# Patient Record
Sex: FEMALE | Race: WHITE | NOT HISPANIC OR LATINO | Employment: PART TIME | URBAN - METROPOLITAN AREA
[De-identification: names, ages, dates, MRNs, and addresses within clinical notes are randomized per-mention and may not be internally consistent; named-entity substitution may affect disease eponyms.]

---

## 2017-01-03 ENCOUNTER — PATIENT MESSAGE (OUTPATIENT)
Dept: FAMILY MEDICINE | Facility: CLINIC | Age: 67
End: 2017-01-03

## 2017-01-03 DIAGNOSIS — M25.50 ARTHRALGIA, UNSPECIFIED JOINT: Primary | ICD-10-CM

## 2017-01-03 NOTE — TELEPHONE ENCOUNTER
Patient is wanting to be referred to rheumatology. Have attached and pended referral for your signature.   Thank you  Corrie Espinoza LPN

## 2017-01-04 ENCOUNTER — OFFICE VISIT (OUTPATIENT)
Dept: SURGERY | Facility: CLINIC | Age: 67
End: 2017-01-04
Payer: MEDICARE

## 2017-01-04 VITALS
BODY MASS INDEX: 29.49 KG/M2 | HEIGHT: 63 IN | HEART RATE: 67 BPM | DIASTOLIC BLOOD PRESSURE: 74 MMHG | TEMPERATURE: 97 F | WEIGHT: 166.44 LBS | SYSTOLIC BLOOD PRESSURE: 173 MMHG

## 2017-01-04 DIAGNOSIS — R15.9 FECAL INCONTINENCE: Primary | ICD-10-CM

## 2017-01-04 PROCEDURE — 1160F RVW MEDS BY RX/DR IN RCRD: CPT | Mod: S$GLB,,, | Performed by: SURGERY

## 2017-01-04 PROCEDURE — 3078F DIAST BP <80 MM HG: CPT | Mod: S$GLB,,, | Performed by: SURGERY

## 2017-01-04 PROCEDURE — 3077F SYST BP >= 140 MM HG: CPT | Mod: S$GLB,,, | Performed by: SURGERY

## 2017-01-04 PROCEDURE — 99999 PR PBB SHADOW E&M-EST. PATIENT-LVL III: CPT | Mod: PBBFAC,,, | Performed by: SURGERY

## 2017-01-04 PROCEDURE — 1126F AMNT PAIN NOTED NONE PRSNT: CPT | Mod: S$GLB,,, | Performed by: SURGERY

## 2017-01-04 PROCEDURE — 1157F ADVNC CARE PLAN IN RCRD: CPT | Mod: S$GLB,,, | Performed by: SURGERY

## 2017-01-04 PROCEDURE — 99204 OFFICE O/P NEW MOD 45 MIN: CPT | Mod: S$GLB,,, | Performed by: SURGERY

## 2017-01-04 PROCEDURE — 1159F MED LIST DOCD IN RCRD: CPT | Mod: S$GLB,,, | Performed by: SURGERY

## 2017-01-04 NOTE — LETTER
January 4, 2017      Mayco Shultz MD  [address] [facility] [address] [place] [address]           [facility]  [address] [facility] [place] [address]  Phone: [phone]          Patient: Lyudmila Neri   MR Number: [id number]   YOB: 1950   Date of Visit: 1/4/2017       Dear Dr. Mayco Shultz:    Thank you for referring Lyudmila Neri to me for evaluation. Attached you will find relevant portions of my assessment and plan of care.    If you have questions, please do not hesitate to call me. I look forward to following Lyudmila Neri along with you.    Sincerely,    Willie Foley MD    Enclosure  CC:  No Recipients    If you would like to receive this communication electronically, please contact [email or web address] or [phone] to request more information on [email or web address] Link access.    For providers and/or their staff who would like to refer a patient to [facility], please contact us through our one-stop-shop provider referral line, [facility] , at [phone].    If you feel you have received this communication in error or would no longer like to receive these types of communications, please e-mail [email or web address]

## 2017-01-04 NOTE — MR AVS SNAPSHOT
[facility]  [address]  [place] [address]  Phone: [phone]                  Lyudmila Neri   2017 2:00 PM   Office Visit    Description:  Female : 1950   Provider:  Willie Foley MD   Department:  [facility]           Reason for Visit     Consult           Diagnoses this Visit        Comments    Fecal incontinence    -  Primary            To Do List           Future Appointments        Provider Department Dept Phone    2017 8:45 AM LAB, [place] [facility] [phone]    2017 9:20 AM Aleksandr Das NP [facility] Hematology Oncology [phone]      Goals (5 Years of Data)     None      [facility] Call     [facility] Nurse Care Line -  Assistance  Registered nurses in the [facility] provide clinical advisement, health education, appointment booking, and other advisory services.  Call for this free service at [phone].             Medications           Message regarding Medications     Verify the changes and/or additions to your medication regime listed below are the same as discussed with your clinician today.  If any of these changes or additions are incorrect, please notify your healthcare provider.             Verify that the below list of medications is an accurate representation of the medications you are currently taking.  If none reported, the list may be blank. If incorrect, please contact your healthcare provider. Carry this list with you in case of emergency.           Current Medications     amlodipine (NORVASC) 5 MG tablet Take 1 tablet (5 mg total) by mouth once daily.    aspirin (ECOTRIN) 81 MG EC tablet Take 81 mg by mouth once daily.    FOLIC ACID/MULTIVIT-MIN/LUTEIN (CENTRUM SILVER ORAL) Take by mouth.    losartan (COZAAR) 50 MG tablet Take 50 mg by mouth once daily.    sertraline (ZOLOFT) 100 MG tablet Take 1 tablet (100 mg total) by mouth once daily.    TUMS 200 mg  "calcium (500 mg) chewable tablet     celecoxib (CELEBREX) 200 MG capsule TAKE ONE CAPSULE BY MOUTH TWICE DAILY    clobetasol (TEMOVATE) 0.05 % external solution Apply topically 2 (two) times daily.           Clinical Reference Information           Vital Signs - Last Recorded  Most recent update: 1/4/2017  2:02 PM by Kailash Mendez MA    BP Pulse Temp Ht Wt BMI    (!) 173/74 67 97.4 °F (36.3 °C) (Oral) 5' 3" (1.6 m) 75.5 kg (166 lb 7.2 oz) 29.48 kg/m2      Blood Pressure          Most Recent Value    BP  (!)  173/74      Allergies as of 1/4/2017     Codeine      Immunizations Administered on Date of Encounter - 1/4/2017     None      "

## 2017-01-04 NOTE — PROGRESS NOTES
Subjective:       Patient ID: Lyudmila Neri is a 66 y.o. female.    Chief Complaint: Consult (Bowel Incontinence)    HPI  Pleasant 65 yo F referred to me for evaluation of fecal incontinence.  Sh enotes that over the last 3-4 months she has been having occasional episodes of incontinence to stool.  Notes that it is very sporadic some weeks never occurring and some twice a week.  She denies abdominal pain.  No n/v.  No blood per rectum.  No rectal pain.  She notes that her stools are always loose.  She has had one vaginal delivery requiring episotomy.  She had a normal cscope 3 years ago.  Pt is otherwise without complaint.   \  Review of Systems   Constitutional: Negative for activity change, appetite change, fever and unexpected weight change.   Respiratory: Negative for chest tightness, shortness of breath and wheezing.    Cardiovascular: Negative for chest pain.   Gastrointestinal: Positive for diarrhea. Negative for abdominal distention, abdominal pain, anal bleeding, blood in stool, constipation, nausea, rectal pain and vomiting.   Genitourinary: Negative for difficulty urinating, dysuria and frequency.   Musculoskeletal: Negative for arthralgias and neck stiffness.   Skin: Negative for wound.   Neurological: Negative for dizziness.   Hematological: Negative for adenopathy. Does not bruise/bleed easily.   Psychiatric/Behavioral: Negative for agitation and decreased concentration.       Objective:      Physical Exam   Constitutional: She is oriented to person, place, and time. She appears well-developed and well-nourished.   HENT:   Head: Normocephalic and atraumatic.   Eyes: Pupils are equal, round, and reactive to light.   Neck: Normal range of motion. Neck supple. No tracheal deviation present. No thyromegaly present.   Cardiovascular: Normal rate, regular rhythm and normal heart sounds.    No murmur heard.  Pulmonary/Chest: Effort normal and breath sounds normal. She exhibits no tenderness.   Abdominal:  Soft. Bowel sounds are normal. She exhibits no distension, no abdominal bruit, no pulsatile midline mass and no mass. There is no hepatosplenomegaly. There is no tenderness. There is no rigidity, no rebound, no guarding, no tenderness at McBurney's point and negative Day's sign. No hernia. Hernia confirmed negative in the ventral area.   Genitourinary: Rectum normal.   Genitourinary Comments: Ext exam with no  Significant abnormality.  TEDDY with increased sphincter tone.  Tender on exam secondary to hypertonicity.  No masses.  Anoscopy unable to be performed.       Musculoskeletal: Normal range of motion.   Neurological: She is alert and oriented to person, place, and time.   Skin: Skin is warm. No rash noted. No erythema.   Psychiatric: She has a normal mood and affect. Her behavior is normal.   Vitals reviewed.      Assessment:     fecal incontinence.    No diagnosis found.    Plan:       D/ w pt.  Feel that most of her symptoms are related to looseness of stools.  I have recommended a bulking agent to take twice daily. R ecommended fibercon.  She will rTC In 6 weeks and if symptoms persist will need a cscope.

## 2017-01-10 ENCOUNTER — PATIENT MESSAGE (OUTPATIENT)
Dept: FAMILY MEDICINE | Facility: CLINIC | Age: 67
End: 2017-01-10

## 2017-01-10 DIAGNOSIS — I10 ESSENTIAL HYPERTENSION: ICD-10-CM

## 2017-01-11 RX ORDER — LOSARTAN POTASSIUM 100 MG/1
100 TABLET ORAL DAILY
Qty: 90 TABLET | Refills: 3 | Status: SHIPPED | OUTPATIENT
Start: 2017-01-11 | End: 2018-11-27 | Stop reason: ALTCHOICE

## 2017-01-11 RX ORDER — HYDROCHLOROTHIAZIDE 12.5 MG/1
12.5 CAPSULE ORAL DAILY
Qty: 90 CAPSULE | Refills: 3 | Status: SHIPPED | OUTPATIENT
Start: 2017-01-11 | End: 2018-08-07 | Stop reason: SDUPTHER

## 2017-01-11 RX ORDER — AMLODIPINE BESYLATE 5 MG/1
5 TABLET ORAL DAILY
Qty: 90 TABLET | Refills: 3 | Status: SHIPPED | OUTPATIENT
Start: 2017-01-11 | End: 2018-05-09 | Stop reason: SDUPTHER

## 2017-02-02 ENCOUNTER — PATIENT MESSAGE (OUTPATIENT)
Dept: FAMILY MEDICINE | Facility: CLINIC | Age: 67
End: 2017-02-02

## 2017-03-01 ENCOUNTER — OFFICE VISIT (OUTPATIENT)
Dept: HEMATOLOGY/ONCOLOGY | Facility: CLINIC | Age: 67
End: 2017-03-01
Payer: MEDICARE

## 2017-03-01 ENCOUNTER — LAB VISIT (OUTPATIENT)
Dept: LAB | Facility: HOSPITAL | Age: 67
End: 2017-03-01
Attending: NURSE PRACTITIONER
Payer: MEDICARE

## 2017-03-01 ENCOUNTER — TELEPHONE (OUTPATIENT)
Dept: HEMATOLOGY/ONCOLOGY | Facility: CLINIC | Age: 67
End: 2017-03-01

## 2017-03-01 VITALS
HEIGHT: 63 IN | WEIGHT: 168 LBS | HEART RATE: 59 BPM | SYSTOLIC BLOOD PRESSURE: 126 MMHG | DIASTOLIC BLOOD PRESSURE: 67 MMHG | TEMPERATURE: 98 F | BODY MASS INDEX: 29.77 KG/M2 | RESPIRATION RATE: 18 BRPM

## 2017-03-01 DIAGNOSIS — C49.A2 GASTROINTESTINAL STROMAL TUMOR (GIST) OF STOMACH: Primary | ICD-10-CM

## 2017-03-01 DIAGNOSIS — C49.A0 GIST, MALIGNANT: ICD-10-CM

## 2017-03-01 LAB
ALBUMIN SERPL BCP-MCNC: 3.6 G/DL
ALP SERPL-CCNC: 47 U/L
ALT SERPL W/O P-5'-P-CCNC: 25 U/L
ANION GAP SERPL CALC-SCNC: 7 MMOL/L
AST SERPL-CCNC: 20 U/L
BASOPHILS # BLD AUTO: 0.05 K/UL
BASOPHILS NFR BLD: 0.7 %
BILIRUB SERPL-MCNC: 0.5 MG/DL
BUN SERPL-MCNC: 17 MG/DL
CALCIUM SERPL-MCNC: 8.8 MG/DL
CHLORIDE SERPL-SCNC: 102 MMOL/L
CO2 SERPL-SCNC: 31 MMOL/L
CREAT SERPL-MCNC: 0.77 MG/DL
DIFFERENTIAL METHOD: NORMAL
EOSINOPHIL # BLD AUTO: 0.2 K/UL
EOSINOPHIL NFR BLD: 2.4 %
ERYTHROCYTE [DISTWIDTH] IN BLOOD BY AUTOMATED COUNT: 12.8 %
EST. GFR  (AFRICAN AMERICAN): >60 ML/MIN/1.73 M^2
EST. GFR  (NON AFRICAN AMERICAN): >60 ML/MIN/1.73 M^2
GLUCOSE SERPL-MCNC: 93 MG/DL
HCT VFR BLD AUTO: 38.9 %
HGB BLD-MCNC: 12.8 G/DL
LDH SERPL L TO P-CCNC: 410 U/L
LYMPHOCYTES # BLD AUTO: 2.1 K/UL
LYMPHOCYTES NFR BLD: 28.6 %
MCH RBC QN AUTO: 30.6 PG
MCHC RBC AUTO-ENTMCNC: 32.9 %
MCV RBC AUTO: 93 FL
MONOCYTES # BLD AUTO: 0.7 K/UL
MONOCYTES NFR BLD: 8.9 %
NEUTROPHILS # BLD AUTO: 4.4 K/UL
NEUTROPHILS NFR BLD: 59.4 %
NRBC BLD-RTO: 0 /100 WBC
PLATELET # BLD AUTO: 221 K/UL
PMV BLD AUTO: 12.1 FL
POTASSIUM SERPL-SCNC: 4.6 MMOL/L
PROT SERPL-MCNC: 7 G/DL
RBC # BLD AUTO: 4.18 M/UL
SODIUM SERPL-SCNC: 140 MMOL/L
WBC # BLD AUTO: 7.44 K/UL

## 2017-03-01 PROCEDURE — 83615 LACTATE (LD) (LDH) ENZYME: CPT

## 2017-03-01 PROCEDURE — 1157F ADVNC CARE PLAN IN RCRD: CPT | Mod: S$GLB,,, | Performed by: NURSE PRACTITIONER

## 2017-03-01 PROCEDURE — 99213 OFFICE O/P EST LOW 20 MIN: CPT | Mod: S$GLB,,, | Performed by: NURSE PRACTITIONER

## 2017-03-01 PROCEDURE — 1160F RVW MEDS BY RX/DR IN RCRD: CPT | Mod: S$GLB,,, | Performed by: NURSE PRACTITIONER

## 2017-03-01 PROCEDURE — 85025 COMPLETE CBC W/AUTO DIFF WBC: CPT

## 2017-03-01 PROCEDURE — 1126F AMNT PAIN NOTED NONE PRSNT: CPT | Mod: S$GLB,,, | Performed by: NURSE PRACTITIONER

## 2017-03-01 PROCEDURE — 99999 PR PBB SHADOW E&M-EST. PATIENT-LVL IV: CPT | Mod: PBBFAC,,, | Performed by: NURSE PRACTITIONER

## 2017-03-01 PROCEDURE — 3078F DIAST BP <80 MM HG: CPT | Mod: S$GLB,,, | Performed by: NURSE PRACTITIONER

## 2017-03-01 PROCEDURE — 80053 COMPREHEN METABOLIC PANEL: CPT

## 2017-03-01 PROCEDURE — 1159F MED LIST DOCD IN RCRD: CPT | Mod: S$GLB,,, | Performed by: NURSE PRACTITIONER

## 2017-03-01 PROCEDURE — 3074F SYST BP LT 130 MM HG: CPT | Mod: S$GLB,,, | Performed by: NURSE PRACTITIONER

## 2017-03-01 NOTE — MR AVS SNAPSHOT
[place] [id number]  [facility] [address]  Phone: [phone]  Fax: [phone]                  Lyudmila Neri   3/1/2017 10:20 AM   Office Visit    Description:  Female : 1950   Provider:  Aleksandr Das NP   Department:  [facility]           Diagnoses this Visit        Comments    Gastrointestinal stromal tumor (GIST) of stomach    -  Primary            To Do List           Future Appointments        Provider Department Dept Phone    3/2/2017 8:00 AM [facility] XR3 [facility] [phone]    3/14/2017 9:00 AM Sophie Banerjee DO [facility] Rheumatology [phone]    2018 10:00 AM LAB, [place] [facility] [phone]    2018 10:15 AM NS XR2 [facility] [phone]    3/1/2018 1:40 PM Aleksandr Das NP [facility] [phone]      Goals (5 Years of Data)     None      Follow-Up and Disposition     Return in about 1 year (around 3/1/2018) for ANNUAL evaluation with interval CBC, CMP, LDH,  CXR.    Follow-up and Disposition History      [facility]     [facility] Care [facility] - 24/7 Assistance  Registered nurses in the [facility] provide clinical advisement, health education, appointment booking, and other advisory services.  Call for this free service at [phone].             Medications           Message regarding Medications     Verify the changes and/or additions to your medication regime listed below are the same as discussed with your clinician today.  If any of these changes or additions are incorrect, please notify your healthcare provider.             Verify that the below list of medications is an accurate representation of the medications you are currently taking.  If none reported, the list may be blank. If incorrect, please contact your healthcare provider. Carry this list with you in case of emergency.           Current  Medications     amlodipine (NORVASC) 5 MG tablet Take 1 tablet (5 mg total) by mouth once daily.    aspirin (ECOTRIN) 81 MG EC tablet Take 81 mg by mouth once daily.    celecoxib (CELEBREX) 200 MG capsule TAKE ONE CAPSULE BY MOUTH TWICE DAILY    clobetasol (TEMOVATE) 0.05 % external solution Apply topically as needed.     FOLIC ACID/MULTIVIT-MIN/LUTEIN (CENTRUM SILVER ORAL) Take by mouth.    hydrochlorothiazide (MICROZIDE) 12.5 mg capsule Take 1 capsule (12.5 mg total) by mouth once daily.    losartan (COZAAR) 100 MG tablet Take 1 tablet (100 mg total) by mouth once daily.    sertraline (ZOLOFT) 100 MG tablet Take 1 tablet (100 mg total) by mouth once daily.    TUMS 200 mg calcium (500 mg) chewable tablet            Clinical Reference Information           Your Vitals Were     BP                   126/67           Blood Pressure          Most Recent Value    BP  126/67      Allergies as of 3/1/2017     Codeine      Immunizations Administered on Date of Encounter - 3/1/2017     None      Orders Placed During Today's Visit     Future Labs/Procedures Expected by Expires    CBC w/ DIFF  3/1/2017 4/30/2018    CMP  3/1/2017 4/30/2018    CXR  3/1/2017 3/1/2018    CXR  3/1/2017 3/1/2018    LDH  3/1/2017 4/30/2018      Language Assistance Services     ATTENTION: Language assistance services are available, free of charge. Please call [phone].      ATENCIÓN: Si habla español, tiene a carrillo disposición servicios gratuitos de asistencia lingüística. Llame al [phone].     [facility] Ý: N?u b?n nói Ti?ng Vi?t, có các d?ch v? h? tr? ngôn ng? mi?n phí dành cho b?n. G?i s? [phone].         [facility] complies with applicable Federal civil rights laws and does not discriminate on the basis of race, color, national origin, age, disability, or sex.

## 2017-03-01 NOTE — PROGRESS NOTES
HISTORY OF PRESENT ILLNESS:  This is a 67-year-old white female known to Dr. Pro for resected low-grade GIST (December 2011).  She presents to the clinic   today for her approximate 62-month post-therapy evaluation.  She denies any   difficulties with fevers, chills, abdominal discomfort/bloating, nausea,   vomiting, constipation, diarrhea, painful lymphadenopathy, drenching night   sweats, bleeding, etc.  No other new complaints or pertinent findings on a   14-point review of systems.    PHYSICAL EXAMINATION:  GENERAL:  Well-developed, well-nourished white female in no acute distress.    Alert and oriented x3.  VITAL SIGNS:  Weight 167.9 pounds (gain of 2 pounds in six months), /67,   pulse 95, respiration 18, temperature 98.0.  HEENT:  Normocephalic, atraumatic.  Oral mucosa pink and moist.  Lips without   lesions.  Tongue midline.  Oropharynx clear.  Nonicteric sclerae.   NECK:  Supple.  No adenopathy.                                               HEART:  Regular rate and rhythm without murmur, gallop or rub.               LUNGS:  Clear to auscultation bilaterally.                                   ABDOMEN:  Soft, nontender and nondistended with positive normoactive bowel   sounds.  No hepatosplenomegaly.                                              EXTREMITIES:  No cyanosis, clubbing or edema.  Distal pulses are intact.     AXILLAE AND GROIN:  No palpable pathologic lymphadenopathy is appreciated.    LABORATORY:  WBC 7.44, hemoglobin 12.8, hematocrit 38.9, platelets 221.    Unremarkable differential.  Chemistry:  Sodium 140, potassium 4.6, chloride 102,   CO2 of 31, BUN 17, creatinine 0.77, EGFR > 60, glucose 93, calcium 8.8.  Alk phos,   liver enzymes and LDH within normal limits.    RADIOLOGY:  Chest x-ray to be scheduled and phone review.    IMPRESSION:  Completely resected gastrointestinal stromal tumor -- no evidence   of disease.    PLAN:  As the patient has completed 5 years of surveillance,  we will have the   patient return in one year with interval CBC, CMP, LDH and chest x-ray prior.    Assessment/plan reviewed and approved by Dr. Pro.      ROSA/SHANTI  dd: 03/01/2017 10:40:10 (CST)  td: 03/01/2017 17:55:14 (CST)  Doc ID   #[id number]  Job ID #[id number]    Addendum:  CXR 03/02/17 WNL; posted results to [facility].

## 2017-03-02 ENCOUNTER — HOSPITAL ENCOUNTER (OUTPATIENT)
Dept: RADIOLOGY | Facility: HOSPITAL | Age: 67
Discharge: HOME | End: 2017-03-02
Attending: NURSE PRACTITIONER
Payer: MEDICARE

## 2017-03-02 DIAGNOSIS — C49.A2 GASTROINTESTINAL STROMAL TUMOR (GIST) OF STOMACH: ICD-10-CM

## 2017-03-02 PROCEDURE — 71020 XR CHEST PA AND LATERAL: CPT | Mod: TC,PO

## 2017-03-02 PROCEDURE — 71020 XR CHEST PA AND LATERAL: CPT | Mod: 26,,, | Performed by: RADIOLOGY

## 2017-03-14 ENCOUNTER — INITIAL CONSULT (OUTPATIENT)
Dept: RHEUMATOLOGY | Facility: CLINIC | Age: 67
End: 2017-03-14
Payer: MEDICARE

## 2017-03-14 VITALS
HEART RATE: 72 BPM | BODY MASS INDEX: 30 KG/M2 | WEIGHT: 169.31 LBS | DIASTOLIC BLOOD PRESSURE: 90 MMHG | SYSTOLIC BLOOD PRESSURE: 140 MMHG | HEIGHT: 63 IN

## 2017-03-14 DIAGNOSIS — L40.9 PSORIASIS: ICD-10-CM

## 2017-03-14 DIAGNOSIS — M13.0 POLYARTHRITIS: Primary | ICD-10-CM

## 2017-03-14 PROCEDURE — 99999 PR PBB SHADOW E&M-EST. PATIENT-LVL III: CPT | Mod: PBBFAC,,, | Performed by: INTERNAL MEDICINE

## 2017-03-14 PROCEDURE — 1157F ADVNC CARE PLAN IN RCRD: CPT | Mod: S$GLB,,, | Performed by: INTERNAL MEDICINE

## 2017-03-14 PROCEDURE — 99205 OFFICE O/P NEW HI 60 MIN: CPT | Mod: S$GLB,,, | Performed by: INTERNAL MEDICINE

## 2017-03-14 PROCEDURE — 1160F RVW MEDS BY RX/DR IN RCRD: CPT | Mod: S$GLB,,, | Performed by: INTERNAL MEDICINE

## 2017-03-14 PROCEDURE — 1159F MED LIST DOCD IN RCRD: CPT | Mod: S$GLB,,, | Performed by: INTERNAL MEDICINE

## 2017-03-14 PROCEDURE — 99499 UNLISTED E&M SERVICE: CPT | Mod: S$GLB,,, | Performed by: INTERNAL MEDICINE

## 2017-03-14 PROCEDURE — 3077F SYST BP >= 140 MM HG: CPT | Mod: S$GLB,,, | Performed by: INTERNAL MEDICINE

## 2017-03-14 PROCEDURE — 1125F AMNT PAIN NOTED PAIN PRSNT: CPT | Mod: S$GLB,,, | Performed by: INTERNAL MEDICINE

## 2017-03-14 PROCEDURE — 3080F DIAST BP >= 90 MM HG: CPT | Mod: S$GLB,,, | Performed by: INTERNAL MEDICINE

## 2017-03-14 ASSESSMENT — ROUTINE ASSESSMENT OF PATIENT INDEX DATA (RAPID3)
WHEN YOU AWAKENED IN THE MORNING OVER THE LAST WEEK, PLEASE INDICATE THE AMOUNT OF TIME IT TAKES UNTIL YOU ARE AS LIMBER AS YOU WILL BE FOR THE DAY: AN HOUR
FATIGUE SCORE: 8
TOTAL RAPID3 SCORE: 3.55
PSYCHOLOGICAL DISTRESS SCORE: 3.3
PATIENT GLOBAL ASSESSMENT SCORE: 3
PAIN SCORE: 5
AM STIFFNESS SCORE: 1, YES
MDHAQ FUNCTION SCORE: .8

## 2017-03-14 NOTE — LETTER
March 14, 2017      Mayco Shultz MD  [address] [facility] [address] [place] [address]           [facility]  [address] [facility] [place] [address]  Phone: [phone]  Fax: [phone]          Patient: Lyudmila Neri   MR Number: [id number]   YOB: 1950   Date of Visit: 3/14/2017       Dear Dr. Mayco Shultz:    Thank you for referring Lyudmila Neri to me for evaluation. Attached you will find relevant portions of my assessment and plan of care.    If you have questions, please do not hesitate to call me. I look forward to following Lyudmila Neri along with you.    Sincerely,    Sophie Banerjee, DO    Enclosure  CC:  No Recipients    If you would like to receive this communication electronically, please contact [email or web address] or [phone] to request more information on [employer] Link access.    For providers and/or their staff who would like to refer a patient to [facility], please contact us through our one-stop-shop provider referral line, [facility], at [phone].    If you feel you have received this communication in error or would no longer like to receive these types of communications, please e-mail [email or web address]

## 2017-03-14 NOTE — PROGRESS NOTES
Subjective:          Chief Complaint: Lyudmila Neri is a 67 y.o. female who had concerns including Disease Management.    HPI:    Patient is a 67-year-old female referred by Dr. Shultz for various arthralgias.  As a history of nonerosive erosive reflux gastritis as well as GIST sected 2011 follows with Dr. Pro.  Recent serologies rheumatoid factor negative, sedimentation rate within normal limits, SINDI negative, C-reactive protein is slightly elevated.  Affected joints: knees, shoulders cervical spine (hx of fusion), hips, hands are stiff in the CMC and MCP and PIP, feet. Right ankle with known fx and ORIF.   Stifffness in AM 45 min with hot shower. Morning is the worst time for many joints. By the end of the day her legs aches after work.   No dactylitis, no known other joint swelling. No IBD. She notes drys eyes, no scleritis, episcleritis  She did use aleve which helps, meloxicam not really. Celebrex does help but cannot use BID due to HTN. Did recently have BP meds adjusted.  Is having relief but not complete. No GI upset.   She has sensitivity to soft tissue pain.  She has hx of Psoriasis that is controlled for at least 10 years-scalp predominant but diffusely.   A new well marginated erosion present at the radial aspect of the base of the right fourth proximal phalanx joint space narrowing in the third fourth MCP and the left third MCP joint degenerative changes otherwise.    REVIEW OF SYSTEMS:    Review of Systems   Constitutional: Positive for malaise/fatigue. Negative for fever and weight loss.   HENT: Negative for sore throat.    Eyes: Negative for double vision, photophobia and redness.   Respiratory: Negative for cough, shortness of breath and wheezing.    Cardiovascular: Negative for chest pain, palpitations and orthopnea.   Gastrointestinal: Negative for abdominal pain, constipation and diarrhea.   Genitourinary: Negative for dysuria, hematuria and urgency.   Musculoskeletal: Positive for joint  pain and myalgias. Negative for back pain.   Skin: Negative for rash.   Neurological: Negative for dizziness, tingling, focal weakness and headaches.   Endo/Heme/Allergies: Does not bruise/bleed easily.   Psychiatric/Behavioral: Negative for depression, hallucinations and suicidal ideas.               Objective:            Past Medical History:   Diagnosis Date    Arthritis     osteoarthritis    Blood transfusion     Cancer 2011    stromal tumor, stomach    Depression     Disorder of kidney and ureter     CKD 2    GERD (gastroesophageal reflux disease)     GIST (gastrointestinal stromal tumor), malignant     H. pylori infection     Hypertension     KATHY (obstructive sleep apnea) 6/24/2013    Psoriasis 6/24/2013    Trouble in sleeping     arthritic pain wakes her up    Urinary incontinence     stress incontinence     Family History   Problem Relation Age of Onset    Cancer Mother      colon    Heart disease Mother     Arthritis Mother      osteoarthritis    COPD Mother     Hyperlipidemia Mother     Hypertension Mother     Kidney disease Mother     Stroke Mother     Cancer Father      brain and lung    Arthritis Father     COPD Sister     Depression Daughter     Arthritis Maternal Aunt      rheumatoid arthritis    Heart disease Maternal Uncle     Early death Maternal Uncle      in 50s due to heart disease    Arthritis Paternal Aunt      rheuamtoid arthritis    Heart disease Maternal Grandfather     Arthritis Paternal Grandmother      rheumatoid arthritis    Diabetes Paternal Grandmother     Diabetes Cousin     Heart disease Cousin      Social History   Substance Use Topics    Smoking status: Never Smoker    Smokeless tobacco: Never Used    Alcohol use No         Current Outpatient Prescriptions on File Prior to Visit   Medication Sig Dispense Refill    amlodipine (NORVASC) 5 MG tablet Take 1 tablet (5 mg total) by mouth once daily. 90 tablet 3    aspirin (ECOTRIN) 81 MG EC tablet  Take 81 mg by mouth once daily.      celecoxib (CELEBREX) 200 MG capsule TAKE ONE CAPSULE BY MOUTH TWICE DAILY 180 capsule 3    FOLIC ACID/MULTIVIT-MIN/LUTEIN (CENTRUM SILVER ORAL) Take by mouth.      hydrochlorothiazide (MICROZIDE) 12.5 mg capsule Take 1 capsule (12.5 mg total) by mouth once daily. 90 capsule 3    losartan (COZAAR) 100 MG tablet Take 1 tablet (100 mg total) by mouth once daily. 90 tablet 3    sertraline (ZOLOFT) 100 MG tablet Take 1 tablet (100 mg total) by mouth once daily. 30 tablet 11    TUMS 200 mg calcium (500 mg) chewable tablet       clobetasol (TEMOVATE) 0.05 % external solution Apply topically as needed.        No current facility-administered medications on file prior to visit.        Vitals:    03/14/17 0933   BP: (!) 140/90   Pulse: 72       Physical Exam:    Physical Exam   Constitutional: She appears well-developed and well-nourished.   HENT:   Nose: No septal deviation.   Mouth/Throat: Mucous membranes are normal. No oral lesions.   Eyes: Pupils are equal, round, and reactive to light. Right conjunctiva is not injected. Left conjunctiva is not injected.   Neck: No JVD present. No thyroid mass and no thyromegaly present.   Cardiovascular: Normal rate, regular rhythm and normal pulses.    No edema   Pulmonary/Chest: Effort normal and breath sounds normal.   Abdominal: Soft. Normal appearance. There is no hepatosplenomegaly.   Musculoskeletal:        Right shoulder: She exhibits tenderness. She exhibits normal range of motion and no swelling.        Left shoulder: She exhibits tenderness. She exhibits normal range of motion and no swelling.        Right elbow: She exhibits normal range of motion and no swelling. No tenderness found.        Left elbow: She exhibits normal range of motion and no swelling. No tenderness found.        Right wrist: She exhibits normal range of motion, no tenderness and no swelling.        Left wrist: She exhibits normal range of motion, no tenderness  and no swelling.        Right hip: She exhibits normal range of motion, normal strength and no swelling.        Left hip: She exhibits normal range of motion, no tenderness and no swelling.        Right knee: She exhibits normal range of motion and no swelling. Tenderness found.        Left knee: She exhibits normal range of motion and no swelling. Tenderness found.        Right ankle: She exhibits normal range of motion and no swelling. No tenderness.        Left ankle: She exhibits normal range of motion and no swelling. No tenderness.        Right hand: She exhibits tenderness.        Left hand: She exhibits tenderness.        Right foot: There is tenderness.        Left foot: There is tenderness.   2nd DIP b/l bony hypertrophy.   No active synovitis but tenderness at fingers, shoulders, knees and metatarsalgia.   No nodules of the achilles.    Lymphadenopathy:     She has no cervical adenopathy.     She has no axillary adenopathy.   Neurological: She has normal strength and normal reflexes.   Skin: Skin is dry and intact.   Psychiatric: She has a normal mood and affect.             Assessment:       Encounter Diagnoses   Name Primary?    Polyarthritis Yes    Psoriasis           Plan:        Polyarthritis    Psoriasis    Not quite meeting criteria for PsA but erosive change at MCP is concerning for inflammatory arthritis. CRP only slightly elevated, negative RF, personal hx of psoriasis long standing.   Now that BP meds increased try Celebrex 200mg BID watch BP  If not working going to try  Alternate NSAIDs with PPI and will   will consider MTX in future.   HCQ will flare psoriasis.  Return in about 8 weeks (around 5/9/2017).      Thank you for allowing me to participate in the care of this very pleasant patient.

## 2017-03-14 NOTE — MR AVS SNAPSHOT
[facility] Rheumatology  [address]  [facility] [address]  Phone: [phone]  Fax: [phone]                  Lyudmila Neri   3/14/2017 9:00 AM   Initial consult    Description:  Female : 1950   Provider:  Sophie Banerjee DO   Department:  [facility] - Rheumatology           Reason for Visit     Disease Management           Diagnoses this Visit        Comments    Polyarthritis    -  Primary     Psoriasis                To Do List           Future Appointments        Provider Department Dept Phone    2017 8:00 AM Sophie Banerjee DO [facility] Rheumatology [phone]    2018 10:00 AM LAB, [place] [facility] [phone]    2018 10:15 AM NS XR2 [facility] [phone]    3/1/2018 1:40 PM Aleksandr Das NP [facility] Hematology [phone]      Goals (5 Years of Data)     None      Follow-Up and Disposition     Return in about 8 weeks (around 2017).      [facility]     [facility] Nurse Care [facility] - 24/7 Assistance  Registered nurses in the [facility] provide clinical advisement, health education, appointment booking, and other advisory services.  Call for this free service at [phone].             Medications           Message regarding Medications     Verify the changes and/or additions to your medication regime listed below are the same as discussed with your clinician today.  If any of these changes or additions are incorrect, please notify your healthcare provider.             Verify that the below list of medications is an accurate representation of the medications you are currently taking.  If none reported, the list may be blank. If incorrect, please contact your healthcare provider. Carry this list with you in case of emergency.           Current Medications     amlodipine (NORVASC) 5 MG tablet Take 1 tablet (5 mg total) by mouth once daily.    aspirin (ECOTRIN) 81 MG EC tablet  "Take 81 mg by mouth once daily.    celecoxib (CELEBREX) 200 MG capsule TAKE ONE CAPSULE BY MOUTH TWICE DAILY    FOLIC ACID/MULTIVIT-MIN/LUTEIN (CENTRUM SILVER ORAL) Take by mouth.    hydrochlorothiazide (MICROZIDE) 12.5 mg capsule Take 1 capsule (12.5 mg total) by mouth once daily.    losartan (COZAAR) 100 MG tablet Take 1 tablet (100 mg total) by mouth once daily.    sertraline (ZOLOFT) 100 MG tablet Take 1 tablet (100 mg total) by mouth once daily.    TUMS 200 mg calcium (500 mg) chewable tablet     clobetasol (TEMOVATE) 0.05 % external solution Apply topically as needed.            Clinical Reference Information           Your Vitals Were     BP Pulse Height Weight BMI    140/90 72 5' 3" (1.6 m) 76.8 kg (169 lb 5 oz) 29.99 kg/m2      Blood Pressure          Most Recent Value    BP  (!)  140/90      Allergies as of 3/14/2017     Codeine      Immunizations Administered on Date of Encounter - 3/14/2017     None      Language Assistance Services     ATTENTION: Language assistance services are available, free of charge. Please call [phone].      ATENCIÓN: Si habla manuelañol, tiene a carrillo disposición servicios gratuitos de asistencia lingüística. Llame al [phone].     MATTHEW Ý: N?u b?n nói Ti?ng Vi?t, có các d?ch v? h? tr? ngôn ng? mi?n phí dành cho b?n. G?i s? [phone].         [facility] complies with applicable Federal civil rights laws and does not discriminate on the basis of race, color, national origin, age, disability, or sex.        "

## 2017-04-13 ENCOUNTER — OFFICE VISIT (OUTPATIENT)
Dept: PRIMARY CARE CLINIC | Facility: CLINIC | Age: 67
End: 2017-04-13
Payer: MEDICARE

## 2017-04-13 ENCOUNTER — TELEPHONE (OUTPATIENT)
Dept: FAMILY MEDICINE | Facility: CLINIC | Age: 67
End: 2017-04-13

## 2017-04-13 VITALS
SYSTOLIC BLOOD PRESSURE: 126 MMHG | DIASTOLIC BLOOD PRESSURE: 72 MMHG | WEIGHT: 165.81 LBS | HEART RATE: 65 BPM | BODY MASS INDEX: 29.38 KG/M2 | OXYGEN SATURATION: 95 % | TEMPERATURE: 98 F | HEIGHT: 63 IN

## 2017-04-13 DIAGNOSIS — L72.3 INFECTED SEBACEOUS CYST: Primary | ICD-10-CM

## 2017-04-13 DIAGNOSIS — L08.9 INFECTED SEBACEOUS CYST: Primary | ICD-10-CM

## 2017-04-13 PROCEDURE — 99999 PR PBB SHADOW E&M-EST. PATIENT-LVL IV: CPT | Mod: PBBFAC,,, | Performed by: NURSE PRACTITIONER

## 2017-04-13 PROCEDURE — 3074F SYST BP LT 130 MM HG: CPT | Mod: S$GLB,,, | Performed by: NURSE PRACTITIONER

## 2017-04-13 PROCEDURE — 99213 OFFICE O/P EST LOW 20 MIN: CPT | Mod: S$GLB,,, | Performed by: NURSE PRACTITIONER

## 2017-04-13 PROCEDURE — 3078F DIAST BP <80 MM HG: CPT | Mod: S$GLB,,, | Performed by: NURSE PRACTITIONER

## 2017-04-13 PROCEDURE — 1160F RVW MEDS BY RX/DR IN RCRD: CPT | Mod: S$GLB,,, | Performed by: NURSE PRACTITIONER

## 2017-04-13 PROCEDURE — 1159F MED LIST DOCD IN RCRD: CPT | Mod: S$GLB,,, | Performed by: NURSE PRACTITIONER

## 2017-04-13 PROCEDURE — 1157F ADVNC CARE PLAN IN RCRD: CPT | Mod: S$GLB,,, | Performed by: NURSE PRACTITIONER

## 2017-04-13 PROCEDURE — 1125F AMNT PAIN NOTED PAIN PRSNT: CPT | Mod: S$GLB,,, | Performed by: NURSE PRACTITIONER

## 2017-04-13 RX ORDER — SULFAMETHOXAZOLE AND TRIMETHOPRIM 800; 160 MG/1; MG/1
1 TABLET ORAL 2 TIMES DAILY
Qty: 20 TABLET | Refills: 0 | Status: SHIPPED | OUTPATIENT
Start: 2017-04-13 | End: 2017-04-24 | Stop reason: ALTCHOICE

## 2017-04-13 NOTE — Clinical Note
Mayco Shultz MD,  I saw your patient today in the [facility].  If you have any questions, please do not hesitate to contact me.  Thank you!  HELIO Simpson

## 2017-04-13 NOTE — TELEPHONE ENCOUNTER
----- Message from Molina COSME Frisard sent at 4/13/2017  1:53 PM CDT -----  Contact: same  Patient called in and requested a message be sent regarding a boil she has on her neck.  Patient has history of staph outbreaks.  Patient wanted to see if Dr. Shultz wanted to start her on an antibiotic or could see her on Monday 4/17/17.  Patient stated she has had this for about 5 days.  Patient has been using warm compresses and Bactraban.    Patient call back number is [phone]

## 2017-04-13 NOTE — PATIENT INSTRUCTIONS
Your symptoms and exam today are consistent with infected sebaceous cyst.    Take the antibiotic Bactrim.  Consider taking probiotic or eating a yogurt with active culture to prevent stomach upset while on the antibiotic.    Topical bactroban ointment twice a day.    Warm compresses through out the day until improved.    If you are not better in 3 days, if worse or you have concerns or questions, please do not hesitate to call.  You can reach us at [phone] Monday through Friday (except holidays) 12 nooon to 6 p.m.    Evenings and weekends [facility] is available if symptoms worsen or fail to improve.  When you call the number, a registered nurse answers and takes your information.  The nurse can look at your medical record and make recommendations or call the on call physician, if warranted.     [facility] Urgent care is also available.    If you get fever, go to the ER.    Thank you for using the [facility]!    RUKHSANA Simpson, CNP, FNP-BC  [facility]  [facility]

## 2017-04-13 NOTE — TELEPHONE ENCOUNTER
Have her seen at [facility] or [facility] with Zabrina

## 2017-04-13 NOTE — MR AVS SNAPSHOT
[facility]  [address]  [place] [address]  Phone: [phone]                  Lyudmila WELLS Daron   2017 5:00 PM   Office Visit    Description:  Female : 1950   Provider:  Lyudmila Monsalve NP   Department:  [facility]           Reason for Visit     Recurrent Skin Infections           Diagnoses this Visit        Comments    Infected sebaceous cyst    -  Primary            To Do List           Future Appointments        Provider Department Dept Phone    2017 8:00 AM Sophie Banerjee,  [facility] Rheumatology [phone]    2018 10:00 AM LAB, [place] [facility] [phone]    2018 10:15 AM [facility] XR2 [facility] [phone]    3/1/2018 1:40 PM Aleksandr Das NP [facility] Hematology [phone]      Goals (5 Years of Data)     None       These Medications        Disp Refills Start End    sulfamethoxazole-trimethoprim 800-160mg (BACTRIM DS) 800-160 mg Tab 20 tablet 0 2017    Take 1 tablet by mouth 2 (two) times daily. - Oral    Pharmacy: [facility] [address] [place] [address] AT On license of [facility] & [address] #: [phone]         [facility]     [facility] Care [facility] -  Assistance  Unless otherwise directed by your provider, please contact [facility], our nurse care line that is available for  assistance.     Registered nurses in the [facility] provide: appointment scheduling, clinical advisement, health education, and other advisory services.  Call: [phone] (toll free)               Medications           Message regarding Medications     Verify the changes and/or additions to your medication regime listed below are the same as discussed with your clinician today.  If any of these changes or additions are incorrect, please notify your healthcare provider.        START taking these NEW medications         "Refills    sulfamethoxazole-trimethoprim 800-160mg (BACTRIM DS) 800-160 mg Tab 0    Sig: Take 1 tablet by mouth 2 (two) times daily.    Class: Normal    Route: Oral           Verify that the below list of medications is an accurate representation of the medications you are currently taking.  If none reported, the list may be blank. If incorrect, please contact your healthcare provider. Carry this list with you in case of emergency.           Current Medications     amlodipine (NORVASC) 5 MG tablet Take 1 tablet (5 mg total) by mouth once daily.    aspirin (ECOTRIN) 81 MG EC tablet Take 81 mg by mouth once daily.    celecoxib (CELEBREX) 200 MG capsule TAKE ONE CAPSULE BY MOUTH TWICE DAILY    clobetasol (TEMOVATE) 0.05 % external solution Apply topically as needed.     FOLIC ACID/MULTIVIT-MIN/LUTEIN (CENTRUM SILVER ORAL) Take by mouth.    hydrochlorothiazide (MICROZIDE) 12.5 mg capsule Take 1 capsule (12.5 mg total) by mouth once daily.    losartan (COZAAR) 100 MG tablet Take 1 tablet (100 mg total) by mouth once daily.    sertraline (ZOLOFT) 100 MG tablet Take 1 tablet (100 mg total) by mouth once daily.    sulfamethoxazole-trimethoprim 800-160mg (BACTRIM DS) 800-160 mg Tab Take 1 tablet by mouth 2 (two) times daily.    TUMS 200 mg calcium (500 mg) chewable tablet            Clinical Reference Information           Your Vitals Were     BP Pulse Temp Height Weight SpO2    126/72 65 97.7 °F (36.5 °C) 5' 3" (1.6 m) 75.2 kg (165 lb 12.6 oz) 95%    BMI                29.37 kg/m2          Blood Pressure          Most Recent Value    BP  126/72      Allergies as of 4/13/2017     Codeine      Immunizations Administered on Date of Encounter - 4/13/2017     None      Instructions    Your symptoms and exam today are consistent with infected sebaceous cyst.    Take the antibiotic Bactrim.  Consider taking probiotic or eating a yogurt with active culture to prevent stomach upset while on the antibiotic.    Topical bactroban " ointment twice a day.    Warm compresses through out the day until improved.    If you are not better in 3 days, if worse or you have concerns or questions, please do not hesitate to call.  You can reach us at [phone] Monday through Friday (except holidays) 12 nooon to 6 p.m.    Evenings and weekends [facility] is available if symptoms worsen or fail to improve.  When you call the number, a registered nurse answers and takes your information.  The nurse can look at your medical record and make recommendations or call the on call physician, if warranted.     [facility] Urgent care is also available.    If you get fever, go to the ER.    Thank you for using the [facility]!    Zabrina Bello, RUKHSANA, CNP, FNP-BC  [facility]  [facility]         Language Assistance Services     ATTENTION: Language assistance services are available, free of charge. Please call [phone].      ATENCIÓN: Si habla español, tiene a carrillo disposición servicios gratuitos de asistencia lingüística. Llame al [phone].     [facility] Ý: N?u b?n nói Ti?ng Vi?t, có các d?ch v? h? tr? ngôn ng? mi?n phí dành cho b?n. G?i s? [phone].         [facility] complies with applicable Federal civil rights laws and does not discriminate on the basis of race, color, national origin, age, disability, or sex.

## 2017-04-13 NOTE — TELEPHONE ENCOUNTER
Spoke to patient and states that she has a boil on her neck and it seems to have gotten worse overnight. States she has been using warm compresses and putting Bactroban on it. Describes it a localized tenderness. Pt is requesting an antibiotic to be sent in as she has a history of staph. She is willing to come into the clinic as well. Please advise.

## 2017-04-13 NOTE — PROGRESS NOTES
"Lyudmila Neri is a 67 y.o. female patient of Mayco Shultz MD who presents to the clinic today for   Chief Complaint   Patient presents with    Recurrent Skin Infections     noticed 4 days ago, using hot compresses and bactroban.   .    HPI    Pt, who is not known to me, reports a new problem to me:  Right neck with "staph breakout" that she noted 4 days ago.  Using bactroban and warm compresses.    These symptoms began 4 days ago and status is worse, manuel over the past 24 hrs..     Pt denies the following symptoms:  illness    Aggravating factors include nothing .    Relieving factors include nothing .    OTC Medications tried are nothing.    Prescription medications taken for symptoms are bactroban.    Pertinent medical history:  H/o staph infections in the past    ROS    Constitutional:  No fever.    Skin:  See chief complaint/HPI.    HEENT:  No complaints.    Heart/Lungs:  No complaints    GI/:  No sxs.    MS:  No new problems in bones, joints or muscles.      PAST MEDICAL HISTORY:  Past Medical History:   Diagnosis Date    Arthritis     osteoarthritis    Blood transfusion     Cancer 2011    stromal tumor, stomach    Depression     Disorder of kidney and ureter     CKD 2    GERD (gastroesophageal reflux disease)     GIST (gastrointestinal stromal tumor), malignant     H. pylori infection     Hypertension     KATHY (obstructive sleep apnea) 6/24/2013    Psoriasis 6/24/2013    Trouble in sleeping     arthritic pain wakes her up    Urinary incontinence     stress incontinence       PAST SURGICAL HISTORY:  Past Surgical History:   Procedure Laterality Date    ANKLE FRACTURE SURGERY      RT ankle    APPENDECTOMY      BLADDER SUSPENSION  1995    CARPAL TUNNEL RELEASE      left    CERVICAL FUSION      CHOLECYSTECTOMY  12/7/2011  Dr. Cai    COLONOSCOPY  7/26/2005  Thomas    Non-erosive esophageal reflux (NERD) disease?.  Post-surgical deformity in the gastric body.   Gastric mucosal " abnormality (erythema) in the greater  curve of antrum.  STEVIE Test performed on 02/24/12 was Negative.    COLONOSCOPY  12/12/2008  Thomas    Small internal hemorrhoids.  Slightly redundant left colon.    CYST REMOVAL Left 2016    left middle finger, Dr. Johnson    EYE SURGERY Bilateral 1995    RK surgery    EYE SURGERY Bilateral 2015    cataract removal    HYSTERECTOMY      KNEE ARTHROSCOPY W/ DEBRIDEMENT      lt knee    SHOULDER OPEN ROTATOR CUFF REPAIR      left    STOMACH SURGERY  12/7/2011  Dr. Cai    removal of GIST tumor from wall of the stomach, 2.3 cm in size.    UPPER GASTROINTESTINAL ENDOSCOPY  2/24/2012  Thomas    Non-erosive esophageal reflux (NERD) disease?.  Post-surgical deformity in the gastric body.   Gastric mucosal abnormality (erythema) in the greater  curve of antrum.  STEVIE Test performed on 02/24/12 was Negative.       SOCIAL HISTORY:  Social History     Social History    Marital status:      Spouse name: N/A    Number of children: N/A    Years of education: N/A     Occupational History    Not on file.     Social History Main Topics    Smoking status: Never Smoker    Smokeless tobacco: Never Used    Alcohol use No    Drug use: No    Sexual activity: Yes     Partners: Male     Other Topics Concern    Not on file     Social History Narrative       FAMILY HISTORY:  Family History   Problem Relation Age of Onset    Cancer Mother      colon    Heart disease Mother     Arthritis Mother      osteoarthritis    COPD Mother     Hyperlipidemia Mother     Hypertension Mother     Kidney disease Mother     Stroke Mother     Cancer Father      brain and lung    Arthritis Father     COPD Sister     Depression Daughter     Arthritis Maternal Aunt      rheumatoid arthritis    Heart disease Maternal Uncle     Early death Maternal Uncle      in 50s due to heart disease    Arthritis Paternal Aunt      rheuamtoid arthritis    Heart disease Maternal Grandfather      Arthritis Paternal Grandmother      rheumatoid arthritis    Diabetes Paternal Grandmother     Diabetes Cousin     Heart disease Cousin        ALLERGIES AND MEDICATIONS: updated and reviewed.  Review of patient's allergies indicates:   Allergen Reactions    Codeine      Other reaction(s): Nausea     Current Outpatient Prescriptions   Medication Sig Dispense Refill    amlodipine (NORVASC) 5 MG tablet Take 1 tablet (5 mg total) by mouth once daily. 90 tablet 3    aspirin (ECOTRIN) 81 MG EC tablet Take 81 mg by mouth once daily.      celecoxib (CELEBREX) 200 MG capsule TAKE ONE CAPSULE BY MOUTH TWICE DAILY 180 capsule 3    clobetasol (TEMOVATE) 0.05 % external solution Apply topically as needed.       FOLIC ACID/MULTIVIT-MIN/LUTEIN (CENTRUM SILVER ORAL) Take by mouth.      hydrochlorothiazide (MICROZIDE) 12.5 mg capsule Take 1 capsule (12.5 mg total) by mouth once daily. 90 capsule 3    losartan (COZAAR) 100 MG tablet Take 1 tablet (100 mg total) by mouth once daily. 90 tablet 3    sertraline (ZOLOFT) 100 MG tablet Take 1 tablet (100 mg total) by mouth once daily. 30 tablet 11    TUMS 200 mg calcium (500 mg) chewable tablet        No current facility-administered medications for this visit.        PHYSICAL EXAM    Alert, coop 67 y.o. female patient in no acute distress, is not ill-appearing.    Vitals:    04/13/17 1726   BP: 126/72   Pulse: 65   Temp: 97.7 °F (36.5 °C)     VS reviewed.  VS stable.  CC, nursing note, medications & PMH all reviewed today.    Head:  Normocephalic, atraumatic.    EENT:  Ext nose/ears normal.               Eyes with normal lids, not injected.     Resp:  Respirations even, unlabored    Heart:  Regular rate.  CV:  Cap RF brisk    MS:  Abulates normally.     NEURO:  Alert and oriented x 4.  Responds appropriately during interaction.    Skin:  Warm, dry, color good.            A/an very tender, erythematous, round, papular lesion approx 1 cm diameter is noted on the right side of  the neck inf and post to the ear.              No pustules or vesicles noted.    Psych:  Responds appropriately throughout the visit.               Relaxed.  Well-groomed    Infected sebaceous cyst  -     sulfamethoxazole-trimethoprim 800-160mg (BACTRIM DS) 800-160 mg Tab; Take 1 tablet by mouth 2 (two) times daily.  Dispense: 20 tablet; Refill: 0      Pt today presents with 4 days of worsening skin lesion on the right side of the neck.  Size doubled in the past 24 hrs despite bactroban and warm compresses.    H/o staph infections in the past.      This is a new problem to me.  No work up is planned.       Pt advised to perform comfort measures recommended on patient instruction sheet .    If not better in 3 days, the patient is advised to call us.  If worse or concerns, the patient is advised to call us.  Explained exam findings, diagnosis and treatment plan to patient.  Questions answered and patient states understanding.

## 2017-04-20 ENCOUNTER — TELEPHONE (OUTPATIENT)
Dept: RHEUMATOLOGY | Facility: CLINIC | Age: 67
End: 2017-04-20

## 2017-04-20 NOTE — TELEPHONE ENCOUNTER
----- Message from Petrona Crawford sent at 4/20/2017  8:57 AM CDT -----  Patient needs to reschedule appointment on May 25th/stated she will be on vacation/requesting appointment the following week or sooner in the morning/no appointment showing available until July/please call back at [phone] to reschedule with patient.    Patient is rescheduling to 6-23-17. CG

## 2017-04-24 ENCOUNTER — OFFICE VISIT (OUTPATIENT)
Dept: PRIMARY CARE CLINIC | Facility: CLINIC | Age: 67
End: 2017-04-24
Payer: MEDICARE

## 2017-04-24 VITALS
HEART RATE: 65 BPM | TEMPERATURE: 98 F | HEIGHT: 63 IN | DIASTOLIC BLOOD PRESSURE: 60 MMHG | OXYGEN SATURATION: 98 % | WEIGHT: 167.56 LBS | BODY MASS INDEX: 29.69 KG/M2 | SYSTOLIC BLOOD PRESSURE: 118 MMHG

## 2017-04-24 DIAGNOSIS — L72.3 INFECTED SEBACEOUS CYST: Primary | ICD-10-CM

## 2017-04-24 DIAGNOSIS — L08.9 INFECTED SEBACEOUS CYST: Primary | ICD-10-CM

## 2017-04-24 PROCEDURE — 3078F DIAST BP <80 MM HG: CPT | Mod: S$GLB,,, | Performed by: NURSE PRACTITIONER

## 2017-04-24 PROCEDURE — 99999 PR PBB SHADOW E&M-EST. PATIENT-LVL IV: CPT | Mod: PBBFAC,,, | Performed by: NURSE PRACTITIONER

## 2017-04-24 PROCEDURE — 1160F RVW MEDS BY RX/DR IN RCRD: CPT | Mod: S$GLB,,, | Performed by: NURSE PRACTITIONER

## 2017-04-24 PROCEDURE — 99213 OFFICE O/P EST LOW 20 MIN: CPT | Mod: S$GLB,,, | Performed by: NURSE PRACTITIONER

## 2017-04-24 PROCEDURE — 1126F AMNT PAIN NOTED NONE PRSNT: CPT | Mod: S$GLB,,, | Performed by: NURSE PRACTITIONER

## 2017-04-24 PROCEDURE — 3074F SYST BP LT 130 MM HG: CPT | Mod: S$GLB,,, | Performed by: NURSE PRACTITIONER

## 2017-04-24 PROCEDURE — 1159F MED LIST DOCD IN RCRD: CPT | Mod: S$GLB,,, | Performed by: NURSE PRACTITIONER

## 2017-04-24 RX ORDER — MUPIROCIN 20 MG/G
OINTMENT TOPICAL 2 TIMES DAILY
Qty: 15 G | Refills: 0 | Status: SHIPPED | OUTPATIENT
Start: 2017-04-24 | End: 2017-05-04

## 2017-04-24 RX ORDER — SULFAMETHOXAZOLE AND TRIMETHOPRIM 800; 160 MG/1; MG/1
1 TABLET ORAL 2 TIMES DAILY
Qty: 14 TABLET | Refills: 0 | Status: SHIPPED | OUTPATIENT
Start: 2017-04-24 | End: 2017-05-01

## 2017-04-24 NOTE — PATIENT INSTRUCTIONS
The infection looks much better and does not need draining at this time.  If it gets larger and and soft in the middle, then drainage would improve it.  Restart the Bactrim and continue warm compresses and bactroban.    Follow up as needed.      If you have any questions, please call.  You can reach us at [phone] Monday through Friday (except holidays) 12 nooon to 6 p.m.    Thank you for using the [facility]!    RUKHSANA Simpson, CNP, FNP-BC  [facility]  [facility]

## 2017-04-24 NOTE — Clinical Note
Mayco Shultz MD,  I saw your patient today in the [facility].  If you have any questions, please do not hesitate to contact me.  Thank you!  HELIO Simpson

## 2017-04-24 NOTE — MR AVS SNAPSHOT
[facility]  [address]  [facility] [address]  Phone: [phone]                  Lyudmila WELLS Daron   2017 10:00 AM   Office Visit    Description:  Female : 1950   Provider:  Lyudmila Monsalve NP   Department:  [facility]           Reason for Visit     Follow-up           Diagnoses this Visit        Comments    Infected sebaceous cyst    -  Primary            To Do List           Future Appointments        Provider Department Dept Phone    2017 8:30 AM Sophie Banerjee,  [facility] Rheumatology [phone]    2018 10:00 AM LAB, [place] [facility] [phone]    2018 10:15 AM [facility] XR2 [facility] [phone]    3/1/2018 1:40 PM Aleksandr Das NP [facility] Hematology [phone]      Goals (5 Years of Data)     None       These Medications        Disp Refills Start End    sulfamethoxazole-trimethoprim 800-160mg (BACTRIM DS) 800-160 mg Tab 14 tablet 0 2017    Take 1 tablet by mouth 2 (two) times daily. - Oral    Pharmacy: [facility] [address] [place] [address] AT [facility] & [address] #: [phone]         [facility]     [facility] Care [facility] -  Assistance  Unless otherwise directed by your provider, please contact [facility], our nurse care line that is available for  assistance.     Registered nurses in the [facility] provide: appointment scheduling, clinical advisement, health education, and other advisory services.  Call: [phone] (toll free)               Medications           Message regarding Medications     Verify the changes and/or additions to your medication regime listed below are the same as discussed with your clinician today.  If any of these changes or additions are incorrect, please notify your healthcare provider.        START taking these NEW medications        Refills     "sulfamethoxazole-trimethoprim 800-160mg (BACTRIM DS) 800-160 mg Tab 0    Sig: Take 1 tablet by mouth 2 (two) times daily.    Class: Normal    Route: Oral           Verify that the below list of medications is an accurate representation of the medications you are currently taking.  If none reported, the list may be blank. If incorrect, please contact your healthcare provider. Carry this list with you in case of emergency.           Current Medications     amlodipine (NORVASC) 5 MG tablet Take 1 tablet (5 mg total) by mouth once daily.    aspirin (ECOTRIN) 81 MG EC tablet Take 81 mg by mouth once daily.    celecoxib (CELEBREX) 200 MG capsule TAKE ONE CAPSULE BY MOUTH TWICE DAILY    clobetasol (TEMOVATE) 0.05 % external solution Apply topically as needed.     FOLIC ACID/MULTIVIT-MIN/LUTEIN (CENTRUM SILVER ORAL) Take by mouth.    hydrochlorothiazide (MICROZIDE) 12.5 mg capsule Take 1 capsule (12.5 mg total) by mouth once daily.    losartan (COZAAR) 100 MG tablet Take 1 tablet (100 mg total) by mouth once daily.    sertraline (ZOLOFT) 100 MG tablet Take 1 tablet (100 mg total) by mouth once daily.    TUMS 200 mg calcium (500 mg) chewable tablet     sulfamethoxazole-trimethoprim 800-160mg (BACTRIM DS) 800-160 mg Tab Take 1 tablet by mouth 2 (two) times daily.           Clinical Reference Information           Your Vitals Were     BP Pulse Temp Height Weight SpO2    118/60 (BP Location: Right arm, Patient Position: Sitting, BP Method: Manual) 65 98.2 °F (36.8 °C) (Oral) 5' 3" (1.6 m) 76 kg (167 lb 8.8 oz) 98%    BMI                29.68 kg/m2          Blood Pressure          Most Recent Value    BP  118/60      Allergies as of 4/24/2017     Codeine      Immunizations Administered on Date of Encounter - 4/24/2017     None      Instructions    The infection looks much better and does not need draining at this time.  If it gets larger and and soft in the middle, then drainage would improve it.  Restart the Bactrim and " continue warm compresses and bactroban.    Follow up as needed.      If you have any questions, please call.  You can reach us at [phone] Monday through Friday (except holidays) 12 nooon to 6 p.m.    Thank you for using the [facility]!    Zabrina Monsalve, APRN, CNP, FNP-BC  [facility]  [facility]           Language Assistance Services     ATTENTION: Language assistance services are available, free of charge. Please call [phone].      ATENCIÓN: Si habla español, tiene a carrillo disposición servicios gratuitos de asistencia lingüística. Llame al [phone].     CHÚ Ý: N?u b?n nói Ti?ng Vi?t, có các d?ch v? h? tr? ngôn ng? mi?n phí dành cho b?n. G?i s? [phone].         [facility] complies with applicable Federal civil rights laws and does not discriminate on the basis of race, color, national origin, age, disability, or sex.

## 2017-04-24 NOTE — PROGRESS NOTES
Lyudmila Neri is a 67 y.o. female patient of Mayco Shultz MD who presents to the clinic today for   Chief Complaint   Patient presents with    Follow-up     infected sebaceous cyst   .    HPI    Pt, who is known to me, reports a continuing but improved problem to me, sebaceous cyst that's improved but not resolved with 7 days of Bactrim and bactroban with warm compresses. .    These symptoms began 2 weeks  ago and status is improved.     Pt denies the following symptoms:  fever    Aggravating factors include h/o staph .    Relieving factors include bactroban and bactrim .    OTC Medications tried are none.    Prescription medications taken for symptoms are bactroban and bactrim.    Pertinent medical history:  H/o staph    ROS    Constitutional:  No fever.    Skin:  See chief complaint/HPI.    All other ROS neg.      PAST MEDICAL HISTORY:  Past Medical History:   Diagnosis Date    Arthritis     osteoarthritis    Blood transfusion     Cancer 2011    stromal tumor, stomach    Depression     Disorder of kidney and ureter     CKD 2    GERD (gastroesophageal reflux disease)     GIST (gastrointestinal stromal tumor), malignant     H. pylori infection     Hypertension     KATHY (obstructive sleep apnea) 6/24/2013    Psoriasis 6/24/2013    Trouble in sleeping     arthritic pain wakes her up    Urinary incontinence     stress incontinence       PAST SURGICAL HISTORY:  Past Surgical History:   Procedure Laterality Date    ANKLE FRACTURE SURGERY      RT ankle    APPENDECTOMY      BLADDER SUSPENSION  1995    CARPAL TUNNEL RELEASE      left    CERVICAL FUSION      CHOLECYSTECTOMY  12/7/2011  Dr. Cai    COLONOSCOPY  7/26/2005  Thomas    Non-erosive esophageal reflux (NERD) disease?.  Post-surgical deformity in the gastric body.   Gastric mucosal abnormality (erythema) in the greater  curve of antrum.  STEVIE Test performed on 02/24/12 was Negative.    COLONOSCOPY  12/12/2008  [facility]    Small internal  hemorrhoids.  Slightly redundant left colon.    CYST REMOVAL Left 2016    left middle finger, Dr. Johnson    EYE SURGERY Bilateral 1995    RK surgery    EYE SURGERY Bilateral 2015    cataract removal    HYSTERECTOMY      KNEE ARTHROSCOPY W/ DEBRIDEMENT      lt knee    SHOULDER OPEN ROTATOR CUFF REPAIR      left    STOMACH SURGERY  12/7/2011  Dr. Cai    removal of GIST tumor from wall of the stomach, 2.3 cm in size.    UPPER GASTROINTESTINAL ENDOSCOPY  2/24/2012  Thomas    Non-erosive esophageal reflux (NERD) disease?.  Post-surgical deformity in the gastric body.   Gastric mucosal abnormality (erythema) in the greater  curve of antrum.  STEVIE Test performed on 02/24/12 was Negative.       SOCIAL HISTORY:  Social History     Social History    Marital status:      Spouse name: N/A    Number of children: N/A    Years of education: N/A     Occupational History    Not on file.     Social History Main Topics    Smoking status: Never Smoker    Smokeless tobacco: Never Used    Alcohol use No    Drug use: No    Sexual activity: Yes     Partners: Male     Other Topics Concern    Not on file     Social History Narrative       FAMILY HISTORY:  Family History   Problem Relation Age of Onset    Cancer Mother      colon    Heart disease Mother     Arthritis Mother      osteoarthritis    COPD Mother     Hyperlipidemia Mother     Hypertension Mother     Kidney disease Mother     Stroke Mother     Cancer Father      brain and lung    Arthritis Father     COPD Sister     Depression Daughter     Arthritis Maternal Aunt      rheumatoid arthritis    Heart disease Maternal Uncle     Early death Maternal Uncle      in 50s due to heart disease    Arthritis Paternal Aunt      rheuamtoid arthritis    Heart disease Maternal Grandfather     Arthritis Paternal Grandmother      rheumatoid arthritis    Diabetes Paternal Grandmother     Diabetes Cousin     Heart disease Cousin        ALLERGIES AND  MEDICATIONS: updated and reviewed.  Review of patient's allergies indicates:   Allergen Reactions    Codeine      Other reaction(s): Nausea     Current Outpatient Prescriptions   Medication Sig Dispense Refill    amlodipine (NORVASC) 5 MG tablet Take 1 tablet (5 mg total) by mouth once daily. 90 tablet 3    aspirin (ECOTRIN) 81 MG EC tablet Take 81 mg by mouth once daily.      celecoxib (CELEBREX) 200 MG capsule TAKE ONE CAPSULE BY MOUTH TWICE DAILY 180 capsule 3    clobetasol (TEMOVATE) 0.05 % external solution Apply topically as needed.       FOLIC ACID/MULTIVIT-MIN/LUTEIN (CENTRUM SILVER ORAL) Take by mouth.      hydrochlorothiazide (MICROZIDE) 12.5 mg capsule Take 1 capsule (12.5 mg total) by mouth once daily. 90 capsule 3    losartan (COZAAR) 100 MG tablet Take 1 tablet (100 mg total) by mouth once daily. 90 tablet 3    sertraline (ZOLOFT) 100 MG tablet Take 1 tablet (100 mg total) by mouth once daily. 30 tablet 11    TUMS 200 mg calcium (500 mg) chewable tablet        No current facility-administered medications for this visit.          PHYSICAL EXAM    Alert, coop 67 y.o. female patient in no acute distress, is not ill-appearing.    Vitals:    04/24/17 1024   BP: 118/60   Pulse: 65   Temp: 98.2 °F (36.8 °C)     VS reviewed.  VS stable.  CC, nursing note, medications & PMH all reviewed today.    Head:  Normocephalic, atraumatic.    EENT:  Ext nose/ears normal.               Eyes with normal lids, not injected.     Resp:  Respirations even, unlabored    Heart:  Regular rate.  CV:  Cap RF brisk    MS:  Ambulates normallt.     NEURO:  Alert and oriented x 4.  Responds appropriately during interaction.    Skin:  Warm, dry, color good.            A/an erythematous, drying, small, round, papular lesion (approx 0.5 cm) is noted on the right side of the neck under the ear.  It has improved since the 4/12/17 visit.            No pustules or vesicles noted.    Psych:  Responds appropriately throughout the  visit.               Relaxed.  Well-groomed    Infected sebaceous cyst  Comments:  improved but not resolved, repeat bactrim DS for 1 week, continue bactroban and warm compresses, call if larger and soft-would need surg consult due to location  Orders:  -     sulfamethoxazole-trimethoprim 800-160mg (BACTRIM DS) 800-160 mg Tab; Take 1 tablet by mouth 2 (two) times daily.  Dispense: 14 tablet; Refill: 0  -     mupirocin (BACTROBAN) 2 % ointment; Apply topically 2 (two) times daily.  Dispense: 15 g; Refill: 0      Pt today presents with improved but not resolved infected sebaceous cyst on the right neck in a pt with h/o staph.  Lesion is firm and drying, decreased redness and size.    This is a new problem to me.  No work up is planned.        Pt advised to perform comfort measures recommended on patient instruction sheet .  If larger and soft in the middle, surg consult would be recommended.  Pt will notify us in that circumstance.    If not better in 5 days, the patient is advised to call us.  If worse or concerns, the patient is advised to call us.  Explained exam findings, diagnosis and treatment plan to patient.  Questions answered and patient states understanding.

## 2017-06-23 ENCOUNTER — OFFICE VISIT (OUTPATIENT)
Dept: RHEUMATOLOGY | Facility: CLINIC | Age: 67
End: 2017-06-23
Payer: MEDICARE

## 2017-06-23 VITALS
HEART RATE: 64 BPM | DIASTOLIC BLOOD PRESSURE: 70 MMHG | HEIGHT: 63 IN | BODY MASS INDEX: 29.57 KG/M2 | WEIGHT: 166.88 LBS | SYSTOLIC BLOOD PRESSURE: 108 MMHG

## 2017-06-23 DIAGNOSIS — M15.9 PRIMARY OSTEOARTHRITIS INVOLVING MULTIPLE JOINTS: Primary | ICD-10-CM

## 2017-06-23 PROCEDURE — 99999 PR PBB SHADOW E&M-EST. PATIENT-LVL III: CPT | Mod: PBBFAC,,, | Performed by: INTERNAL MEDICINE

## 2017-06-23 PROCEDURE — 99499 UNLISTED E&M SERVICE: CPT | Mod: S$GLB,,, | Performed by: INTERNAL MEDICINE

## 2017-06-23 PROCEDURE — 99213 OFFICE O/P EST LOW 20 MIN: CPT | Mod: S$GLB,,, | Performed by: INTERNAL MEDICINE

## 2017-06-23 PROCEDURE — 1125F AMNT PAIN NOTED PAIN PRSNT: CPT | Mod: S$GLB,,, | Performed by: INTERNAL MEDICINE

## 2017-06-23 PROCEDURE — 1159F MED LIST DOCD IN RCRD: CPT | Mod: S$GLB,,, | Performed by: INTERNAL MEDICINE

## 2017-06-23 ASSESSMENT — ROUTINE ASSESSMENT OF PATIENT INDEX DATA (RAPID3)
PATIENT GLOBAL ASSESSMENT SCORE: 4
PAIN SCORE: 4
MDHAQ FUNCTION SCORE: .7
PSYCHOLOGICAL DISTRESS SCORE: 3.3
TOTAL RAPID3 SCORE: 3.44

## 2017-06-23 NOTE — PROGRESS NOTES
Subjective:          Chief Complaint: Lyudmila Neri is a 67 y.o. female who had concerns including Disease Management.    HPI:    Patient is a 67-year-old female her for consult f/u we did start Celebrex and having significantly improved overall. The anterior thigh aches are present and ache more as the day goes on. Feet are painful by the end of the day.      As a history of nonerosive erosive reflux gastritis as well as GIST sected 2011 follows with Dr. Pro.  Recent serologies rheumatoid factor negative, sedimentation rate within normal limits, SINDI negative, C-reactive protein is slightly elevated.  Affected joints: knees, shoulders cervical spine (hx of fusion), hips, hands are stiff in the CMC and MCP and PIP, feet. Right ankle with known fx and ORIF.   Stifffness in AM 45 min with hot shower. Morning is the worst time for many joints. By the end of the day her legs aches after work.   No dactylitis, no known other joint swelling. No IBD. She notes drys eyes, no scleritis, episcleritis  She did use aleve which helps, meloxicam not really. Celebrex does help but cannot use BID due to HTN. Did recently have BP meds adjusted.  Is having relief but not complete. No GI upset.   She has sensitivity to soft tissue pain.  She has hx of Psoriasis that is controlled for at least 10 years-scalp predominant but diffusely.   A new well marginated erosion present at the radial aspect of the base of the right fourth proximal phalanx joint space narrowing in the third fourth MCP and the left third MCP joint degenerative changes otherwise.    REVIEW OF SYSTEMS:    Review of Systems   Constitutional: Positive for malaise/fatigue. Negative for fever and weight loss.   HENT: Negative for sore throat.    Eyes: Negative for double vision, photophobia and redness.   Respiratory: Negative for cough, shortness of breath and wheezing.    Cardiovascular: Negative for chest pain, palpitations and orthopnea.   Gastrointestinal:  Negative for abdominal pain, constipation and diarrhea.   Genitourinary: Negative for dysuria, hematuria and urgency.   Musculoskeletal: Positive for joint pain and myalgias. Negative for back pain.   Skin: Negative for rash.   Neurological: Negative for dizziness, tingling, focal weakness and headaches.   Endo/Heme/Allergies: Does not bruise/bleed easily.   Psychiatric/Behavioral: Negative for depression, hallucinations and suicidal ideas.               Objective:            Past Medical History:   Diagnosis Date    Arthritis     osteoarthritis    Blood transfusion     Cancer 2011    stromal tumor, stomach    Depression     Disorder of kidney and ureter     CKD 2    GERD (gastroesophageal reflux disease)     GIST (gastrointestinal stromal tumor), malignant     H. pylori infection     Hypertension     KATHY (obstructive sleep apnea) 6/24/2013    Psoriasis 6/24/2013    Trouble in sleeping     arthritic pain wakes her up    Urinary incontinence     stress incontinence     Family History   Problem Relation Age of Onset    Cancer Mother      colon    Heart disease Mother     Arthritis Mother      osteoarthritis    COPD Mother     Hyperlipidemia Mother     Hypertension Mother     Kidney disease Mother     Stroke Mother     Cancer Father      brain and lung    Arthritis Father     COPD Sister     Depression Daughter     Arthritis Maternal Aunt      rheumatoid arthritis    Heart disease Maternal Uncle     Early death Maternal Uncle      in 50s due to heart disease    Arthritis Paternal Aunt      rheuamtoid arthritis    Heart disease Maternal Grandfather     Arthritis Paternal Grandmother      rheumatoid arthritis    Diabetes Paternal Grandmother     Diabetes Cousin     Heart disease Cousin      Social History   Substance Use Topics    Smoking status: Never Smoker    Smokeless tobacco: Never Used    Alcohol use No         Current Outpatient Prescriptions on File Prior to Visit   Medication  Sig Dispense Refill    amlodipine (NORVASC) 5 MG tablet Take 1 tablet (5 mg total) by mouth once daily. 90 tablet 3    aspirin (ECOTRIN) 81 MG EC tablet Take 81 mg by mouth once daily.      celecoxib (CELEBREX) 200 MG capsule TAKE ONE CAPSULE BY MOUTH TWICE DAILY 180 capsule 3    clobetasol (TEMOVATE) 0.05 % external solution Apply topically as needed.       FOLIC ACID/MULTIVIT-MIN/LUTEIN (CENTRUM SILVER ORAL) Take by mouth.      hydrochlorothiazide (MICROZIDE) 12.5 mg capsule Take 1 capsule (12.5 mg total) by mouth once daily. 90 capsule 3    losartan (COZAAR) 100 MG tablet Take 1 tablet (100 mg total) by mouth once daily. 90 tablet 3    TUMS 200 mg calcium (500 mg) chewable tablet       sertraline (ZOLOFT) 100 MG tablet Take 1 tablet (100 mg total) by mouth once daily. 30 tablet 11     No current facility-administered medications on file prior to visit.        Vitals:    06/23/17 0839   BP: 108/70   Pulse: 64       Physical Exam:    Physical Exam   Constitutional: She appears well-developed and well-nourished.   HENT:   Nose: No septal deviation.   Mouth/Throat: Mucous membranes are normal. No oral lesions.   Eyes: Pupils are equal, round, and reactive to light. Right conjunctiva is not injected. Left conjunctiva is not injected.   Neck: No JVD present. No thyroid mass and no thyromegaly present.   Cardiovascular: Normal rate, regular rhythm and normal pulses.    No edema   Pulmonary/Chest: Effort normal and breath sounds normal.   Abdominal: Soft. Normal appearance. There is no hepatosplenomegaly.   Musculoskeletal:        Right shoulder: She exhibits tenderness. She exhibits normal range of motion and no swelling.        Left shoulder: She exhibits tenderness. She exhibits normal range of motion and no swelling.        Right elbow: She exhibits normal range of motion and no swelling. No tenderness found.        Left elbow: She exhibits normal range of motion and no swelling. No tenderness found.         Right wrist: She exhibits normal range of motion, no tenderness and no swelling.        Left wrist: She exhibits normal range of motion, no tenderness and no swelling.        Right hip: She exhibits normal range of motion, normal strength and no swelling.        Left hip: She exhibits normal range of motion, no tenderness and no swelling.        Right knee: She exhibits normal range of motion and no swelling. Tenderness found.        Left knee: She exhibits normal range of motion and no swelling. Tenderness found.        Right ankle: She exhibits normal range of motion and no swelling. No tenderness.        Left ankle: She exhibits normal range of motion and no swelling. No tenderness.        Right hand: She exhibits tenderness.        Left hand: She exhibits tenderness.        Right foot: There is tenderness.        Left foot: There is tenderness.   2nd DIP b/l bony hypertrophy.   No active synovitis but tenderness at fingers, shoulders, knees and metatarsalgia.   No nodules of the achilles.    Lymphadenopathy:     She has no cervical adenopathy.     She has no axillary adenopathy.   Neurological: She has normal strength and normal reflexes.   Skin: Skin is dry and intact.   Psychiatric: She has a normal mood and affect.             Assessment:       Encounter Diagnosis   Name Primary?    Primary osteoarthritis involving multiple joints Yes          Plan:        Primary osteoarthritis involving multiple joints         Likely this is OA  Celebrex is working.   Now that BP meds increased try Celebrex 200mg BID -BP doing very well.       Return in about 6 months (around 12/23/2017).

## 2017-08-07 DIAGNOSIS — M19.90 ARTHRITIS: ICD-10-CM

## 2017-08-08 RX ORDER — CELECOXIB 200 MG/1
200 CAPSULE ORAL 2 TIMES DAILY
Qty: 180 CAPSULE | Refills: 3 | Status: SHIPPED | OUTPATIENT
Start: 2017-08-08 | End: 2018-10-13 | Stop reason: SDUPTHER

## 2017-11-01 ENCOUNTER — OFFICE VISIT (OUTPATIENT)
Dept: FAMILY MEDICINE | Facility: CLINIC | Age: 67
End: 2017-11-01
Payer: MEDICARE

## 2017-11-01 VITALS
SYSTOLIC BLOOD PRESSURE: 129 MMHG | BODY MASS INDEX: 29.92 KG/M2 | DIASTOLIC BLOOD PRESSURE: 77 MMHG | WEIGHT: 168.88 LBS | HEART RATE: 68 BPM | HEIGHT: 63 IN

## 2017-11-01 DIAGNOSIS — M15.9 PRIMARY OSTEOARTHRITIS INVOLVING MULTIPLE JOINTS: ICD-10-CM

## 2017-11-01 DIAGNOSIS — I10 ESSENTIAL HYPERTENSION: ICD-10-CM

## 2017-11-01 DIAGNOSIS — Z12.31 ENCOUNTER FOR SCREENING MAMMOGRAM FOR MALIGNANT NEOPLASM OF BREAST: ICD-10-CM

## 2017-11-01 DIAGNOSIS — Z80.0 FAMILY HISTORY OF COLON CANCER IN MOTHER: ICD-10-CM

## 2017-11-01 DIAGNOSIS — M51.36 DEGENERATIVE DISC DISEASE, LUMBAR: ICD-10-CM

## 2017-11-01 DIAGNOSIS — L40.9 PSORIASIS: ICD-10-CM

## 2017-11-01 DIAGNOSIS — Z00.00 ENCOUNTER FOR PREVENTIVE HEALTH EXAMINATION: Primary | ICD-10-CM

## 2017-11-01 DIAGNOSIS — I77.9 BILATERAL CAROTID ARTERY DISEASE: ICD-10-CM

## 2017-11-01 DIAGNOSIS — Z85.09 HISTORY OF GASTROINTESTINAL STROMAL TUMOR (GIST): ICD-10-CM

## 2017-11-01 DIAGNOSIS — G47.33 OSA (OBSTRUCTIVE SLEEP APNEA): ICD-10-CM

## 2017-11-01 DIAGNOSIS — N18.2 CHRONIC KIDNEY DISEASE, STAGE II (MILD): ICD-10-CM

## 2017-11-01 PROBLEM — M51.369 DEGENERATIVE DISC DISEASE, LUMBAR: Status: ACTIVE | Noted: 2017-11-01

## 2017-11-01 PROCEDURE — 99999 PR PBB SHADOW E&M-EST. PATIENT-LVL V: CPT | Mod: PBBFAC,,, | Performed by: NURSE PRACTITIONER

## 2017-11-01 PROCEDURE — 99499 UNLISTED E&M SERVICE: CPT | Mod: S$GLB,,, | Performed by: NURSE PRACTITIONER

## 2017-11-01 PROCEDURE — G0439 PPPS, SUBSEQ VISIT: HCPCS | Mod: S$GLB,,, | Performed by: NURSE PRACTITIONER

## 2017-11-01 NOTE — PATIENT INSTRUCTIONS
Counseling and Referral of Other Preventative  (Italic type indicates deductible and co-insurance are waived)    Patient Name: Lyudmila Neri  Today's Date: 11/1/2017      SERVICE LIMITATIONS RECOMMENDATION    Vaccines    · Pneumococcal (once after 65)    · Influenza (annually)    · Hepatitis B (if medium/high risk)    · Prevnar 13      Hepatitis B medium/high risk factors:       - End-stage renal disease       - Hemophiliacs who received Factor VII or         IX concentrates       - Clients of institutions for the mentally             retarded       - Persons who live in the same house as          a HepB carrier       - Homosexual men       - Illicit injectable drug abusers     Pneumococcal: Done, no repeat necessary     Influenza: Done, repeat in one year     Hepatitis B: N/A     Prevnar 13: Done, no repeat necessary    Mammogram (biennial age 50-74)  Annually (age 40 or over)  Scheduled, see appointments    Pap (up to age 70 and after 70 if unknown history or abnormal study last 10 years)    N/A     The USPSTF recommends against screening for cervical cancer in women older than age 65 years who have had adequate prior screening and are not otherwise at high risk for cervical cancer.      Colorectal cancer screening (to age 75)    · Fecal occult blood test (annual)  · Flexible sigmoidoscopy (5y)  · Screening colonoscopy (10y)  · Barium enema   Last done 2013, recommend to repeat every 5  years    Diabetes self-management training (no USPSTF recommendations)  Requires referral by treating physician for patient with diabetes or renal disease. 10 hours of initial DSMT sessions of no less than 30 minutes each in a continuous 12-month period. 2 hours of follow-up DSMT in subsequent years.  N/A    Bone mass measurements (age 65 & older, biennial)  Requires diagnosis related to osteoporosis or estrogen deficiency. Biennial benefit unless patient has history of long-term glucocorticoid  Last done 2016, recommend to  repeat every 3  years    Glaucoma screening (no USPSTF recommendation)  Diabetes mellitus, family history   , age 50 or over    American, age 65 or over  Recommend follow up with eye care professional regularly    Medical nutrition therapy for diabetes or renal disease (no recommended schedule)  Requires referral by treating physician for patient with diabetes or renal disease or kidney transplant within the past 3 years.  Can be provided in same year as diabetes self-management training (DSMT), and CMS recommends medical nutrition therapy take place after DSMT. Up to 3 hours for initial year and 2 hours in subsequent years.  N/A    Cardiovascular screening blood tests (every 5 years)  · Fasting lipid panel  Order as a panel if possible  Done this year, repeat every year    Diabetes screening tests (at least every 3 years, Medicare covers annually or at 6-month intervals for prediabetic patients)  · Fasting blood sugar (FBS) or glucose tolerance test (GTT)  Patient must be diagnosed with one of the following:       - Hypertension       - Dyslipidemia       - Obesity (BMI 30kg/m2)       - Previous elevated impaired FBS or GTT       ... or any two of the following:       - Overweight (BMI 25 but <30)       - Family history of diabetes       - Age 65 or older       - History of gestational diabetes or birth of baby weighing more than 9 pounds  Done this year, repeat every year    HIV screening (annually for increased risk patients)  · HIV-1 and HIV-2 by EIA, or YANCY, rapid antibody test or oral mucosa transudate  Patients must be at increased risk for HIV infection per USPSTF guidelines or pregnant. Tests covered annually for patient at increased risk or as requested by the patient. Pregnant patients may receive up to 3 tests during pregnancy.  Risks discussed, screening is not recommended    Smoking cessation counseling (up to 8 sessions per year)  Patients must be asymptomatic of  tobacco-related conditions to receive as a preventative service.  Non-smoker    Subsequent annual wellness visit  At least 12 months since last AWV  Return in one year     The following information is provided to all patients.  This information is to help you find resources for any of the problems found today that may be affecting your health:                Living healthy guide: www.[facility].[place].[facility]      Understanding Diabetes: www.diabetes.org      Eating healthy: www.cdc.gov/healthyweight      CDC home safety checklist: www.cdc.gov/steadi/patient.html      Agency on Aging: www.goea.[place].[facility]      Alcoholics anonymous (AA): www.aa.org      Physical Activity: www.eliud.nih.gov/sw2dnxq      Tobacco use: www.quitwithusla.org

## 2017-11-02 ENCOUNTER — CLINICAL SUPPORT (OUTPATIENT)
Dept: REHABILITATION | Facility: HOSPITAL | Age: 67
End: 2017-11-02
Payer: MEDICARE

## 2017-11-02 DIAGNOSIS — M54.5 LOW BACK PAIN, UNSPECIFIED BACK PAIN LATERALITY, UNSPECIFIED CHRONICITY, WITH SCIATICA PRESENCE UNSPECIFIED: ICD-10-CM

## 2017-11-02 PROBLEM — M54.50 LOW BACK PAIN: Status: ACTIVE | Noted: 2017-11-02

## 2017-11-02 PROCEDURE — G8991 OTHER PT/OT GOAL STATUS: HCPCS | Mod: CJ,PN

## 2017-11-02 PROCEDURE — 97161 PT EVAL LOW COMPLEX 20 MIN: CPT | Mod: PN

## 2017-11-02 PROCEDURE — G8990 OTHER PT/OT CURRENT STATUS: HCPCS | Mod: CK,PN

## 2017-11-02 PROCEDURE — 97110 THERAPEUTIC EXERCISES: CPT | Mod: PN

## 2017-11-02 NOTE — PROGRESS NOTES
OUTPATIENT PHYSICAL THERAPY  PHYSICAL THERAPY EVALUATION    Name: Lyudmila Neri  Clinic Number: [id number]    Evaluation Date: 11/02/2017  Visit #: 1  Precautions: HTN     Diagnosis:   Encounter Diagnosis   Name Primary?    Low back pain, unspecified back pain laterality, unspecified chronicity, with sciatica presence unspecified      Physician: Tamie Torres,*  Treatment Orders: PT Eval and Treat  Past Medical History:   Diagnosis Date    Arthritis     osteoarthritis    Blood transfusion     Cancer 2011    stromal tumor, stomach    Depression     Disorder of kidney and ureter     CKD 2    GERD (gastroesophageal reflux disease)     GIST (gastrointestinal stromal tumor), malignant     H. pylori infection     Hypertension     KATHY (obstructive sleep apnea) 6/24/2013    Psoriasis 6/24/2013    Trouble in sleeping     arthritic pain wakes her up    Urinary incontinence     stress incontinence     Current Outpatient Prescriptions   Medication Sig    amlodipine (NORVASC) 5 MG tablet Take 1 tablet (5 mg total) by mouth once daily.    aspirin (ECOTRIN) 81 MG EC tablet Take 81 mg by mouth once daily.    celecoxib (CELEBREX) 200 MG capsule Take 1 capsule (200 mg total) by mouth 2 (two) times daily.    clobetasol (TEMOVATE) 0.05 % external solution Apply topically as needed.     FOLIC ACID/MULTIVIT-MIN/LUTEIN (CENTRUM SILVER ORAL) Take by mouth.    hydrochlorothiazide (MICROZIDE) 12.5 mg capsule Take 1 capsule (12.5 mg total) by mouth once daily.    losartan (COZAAR) 100 MG tablet Take 1 tablet (100 mg total) by mouth once daily.    sertraline (ZOLOFT) 100 MG tablet Take 1 tablet (100 mg total) by mouth once daily.    TUMS 200 mg calcium (500 mg) chewable tablet      No current facility-administered medications for this visit.      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[employer]    Subjective   History of Present Illness: acute epsiode of low back and RLE pain limiting daily activity at home, work, and community   Chief complaint: low back pain with radicular sx into RLE   Pain: current 6/10, worst 9/10, best 3/10, achiness and heaviness   Radicular symptoms: RLE   Aggravating factors: prolonged sitting  Easing factors: movement   Pts goals: Decrease pain with daily activity     Objective   Mental status: alert    Static Postural Assessment:   Loss of lumbar lordosis, APT, Genu valgus     GAIT: Lyudmila ambulates with no assistive device with independently.     GAIT DEVIATIONS: Lyudmila displays decreased tatiana      ROM:   Lumbar Flexion = tight   Lumbar extension = Painful   Lumbar Rotation: R = 25 % limited/ L = 25 % limited and painful     Strength:   Psoas R = 2 * pain with resistance    Glute Med = 2 Sagar   Anterior core stab = 2     Level of strength:   3 = Normal   2 = Compensation (recruits other muscles)  1= Weak    Muscle Length:   +Tight psoas   + HS   + gastroc/soleus     Special Tests:  + SLR RLE     Palpation:  Tight lumbar paraspinals   TTP R sided glute and piriformis     Pt/family was provided educational information, including: role of PT, goals for PT, scheduling - pt verbalized understanding. Discussed insurance plan with pt.     TREATMENT   Time In: 1:01 PM  Time Out: 2:00 PM     Discussed Plan of Care with patient: Yes    Lyudmila received 10 minutes of therapeutic exercise including:   diaphragmatic breathing   PPT x 30   Nerve glides 3 x 10     Lyudmila received 5 minutes of manual therapy including:  MFD/MFR lumbar paraspinals and TL  fascia      Pt signed written consent to dry needling Rx.  Pt gave verbal consent for DN.    DN Time: 5'; (No Charge)    Pt rec'd dry needling to  Homeostatic points:  1. Deep Radial  2. Greater Auricular  3. Spinal Accessory  4. Saphenous  5. Deep Fibular  6. Tibial  7. Greater Occipital  8. Suprascapular ( infraspinatus)  9. Lateral Antebrachial Cutaneous  10. Sural  11 Lateral Popliteal  12. Superficial Radial  13. Dorsal Scapular  14. Superior Cluneal *  15. Posterior Cutaneous L 2 *  16. Inferior Gluteal *   17. Lateral Pectoral  18. Ilitotibal  19. Infraorbital  20. Spinous process T7  21. Posterior cutaneous  T6  22. Posterior cutaneous L 5  23. Supraorbital  24. Common fibular     Paravertebral Points:   L3-4   Symptomatic Points:   Piriformis      with 40-60  mm needles;  with no adverse effects.         Written Home Exercises Provided: yes  Exercises were reviewed and Lyudmila was able to demonstrate them prior to the end of the session. Pt received a written copy of exercises to perform at home. Lyudmila demonstrated good  understanding of the education provided.     Assessment   Lyudmila is a 67 y.o. female referred to outpatient physical therapy with a medical diagnosis of Low back pain with RLE radiculopathy .     Demonstrates limitations as described in the problem list.  Medical necessity is demonstrated by the following IMPAIRMENTS/PROBLEMS:  weakness, impaired balance, decreased lower extremity function, pain, decreased ROM, impaired joint extensibility and impaired muscle length      Positive prognostic factors include motivated .   Negative prognostic factors include age, comorbities .   Pt prognosis is Good.    Pt will benefit from skilled outpatient physical therapy to address the above stated deficits, provide pt/family education, and to maximize pt's level of independence.       History  Co-morbidities and personal factors that may impact the plan of care Examination  Body Structures and  Functions, activity limitations and participation restrictions that may impact the plan of care Clinical Presentation   Decision Making/ Complexity Score   Co-morbidities:   BMI, HTN, CKD        Personal Factors:   age Body Regions:   back  lower extremities    Body Systems:   ROM  strength  balance    Activity limitations:   Learning and applying knowledge  no deficits    General Tasks and Commands  no deficits    Communication  no deficits    Mobility  lifting and carrying objects  walking    Self care  no deficits    Domestic Life  doing house work (cleaning house, washing dishes, laundry)  assisting others    Life Areas  no deficits    Community and Social Life  no deficits    Participation Restrictions:   None          stable and uncomplicated            low low low low         Anticipated Barriers for physical therapy: insurance     GOALS: 8- 12 weeks:   -Independent with HEP for mgmt of low back pain sx   -(-) SLR in RLE for improved activity tolerance.   -Improved postural awareness   -Psoas, HS and heelcord length WNL got improved postural alignment   -Improved glute and abd strength WFL for increased postural stability   -FOTO reporting to predicted level of function.         Plan       Outpatient physical therapy 1- 2 times weekly to include: pt ed, HEP, therapeutic exercises, therapeutic activities, neuromuscular re-education/ balance exercises, manual therapy, and modalities prn. Cont PT for  8-12 weeks. Pt may be seen by PTA as part of the rehabilitation team.     I certify the need for these services furnished under this plan of treatment and while under my care.    Ishmael Rudd, PT

## 2017-11-02 NOTE — PROGRESS NOTES
"Lyudmila Neri presented for a  Medicare AWV and comprehensive Health Risk Assessment today. The following components were reviewed and updated:    · Medical history  · Family History  · Social history  · Allergies and Current Medications  · Health Risk Assessment  · Health Maintenance  · Care Team     ** See Completed Assessments for Annual Wellness Visit within the encounter summary.**       The following assessments were completed:  · Living Situation  · CAGE  · Depression Screening  · Timed Get Up and Go  · Whisper Test  · Cognitive Function Screening          · Nutrition Screening  · ADL Screening  · PAQ Screening    Vitals:    11/01/17 1458   BP: 129/77   BP Location: Left arm   Patient Position: Sitting   BP Method: Medium (Automatic)   Pulse: 68   Weight: 76.6 kg (168 lb 14 oz)   Height: 5' 3" (1.6 m)     Body mass index is 29.91 kg/m².  Physical Exam   Constitutional: She is oriented to person, place, and time. She appears well-developed and well-nourished.   HENT:   Head: Normocephalic and atraumatic.   Right Ear: External ear normal.   Left Ear: External ear normal.   Eyes: Conjunctivae are normal. No scleral icterus.   Neck: No thyromegaly present.   Cardiovascular: Normal rate, regular rhythm and normal heart sounds.  Exam reveals no gallop and no friction rub.    No murmur heard.  Pulmonary/Chest: Effort normal and breath sounds normal. She has no wheezes. She has no rales.   Musculoskeletal: Normal range of motion. She exhibits no edema.   Neurological: She is alert and oriented to person, place, and time. She has normal strength. No cranial nerve deficit or sensory deficit.   Skin: Skin is warm and dry. No rash noted.   Vitals reviewed.        Diagnoses and health risks identified today and associated recommendations/orders:    1. Encounter for preventive health examination  Reviewed health maintenance and provided recommendations    recommend schedule annual with pcp    - Mammo Digital Screening " Bilat with CAD; Future    2. Essential hypertension  Stable.   Controlled on current medications.  Followed by Mayco Shultz MD .       3. Chronic kidney disease, stage II (mild)  Stable.   Taking arb  Followed by Mayco Shultz MD .       4. Primary osteoarthritis involving multiple joints  Taking celebrex    Followed by Mayco Shultz MD .       5. Family history of colon cancer in mother  Stable.     Followed by Mayco Shultz MD .       6. KATHY (obstructive sleep apnea)  Recommend cpap as previously instructed  Followed by Mayco Shultz MD .       7. History of gastrointestinal stromal tumor (GIST)  Stable.     Followed by [facility].       8. Psoriasis  Stable.     Followed by Mayco Shultz MD .       9. Encounter for screening mammogram for malignant neoplasm of breast       - Mammo Digital Screening Bilat with CAD; Future    10. Bilateral carotid artery disease  Continue to monitor lipids    Followed by Mayco Shultz MD .   Carotid US 9/26/16    11. Degenerative disc disease, lumbar  uncontrolled  Followed by Mayco Shultz MD .     - Ambulatory Referral to Physical/Occupational Therapy      Provided Lyudmila with a 5-10 year written screening schedule and personal prevention plan. Recommendations were developed using the USPSTF age appropriate recommendations. Education, counseling, and referrals were provided as needed. After Visit Summary printed and given to patient which includes a list of additional screenings\tests needed.    Return in about 1 year (around 11/1/2018).    Tamie Torres NP

## 2017-11-06 NOTE — PLAN OF CARE
OUTPATIENT PHYSICAL THERAPY  PHYSICAL THERAPY EVALUATION    Name: Lyudmila Neri  Clinic Number: [id number]    Evaluation Date: 11/02/2017  Visit #: 1  Precautions: HTN     Diagnosis:   Encounter Diagnosis   Name Primary?    Low back pain, unspecified back pain laterality, unspecified chronicity, with sciatica presence unspecified      Physician: Tamie Torres,*  Treatment Orders: PT Eval and Treat  Past Medical History:   Diagnosis Date    Arthritis     osteoarthritis    Blood transfusion     Cancer 2011    stromal tumor, stomach    Depression     Disorder of kidney and ureter     CKD 2    GERD (gastroesophageal reflux disease)     GIST (gastrointestinal stromal tumor), malignant     H. pylori infection     Hypertension     KATHY (obstructive sleep apnea) 6/24/2013    Psoriasis 6/24/2013    Trouble in sleeping     arthritic pain wakes her up    Urinary incontinence     stress incontinence     Current Outpatient Prescriptions   Medication Sig    amlodipine (NORVASC) 5 MG tablet Take 1 tablet (5 mg total) by mouth once daily.    aspirin (ECOTRIN) 81 MG EC tablet Take 81 mg by mouth once daily.    celecoxib (CELEBREX) 200 MG capsule Take 1 capsule (200 mg total) by mouth 2 (two) times daily.    clobetasol (TEMOVATE) 0.05 % external solution Apply topically as needed.     FOLIC ACID/MULTIVIT-MIN/LUTEIN (CENTRUM SILVER ORAL) Take by mouth.    hydrochlorothiazide (MICROZIDE) 12.5 mg capsule Take 1 capsule (12.5 mg total) by mouth once daily.    losartan (COZAAR) 100 MG tablet Take 1 tablet (100 mg total) by mouth once daily.    sertraline (ZOLOFT) 100 MG tablet Take 1 tablet (100 mg total) by mouth once daily.    TUMS 200 mg calcium (500 mg) chewable tablet      No current facility-administered medications for this visit.      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[employer]    Subjective   History of Present Illness: acute epsiode of low back and RLE pain limiting daily activity at home, work, and community   Chief complaint: low back pain with radicular sx into RLE   Pain: current 6/10, worst 9/10, best 3/10, achiness and heaviness   Radicular symptoms: RLE   Aggravating factors: prolonged sitting  Easing factors: movement   Pts goals: Decrease pain with daily activity     Objective   Mental status: alert    Static Postural Assessment:   Loss of lumbar lordosis, APT, Genu valgus     GAIT: Lyudmila ambulates with no assistive device with independently.     GAIT DEVIATIONS: Lyudmila displays decreased tatiana      ROM:   Lumbar Flexion = tight   Lumbar extension = Painful   Lumbar Rotation: R = 25 % limited/ L = 25 % limited and painful     Strength:   Psoas R = 2 * pain with resistance    Glute Med = 2 Sagar   Anterior core stab = 2     Level of strength:   3 = Normal   2 = Compensation (recruits other muscles)  1= Weak    Muscle Length:   +Tight psoas   + HS   + gastroc/soleus     Special Tests:  + SLR RLE     Palpation:  Tight lumbar paraspinals   TTP R sided glute and piriformis     Pt/family was provided educational information, including: role of PT, goals for PT, scheduling - pt verbalized understanding. Discussed insurance plan with pt.     TREATMENT   Time In: 1:01 PM  Time Out: 2:00 PM     Discussed Plan of Care with patient: Yes    Lyudmila received 10 minutes of therapeutic exercise including:   diaphragmatic breathing   PPT x 30   Nerve glides 3 x 10     Lyudmila received 5 minutes of manual therapy including:  MFD/MFR lumbar paraspinals and TL  fascia      Pt signed written consent to dry needling Rx.  Pt gave verbal consent for DN.    DN Time: 5'; (No Charge)    Pt rec'd dry needling to  Homeostatic points:  1. Deep Radial  2. Greater Auricular  3. Spinal Accessory  4. Saphenous  5. Deep Fibular  6. Tibial  7. Greater Occipital  8. Suprascapular ( infraspinatus)  9. Lateral Antebrachial Cutaneous  10. Sural  11 Lateral Popliteal  12. Superficial Radial  13. Dorsal Scapular  14. Superior Cluneal *  15. Posterior Cutaneous L 2 *  16. Inferior Gluteal *   17. Lateral Pectoral  18. Ilitotibal  19. Infraorbital  20. Spinous process T7  21. Posterior cutaneous  T6  22. Posterior cutaneous L 5  23. Supraorbital  24. Common fibular     Paravertebral Points:   L3-4   Symptomatic Points:   Piriformis      with 40-60  mm needles;  with no adverse effects.         Written Home Exercises Provided: yes  Exercises were reviewed and Lyudmila was able to demonstrate them prior to the end of the session. Pt received a written copy of exercises to perform at home. Lyudmila demonstrated good  understanding of the education provided.     Assessment   Lyudmila is a 67 y.o. female referred to outpatient physical therapy with a medical diagnosis of Low back pain with RLE radiculopathy .     Demonstrates limitations as described in the problem list.  Medical necessity is demonstrated by the following IMPAIRMENTS/PROBLEMS:  weakness, impaired balance, decreased lower extremity function, pain, decreased ROM, impaired joint extensibility and impaired muscle length      Positive prognostic factors include motivated .   Negative prognostic factors include age, comorbities .   Pt prognosis is Good.    Pt will benefit from skilled outpatient physical therapy to address the above stated deficits, provide pt/family education, and to maximize pt's level of independence.       History  Co-morbidities and personal factors that may impact the plan of care Examination  Body Structures and  Functions, activity limitations and participation restrictions that may impact the plan of care Clinical Presentation   Decision Making/ Complexity Score   Co-morbidities:   BMI, HTN, CKD        Personal Factors:   age Body Regions:   back  lower extremities    Body Systems:   ROM  strength  balance    Activity limitations:   Learning and applying knowledge  no deficits    General Tasks and Commands  no deficits    Communication  no deficits    Mobility  lifting and carrying objects  walking    Self care  no deficits    Domestic Life  doing house work (cleaning house, washing dishes, laundry)  assisting others    Life Areas  no deficits    Community and Social Life  no deficits    Participation Restrictions:   None          stable and uncomplicated            low low low low         Anticipated Barriers for physical therapy: insurance     GOALS: 8- 12 weeks:   -Independent with HEP for mgmt of low back pain sx   -(-) SLR in RLE for improved activity tolerance.   -Improved postural awareness   -Psoas, HS and heelcord length WNL got improved postural alignment   -Improved glute and abd strength WFL for increased postural stability   -FOTO reporting to predicted level of function.         Plan       Outpatient physical therapy 1- 2 times weekly to include: pt ed, HEP, therapeutic exercises, therapeutic activities, neuromuscular re-education/ balance exercises, manual therapy, and modalities prn. Cont PT for  8-12 weeks. Pt may be seen by PTA as part of the rehabilitation team.     I certify the need for these services furnished under this plan of treatment and while under my care.    Ishmael Rudd, PT

## 2017-11-08 ENCOUNTER — CLINICAL SUPPORT (OUTPATIENT)
Dept: REHABILITATION | Facility: HOSPITAL | Age: 67
End: 2017-11-08
Payer: MEDICARE

## 2017-11-08 DIAGNOSIS — M54.5 LOW BACK PAIN, UNSPECIFIED BACK PAIN LATERALITY, UNSPECIFIED CHRONICITY, WITH SCIATICA PRESENCE UNSPECIFIED: Primary | ICD-10-CM

## 2017-11-08 PROCEDURE — 97110 THERAPEUTIC EXERCISES: CPT | Mod: PN

## 2017-11-08 NOTE — PROGRESS NOTES
Name: Lyudmila WELLS [facility] Number: [id number]  Date of Treatment: 11/08/2017   Diagnosis:   Encounter Diagnosis   Name Primary?    Low back pain, unspecified back pain laterality, unspecified chronicity, with sciatica presence unspecified Yes       Time in: 1000   Time Out: 1100  Total Treatment Time: 60   Group Time: 30       Subjective:    Lyudmila reports improvement of symptoms.  Patient reports their pain to be 3/10 on a 0-10 scale with 0 being no pain and 10 being the worst pain imaginable.    Objective      Lyudmila received therapeutic exercises to develop strength, ROM, flexibility, posture and core stabilization for 60 minutes including:      -Supine:   PPT x 30   LTR x 3 min   Hamstring stretch RLE x 30 sec    Nerve glides 3 x 10   Hip ER stretch 2 x 10 Sagar    Written Home Exercises Provided: see instructions   Pt demo good understanding of the education provided. Lyudmila demonstrated good return demonstration of activities.     Assessment: improved sx of back pain, tolerated exercises well decreased pain after session       Pt will continue to benefit from skilled PT intervention. Medical Necessity is demonstrated by:  Pain limits function of effected part for some activities and Requires skilled supervision to complete and progress HEP.    Patient is making good progress towards established goals.    New/Revised goals: none       Plan:  Continue with established Plan of Care towards PT goals.     Ishmael Rudd

## 2017-11-10 NOTE — PATIENT INSTRUCTIONS
Back Exercises: Bridge  The bridge exercise strengthens your abdominal, buttock, and hamstring muscles. This helps keep your back stable and aligned when you walk.  · Lie on the floor with your back and palms flat. Bend your knees. Keep your feet flat on the floor.  · Contract your abdominal and buttock muscles. Slowly lift your buttocks off the floor until there is a straight line from your knees to your shoulders.  · Hold for 5 to 15  seconds. Repeat 5 to10 times.    Date Last Reviewed: 8/31/2015  © 5645-1247 [employer]. [address] [place] [address]. All rights reserved. This information is not intended as a substitute for professional medical care. Always follow your healthcare professional's instructions.        Back Exercises: Hip Lift    To start, lie on your back with your knees bent and feet flat on the floor. Dont press your neck or lower back to the floor. Breathe deeply. You should feel comfortable and relaxed in this position:  · Tighten your abdomen and buttocks.  · Slowly raise your hips upward. Be careful not to arch your back.  · Hold for 5 seconds. Lower your hips to the floor.  · Repeat 10 times.  For your safety, check with your healthcare provider before starting an exercise program.   Date Last Reviewed: 8/16/2015  © 4600-5613 [employer]. [address] [place] [address]. All rights reserved. This information is not intended as a substitute for professional medical care. Always follow your healthcare professional's instructions.        Back Exercises: Leg Pull    To start, lie on your back with your knees bent and feet flat on the floor. Dont press your neck or lower back to the floor. Breathe deeply. You should feel comfortable and relaxed in this position.  · Pull one knee to your chest.  · Hold for 30 to 60 seconds. Return to starting position.  · Repeat 2 times.  · Switch legs.  · For a double leg pull, pull both legs to your chest at  the same time. Repeat 2 times.  For your safety, check with your healthcare provider before starting an exercise program.   Date Last Reviewed: 8/16/2015  © 7605-5599 [employer]. [address] [place] [address]. All rights reserved. This information is not intended as a substitute for professional medical care. Always follow your healthcare professional's instructions.        Back Exercises: Lower Back Rotation    To start, lie on your back with your knees bent and feet flat on the floor. Dont press your neck or lower back to the floor. Breathe deeply. You should feel comfortable and relaxed in this position.  · Drop both knees to one side. Turn your head to the other side. Keep your shoulders flat on the floor.  · Do not push through pain.  · Hold for 20 seconds.  · Slowly switch sides.  · Repeat 2 to 5 times.  Date Last Reviewed: 10/11/2015  © 0031-4376 [employer]. [address] [place] [address]. All rights reserved. This information is not intended as a substitute for professional medical care. Always follow your healthcare professional's instructions.        Back Exercises: Pelvic Tilt    To start, lie on your back with your knees bent and feet flat on the floor. Dont press your neck or lower back to the floor. Breathe deeply. You should feel comfortable and relaxed in this position:  · Tighten your stomach and buttocks, and press your lower back toward the floor. This should be a small, subtle movement. This should not increase your pain.  · Hold for 5 to 15 seconds. Release.  · Repeat 2 to 5 times.  Date Last Reviewed: 10/11/2015  © 6888-1965 [employer]. [address] [place] [address]. All rights reserved. This information is not intended as a substitute for professional medical care. Always follow your healthcare professional's instructions.

## 2017-11-16 ENCOUNTER — TELEPHONE (OUTPATIENT)
Dept: FAMILY MEDICINE | Facility: CLINIC | Age: 67
End: 2017-11-16

## 2017-11-16 ENCOUNTER — CLINICAL SUPPORT (OUTPATIENT)
Dept: REHABILITATION | Facility: HOSPITAL | Age: 67
End: 2017-11-16
Payer: MEDICARE

## 2017-11-16 DIAGNOSIS — M54.5 LOW BACK PAIN, UNSPECIFIED BACK PAIN LATERALITY, UNSPECIFIED CHRONICITY, WITH SCIATICA PRESENCE UNSPECIFIED: ICD-10-CM

## 2017-11-16 PROCEDURE — 97110 THERAPEUTIC EXERCISES: CPT | Mod: PN

## 2017-11-16 NOTE — PATIENT INSTRUCTIONS
Pelvic Tilt: Posterior - Legs Bent (supine)            Tighten lower stomach and flatten back by rolling pelvis down. Buttocks may contract lightlt. Hold for 5 seconds. Relax. Repeat.  Repeat 10 times per set. Do 2 sets per session. Do 1-2 sessions per day.     https://[email or web address]."[employer].us/203   Copyright © I. All rights reserved.             Knee Roll          Lying on back, with knees bent and feet flat on floor, arms outstretched to sides, slowly roll both knees to side, hold 5 seconds. Back to starting position, pause and then rotate to opposite side, hold 5 seconds. Return to starting position. Keep shoulders and arms in contact with floor. Perform this exercise before getting out of bed and before falling asleep.  Copyright © [employer]. All rights reserved.           STRAIGHT LEG RAISE: Nerve Mobilization        Lie on back, hands behind right thigh, knee pulled toward opposite shoulder. Straighten knee, hold 3 seconds, return to start. Repeat. Perform on both legs.  Do 2 sets of 10 repetitions per session. Do 1-2 sessions per day.  Copyright © [employer]. All rights reserved.     Chair Sitting        Sit at edge of seat, spine straight, one leg extended, toes pointed toward ceiling. Keeping back straight, bend forward from the hip until stretch is felt in back of thigh. Hold 10 slow breaths. Perform on each leg.  Repeat 2 times per session. Do 2-3 sessions per day.    Copyright © [employer]. All rights reserved.   Piriformis Stretch, Supine        While lying on your back with your leg crossed over your knee, use your hand and push the crossed knee away from you as shown. Hold 2 seconds, releax, repeat. Perform on both legs   Repeat 20 times per session. Do 1-2 sessions per day.    Copyright © [employer]. All rights reserved.   Bridge Pose        Take a relaxed breath in. Press small of back into mat by tightening lower abdominals and buttocks. As you breathe out, gently push through your feet to lift hips off floor/bed. Focus on keeping  abdominals and buttocks tight.   Repeat 10 times. Perform 2 sets. Perform onle 1 time a day.    Copyright © I. All rights reserved.

## 2017-11-16 NOTE — PROGRESS NOTES
Name: Lyudmila Neri  Clinic Number: [id number]  Date of Treatment: 11/16/2017   Diagnosis:   Encounter Diagnosis   Name Primary?    Low back pain, unspecified back pain laterality, unspecified chronicity, with sciatica presence unspecified        Time in: 8:00   Time Out: 8:53  Total Treatment Time: 50   Group Time: 0      Subjective:    Lyudmila reports improvement of symptoms with no pain currently into the R hip, but stiffness in the low back as she does every morning.  Patient reports their pain to be 0/10 but stiffness at 3-4/10 on a 0-10 scale with 0 being no pain and 10 being the worst pain imaginable.    Objective      Lyudmila received therapeutic exercises to develop strength, ROM, flexibility, posture and core stabilization for 45 minutes including:      -Supine:   PPT x 30   LTR x 3 min   Seated Hamstring stretch RLE x 30 sec    Nerve glides 3 x 10   Sup modified figure 4 stretch 2 x 10 Sagar  Bridging 2x10     Pt received STM to R lumbar paraspinals in L side lying x 5 minutes for improved tissue extensibility and circulation.    Written Home Exercises Provided: Patient given written and verbal instruction for all exercises listed above, see patient instructions in notes section.    Pt demo good understanding of the education provided. Lyudmila demonstrated good return demonstration of activities.     Assessment: improved sx of back pain, tolerated exercises well decreased pain after session       Tolerated treatment well overall this date yet reported having nausea from a flare up with vertigo this date. Weakness of core and B hips with exercises, relief following stretches. No increase of radicular symptoms reported post treatment.   Pt will continue to benefit from skilled PT intervention. Medical Necessity is demonstrated by:  Pain limits function of effected part for some activities and Requires skilled supervision to complete and progress HEP.    Patient is making good progress towards established  goals.    New/Revised goals: none       Plan:  Continue with established Plan of Care towards PT goals.     Fatimah Murphy

## 2017-11-16 NOTE — TELEPHONE ENCOUNTER
----- Message from Desiree Leija sent at 11/16/2017  3:34 PM CST -----  Contact: Self  Patient is having vertigo for the last three days and leaves on a cruise on Saturday and would like a one time script for Meclizine.  Please call [phone] (home).  Thanks    [facility] [address] [place] [address] AT SEC of [facility] & [address] [address]  Phone: [phone] Fax: [phone]

## 2017-11-16 NOTE — TELEPHONE ENCOUNTER
Spoke to patient and states she is having some issues of vertigo and is requesting a prescription for Meclizine. States she is leaving on a cruise on Saturday. Pt aware that  may not send in prescription, but would really like a few tablets. Please advise. Thanks.

## 2017-11-18 RX ORDER — MECLIZINE HYDROCHLORIDE 25 MG/1
25 TABLET ORAL 3 TIMES DAILY PRN
Qty: 20 TABLET | Refills: 0 | Status: SHIPPED | OUTPATIENT
Start: 2017-11-18 | End: 2018-02-20

## 2017-12-05 ENCOUNTER — CLINICAL SUPPORT (OUTPATIENT)
Dept: REHABILITATION | Facility: HOSPITAL | Age: 67
End: 2017-12-05
Payer: MEDICARE

## 2017-12-05 DIAGNOSIS — M54.5 LOW BACK PAIN, UNSPECIFIED BACK PAIN LATERALITY, UNSPECIFIED CHRONICITY, WITH SCIATICA PRESENCE UNSPECIFIED: Primary | ICD-10-CM

## 2017-12-05 PROCEDURE — 97110 THERAPEUTIC EXERCISES: CPT | Mod: PN

## 2017-12-05 NOTE — PROGRESS NOTES
Name: Lyudmila WELLS [facility]  Clinic Number: [id number]  Date of Treatment: 12/05/2017   Diagnosis:   Encounter Diagnosis   Name Primary?    Low back pain, unspecified back pain laterality, unspecified chronicity, with sciatica presence unspecified Yes       Time in: 300  Time Out: 345  Total Treatment Time: 45  Group Time: 15      Subjective:    Lyudmila reports improvement of sx up until 4 days prior pulled a muscle in her back decorating for diana  Pain is located L side .  Patient reports their pain to be 5/10 on a 0-10 scale with 0 being no pain and 10 being the worst pain imaginable.    Objective:     Lyudmila received therapeutic exercises to develop ROM, flexibility, posture and core stabilization for 45 minutes including: (30 min 1:1)   -cat/camel   -rock backs   -pirformis stretch   -SKTC/DKTC       Lyudmila received the following manual therapy techniques: DN were applied to the: low back for 5 minutes.   -L3-5, Superior cluneals, pirformis L     Written Home Exercises Provided:  Lumbar flexion/spinal mobility   Pt demo  good understanding of the education provided. Lyudmila demonstrated good return demonstration of activities.     Assessment: continued pain/guarding low back,  Better after session today     Pt will continue to benefit from skilled PT intervention. Medical Necessity is demonstrated by:  Pain limits function of effected part for some activities and Requires skilled supervision to complete and progress HEP.    Patient is making good progress towards established goals.    New/Revised goals: none       Plan:  Continue with established Plan of Care towards PT goals.     Ishmael Rudd

## 2018-01-29 DIAGNOSIS — N95.1 MENOPAUSAL SYMPTOMS: Primary | ICD-10-CM

## 2018-01-29 DIAGNOSIS — R53.83 FATIGUE, UNSPECIFIED TYPE: ICD-10-CM

## 2018-01-30 ENCOUNTER — TELEPHONE (OUTPATIENT)
Dept: OBSTETRICS AND GYNECOLOGY | Facility: CLINIC | Age: 68
End: 2018-01-30

## 2018-01-30 NOTE — TELEPHONE ENCOUNTER
----- Message from Kelle Tena sent at 1/30/2018  9:09 AM CST -----  Contact: self  Patient is returning a missed call and can be reached at [phone].

## 2018-01-31 ENCOUNTER — TELEPHONE (OUTPATIENT)
Dept: OBSTETRICS AND GYNECOLOGY | Facility: CLINIC | Age: 68
End: 2018-01-31

## 2018-01-31 NOTE — TELEPHONE ENCOUNTER
----- Message from Dayron Rondon MA sent at 1/29/2018  3:31 PM CST -----  pt calling to schedule hormone implant appt..please advise

## 2018-02-20 ENCOUNTER — HOSPITAL ENCOUNTER (OUTPATIENT)
Dept: RADIOLOGY | Facility: HOSPITAL | Age: 68
Discharge: HOME | End: 2018-02-20
Attending: NURSE PRACTITIONER
Payer: MEDICARE

## 2018-02-20 ENCOUNTER — OFFICE VISIT (OUTPATIENT)
Dept: FAMILY MEDICINE | Facility: CLINIC | Age: 68
End: 2018-02-20
Payer: MEDICARE

## 2018-02-20 VITALS
DIASTOLIC BLOOD PRESSURE: 72 MMHG | WEIGHT: 165.81 LBS | SYSTOLIC BLOOD PRESSURE: 120 MMHG | BODY MASS INDEX: 29.38 KG/M2 | HEART RATE: 68 BPM | RESPIRATION RATE: 16 BRPM | HEIGHT: 63 IN

## 2018-02-20 DIAGNOSIS — K62.89 RECTAL PAIN: ICD-10-CM

## 2018-02-20 DIAGNOSIS — Z12.31 ENCOUNTER FOR SCREENING MAMMOGRAM FOR MALIGNANT NEOPLASM OF BREAST: ICD-10-CM

## 2018-02-20 DIAGNOSIS — I10 ESSENTIAL HYPERTENSION: ICD-10-CM

## 2018-02-20 DIAGNOSIS — H35.30 MACULAR DEGENERATION, UNSPECIFIED LATERALITY, UNSPECIFIED TYPE: ICD-10-CM

## 2018-02-20 DIAGNOSIS — H18.519 FUCHS' CORNEAL DYSTROPHY: ICD-10-CM

## 2018-02-20 DIAGNOSIS — Z00.00 ROUTINE HEALTH MAINTENANCE: Primary | ICD-10-CM

## 2018-02-20 DIAGNOSIS — Z00.00 ENCOUNTER FOR PREVENTIVE HEALTH EXAMINATION: ICD-10-CM

## 2018-02-20 PROCEDURE — 99397 PER PM REEVAL EST PAT 65+ YR: CPT | Mod: S$GLB,,, | Performed by: FAMILY MEDICINE

## 2018-02-20 PROCEDURE — 77067 SCR MAMMO BI INCL CAD: CPT | Mod: 26,,, | Performed by: RADIOLOGY

## 2018-02-20 PROCEDURE — 77067 SCR MAMMO BI INCL CAD: CPT | Mod: TC,PO

## 2018-02-20 PROCEDURE — 77063 BREAST TOMOSYNTHESIS BI: CPT | Mod: 26,,, | Performed by: RADIOLOGY

## 2018-02-20 PROCEDURE — 99999 PR PBB SHADOW E&M-EST. PATIENT-LVL III: CPT | Mod: PBBFAC,,, | Performed by: FAMILY MEDICINE

## 2018-02-20 PROCEDURE — 99499 UNLISTED E&M SERVICE: CPT | Mod: S$GLB,,, | Performed by: FAMILY MEDICINE

## 2018-02-20 RX ORDER — VIT C/E/ZN/COPPR/LUTEIN/ZEAXAN 250MG-90MG
1000 CAPSULE ORAL DAILY
COMMUNITY

## 2018-02-20 RX ORDER — AMOXICILLIN 500 MG
CAPSULE ORAL DAILY
COMMUNITY

## 2018-02-20 NOTE — PROGRESS NOTES
HPI  Lyudmila Neri is a 68 y.o. female with multiple medical diagnoses as listed in the medical history and problem list that presents for Annual Exam and Rectal Pain  .      HPI  Here today for routine health maintenance.    PAST MEDICAL HISTORY:  Past Medical History:   Diagnosis Date    Arthritis     osteoarthritis    Blood transfusion     Cancer 2011    stromal tumor, stomach    Depression     Disorder of kidney and ureter     CKD 2    GERD (gastroesophageal reflux disease)     GIST (gastrointestinal stromal tumor), malignant     H. pylori infection     Hypertension     KATHY (obstructive sleep apnea) 6/24/2013    Psoriasis 6/24/2013    Trouble in sleeping     arthritic pain wakes her up    Urinary incontinence     stress incontinence       PAST SURGICAL HISTORY:  Past Surgical History:   Procedure Laterality Date    ANKLE FRACTURE SURGERY      RT ankle    APPENDECTOMY      BLADDER SUSPENSION  1995    CARPAL TUNNEL RELEASE      left    CERVICAL FUSION      CHOLECYSTECTOMY  12/7/2011  Dr. Cai    COLONOSCOPY  7/26/2005  Thomas    Non-erosive esophageal reflux (NERD) disease?.  Post-surgical deformity in the gastric body.   Gastric mucosal abnormality (erythema) in the greater  curve of antrum.  STEVIE Test performed on 02/24/12 was Negative.    COLONOSCOPY  12/12/2008  Thomas    Small internal hemorrhoids.  Slightly redundant left colon.    CYST REMOVAL Left 2016    left middle finger, Dr. Johnson    EYE SURGERY Bilateral 1995    RK surgery    EYE SURGERY Bilateral 2015    cataract removal    HYSTERECTOMY      KNEE ARTHROSCOPY W/ DEBRIDEMENT      lt knee    SHOULDER OPEN ROTATOR CUFF REPAIR      left    STOMACH SURGERY  12/7/2011  Dr. Cai    removal of GIST tumor from wall of the stomach, 2.3 cm in size.    UPPER GASTROINTESTINAL ENDOSCOPY  2/24/2012  Thomas    Non-erosive esophageal reflux (NERD) disease?.  Post-surgical deformity in the gastric body.   Gastric mucosal abnormality  (erythema) in the greater  curve of antrum.  STEVIE Test performed on 02/24/12 was Negative.       SOCIAL HISTORY:  Social History     Social History    Marital status:      Spouse name: N/A    Number of children: N/A    Years of education: N/A     Occupational History    Not on file.     Social History Main Topics    Smoking status: Never Smoker    Smokeless tobacco: Never Used    Alcohol use No    Drug use: No    Sexual activity: Yes     Partners: Male     Other Topics Concern    Not on file     Social History Narrative    No narrative on file       FAMILY HISTORY:  Family History   Problem Relation Age of Onset    Cancer Mother      colon    Heart disease Mother     Arthritis Mother      osteoarthritis    COPD Mother     Hyperlipidemia Mother     Hypertension Mother     Kidney disease Mother     Stroke Mother     Cancer Father      brain and lung    Arthritis Father     COPD Sister     Depression Daughter     Arthritis Maternal Aunt      rheumatoid arthritis    Heart disease Maternal Uncle     Early death Maternal Uncle      in 50s due to heart disease    Arthritis Paternal Aunt      rheuamtoid arthritis    Heart disease Maternal Grandfather     Arthritis Paternal Grandmother      rheumatoid arthritis    Diabetes Paternal Grandmother     Diabetes Cousin     Heart disease Cousin        ALLERGIES AND MEDICATIONS: updated and reviewed.  Review of patient's allergies indicates:   Allergen Reactions    Codeine      Other reaction(s): Nausea     Current Outpatient Prescriptions   Medication Sig Dispense Refill    amlodipine (NORVASC) 5 MG tablet Take 1 tablet (5 mg total) by mouth once daily. 90 tablet 3    aspirin (ECOTRIN) 81 MG EC tablet Take 81 mg by mouth once daily.      celecoxib (CELEBREX) 200 MG capsule Take 1 capsule (200 mg total) by mouth 2 (two) times daily. 180 capsule 3    cholecalciferol, vitamin D3, (VITAMIN D3) 1,000 unit capsule Take 1,000 Units by mouth once  daily.      clobetasol (TEMOVATE) 0.05 % external solution Apply topically as needed.       fish oil-omega-3 fatty acids 300-1,000 mg capsule Take by mouth once daily.      FOLIC ACID/MULTIVIT-MIN/LUTEIN (CENTRUM SILVER ORAL) Take by mouth.      hydrochlorothiazide (MICROZIDE) 12.5 mg capsule Take 1 capsule (12.5 mg total) by mouth once daily. 90 capsule 3    losartan (COZAAR) 100 MG tablet Take 1 tablet (100 mg total) by mouth once daily. 90 tablet 3    TUMS 200 mg calcium (500 mg) chewable tablet       lidocaine 3.75 % Crea Apply 1 application topically 2 (two) times daily as needed (Pain). 60 g 0     No current facility-administered medications for this visit.        ROS  Review of Systems   Constitutional: Negative for fatigue, fever and unexpected weight change.   HENT: Positive for rhinorrhea (treatment with flonase). Negative for congestion, hearing loss and sore throat.    Eyes: Negative for visual disturbance.   Respiratory: Negative for cough, chest tightness, shortness of breath and wheezing.    Cardiovascular: Negative for chest pain, palpitations and leg swelling.   Gastrointestinal: Positive for rectal pain (with occasional bleeding, requesting colonoscopy prior to colorectal evaluation.  declines rectal exam today). Negative for abdominal distention, abdominal pain, blood in stool, constipation, diarrhea, nausea and vomiting.   Genitourinary: Negative for difficulty urinating, dysuria, frequency, hematuria, menstrual problem, pelvic pain, urgency and vaginal bleeding.   Musculoskeletal: Negative for back pain, joint swelling and neck pain.   Skin: Negative for rash.   Neurological: Negative for dizziness, tremors, weakness, light-headedness, numbness and headaches.   Psychiatric/Behavioral: Negative for confusion, dysphoric mood and sleep disturbance. The patient is not nervous/anxious.        Physical Exam  Vitals:    02/20/18 1152   BP: 120/72   Pulse: 68   Resp: 16    Body mass index is  "29.37 kg/m².  Weight: 75.2 kg (165 lb 12.6 oz)   Height: 5' 3" (160 cm)     Physical Exam   Constitutional: She is oriented to person, place, and time. She appears well-developed and well-nourished.   HENT:   Head: Normocephalic and atraumatic.   Right Ear: External ear normal.   Left Ear: External ear normal.   Nose: Nose normal.   Mouth/Throat: Oropharynx is clear and moist.   Eyes: Conjunctivae and EOM are normal. Pupils are equal, round, and reactive to light. No scleral icterus.   Neck: Normal range of motion. Neck supple. No JVD present. No thyromegaly present.   Cardiovascular: Normal rate, regular rhythm and normal heart sounds.  Exam reveals no gallop and no friction rub.    No murmur heard.  Pulmonary/Chest: Effort normal and breath sounds normal. She has no wheezes. She has no rales.   Abdominal: Soft. Bowel sounds are normal. She exhibits no distension and no mass. There is no tenderness. There is no rebound and no guarding.   Musculoskeletal: Normal range of motion. She exhibits no edema.   Lymphadenopathy:     She has no cervical adenopathy.   Neurological: She is alert and oriented to person, place, and time. She has normal strength. No cranial nerve deficit or sensory deficit.   Skin: Skin is warm and dry. No rash noted.   Lipomatous lesion right upper abdomen   Vitals reviewed.      Health Maintenance       Date Due Completion Date    Mammogram 11/08/2017 11/8/2016    Colonoscopy 12/13/2018 12/13/2013    DEXA SCAN 11/07/2019 11/7/2016    Lipid Panel 12/27/2021 12/27/2016    TETANUS VACCINE 09/23/2026 9/23/2016 (Declined)    Override on 9/23/2016: Declined          Assessment & Plan    Routine health maintenance  - Health maintenance reviewed  - Diet and exercise education.    Rectal pain  -     lidocaine 3.75 % Crea; Apply 1 application topically 2 (two) times daily as needed (Pain).  Dispense: 60 g; Refill: 0  -     Case request GI: COLONOSCOPY  - Recommend CRS    Essential hypertension  -     " Comprehensive metabolic panel; Future; Expected date: 02/20/2018  -     Lipid panel; Future; Expected date: 02/20/2018  - Continue current therapy  - Serial blood pressure monitoring  - Diet and exercise education.    Fuchs' corneal dystrophy with Macular degeneration, unspecified laterality, unspecified type  - Continue Optometry      Follow-up in about 1 year (around 2/20/2019).

## 2018-02-22 ENCOUNTER — OFFICE VISIT (OUTPATIENT)
Dept: RHEUMATOLOGY | Facility: CLINIC | Age: 68
End: 2018-02-22
Payer: MEDICARE

## 2018-02-22 VITALS
BODY MASS INDEX: 28.75 KG/M2 | DIASTOLIC BLOOD PRESSURE: 80 MMHG | HEIGHT: 63 IN | WEIGHT: 162.25 LBS | SYSTOLIC BLOOD PRESSURE: 110 MMHG | HEART RATE: 60 BPM

## 2018-02-22 DIAGNOSIS — M15.9 PRIMARY OSTEOARTHRITIS INVOLVING MULTIPLE JOINTS: Primary | ICD-10-CM

## 2018-02-22 DIAGNOSIS — L40.9 PSORIASIS: ICD-10-CM

## 2018-02-22 DIAGNOSIS — N18.2 CHRONIC KIDNEY DISEASE, STAGE II (MILD): ICD-10-CM

## 2018-02-22 PROCEDURE — 99214 OFFICE O/P EST MOD 30 MIN: CPT | Mod: S$GLB,,, | Performed by: INTERNAL MEDICINE

## 2018-02-22 PROCEDURE — 3008F BODY MASS INDEX DOCD: CPT | Mod: S$GLB,,, | Performed by: INTERNAL MEDICINE

## 2018-02-22 PROCEDURE — 1125F AMNT PAIN NOTED PAIN PRSNT: CPT | Mod: S$GLB,,, | Performed by: INTERNAL MEDICINE

## 2018-02-22 PROCEDURE — 99499 UNLISTED E&M SERVICE: CPT | Mod: S$GLB,,, | Performed by: INTERNAL MEDICINE

## 2018-02-22 PROCEDURE — 1159F MED LIST DOCD IN RCRD: CPT | Mod: S$GLB,,, | Performed by: INTERNAL MEDICINE

## 2018-02-22 PROCEDURE — 99999 PR PBB SHADOW E&M-EST. PATIENT-LVL III: CPT | Mod: PBBFAC,,, | Performed by: INTERNAL MEDICINE

## 2018-02-22 ASSESSMENT — ROUTINE ASSESSMENT OF PATIENT INDEX DATA (RAPID3)
PSYCHOLOGICAL DISTRESS SCORE: 1.1
TOTAL RAPID3 SCORE: 2.44
PAIN SCORE: 4
PATIENT GLOBAL ASSESSMENT SCORE: 1
MDHAQ FUNCTION SCORE: .7

## 2018-02-22 NOTE — PATIENT INSTRUCTIONS
Going to try to take Celebrex 200mg once daily and Tylenol 500-1,000mg daily for any breakthrough  Ok to use second Celebrex for bad days.

## 2018-02-22 NOTE — PROGRESS NOTES
Subjective:          Chief Complaint: Lyudmila Neri is a 68 y.o. female who had concerns including Disease Management.    HPI:    Patient is a 67-year-old female her for consult f/u we did start Celebrex and having significantly improved overall.  Feet are painful by the end of the day.   Taking Celebrex 200mg twice daily  BP has been good     As a history of nonerosive erosive reflux gastritis as well as GIST resected 2011 follows with Dr. Pro.  Recent serologies rheumatoid factor negative, sedimentation rate within normal limits, SINDI negative, C-reactive protein is slightly elevated.  Affected joints: knees, shoulders cervical spine (hx of fusion), hips, hands are stiff in the CMC and MCP and PIP, feet. Right ankle with known fx and ORIF.   Stifffness in AM 45 min with hot shower.   No dactylitis, no known other joint swelling. No IBD. She notes drys eyes, no scleritis, episcleritis  She did use aleve which helps, meloxicam not really. Celebrex does help but cannot use BID due to HTN. Did recently have BP meds adjusted.  Is having relief but not complete. No GI upset.   She has sensitivity to soft tissue pain.  She has hx of Psoriasis that is controlled for at least 10 years-scalp predominant but diffusely.   A new well marginated erosion present at the radial aspect of the base of the right fourth proximal phalanx joint space narrowing in the third fourth MCP and the left third MCP joint degenerative changes otherwise.    REVIEW OF SYSTEMS:    Review of Systems   Constitutional: Positive for malaise/fatigue. Negative for fever and weight loss.   HENT: Negative for sore throat.    Eyes: Negative for double vision, photophobia and redness.   Respiratory: Negative for cough, shortness of breath and wheezing.    Cardiovascular: Negative for chest pain, palpitations and orthopnea.   Gastrointestinal: Negative for abdominal pain, constipation and diarrhea.   Genitourinary: Negative for dysuria, hematuria and  urgency.   Musculoskeletal: Positive for joint pain and myalgias. Negative for back pain.   Skin: Negative for rash.   Neurological: Negative for dizziness, tingling, focal weakness and headaches.   Endo/Heme/Allergies: Does not bruise/bleed easily.   Psychiatric/Behavioral: Negative for depression, hallucinations and suicidal ideas.               Objective:            Past Medical History:   Diagnosis Date    Arthritis     osteoarthritis    Blood transfusion     Cancer 2011    stromal tumor, stomach    Depression     Disorder of kidney and ureter     CKD 2    GERD (gastroesophageal reflux disease)     GIST (gastrointestinal stromal tumor), malignant     H. pylori infection     Hypertension     KATHY (obstructive sleep apnea) 6/24/2013    Psoriasis 6/24/2013    Trouble in sleeping     arthritic pain wakes her up    Urinary incontinence     stress incontinence     Family History   Problem Relation Age of Onset    Cancer Mother      colon    Heart disease Mother     Arthritis Mother      osteoarthritis    COPD Mother     Hyperlipidemia Mother     Hypertension Mother     Kidney disease Mother     Stroke Mother     Cancer Father      brain and lung    Arthritis Father     COPD Sister     Depression Daughter     Arthritis Maternal Aunt      rheumatoid arthritis    Heart disease Maternal Uncle     Early death Maternal Uncle      in 50s due to heart disease    Arthritis Paternal Aunt      rheuamtoid arthritis    Heart disease Maternal Grandfather     Arthritis Paternal Grandmother      rheumatoid arthritis    Diabetes Paternal Grandmother     Diabetes Cousin     Heart disease Cousin      Social History   Substance Use Topics    Smoking status: Never Smoker    Smokeless tobacco: Never Used    Alcohol use No         Current Outpatient Prescriptions on File Prior to Visit   Medication Sig Dispense Refill    amlodipine (NORVASC) 5 MG tablet Take 1 tablet (5 mg total) by mouth once daily. 90  tablet 3    aspirin (ECOTRIN) 81 MG EC tablet Take 81 mg by mouth once daily.      celecoxib (CELEBREX) 200 MG capsule Take 1 capsule (200 mg total) by mouth 2 (two) times daily. 180 capsule 3    cholecalciferol, vitamin D3, (VITAMIN D3) 1,000 unit capsule Take 1,000 Units by mouth once daily.      clobetasol (TEMOVATE) 0.05 % external solution Apply topically as needed.       fish oil-omega-3 fatty acids 300-1,000 mg capsule Take by mouth once daily.      FOLIC ACID/MULTIVIT-MIN/LUTEIN (CENTRUM SILVER ORAL) Take by mouth.      hydrochlorothiazide (MICROZIDE) 12.5 mg capsule Take 1 capsule (12.5 mg total) by mouth once daily. 90 capsule 3    lidocaine 3.75 % Crea Apply 1 application topically 2 (two) times daily as needed (Pain). 60 g 0    losartan (COZAAR) 100 MG tablet Take 1 tablet (100 mg total) by mouth once daily. 90 tablet 3    TUMS 200 mg calcium (500 mg) chewable tablet        No current facility-administered medications on file prior to visit.        Vitals:    02/22/18 1032   BP: 110/80   Pulse: 60       Physical Exam:    Physical Exam   Constitutional: She appears well-developed and well-nourished.   HENT:   Nose: No septal deviation.   Mouth/Throat: Mucous membranes are normal. No oral lesions.   Eyes: Pupils are equal, round, and reactive to light. Right conjunctiva is not injected. Left conjunctiva is not injected.   Neck: No JVD present. No thyroid mass and no thyromegaly present.   Cardiovascular: Normal rate, regular rhythm and normal pulses.    No edema   Pulmonary/Chest: Effort normal and breath sounds normal.   Abdominal: Soft. Normal appearance. There is no hepatosplenomegaly.   Musculoskeletal:        Right shoulder: She exhibits tenderness. She exhibits normal range of motion and no swelling.        Left shoulder: She exhibits tenderness. She exhibits normal range of motion and no swelling.        Right elbow: She exhibits normal range of motion and no swelling. No tenderness found.         Left elbow: She exhibits normal range of motion and no swelling. No tenderness found.        Right wrist: She exhibits normal range of motion, no tenderness and no swelling.        Left wrist: She exhibits normal range of motion, no tenderness and no swelling.        Right hip: She exhibits normal range of motion, normal strength and no swelling.        Left hip: She exhibits normal range of motion, no tenderness and no swelling.        Right knee: She exhibits normal range of motion and no swelling. Tenderness found.        Left knee: She exhibits normal range of motion and no swelling. Tenderness found.        Right ankle: She exhibits normal range of motion and no swelling. No tenderness.        Left ankle: She exhibits normal range of motion and no swelling. No tenderness.        Right hand: She exhibits tenderness.        Left hand: She exhibits tenderness.        Right foot: There is tenderness.        Left foot: There is tenderness.   2nd DIP b/l bony hypertrophy.   No active synovitis but tenderness at fingers, shoulders, knees and metatarsalgia.   No nodules of the achilles.    Lymphadenopathy:     She has no cervical adenopathy.     She has no axillary adenopathy.   Neurological: She has normal strength and normal reflexes.   Skin: Skin is dry and intact.   Psychiatric: She has a normal mood and affect.             Assessment:       Encounter Diagnoses   Name Primary?    Primary osteoarthritis involving multiple joints Yes    Psoriasis     Chronic kidney disease, stage II (mild)           Plan:        Primary osteoarthritis involving multiple joints    Psoriasis    Chronic kidney disease, stage II (mild)     OA  Celebrex is working.   Now that BP meds increased try Celebrex 200mg BID -BP doing very well.   Going to try to take Celebrex 200mg once daily and Tylenol 500-1,000mg daily for any breakthrough  Ok to use second Celebrex for bad days. Would like to find the lowest effective dose that  allows quality of life for her joint pain.     Follow-up in about 6 months (around 8/22/2018).         30min consultation with greater than 50% spent in counseling, chart review and coordination of care. All questions answered.

## 2018-02-27 ENCOUNTER — HOSPITAL ENCOUNTER (OUTPATIENT)
Dept: RADIOLOGY | Facility: HOSPITAL | Age: 68
Discharge: HOME | End: 2018-02-27
Attending: NURSE PRACTITIONER
Payer: MEDICARE

## 2018-02-27 DIAGNOSIS — C49.A2 GASTROINTESTINAL STROMAL TUMOR (GIST) OF STOMACH: ICD-10-CM

## 2018-02-27 PROCEDURE — 71046 X-RAY EXAM CHEST 2 VIEWS: CPT | Mod: TC,FY,PO

## 2018-02-27 PROCEDURE — 71046 X-RAY EXAM CHEST 2 VIEWS: CPT | Mod: 26,,, | Performed by: RADIOLOGY

## 2018-02-28 ENCOUNTER — PATIENT MESSAGE (OUTPATIENT)
Dept: GASTROENTEROLOGY | Facility: CLINIC | Age: 68
End: 2018-02-28

## 2018-03-01 ENCOUNTER — OFFICE VISIT (OUTPATIENT)
Dept: HEMATOLOGY/ONCOLOGY | Facility: CLINIC | Age: 68
End: 2018-03-01
Payer: MEDICARE

## 2018-03-01 VITALS
RESPIRATION RATE: 16 BRPM | TEMPERATURE: 98 F | HEART RATE: 68 BPM | SYSTOLIC BLOOD PRESSURE: 141 MMHG | BODY MASS INDEX: 29.49 KG/M2 | HEIGHT: 63 IN | WEIGHT: 166.44 LBS | DIASTOLIC BLOOD PRESSURE: 83 MMHG

## 2018-03-01 DIAGNOSIS — R11.0 NAUSEA: ICD-10-CM

## 2018-03-01 DIAGNOSIS — I10 ESSENTIAL HYPERTENSION: ICD-10-CM

## 2018-03-01 DIAGNOSIS — Z85.09 HISTORY OF GASTROINTESTINAL STROMAL TUMOR (GIST): Primary | ICD-10-CM

## 2018-03-01 PROCEDURE — 99999 PR PBB SHADOW E&M-EST. PATIENT-LVL IV: CPT | Mod: PBBFAC,,, | Performed by: NURSE PRACTITIONER

## 2018-03-01 PROCEDURE — 99213 OFFICE O/P EST LOW 20 MIN: CPT | Mod: S$GLB,,, | Performed by: NURSE PRACTITIONER

## 2018-03-01 PROCEDURE — 3079F DIAST BP 80-89 MM HG: CPT | Mod: S$GLB,,, | Performed by: NURSE PRACTITIONER

## 2018-03-01 PROCEDURE — 3077F SYST BP >= 140 MM HG: CPT | Mod: S$GLB,,, | Performed by: NURSE PRACTITIONER

## 2018-03-01 NOTE — PROGRESS NOTES
"HISTORY OF PRESENT ILLNESS:  This is a 68-year-old white female known to Dr. Pro for resected low-grade GIST (December 2011).  She presents to the clinic   today for her approximate 74-month post-therapy evaluation.  She reports that she   has been experiencing "vague" nausea as she did before her diagnosis of GIST   over the last month.  She denies any difficulties with fevers, chills, abdominal   discomfort/bloating, vomiting, constipation, diarrhea, painful lymphadenopathy,   drenching night sweats, bleeding, etc.  No other new complaints or pertinent findings   on a 14-point review of systems.    PHYSICAL EXAMINATION:  GENERAL:  Well-developed, well-nourished white female in no acute distress.    Alert and oriented x3.  VITAL SIGNS:    Wt Readings from Last 3 Encounters:   03/01/18 75.5 kg (166 lb 7.2 oz)   02/22/18 73.6 kg (162 lb 4.1 oz)   02/20/18 75.2 kg (165 lb 12.6 oz)     Temp Readings from Last 3 Encounters:   03/01/18 98.3 °F (36.8 °C)   04/24/17 98.2 °F (36.8 °C) (Oral)   04/13/17 97.7 °F (36.5 °C)     BP Readings from Last 3 Encounters:   03/01/18 (!) 141/83   02/22/18 110/80   02/20/18 120/72     Pulse Readings from Last 3 Encounters:   03/01/18 68   02/22/18 60   02/20/18 68     HEENT:  Normocephalic, atraumatic.  Oral mucosa pink and moist.  Lips without   lesions.  Tongue midline.  Oropharynx clear.  Nonicteric sclerae.   NECK:  Supple.  No adenopathy.                                               HEART:  Regular rate and rhythm without murmur, gallop or rub.               LUNGS:  Clear to auscultation bilaterally.                                   ABDOMEN:  Soft, lipoma to RUQ, tender to LUQ, nondistended with positive normoactive bowel   sounds.  No hepatosplenomegaly.                                              EXTREMITIES:  No cyanosis, clubbing or edema.  Distal pulses are intact.     AXILLAE AND GROIN:  No palpable pathologic lymphadenopathy is appreciated.    LABORATORY:    Lab " Results   Component Value Date    WBC 6.76 02/27/2018    HGB 13.2 02/27/2018    HCT 39.7 02/27/2018    MCV 91 02/27/2018     02/27/2018     Unremarkable differential    CMP  Sodium   Date Value Ref Range Status   02/27/2018 139 136 - 145 mmol/L Final   02/27/2018 139 136 - 145 mmol/L Final     Potassium   Date Value Ref Range Status   02/27/2018 4.0 3.5 - 5.1 mmol/L Final   02/27/2018 4.0 3.5 - 5.1 mmol/L Final     Chloride   Date Value Ref Range Status   02/27/2018 103 95 - 110 mmol/L Final   02/27/2018 103 95 - 110 mmol/L Final     CO2   Date Value Ref Range Status   02/27/2018 29 23 - 29 mmol/L Final   02/27/2018 29 23 - 29 mmol/L Final     Glucose   Date Value Ref Range Status   02/27/2018 92 70 - 110 mg/dL Final   02/27/2018 92 70 - 110 mg/dL Final     BUN, Bld   Date Value Ref Range Status   02/27/2018 15 8 - 23 mg/dL Final   02/27/2018 15 8 - 23 mg/dL Final     Creatinine   Date Value Ref Range Status   02/27/2018 0.8 0.5 - 1.4 mg/dL Final   02/27/2018 0.8 0.5 - 1.4 mg/dL Final     Calcium   Date Value Ref Range Status   02/27/2018 9.1 8.7 - 10.5 mg/dL Final   02/27/2018 9.1 8.7 - 10.5 mg/dL Final     Total Protein   Date Value Ref Range Status   02/27/2018 6.9 6.0 - 8.4 g/dL Final   02/27/2018 6.9 6.0 - 8.4 g/dL Final     Albumin   Date Value Ref Range Status   02/27/2018 3.3 (L) 3.5 - 5.2 g/dL Final   02/27/2018 3.3 (L) 3.5 - 5.2 g/dL Final     Total Bilirubin   Date Value Ref Range Status   02/27/2018 0.4 0.1 - 1.0 mg/dL Final     Comment:     For infants and newborns, interpretation of results should be based  on gestational age, weight and in agreement with clinical  observations.  Premature Infant recommended reference ranges:  Up to 24 hours.............<8.0 mg/dL  Up to 48 hours............<12.0 mg/dL  3-5 days..................<15.0 mg/dL  6-29 days.................<15.0 mg/dL     02/27/2018 0.4 0.1 - 1.0 mg/dL Final     Comment:     For infants and newborns, interpretation of results should  be based  on gestational age, weight and in agreement with clinical  observations.  Premature Infant recommended reference ranges:  Up to 24 hours.............<8.0 mg/dL  Up to 48 hours............<12.0 mg/dL  3-5 days..................<15.0 mg/dL  6-29 days.................<15.0 mg/dL       Alkaline Phosphatase   Date Value Ref Range Status   02/27/2018 43 (L) 55 - 135 U/L Final   02/27/2018 43 (L) 55 - 135 U/L Final     AST   Date Value Ref Range Status   02/27/2018 13 10 - 40 U/L Final   02/27/2018 13 10 - 40 U/L Final     ALT   Date Value Ref Range Status   02/27/2018 12 10 - 44 U/L Final   02/27/2018 12 10 - 44 U/L Final     Anion Gap   Date Value Ref Range Status   02/27/2018 7 (L) 8 - 16 mmol/L Final   02/27/2018 7 (L) 8 - 16 mmol/L Final     eGFR if    Date Value Ref Range Status   02/27/2018 >60 >60 mL/min/1.73 m^2 Final   02/27/2018 >60 >60 mL/min/1.73 m^2 Final     eGFR if non    Date Value Ref Range Status   02/27/2018 >60 >60 mL/min/1.73 m^2 Final     Comment:     Calculation used to obtain the estimated glomerular filtration  rate (eGFR) is the CKD-EPI equation.      02/27/2018 >60 >60 mL/min/1.73 m^2 Final     Comment:     Calculation used to obtain the estimated glomerular filtration  rate (eGFR) is the CKD-EPI equation.            RADIOLOGY:  Chest x-ray dated 02/27/17:   Impression     No evidence of active chest disease.     IMPRESSION:    1.  Completely resected gastrointestinal stromal tumor -- no evidence of disease.  2.  Nausea - over last month.    PLAN:   1.  Return in one year with interval CBC, CMP, LDH and chest x-ray prior.  2.  Case request for Upper GI with Dr. Guzman in light of nausea & history.    Assessment/plan reviewed and approved by Dr. Comer.

## 2018-03-06 ENCOUNTER — PATIENT MESSAGE (OUTPATIENT)
Dept: OBSTETRICS AND GYNECOLOGY | Facility: CLINIC | Age: 68
End: 2018-03-06

## 2018-03-08 ENCOUNTER — TELEPHONE (OUTPATIENT)
Dept: GASTROENTEROLOGY | Facility: CLINIC | Age: 68
End: 2018-03-08

## 2018-03-08 NOTE — TELEPHONE ENCOUNTER
----- Message from Ector Prince sent at 3/8/2018 11:56 AM CST -----  Contact: [phone]  Patient returning the nurse phone call. Thanks!

## 2018-03-20 ENCOUNTER — PROCEDURE VISIT (OUTPATIENT)
Dept: OBSTETRICS AND GYNECOLOGY | Facility: CLINIC | Age: 68
End: 2018-03-20
Attending: OBSTETRICS & GYNECOLOGY
Payer: MEDICARE

## 2018-03-20 VITALS
WEIGHT: 166.69 LBS | HEIGHT: 63 IN | BODY MASS INDEX: 29.54 KG/M2 | SYSTOLIC BLOOD PRESSURE: 114 MMHG | DIASTOLIC BLOOD PRESSURE: 80 MMHG

## 2018-03-20 DIAGNOSIS — N95.1 SYMPTOMATIC MENOPAUSAL OR FEMALE CLIMACTERIC STATES: Primary | ICD-10-CM

## 2018-03-20 PROCEDURE — 11980 IMPLANT HORMONE PELLET(S): CPT | Mod: S$GLB,,, | Performed by: OBSTETRICS & GYNECOLOGY

## 2018-03-20 NOTE — PATIENT INSTRUCTIONS
Follow the information you have been given by your doctor. Here are some additional instructions and reminders on how to care for yourself once you're home.     Keep pressure on the area for 1 hour. You may have some discomfort. This will go away.   A slight bloody discharge is normal.   A slight yellow discharge of serum is normal.   Pellet can be felt below the skin cut. This is normal.   Breast and vulva tingling and feeling of fullness is normal.   Change your bandage daily for 5 to 7 days.   If small butterfly tape comes off, you do not need to do anything.   You may remove butterfly tape after 5 days.   You may shower the next morning after your procedure. Do NOT take a bath.   Change your bandage if it becomes wet.   If one small white pellet comes out, just wipe off with alcohol and throw away.   If area becomes tender and inflamed, take the antibiotic medication you have been given.   If the suture looks like it has melted, schedule an appointment to have it removed.   Do not do exercise or stretch for 3 days.   Some fluid retention can occur in first weeks or later.   In about 1 month, this area will feel like a lump. This is normal.    If you have any questions, contact the [facility] at [phone].    CHELSEA Morales MD  [facility]  [address], [address]  Phone: [phone]  Fax: [phone]

## 2018-03-20 NOTE — PROCEDURES
Endoscopic injection/implant  Date/Time: 3/20/2018 1:32 PM  Performed by: VIOLETA SHERMAN  Authorized by: VIOLETA SHERMAN   Preparation: Patient was prepped and draped in the usual sterile fashion.  Local anesthesia used: yes  Anesthesia: local infiltration    Anesthesia:  Local anesthesia used: yes  Local Anesthetic: lidocaine 1% without epinephrine  Anesthetic total: 10 mL    Sedation:  Patient sedated: no  Patient tolerance: Patient tolerated the procedure well with no immediate complications      Lyudmila Neri  68 y.o. female is here for her numerous pellet insertion. She did havea hysterectomy.  She has signed her informed consent outlining the procedure and the implant of the pellets . Pt is also aware of the risks and benefits of estradiol and testosterone and that this mode of delivery is not FDA approved even though the actual drug is FDA approved.      After proper informed consent was obtained, pt laid prone on the examining table. With alcohol prep in place, 10 cc of 1% lidocaine with out epinepherine was placed in a tracking formation for the trochar. This was allowed to set for 15 min. The right buttock was prepped and draped in the usual fashion for a sterile procedure using betadine.  With an 11 blade a one cm incision was made.  The sterile trochar was used to insert 100 mg of Testosterone in pellet form followed by 100 mg of Estradiol in pellet form.  Pressure was applied to the incision site for hemostasis.  A steri strip was placed and a pressure dressing placed to be removed no earlier than 4 hours.      Pt tolerated the procedure well and there were no complications.           Pt was instructed to call for fever or redness of the area.  We will call in Rx if infection occurs.    Pt also was instructed to call with adverse effects or in 6 months and to follow up with labs in 4 months

## 2018-04-17 NOTE — H&P
"History & Physical - Short Stay  Gastroenterology      SUBJECTIVE:     Procedure: Gastroscopy and Colonoscopy    Chief Complaint/Indication for Procedure:  "vague" nausea.  Rectal Pain.    History of Present Illness:  Office Visit     3/1/2018  [facility]      Aleksandr Das NP   Hematology and Oncology   History of gastrointestinal stromal tumor (GIST) +2 more   Dx   Appointment   Reason for Visit    Progress Notes        HISTORY OF PRESENT ILLNESS:  This is a 68-year-old white female known to Dr. Pro for resected low-grade GIST (December 2011).  She presents to the clinic   today for her approximate 74-month post-therapy evaluation.  She reports that she   has been experiencing "vague" nausea as she did before her diagnosis of GIST   over the last month.  She denies any difficulties with fevers, chills, abdominal   discomfort/bloating, vomiting, constipation, diarrhea, painful lymphadenopathy,   drenching night sweats, bleeding, etc.  No other new complaints or pertinent findings on a 14-point review of systems    Impression     No evidence of active chest disease.      IMPRESSION:    1.  Completely resected gastrointestinal stromal tumor -- no evidence of disease.  2.  Nausea - over last month.     PLAN:   1.  Return in one year with interval CBC, CMP, LDH and chest x-ray prior.  2.  Case request for Upper GI with Dr. Guzman in light of nausea & history.     Assessment/plan reviewed and approved by Dr. Comer.            Office Visit     2/20/2018  [facility]      Mayco Shultz MD   Family Medicine   Routine health maintenance +4 more   Dx   Annual Exam, Rectal Pain   Reason for Visit    Progress Notes     [facility]  Lyudmila Neri is a 68 y.o. female with multiple medical diagnoses as listed in the medical history and problem list that presents for Annual Exam and Rectal Pain      HPI  Here today for routine health maintenance.     Assessment & " Plan     Routine health maintenance  -           Health maintenance reviewed  -           Diet and exercise education.     Rectal pain  -     lidocaine 3.75 % Crea; Apply 1 application topically 2 (two) times daily as needed (Pain).  Dispense: 60 g; Refill: 0  -     Case request GI: COLONOSCOPY  -           Recommend CRS     Essential hypertension  -     Comprehensive metabolic panel; Future; Expected date: 02/20/2018  -     Lipid panel; Future; Expected date: 02/20/2018  -           Continue current therapy  -           Serial blood pressure monitoring  -           Diet and exercise education.     Fuchs' corneal dystrophy with Macular degeneration, unspecified laterality, unspecified type  -           Continue Optometry      Follow-up in about 1 year (around 2/20/2019).           STOMACH SURGERY   12/7/2011  Dr. Cai     removal of GIST tumor from wall of the stomach, 2.3 cm in size.    UPPER GASTROINTESTINAL ENDOSCOPY   2/24/2012  [facility]     Non-erosive esophageal reflux (NERD) disease?.  Post-surgical deformity in the gastric body.   Gastric mucosal abnormality (erythema) in the greater  curve of antrum.  STEVIE Test performed on 02/24/12 was Negative.   Last Colonoscopy 12/13/2013  Indications:   Colon cancer screening in patient at increased risk:                        Colorectal cancer in mother. Occasional RLQ pain.                        Last colonoscopy: December 12, 2008  Impression:          - Internal hemorrhoids.                       - The entire examined colon is normal.                       - The ascending colon, cecum and ileocecal valve are                        normal.                       - The examined portion of the ileum was normal.                       - Irritable bowel syndrome.  Recommendation:      - Discharge patient to home.                       - High fiber diet.                       - Take a PROBIOTIC, such as a carton of GREEK YOGURT                        (Chobani or Oikos, or  Activia or Dannon); or tablets                        of ALIGN or CULTURELLE or BHARATH-Q (all                        non-prescription), every day for a month.                       - Use Levsin, NuLev (hyoscyamine) 0.125 mg 1-2 tabs                        SL q 4 hours PRN.                       - Repeat colonoscopy in 5 years for surveillance.  David Guzman MD  12/13/2013     Facility-Administered Medications Prior to Admission   Medication    estradiol 50 mg pellet     PTA Medications   Medication Sig    amlodipine (NORVASC) 5 MG tablet Take 1 tablet (5 mg total) by mouth once daily.    aspirin (ECOTRIN) 81 MG EC tablet Take 81 mg by mouth once daily.    celecoxib (CELEBREX) 200 MG capsule Take 1 capsule (200 mg total) by mouth 2 (two) times daily.    cholecalciferol, vitamin D3, (VITAMIN D3) 1,000 unit capsule Take 1,000 Units by mouth once daily.    clobetasol (TEMOVATE) 0.05 % external solution Apply topically as needed.     fish oil-omega-3 fatty acids 300-1,000 mg capsule Take by mouth once daily.    FOLIC ACID/MULTIVIT-MIN/LUTEIN (CENTRUM SILVER ORAL) Take by mouth.    hydrochlorothiazide (MICROZIDE) 12.5 mg capsule Take 1 capsule (12.5 mg total) by mouth once daily.    lidocaine 3.75 % Crea Apply 1 application topically 2 (two) times daily as needed (Pain).    losartan (COZAAR) 100 MG tablet Take 1 tablet (100 mg total) by mouth once daily.    TUMS 200 mg calcium (500 mg) chewable tablet        Review of patient's allergies indicates:   Allergen Reactions    Codeine      Other reaction(s): Nausea        Past Medical History:   Diagnosis Date    Arthritis     osteoarthritis    Blood transfusion     Cancer 2011    stromal tumor, stomach    Depression     Disorder of kidney and ureter     CKD 2    GERD (gastroesophageal reflux disease)     GIST (gastrointestinal stromal tumor), malignant     H. pylori infection     Hypertension     KATHY (obstructive sleep apnea) 6/24/2013     Psoriasis 6/24/2013    Trouble in sleeping     arthritic pain wakes her up    Urinary incontinence     stress incontinence     Past Surgical History:   Procedure Laterality Date    ANKLE FRACTURE SURGERY      RT ankle    APPENDECTOMY      BLADDER SUSPENSION  1995    CARPAL TUNNEL RELEASE      left    CERVICAL FUSION      CHOLECYSTECTOMY  12/7/2011  Dr. Cai    COLONOSCOPY  7/26/2005  Thomas    Non-erosive esophageal reflux (NERD) disease?.  Post-surgical deformity in the gastric body.   Gastric mucosal abnormality (erythema) in the greater  curve of antrum.  STEVIE Test performed on 02/24/12 was Negative.    COLONOSCOPY  12/12/2008  Thomas    Small internal hemorrhoids.  Slightly redundant left colon.    CYST REMOVAL Left 2016    left middle finger, Dr. Johnson    EYE SURGERY Bilateral 1995    RK surgery    EYE SURGERY Bilateral 2015    cataract removal    GASTRECTOMY      HYSTERECTOMY      KNEE ARTHROSCOPY W/ DEBRIDEMENT      lt knee    SHOULDER OPEN ROTATOR CUFF REPAIR      left    STOMACH SURGERY  12/7/2011  Dr. Cai    removal of GIST tumor from wall of the stomach, 2.3 cm in size.    UPPER GASTROINTESTINAL ENDOSCOPY  2/24/2012  Thomas    Non-erosive esophageal reflux (NERD) disease?.  Post-surgical deformity in the gastric body.   Gastric mucosal abnormality (erythema) in the greater  curve of antrum.  STEVIE Test performed on 02/24/12 was Negative.     Family History   Problem Relation Age of Onset    Cancer Mother      colon    Heart disease Mother     Arthritis Mother      osteoarthritis    COPD Mother     Hyperlipidemia Mother     Hypertension Mother     Kidney disease Mother     Stroke Mother     Cancer Father      brain and lung    Arthritis Father     COPD Sister     Depression Daughter     Arthritis Maternal Aunt      rheumatoid arthritis    Heart disease Maternal Uncle     Early death Maternal Uncle      in 50s due to heart disease    Arthritis Paternal Aunt       "rheuamtoid arthritis    Heart disease Maternal Grandfather     Arthritis Paternal Grandmother      rheumatoid arthritis    Diabetes Paternal Grandmother     Diabetes Cousin     Heart disease Cousin      Social History   Substance Use Topics    Smoking status: Never Smoker    Smokeless tobacco: Never Used    Alcohol use No         OBJECTIVE:     Vital Signs (Most Recent)  Temp: 97.7 °F (36.5 °C) (04/18/18 1207)  Pulse: 64 (04/18/18 1207)  Resp: 18 (04/18/18 1207)  BP: 133/65 (04/18/18 1207)  SpO2: 98 % (04/18/18 1207)    Physical Exam:          :Ht 5' 3" (1.6 m)    Wt 75.5 kg (166 lb 7.2 oz)    BMI 29.48 kg/m²                                                         GENERAL:  Comfortable, in no acute distress.                                 HEENT EXAM:  Nonicteric.  No adenopathy.  Oropharynx is clear.               NECK:  Supple.                                                               LUNGS:  Clear.                                                               CARDIAC:  Regular rate and rhythm.  S1, S2.  No murmur.                      ABDOMEN:  Soft, positive bowel sounds, nontender.  No hepatosplenomegaly or masses.  No rebound or guarding.                                             EXTREMITIES:  No edema.     MENTAL STATUS:  Alert and oriented.    ASSESSMENT/PLAN:     Assessment:  "vague" nausea.  Rectal Pain.    Plan: Gastroscopy and Colonoscopy    Anesthesia Plan:   MAC / General Anaesthesia    ASA Grade: ASA 2 - Patient with mild systemic disease with no functional limitations    MALLAMPATI SCORE: II (hard and soft palate, upper portion of tonsils anduvula visible)    "

## 2018-04-18 ENCOUNTER — HOSPITAL ENCOUNTER (OUTPATIENT)
Facility: HOSPITAL | Age: 68
Discharge: HOME | End: 2018-04-18
Attending: INTERNAL MEDICINE | Admitting: INTERNAL MEDICINE
Payer: MEDICARE

## 2018-04-18 ENCOUNTER — ANESTHESIA EVENT (OUTPATIENT)
Dept: ENDOSCOPY | Facility: HOSPITAL | Age: 68
End: 2018-04-18
Payer: MEDICARE

## 2018-04-18 ENCOUNTER — ANESTHESIA (OUTPATIENT)
Dept: ENDOSCOPY | Facility: HOSPITAL | Age: 68
End: 2018-04-18
Payer: MEDICARE

## 2018-04-18 ENCOUNTER — SURGERY (OUTPATIENT)
Age: 68
End: 2018-04-18

## 2018-04-18 VITALS
HEART RATE: 67 BPM | SYSTOLIC BLOOD PRESSURE: 129 MMHG | DIASTOLIC BLOOD PRESSURE: 69 MMHG | RESPIRATION RATE: 18 BRPM | BODY MASS INDEX: 28.35 KG/M2 | WEIGHT: 160 LBS | OXYGEN SATURATION: 98 % | HEIGHT: 63 IN | TEMPERATURE: 97 F

## 2018-04-18 LAB — H PYLORI INDEX VALUE: NEGATIVE

## 2018-04-18 PROCEDURE — 88305 TISSUE EXAM BY PATHOLOGIST: CPT | Mod: 26,,, | Performed by: PATHOLOGY

## 2018-04-18 PROCEDURE — 43239 EGD BIOPSY SINGLE/MULTIPLE: CPT | Mod: PO | Performed by: INTERNAL MEDICINE

## 2018-04-18 PROCEDURE — 88305 TISSUE EXAM BY PATHOLOGIST: CPT | Performed by: PATHOLOGY

## 2018-04-18 PROCEDURE — 87449 NOS EACH ORGANISM AG IA: CPT | Mod: PO | Performed by: INTERNAL MEDICINE

## 2018-04-18 PROCEDURE — 63600175 PHARM REV CODE 636 W HCPCS: Mod: PO | Performed by: NURSE ANESTHETIST, CERTIFIED REGISTERED

## 2018-04-18 PROCEDURE — 45378 DIAGNOSTIC COLONOSCOPY: CPT | Mod: ,,, | Performed by: INTERNAL MEDICINE

## 2018-04-18 PROCEDURE — D9220A PRA ANESTHESIA: Mod: CRNA,,,

## 2018-04-18 PROCEDURE — 43239 EGD BIOPSY SINGLE/MULTIPLE: CPT | Mod: 51,,, | Performed by: INTERNAL MEDICINE

## 2018-04-18 PROCEDURE — 27201012 HC FORCEPS, HOT/COLD, DISP: Mod: PO | Performed by: INTERNAL MEDICINE

## 2018-04-18 PROCEDURE — 37000008 HC ANESTHESIA 1ST 15 MINUTES: Mod: PO | Performed by: INTERNAL MEDICINE

## 2018-04-18 PROCEDURE — D9220A PRA ANESTHESIA: Mod: ANES,,, | Performed by: ANESTHESIOLOGY

## 2018-04-18 PROCEDURE — 37000009 HC ANESTHESIA EA ADD 15 MINS: Mod: PO | Performed by: INTERNAL MEDICINE

## 2018-04-18 PROCEDURE — 45378 DIAGNOSTIC COLONOSCOPY: CPT | Mod: PO | Performed by: INTERNAL MEDICINE

## 2018-04-18 RX ORDER — HYDROCORTISONE ACETATE PRAMOXINE HCL 2.5; 1 G/100G; G/100G
CREAM TOPICAL 3 TIMES DAILY
Qty: 30 G | Refills: 3 | Status: SHIPPED | OUTPATIENT
Start: 2018-04-18 | End: 2018-04-28

## 2018-04-18 RX ORDER — LIDOCAINE HCL/PF 100 MG/5ML
SYRINGE (ML) INTRAVENOUS
Status: DISCONTINUED | OUTPATIENT
Start: 2018-04-18 | End: 2018-04-18

## 2018-04-18 RX ORDER — PROPOFOL 10 MG/ML
VIAL (ML) INTRAVENOUS
Status: DISCONTINUED | OUTPATIENT
Start: 2018-04-18 | End: 2018-04-18

## 2018-04-18 RX ORDER — SODIUM CHLORIDE, SODIUM LACTATE, POTASSIUM CHLORIDE, CALCIUM CHLORIDE 600; 310; 30; 20 MG/100ML; MG/100ML; MG/100ML; MG/100ML
INJECTION, SOLUTION INTRAVENOUS CONTINUOUS PRN
Status: DISCONTINUED | OUTPATIENT
Start: 2018-04-18 | End: 2018-04-18

## 2018-04-18 RX ORDER — OMEPRAZOLE 40 MG/1
40 CAPSULE, DELAYED RELEASE ORAL
Qty: 90 CAPSULE | Refills: 3 | Status: SHIPPED | OUTPATIENT
Start: 2018-04-18 | End: 2019-09-07 | Stop reason: SDUPTHER

## 2018-04-18 RX ADMIN — PROPOFOL 40 MG: 10 INJECTION, EMULSION INTRAVENOUS at 12:04

## 2018-04-18 RX ADMIN — PROPOFOL 40 MG: 10 INJECTION, EMULSION INTRAVENOUS at 01:04

## 2018-04-18 RX ADMIN — LIDOCAINE HYDROCHLORIDE 100 MG: 20 INJECTION, SOLUTION INTRAVENOUS at 12:04

## 2018-04-18 RX ADMIN — SODIUM CHLORIDE, SODIUM LACTATE, POTASSIUM CHLORIDE, CALCIUM CHLORIDE: 600; 310; 30; 20 INJECTION, SOLUTION INTRAVENOUS at 12:04

## 2018-04-18 RX ADMIN — PROPOFOL 100 MG: 10 INJECTION, EMULSION INTRAVENOUS at 12:04

## 2018-04-18 NOTE — ANESTHESIA POSTPROCEDURE EVALUATION
"Anesthesia Post Evaluation    Patient: Lyudmila Neri    Procedure(s) Performed: Procedure(s) (LRB):  ESOPHAGOGASTRODUODENOSCOPY (EGD) (N/A)  COLONOSCOPY (N/A)    Final Anesthesia Type: general  Patient location during evaluation: PACU  Patient participation: Yes- Able to Participate  Level of consciousness: awake and alert  Post-procedure vital signs: reviewed and stable  Pain management: adequate  Airway patency: patent  PONV status at discharge: No PONV  Anesthetic complications: no      Cardiovascular status: hemodynamically stable  Respiratory status: unassisted and room air  Hydration status: euvolemic  Follow-up not needed.        Visit Vitals  BP (!) 95/59 (BP Location: Left arm, Patient Position: Lying)   Pulse (!) 55   Temp 36.1 °C (97 °F) (Skin)   Resp 18   Ht 5' 3" (1.6 m)   Wt 72.6 kg (160 lb)   SpO2 100%   Breastfeeding? No   BMI 28.34 kg/m²       Pain/Jacquelin Score: Pain Assessment Performed: Yes (4/18/2018  1:25 PM)  Presence of Pain: non-verbal indicators absent (4/18/2018  1:25 PM)  Jacquelin Score: 5 (4/18/2018  1:25 PM)      "

## 2018-04-18 NOTE — TRANSFER OF CARE
"Anesthesia Transfer of Care Note    Patient: Lyudmila Neri    Procedure(s) Performed: Procedure(s) (LRB):  ESOPHAGOGASTRODUODENOSCOPY (EGD) (N/A)  COLONOSCOPY (N/A)    Patient location: PACU    Anesthesia Type: general    Transport from OR: Transported from OR on room air with adequate spontaneous ventilation    Post pain: adequate analgesia    Post assessment: no apparent anesthetic complications    Post vital signs: stable    Level of consciousness: awake and sedated    Nausea/Vomiting: no nausea/vomiting    Complications: none    Transfer of care protocol was followed      Last vitals:   Visit Vitals  /65 (BP Location: Right arm, Patient Position: Sitting)   Pulse 64   Temp 36.5 °C (97.7 °F) (Skin)   Resp 18   Ht 5' 3" (1.6 m)   Wt 72.6 kg (160 lb)   SpO2 98%   Breastfeeding? No   BMI 28.34 kg/m²     "

## 2018-04-18 NOTE — PROVATION PATIENT INSTRUCTIONS
Discharge Summary/Instructions for after Colonoscopy without   Biopsy/Polypectomy  Lyudmila Neri    Wednesday, April 18, 2018  David Guzman MD  RESTRICTIONS ON ACTIVITY:  - Do not drive a car or operate machinery until the day after the procedure.      - The following day: return to full activity including work.  - For  3 days: No heavy lifting, straining or running.  - Diet: You may eat solid foods, but no gassy foods (i.e. beans, broccoli,   cabbage, etc).  TREATMENT FOR COMMON SIDE EFFECTS:  - Mild abdominal pain and bloating or excessive gas: rest, eat lightly and   use a heating pad.  SYMPTOMS TO WATCH FOR AND REPORT TO YOUR PHYSICIAN:  1. Severe abdominal pain.  2. Fever within 24 hours after a procedure.  3. A large amount of rectal bleeding. (A small amount of blood from the   rectum is not serious, especially if hemorrhoids are present.  3.  Because air was put into your colon during the procedure, expelling   large amounts of air from your rectum is normal.  4.  You may not have a bowel movement for 1-3 days because of the   colonoscopy prep.  This is normal.  5.  Call immediately if you notice any of the following:   Chills and/or fever over 101   Persistent vomiting   Severe abdominal pain, other than gas cramps   Severe chest pain   Black, tarry stools   Any bleeding - exceeding one tablespoon  Your doctor recommends these additional instructions:  Eat a high fiber diet.   use Analpram HC Cream 2.5%, apply externally twice a day for one week.   If symptoms persist, consider consulting with the colon/rectal surgeon.   Your physician has recommended a repeat colonoscopy in 8-10 years for   screening purposes.  None  If you have any questions or problems, please call your physician.  EMERGENCY PHONE NUMBER: [phone]  LAB RESULTS: Call in two (2) weeks for lab results, [phone]  ___________________________________________  Nurse  Signature  ___________________________________________  Patient/Designated Responsible Party Signature  David Guzman MD  4/18/2018 1:55:33 PM  This report has been verified and signed electronically.

## 2018-04-18 NOTE — DISCHARGE INSTRUCTIONS
Recovery After Procedural Sedation (Adult)  You have been given medicine by vein to make you sleep during your surgery. This may have included both a pain medicine and sleeping medicine. Most of the effects have worn off. But you may still have some drowsiness for the next 6 to 8 hours.  Home care  Follow these guidelines when you get home:  · For the next 8 hours, you should be watched by a responsible adult. This person should make sure your condition is not getting worse.  · Don't drink any alcohol for the next 24 hours.  · Don't drive, operate dangerous machinery, or make important business or personal decisions during the next 24 hours.  Note: Your healthcare provider may tell you not to take any medicine by mouth for pain or sleep in the next 4 hours. These medicines may react with the medicines you were given in the hospital. This could cause a much stronger response than usual.  Follow-up care  Follow up with your healthcare provider if you are not alert and back to your usual level of activity within 12 hours.  When to seek medical advice  Call your healthcare provider right away if any of these occur:  · Drowsiness gets worse  · Weakness or dizziness gets worse  · Repeated vomiting  · You can't be awakened   Date Last Reviewed: 10/18/2016  © 4105-4122 [employer]. [address] [place] [address], [place] [address]. All rights reserved. This information is not intended as a substitute for professional medical care. Always follow your healthcare professional's instructions.      PROBIOTICS:  Now that your colon is so cleaned out, now is a good time for a round of PROBIOTICS.  Eat a container of Greek Yogurt, such as OIKOS or CHOBANI,  Or Activia or Dannon    Greek Yogurt.    Or Take a similar Probiotic product such as Align or Culturelle or Mary-Q, every day for a month.                  (The products listed are non-prescription, but you may need to ask the pharmacist for their location.)  Repeat  this 5-6 times a year.        Tips to Control Acid Reflux    To control acid reflux, youll need to make some basic diet and lifestyle changes. The simple steps outlined below may be all youll need to ease discomfort.  Watch what you eat  · Avoid fatty foods and spicy foods.  · Eat fewer acidic foods, such as citrus and tomato-based foods. These can increase symptoms.  · Limit drinking alcohol, caffeine, and fizzy beverages. All increase acid reflux.  · Try limiting chocolate, peppermint, and spearmint. These can worsen acid reflux in some people.  Watch when you eat  · Avoid lying down for 3 hours after eating.  · Do not snack before going to bed.  Raise your head  Raising your head and upper body by 4 to 6 inches helps limit reflux when youre lying down. Put blocks under the head of your bed frame to raise it.  Other changes  · Lose weight, if you need to  · Dont exercise near bedtime  · Avoid tight-fitting clothes  · Limit aspirin and ibuprofen  · Stop smoking   Date Last Reviewed: 7/1/2016  © 5422-4065 [employer]. [address] [place] [address]. All rights reserved. This information is not intended as a substitute for professional medical care. Always follow your healthcare professional's instructions.        High-Fiber Diet  Fiber is in fruits, vegetables, cereals, and grains. Fiber passes through your body undigested. A high-fiber diet helps food move through your intestinal tract. The added bulk is helpful in preventing constipation. In people with diverticulosis, fiber helps clean out the pouches along the colon wall. It also prevents new pouches from forming. A high-fiber diet reduces the risk of colon cancer. It also lowers blood cholesterol and prevents high blood sugar in people with diabetes.    The fiber-rich foods listed below should be part of your diet. If you are not used to high-fiber foods, start with 1 or 2 foods from this list. Every 3 to 4 days add a new one to  your diet. Do this until you are eating 4 high-fiber foods per day. This should give you 20 to 35 grams of fiber a day. It is also important to drink a lot of water when you are on this diet. You should have 6 to 8 glasses of water a day. Water makes the fiber swell and increases the benefit.  Foods high in dietary fiber  The following foods are high in dietary fiber:  · Breads. Breads made with 100% whole-wheat flour; avi, wheat, or rye crackers; whole-grain tortillas, bran muffins.  · Cereals. Whole-grain and bran cereals with bran (shredded wheat, wheat flakes, raisin bran, corn bran); oatmeal, rolled oats, granola, and brown rice.  · Fruits. Fresh fruits and their edible skins (pears, prunes, raisins, berries, apples, and apricots); bananas, citrus fruit, mangoes, pineapple; and prune juice.  · Nuts. Any nuts and seeds.  · Vegetables. Best served raw or lightly cooked. All types, especially: green peas, celery, eggplant, potatoes, spinach, broccoli, [place] sprouts, winter squash, carrots, cauliflower, soybeans, lentils, and fresh and dried beans of all kinds.  · Other. Popcorn, any spices.  Date Last Reviewed: 8/1/2016  © 3830-6068 [employer]. [address] [place] [address], [place] [address]. All rights reserved. This information is not intended as a substitute for professional medical care. Always follow your healthcare professional's instructions.

## 2018-04-18 NOTE — PROVATION PATIENT INSTRUCTIONS
Discharge Summary/Instructions after an Endoscopic Procedure  Patient Name: Lyudmila Neri  Patient MRN: [id number]  Patient YOB: 1950 Wednesday, April 18, 2018  David Guzman MD  RESTRICTIONS:  During your procedure today, you received medications for sedation.  These   medications may affect your judgment, balance and coordination.  Therefore,   for 24 hours, you have the following restrictions:   - DO NOT drive a car, operate machinery, make legal/financial decisions,   sign important papers or drink alcohol.    ACTIVITY:  The following day: return to full activity including work, except no heavy   lifting, straining or running for 3 days if polyps were removed.  DIET:  Eat and drink normally unless instructed otherwise.     TREATMENT FOR COMMON SIDE EFFECTS:  - Mild abdominal pain, nausea, belching, bloating or excessive gas:  rest,   eat lightly and use a heating pad.  - Sore Throat: treat with throat lozenges and/or gargle with warm salt   water.  - Because air was used during the procedure, expelling large amounts of air   from your rectum or belching is normal.  - If a bowel prep was taken, you may not have a bowel movement for 1-3 days.    This is normal.  SYMPTOMS TO WATCH FOR AND REPORT TO YOUR PHYSICIAN:  1. Abdominal pain or bloating, other than gas cramps.  2. Chest pain.  3. Back pain.  4. Signs of infection such as: chills or fever occurring within 24 hours   after the procedure.  5. Rectal bleeding, which would show as bright red, maroon, or black stools.   (A tablespoon of blood from the rectum is not serious, especially if   hemorrhoids are present.)  6. Vomiting.  7. Weakness or dizziness.  GO DIRECTLY TO THE NEAREST EMERGENCY ROOM IF YOU HAVE ANY OF THE FOLLOWING:      Difficulty breathing              Chills and/or fever over 101 F   Persistent vomiting and/or vomiting blood   Severe abdominal pain   Severe chest pain   Black, tarry stools   Bleeding- more than one  tablespoon   Any other symptom or condition that you feel may need urgent attention  Your doctor recommends these additional instructions:  If any biopsies were taken, your doctors clinic will contact you in 1 to 2   weeks with any results.  - Perform a colonoscopy as previously scheduled.   - Discharge patient to home after that.   - Await pathology and CLOtest results.   - Follow an antireflux regimen.   - Use Prilosec (omeprazole) 40 mg PO daily for 3 months.   - Return to primary care physician as previously scheduled.   - Return to GI clinic PRN.  For questions, problems or results please call your physician - David Guzman MD at Work:  [phone].  EMERGENCY PHONE NUMBER: [phone], LAB RESULTS: [phone]  IF A COMPLICATION OR EMERGENCY SITUATION ARISES AND YOU ARE UNABLE TO REACH   YOUR PHYSICIAN - GO DIRECTLY TO THE EMERGENCY ROOM.  ___________________________________________  Nurse Signature  ___________________________________________  Patient/Designated Responsible Party Signature  David Guzman MD  4/18/2018 1:36:47 PM  This report has been verified and signed electronically.

## 2018-04-18 NOTE — ANESTHESIA PREPROCEDURE EVALUATION
04/18/2018  Lyudmila Neri is a 68 y.o., female.    Anesthesia Evaluation    I have reviewed the Patient Summary Reports.    I have reviewed the Nursing Notes.   I have reviewed the Medications.     Review of Systems  Anesthesia Hx:  No problems with previous Anesthesia    Hematology/Oncology:         -- Cancer in past history: Other (see Oncology comments) surgery    Cardiovascular:   Hypertension, well controlled    Pulmonary:   Sleep Apnea    Renal/:   Chronic Renal Disease    Hepatic/GI:   Bowel Prep. GERD H/o GIST    Musculoskeletal:   Arthritis     Neurological:  Neurology Normal    Endocrine:  Endocrine Normal    Psych:   Psychiatric History depression          Physical Exam  General:  Well nourished    Airway/Jaw/Neck:  Airway Findings: Mouth Opening: Normal Tongue: Normal  General Airway Assessment: Adult  Mallampati: I  TM Distance: Normal, at least 6 cm        Eyes/Ears/Nose:  EYES/EARS/NOSE FINDINGS: Normal   Dental:  Dental Findings:Upper veneers    Chest/Lungs:  Chest/Lungs Findings: Clear to auscultation, Normal Respiratory Rate     Heart/Vascular:  Heart Findings: Rate: Normal  Rhythm: Regular Rhythm  Sounds: Normal        Mental Status:  Mental Status Findings:  Cooperative, Alert and Oriented         Anesthesia Plan  Type of Anesthesia, risks & benefits discussed:  Anesthesia Type:  general  Patient's Preference:   Intra-op Monitoring Plan: standard ASA monitors  Intra-op Monitoring Plan Comments:   Post Op Pain Control Plan:   Post Op Pain Control Plan Comments:   Induction:   IV  Beta Blocker:  Patient is not currently on a Beta-Blocker (No further documentation required).       Informed Consent: Patient understands risks and agrees with Anesthesia plan.  Questions answered. Anesthesia consent signed with patient.  ASA Score: 3     Day of Surgery Review of History & Physical: I have  interviewed and examined the patient. I have reviewed the patient's H&P dated:  There are no significant changes.  H&P update referred to the provider.         Ready For Surgery From Anesthesia Perspective.

## 2018-05-09 DIAGNOSIS — I10 ESSENTIAL HYPERTENSION: ICD-10-CM

## 2018-05-09 RX ORDER — AMLODIPINE BESYLATE 5 MG/1
TABLET ORAL
Qty: 90 TABLET | Refills: 3 | Status: SHIPPED | OUTPATIENT
Start: 2018-05-09 | End: 2019-08-21 | Stop reason: ALTCHOICE

## 2018-05-15 ENCOUNTER — PATIENT MESSAGE (OUTPATIENT)
Dept: GASTROENTEROLOGY | Facility: CLINIC | Age: 68
End: 2018-05-15

## 2018-05-15 ENCOUNTER — TELEPHONE (OUTPATIENT)
Dept: GASTROENTEROLOGY | Facility: CLINIC | Age: 68
End: 2018-05-15

## 2018-05-15 NOTE — TELEPHONE ENCOUNTER
I had EGD don 4/18 and have not yet received the biopsy report. I was told I would receive it in 2-3 weeks. Please inform me of the results....   Thank You

## 2018-05-26 ENCOUNTER — PATIENT MESSAGE (OUTPATIENT)
Dept: GASTROENTEROLOGY | Facility: CLINIC | Age: 68
End: 2018-05-26

## 2018-06-28 ENCOUNTER — PES CALL (OUTPATIENT)
Dept: ADMINISTRATIVE | Facility: CLINIC | Age: 68
End: 2018-06-28

## 2018-08-04 DIAGNOSIS — I10 HYPERTENSION, UNSPECIFIED TYPE: Primary | ICD-10-CM

## 2018-08-06 NOTE — PROGRESS NOTES
Refill Authorization Note     is requesting a refill authorization.    Brief assessment and rationale for refill: DEFER: not sure if pt is still taking  Amount/Quantity of medication ordered: 90d        Refills Authorized: No              Medication Therapy Plan: Labs WNL; BP cotrolled; medication not showing up on medmined; pt recently picked up amlodipine; not sure if pt still taking, should have been out 1/18; defer to you  Name and strength of medication: LOSARTAN 100MG TABLETS  How patient will take medication: t1t qd  Medication reconciliation completed: Yes  Comments:   BP Readings from Last 3 Encounters:   04/18/18 129/69   03/20/18 114/80   03/01/18 (!) 141/83     Lab Results   Component Value Date    CREATININE 0.8 02/27/2018    CREATININE 0.8 02/27/2018    BUN 15 02/27/2018    BUN 15 02/27/2018     02/27/2018     02/27/2018    K 4.0 02/27/2018    K 4.0 02/27/2018     02/27/2018     02/27/2018    CO2 29 02/27/2018    CO2 29 02/27/2018

## 2018-08-06 NOTE — PROGRESS NOTES
Refill Authorization Note     is requesting a refill authorization.    Brief assessment and rationale for refill: DEFER: pt requesting combination pill  Amount/Quantity of medication ordered: 90d        Refills Authorized: No           Medication-related problems identified: Non-adherence (knowledge deficit) non-intentional  Medication Therapy Plan: Labs WNL; BP cotrolled; losartan last filled for 90d back in 10/17; pt recently picked up amlodipine; should have been out 1/18; reading updated note per Hilary pt is requesting losartan 100 mg hctz 12.5 combo; will queue and defer  Name and strength of medication: LOSARTAN 100MG TABLETS  How patient will take medication: t1t qd  Medication reconciliation completed: Yes  Comments:   BP Readings from Last 3 Encounters:   04/18/18 129/69   03/20/18 114/80   03/01/18 (!) 141/83      Lab Results   Component Value Date    CREATININE 0.8 02/27/2018    CREATININE 0.8 02/27/2018    BUN 15 02/27/2018    BUN 15 02/27/2018     02/27/2018     02/27/2018    K 4.0 02/27/2018    K 4.0 02/27/2018     02/27/2018     02/27/2018    CO2 29 02/27/2018    CO2 29 02/27/2018

## 2018-08-07 RX ORDER — LOSARTAN POTASSIUM 100 MG/1
TABLET ORAL
Qty: 90 TABLET | Refills: 0 | OUTPATIENT
Start: 2018-08-07

## 2018-08-07 NOTE — TELEPHONE ENCOUNTER
Called pt to verify that she is still taking losartan cause pt should have been out of medication 6 months ago. LM for pt to call us back

## 2018-08-07 NOTE — TELEPHONE ENCOUNTER
Pt states she has been taking medication from an old prescription on file for the Hyzaar 100/12.5mg so that she could finish it out, but is now almost completely out of medication. She states that she prefers the combination pill instead of the two separate pills.

## 2018-08-08 RX ORDER — LOSARTAN POTASSIUM AND HYDROCHLOROTHIAZIDE 12.5; 1 MG/1; MG/1
1 TABLET ORAL DAILY
Qty: 90 TABLET | Refills: 0 | Status: SHIPPED | OUTPATIENT
Start: 2018-08-08 | End: 2018-11-26 | Stop reason: SDUPTHER

## 2018-08-20 ENCOUNTER — TELEPHONE (OUTPATIENT)
Dept: RHEUMATOLOGY | Facility: CLINIC | Age: 68
End: 2018-08-20

## 2018-08-20 NOTE — TELEPHONE ENCOUNTER
----- Message from Valente Martinez sent at 8/20/2018 12:26 PM CDT -----  Contact: Patient  Lyudmila, [phone]. Calling to reschedule her 8/23 appointment. Please advise. Thanks.    LVM a letter will be mailed with next available appointment. CG

## 2018-08-22 ENCOUNTER — PES CALL (OUTPATIENT)
Dept: ADMINISTRATIVE | Facility: CLINIC | Age: 68
End: 2018-08-22

## 2018-08-27 ENCOUNTER — PES CALL (OUTPATIENT)
Dept: ADMINISTRATIVE | Facility: CLINIC | Age: 68
End: 2018-08-27

## 2018-10-13 DIAGNOSIS — M19.90 ARTHRITIS: ICD-10-CM

## 2018-10-15 RX ORDER — CELECOXIB 200 MG/1
CAPSULE ORAL
Qty: 180 CAPSULE | Refills: 2 | Status: SHIPPED | OUTPATIENT
Start: 2018-10-15 | End: 2019-05-21 | Stop reason: SDUPTHER

## 2018-10-19 ENCOUNTER — PES CALL (OUTPATIENT)
Dept: ADMINISTRATIVE | Facility: CLINIC | Age: 68
End: 2018-10-19

## 2018-11-27 RX ORDER — LOSARTAN POTASSIUM AND HYDROCHLOROTHIAZIDE 12.5; 1 MG/1; MG/1
TABLET ORAL
Qty: 90 TABLET | Refills: 0 | Status: SHIPPED | OUTPATIENT
Start: 2018-11-27 | End: 2019-04-16 | Stop reason: SDUPTHER

## 2019-02-13 ENCOUNTER — OFFICE VISIT (OUTPATIENT)
Dept: FAMILY MEDICINE | Facility: CLINIC | Age: 69
End: 2019-02-13
Payer: MEDICARE

## 2019-02-13 ENCOUNTER — LAB VISIT (OUTPATIENT)
Dept: LAB | Facility: HOSPITAL | Age: 69
End: 2019-02-13
Attending: FAMILY MEDICINE
Payer: MEDICARE

## 2019-02-13 ENCOUNTER — TELEPHONE (OUTPATIENT)
Dept: FAMILY MEDICINE | Facility: CLINIC | Age: 69
End: 2019-02-13

## 2019-02-13 VITALS
WEIGHT: 162.69 LBS | DIASTOLIC BLOOD PRESSURE: 84 MMHG | SYSTOLIC BLOOD PRESSURE: 140 MMHG | HEART RATE: 59 BPM | HEIGHT: 63 IN | BODY MASS INDEX: 28.82 KG/M2

## 2019-02-13 DIAGNOSIS — R10.30 LOWER ABDOMINAL PAIN: ICD-10-CM

## 2019-02-13 DIAGNOSIS — M54.16 LUMBAR RADICULOPATHY: Primary | ICD-10-CM

## 2019-02-13 LAB
BILIRUB UR QL STRIP: NEGATIVE
CLARITY UR: CLEAR
COLOR UR: YELLOW
GLUCOSE UR QL STRIP: NEGATIVE
HGB UR QL STRIP: NEGATIVE
KETONES UR QL STRIP: NEGATIVE
LEUKOCYTE ESTERASE UR QL STRIP: NEGATIVE
NITRITE UR QL STRIP: NEGATIVE
PH UR STRIP: 6 [PH] (ref 5–8)
PROT UR QL STRIP: NEGATIVE
SP GR UR STRIP: 1.02 (ref 1–1.03)
URN SPEC COLLECT METH UR: NORMAL

## 2019-02-13 PROCEDURE — 99214 PR OFFICE/OUTPT VISIT, EST, LEVL IV, 30-39 MIN: ICD-10-PCS | Mod: HCNC,S$GLB,, | Performed by: FAMILY MEDICINE

## 2019-02-13 PROCEDURE — 81003 URINALYSIS AUTO W/O SCOPE: CPT | Mod: HCNC,PO

## 2019-02-13 PROCEDURE — 1100F PTFALLS ASSESS-DOCD GE2>/YR: CPT | Mod: HCNC,CPTII,S$GLB, | Performed by: FAMILY MEDICINE

## 2019-02-13 PROCEDURE — 3077F SYST BP >= 140 MM HG: CPT | Mod: HCNC,CPTII,S$GLB, | Performed by: FAMILY MEDICINE

## 2019-02-13 PROCEDURE — 3079F DIAST BP 80-89 MM HG: CPT | Mod: HCNC,CPTII,S$GLB, | Performed by: FAMILY MEDICINE

## 2019-02-13 PROCEDURE — 3288F PR FALLS RISK ASSESSMENT DOCUMENTED: ICD-10-PCS | Mod: HCNC,CPTII,S$GLB, | Performed by: FAMILY MEDICINE

## 2019-02-13 PROCEDURE — 99214 OFFICE O/P EST MOD 30 MIN: CPT | Mod: HCNC,S$GLB,, | Performed by: FAMILY MEDICINE

## 2019-02-13 PROCEDURE — 99999 PR PBB SHADOW E&M-EST. PATIENT-LVL IV: ICD-10-PCS | Mod: PBBFAC,HCNC,, | Performed by: FAMILY MEDICINE

## 2019-02-13 PROCEDURE — 3079F PR MOST RECENT DIASTOLIC BLOOD PRESSURE 80-89 MM HG: ICD-10-PCS | Mod: HCNC,CPTII,S$GLB, | Performed by: FAMILY MEDICINE

## 2019-02-13 PROCEDURE — 1100F PR PT FALLS ASSESS DOC 2+ FALLS/FALL W/INJURY/YR: ICD-10-PCS | Mod: HCNC,CPTII,S$GLB, | Performed by: FAMILY MEDICINE

## 2019-02-13 PROCEDURE — 3077F PR MOST RECENT SYSTOLIC BLOOD PRESSURE >= 140 MM HG: ICD-10-PCS | Mod: HCNC,CPTII,S$GLB, | Performed by: FAMILY MEDICINE

## 2019-02-13 PROCEDURE — 99999 PR PBB SHADOW E&M-EST. PATIENT-LVL IV: CPT | Mod: PBBFAC,HCNC,, | Performed by: FAMILY MEDICINE

## 2019-02-13 PROCEDURE — 3288F FALL RISK ASSESSMENT DOCD: CPT | Mod: HCNC,CPTII,S$GLB, | Performed by: FAMILY MEDICINE

## 2019-02-13 NOTE — MEDICAL/APP STUDENT
"Subjective     Patient: Lyudmila Neri is a 69 year old female.   Chief Complaint: Back pain      Lyudmila Neri complains of a 1 month history of bilateral leg pain. The pain starts in her lumbar area and shoots down both legs. The pain is sharp in nature. It is worse when she is supine. This has impacted day-to-day functioning and she has decreased her number of work days. She is a [employer] nurse. She has not taken any measures to reduce this pain. She has attended physical therapy in the past (2017) with some relief. She also complains of worsening fecal incontinence that occurs 2-3 times a month. She is unable to predict these episodes. She denies associated pain or bleeding. Of note, her recent colonoscopy on 4/18/2018 was normal other than small, grade 1 internal hemorrhoids "that do not prolapse".     MRI Lumbar Spine 10/28/2014:     Impression       1.  L1-2, L2-3, and L3-4 mild disc bulges.  2.  L4-5 right posterior paracentral this extrusion with right paracentral thecal sac compression.  There is mild degenerative facet arthrosis.  3.  L5-S1 minimal disc bulge with mild thecal sac compression and mild right foraminal stenosis.  There is degenerative facet arthrosis.     She has been experiencing lower abdominal pain for the past 2 weeks. It is described as "searing" and "burning" in nature. She compares it to the pain she experienced with her endometrioses and PID as a teenager. It does not radiate nor does it change with bowel movements. Of note, she is s/p total hysterectomy. She denies indigestion, constipation, nausea, vomiting.     She also complains of right flank pain that wakes her up at night. This is a new issue and started 3 months in an intermittent fashion, however, for the past 4-5 weeks it has been constant. She denies fever, chills, night sweats, unintentional weight loss. She also denies past history of renal stones or injury to the right flank area.       Review of Systems "   Constitutional: Negative for chills, diaphoresis, fever and weight loss.   HENT: Negative.    Eyes: Negative.    Respiratory: Negative for shortness of breath.    Cardiovascular: Negative for chest pain and palpitations.   Gastrointestinal: Positive for abdominal pain and diarrhea (Intermittent). Negative for blood in stool, constipation, heartburn, nausea and vomiting.   Genitourinary: Positive for flank pain (right) and urgency. Negative for dysuria, frequency and hematuria.   Musculoskeletal: Positive for back pain.   Skin: Negative.    Neurological: Negative for dizziness, sensory change, focal weakness and headaches.   Endo/Heme/Allergies: Negative.    Psychiatric/Behavioral: Negative for depression, hallucinations and suicidal ideas. The patient is not nervous/anxious.      Current Outpatient Medications on File Prior to Visit   Medication Sig Dispense Refill    amLODIPine (NORVASC) 5 MG tablet TAKE 1 TABLET(5 MG) BY MOUTH EVERY DAY 90 tablet 3    aspirin (ECOTRIN) 81 MG EC tablet Take 81 mg by mouth once daily.      celecoxib (CELEBREX) 200 MG capsule TAKE ONE CAPSULE BY MOUTH TWICE DAILY 180 capsule 2    clobetasol (TEMOVATE) 0.05 % external solution Apply topically as needed.       FOLIC ACID/MULTIVIT-MIN/LUTEIN (CENTRUM SILVER ORAL) Take by mouth.      lidocaine 3.75 % Crea Apply 1 application topically 2 (two) times daily as needed (Pain). 60 g 0    losartan-hydrochlorothiazide 100-12.5 mg (HYZAAR) 100-12.5 mg Tab TAKE 1 TABLET BY MOUTH EVERY DAY 90 tablet 0    omeprazole (PRILOSEC) 40 MG capsule Take 1 capsule (40 mg total) by mouth before breakfast. 90 capsule 3    cholecalciferol, vitamin D3, (VITAMIN D3) 1,000 unit capsule Take 1,000 Units by mouth once daily.      fish oil-omega-3 fatty acids 300-1,000 mg capsule Take by mouth once daily.      [DISCONTINUED] phenylephrine-acetaminophen 5-325 mg Cap       [DISCONTINUED] TUMS 200 mg calcium (500 mg) chewable tablet        Current  "Facility-Administered Medications on File Prior to Visit   Medication Dose Route Frequency Provider Last Rate Last Dose    estradiol 50 mg pellet  100 mg Intramuscular Once Afua Morales MD           Objective     Vitals:    02/13/19 1124   BP: (!) 140/84   Pulse: (!) 59   Weight: 73.8 kg (162 lb 11.2 oz)   Height: 5' 3" (1.6 m)       Physical Exam   Constitutional: She is oriented to person, place, and time. Vital signs are normal. She appears distressed (Appears uncomfortable).   Eyes: Conjunctivae and EOM are normal. Pupils are equal, round, and reactive to light.   Neck: Normal range of motion. Neck supple.   Cardiovascular: Normal rate, regular rhythm, normal heart sounds and intact distal pulses.   Pulmonary/Chest: Effort normal and breath sounds normal. No respiratory distress.   Abdominal: Soft. Normal appearance and bowel sounds are normal. She exhibits no mass. There is no splenomegaly or hepatomegaly. There is tenderness (Lower abdomen) in the right lower quadrant and left lower quadrant. There is no rigidity, no rebound and no guarding.   Musculoskeletal:        Right hip: She exhibits decreased range of motion (Straight leg raise positive at 45 degrees).        Left hip: She exhibits decreased range of motion (Straigh leg raise positive at 80 degrees).   Lymphadenopathy:     She has no cervical adenopathy.   Neurological: She is alert and oriented to person, place, and time. She has intact cranial nerves. She displays no tremor. No cranial nerve deficit. She has a normal Cerebellar Exam. Gait (antalgic gait) abnormal.   Skin: Skin is warm and dry.   Psychiatric: Mood, memory, affect and judgment normal. Her mood appears not anxious. She does not exhibit a depressed mood.         Assessment     1. Lumbar radiculopathy  2. Lower abdominal pain  3. Right flank pain  4. Health maintenance      Plan     1. Lumbar radiculopathy  · Uncontrolled  · MRI Lumbar Spine   · Utilize lumbar " support/ergonomics  · Refer to neurosurgery  · Keep track of frequency of fecal incontinence     2. Lower abdominal pain  · New   · CT Abdomen Pelvis   · Acquire past surgical records for hysterectomy    3. Right flank pain  · New  · Urinalysis  · Renal   · Abdominal ultrasound    4. Health maintenance  · PHQ-9  · Mammogram to be scheduled this year              Consuelo Hernandez  MS-4  [employer]

## 2019-02-13 NOTE — TELEPHONE ENCOUNTER
Hi,    Dr. Shultz will like this patient to see Neuro surgery ASAP. Please help me schedule it. Patient will be waiting for call    Thanks    Bushra [facility]

## 2019-02-18 ENCOUNTER — TELEPHONE (OUTPATIENT)
Dept: FAMILY MEDICINE | Facility: CLINIC | Age: 69
End: 2019-02-18

## 2019-02-18 DIAGNOSIS — Z12.39 BREAST CANCER SCREENING: Primary | ICD-10-CM

## 2019-02-18 NOTE — TELEPHONE ENCOUNTER
----- Message from Amy Tian sent at 2/18/2019 11:06 AM CST -----  Contact: self   Patient need orders put in for a mammogram, please call back to schedule at [phone] (home)

## 2019-02-18 NOTE — TELEPHONE ENCOUNTER
Spoke with pt and put in orders for a mammogram. Pt said she will call back to schedule when she gets by her calendar.

## 2019-02-19 ENCOUNTER — TELEPHONE (OUTPATIENT)
Dept: OBSTETRICS AND GYNECOLOGY | Facility: CLINIC | Age: 69
End: 2019-02-19

## 2019-02-19 DIAGNOSIS — N95.1 MENOPAUSAL SYMPTOMS: Primary | ICD-10-CM

## 2019-02-19 DIAGNOSIS — Z79.890 MENOPAUSAL SYNDROME ON HORMONE REPLACEMENT THERAPY: Primary | ICD-10-CM

## 2019-02-19 DIAGNOSIS — N95.1 MENOPAUSAL SYNDROME ON HORMONE REPLACEMENT THERAPY: Primary | ICD-10-CM

## 2019-02-19 NOTE — TELEPHONE ENCOUNTER
----- Message from Adriana Gonzalez sent at 2/19/2019  9:33 AM CST -----  Contact: pt   Name of Who is Calling: MIKI HOUSTON [[id number]]    What is the request in detail: Patient is requesting to schedule hormone implants.... Please contact to further discuss and advise      Can the clinic reply by [facility]:     What Number to Call Back if not in [facility]: [phone].

## 2019-02-20 ENCOUNTER — HOSPITAL ENCOUNTER (OUTPATIENT)
Dept: RADIOLOGY | Facility: HOSPITAL | Age: 69
Discharge: HOME | End: 2019-02-20
Attending: FAMILY MEDICINE
Payer: MEDICARE

## 2019-02-20 DIAGNOSIS — R10.30 LOWER ABDOMINAL PAIN: ICD-10-CM

## 2019-02-20 DIAGNOSIS — M54.16 LUMBAR RADICULOPATHY: ICD-10-CM

## 2019-02-20 PROCEDURE — 74178 CT ABD&PLV WO CNTR FLWD CNTR: CPT | Mod: TC,HCNC,PO

## 2019-02-20 PROCEDURE — 72148 MRI LUMBAR SPINE WITHOUT CONTRAST: ICD-10-PCS | Mod: 26,HCNC,, | Performed by: RADIOLOGY

## 2019-02-20 PROCEDURE — 72148 MRI LUMBAR SPINE W/O DYE: CPT | Mod: TC,HCNC,PO

## 2019-02-20 PROCEDURE — 72148 MRI LUMBAR SPINE W/O DYE: CPT | Mod: 26,HCNC,, | Performed by: RADIOLOGY

## 2019-02-20 PROCEDURE — 74178 CT ABDOMEN PELVIS W WO CONTRAST: ICD-10-PCS | Mod: 26,HCNC,, | Performed by: RADIOLOGY

## 2019-02-20 PROCEDURE — 74178 CT ABD&PLV WO CNTR FLWD CNTR: CPT | Mod: 26,HCNC,, | Performed by: RADIOLOGY

## 2019-02-20 PROCEDURE — 25500020 PHARM REV CODE 255: Mod: HCNC,PO | Performed by: FAMILY MEDICINE

## 2019-02-20 RX ADMIN — IOHEXOL 30 ML: 350 INJECTION, SOLUTION INTRAVENOUS at 11:02

## 2019-02-20 RX ADMIN — IOHEXOL 75 ML: 350 INJECTION, SOLUTION INTRAVENOUS at 11:02

## 2019-02-22 NOTE — PROGRESS NOTES
"Subjective      Patient: Lyudmila Neri is a 69 year old female.   Chief Complaint: Back pain        Lyudmila Neri complains of a 1 month history of bilateral leg pain. The pain starts in her lumbar area and shoots down both legs. The pain is sharp in nature. It is worse when she is supine. This has impacted day-to-day functioning and she has decreased her number of work days. She is a [employer] nurse. She has not taken any measures to reduce this pain. She has attended physical therapy in the past (2017) with some relief. She also complains of worsening fecal incontinence that occurs 2-3 times a month. She is unable to predict these episodes. She denies associated pain or bleeding. Of note, her recent colonoscopy on 4/18/2018 was normal other than small, grade 1 internal hemorrhoids "that do not prolapse".      MRI Lumbar Spine 10/28/2014:                       Impression         1.  L1-2, L2-3, and L3-4 mild disc bulges.  2.  L4-5 right posterior paracentral this extrusion with right paracentral thecal sac compression.  There is mild degenerative facet arthrosis.  3.  L5-S1 minimal disc bulge with mild thecal sac compression and mild right foraminal stenosis.  There is degenerative facet arthrosis.      She has been experiencing lower abdominal pain for the past 2 weeks. It is described as "searing" and "burning" in nature. She compares it to the pain she experienced with her endometrioses and PID as a teenager. It does not radiate nor does it change with bowel movements. Of note, she is s/p total hysterectomy. She denies indigestion, constipation, nausea, vomiting.      She also complains of right flank pain that wakes her up at night. This is a new issue and started 3 months in an intermittent fashion, however, for the past 4-5 weeks it has been constant. She denies fever, chills, night sweats, unintentional weight loss. She also denies past history of renal stones or injury to the right flank area.         Review " of Systems   Constitutional: Negative for chills, diaphoresis, fever and weight loss.   HENT: Negative.    Eyes: Negative.    Respiratory: Negative for shortness of breath.    Cardiovascular: Negative for chest pain and palpitations.   Gastrointestinal: Positive for abdominal pain and diarrhea (Intermittent). Negative for blood in stool, constipation, heartburn, nausea and vomiting.   Genitourinary: Positive for flank pain (right) and urgency. Negative for dysuria, frequency and hematuria.   Musculoskeletal: Positive for back pain.   Skin: Negative.    Neurological: Negative for dizziness, sensory change, focal weakness and headaches.   Endo/Heme/Allergies: Negative.    Psychiatric/Behavioral: Negative for depression, hallucinations and suicidal ideas. The patient is not nervous/anxious.              Current Outpatient Medications on File Prior to Visit   Medication Sig Dispense Refill    amLODIPine (NORVASC) 5 MG tablet TAKE 1 TABLET(5 MG) BY MOUTH EVERY DAY 90 tablet 3    aspirin (ECOTRIN) 81 MG EC tablet Take 81 mg by mouth once daily.        celecoxib (CELEBREX) 200 MG capsule TAKE ONE CAPSULE BY MOUTH TWICE DAILY 180 capsule 2    clobetasol (TEMOVATE) 0.05 % external solution Apply topically as needed.         FOLIC ACID/MULTIVIT-MIN/LUTEIN (CENTRUM SILVER ORAL) Take by mouth.        lidocaine 3.75 % Crea Apply 1 application topically 2 (two) times daily as needed (Pain). 60 g 0    losartan-hydrochlorothiazide 100-12.5 mg (HYZAAR) 100-12.5 mg Tab TAKE 1 TABLET BY MOUTH EVERY DAY 90 tablet 0    omeprazole (PRILOSEC) 40 MG capsule Take 1 capsule (40 mg total) by mouth before breakfast. 90 capsule 3    cholecalciferol, vitamin D3, (VITAMIN D3) 1,000 unit capsule Take 1,000 Units by mouth once daily.        fish oil-omega-3 fatty acids 300-1,000 mg capsule Take by mouth once daily.        [DISCONTINUED] phenylephrine-acetaminophen 5-325 mg Cap          [DISCONTINUED] TUMS 200 mg calcium (500 mg)  "chewable tablet                      Current Facility-Administered Medications on File Prior to Visit   Medication Dose Route Frequency Provider Last Rate Last Dose    estradiol 50 mg pellet  100 mg Intramuscular Once Afua Morales MD             Objective      Vitals       Vitals:     02/13/19 1124   BP: (!) 140/84   Pulse: (!) 59   Weight: 73.8 kg (162 lb 11.2 oz)   Height: 5' 3" (1.6 m)            Physical Exam   Constitutional: She is oriented to person, place, and time. Vital signs are normal. She appears distressed (Appears uncomfortable).   Eyes: Conjunctivae and EOM are normal. Pupils are equal, round, and reactive to light.   Neck: Normal range of motion. Neck supple.   Cardiovascular: Normal rate, regular rhythm, normal heart sounds and intact distal pulses.   Pulmonary/Chest: Effort normal and breath sounds normal. No respiratory distress.   Abdominal: Soft. Normal appearance and bowel sounds are normal. She exhibits no mass. There is no splenomegaly or hepatomegaly. There is tenderness (Lower abdomen) in the right lower quadrant and left lower quadrant. There is no rigidity, no rebound and no guarding.   Musculoskeletal:        Right hip: She exhibits decreased range of motion (Straight leg raise positive at 45 degrees).        Left hip: She exhibits decreased range of motion (Straigh leg raise positive at 80 degrees).   Lymphadenopathy:     She has no cervical adenopathy.   Neurological: She is alert and oriented to person, place, and time. She has intact cranial nerves. She displays no tremor. No cranial nerve deficit. She has a normal Cerebellar Exam. Gait (antalgic gait) abnormal.   Skin: Skin is warm and dry.   Psychiatric: Mood, memory, affect and judgment normal. Her mood appears not anxious. She does not exhibit a depressed mood.            Assessment      1. Lumbar radiculopathy  Ambulatory referral to Neurosurgery    MRI Lumbar Spine Without Contrast   2. Lower abdominal pain  " Urinalysis    CT Abdomen Pelvis W Wo Contrast    Creatinine, serum          Plan      1. Lumbar radiculopathy  · Uncontrolled  · MRI Lumbar Spine   · Utilize lumbar support/ergonomics  · Refer to neurosurgery  · Keep track of frequency of fecal incontinence      2. Lower abdominal pain  · New   · CT Abdomen Pelvis   · Acquire past surgical records for hysterectomy  ·  Check urinalysis and creatinine     3. Health maintenance  · Mammogram to be scheduled this year

## 2019-02-25 ENCOUNTER — TELEPHONE (OUTPATIENT)
Dept: GASTROENTEROLOGY | Facility: CLINIC | Age: 69
End: 2019-02-25

## 2019-02-25 NOTE — TELEPHONE ENCOUNTER
----- Message from Tanna Parham LPN sent at 2/23/2019 10:43 AM CST -----  Booked patient earliest appointment she would like to get seen sooner if something becomes available. Booked first available with May Goss NP. Notified patient.

## 2019-02-26 ENCOUNTER — HOSPITAL ENCOUNTER (OUTPATIENT)
Dept: RADIOLOGY | Facility: HOSPITAL | Age: 69
Discharge: HOME | End: 2019-02-26
Attending: STUDENT IN AN ORGANIZED HEALTH CARE EDUCATION/TRAINING PROGRAM
Payer: MEDICARE

## 2019-02-26 ENCOUNTER — HOSPITAL ENCOUNTER (OUTPATIENT)
Dept: RADIOLOGY | Facility: HOSPITAL | Age: 69
Discharge: HOME | End: 2019-02-26
Attending: NURSE PRACTITIONER
Payer: MEDICARE

## 2019-02-26 ENCOUNTER — INITIAL CONSULT (OUTPATIENT)
Dept: NEUROSURGERY | Facility: CLINIC | Age: 69
End: 2019-02-26
Payer: MEDICARE

## 2019-02-26 VITALS
WEIGHT: 169.31 LBS | HEART RATE: 65 BPM | TEMPERATURE: 98 F | DIASTOLIC BLOOD PRESSURE: 83 MMHG | HEIGHT: 63 IN | BODY MASS INDEX: 30 KG/M2 | SYSTOLIC BLOOD PRESSURE: 171 MMHG

## 2019-02-26 DIAGNOSIS — M51.36 DEGENERATIVE DISC DISEASE, LUMBAR: ICD-10-CM

## 2019-02-26 DIAGNOSIS — M76.30 ILIOTIBIAL BAND SYNDROME, UNSPECIFIED LATERALITY: ICD-10-CM

## 2019-02-26 DIAGNOSIS — Z85.09 HISTORY OF GASTROINTESTINAL STROMAL TUMOR (GIST): ICD-10-CM

## 2019-02-26 DIAGNOSIS — M51.36 DEGENERATIVE DISC DISEASE, LUMBAR: Primary | ICD-10-CM

## 2019-02-26 PROCEDURE — 72100 X-RAY EXAM L-S SPINE 2/3 VWS: CPT | Mod: TC,HCNC,FY,PO

## 2019-02-26 PROCEDURE — 71046 X-RAY EXAM CHEST 2 VIEWS: CPT | Mod: TC,HCNC,FY,PO

## 2019-02-26 PROCEDURE — 72100 X-RAY EXAM L-S SPINE 2/3 VWS: CPT | Mod: 26,HCNC,, | Performed by: RADIOLOGY

## 2019-02-26 PROCEDURE — 99999 PR PBB SHADOW E&M-EST. PATIENT-LVL III: ICD-10-PCS | Mod: PBBFAC,HCNC,, | Performed by: STUDENT IN AN ORGANIZED HEALTH CARE EDUCATION/TRAINING PROGRAM

## 2019-02-26 PROCEDURE — 1101F PR PT FALLS ASSESS DOC 0-1 FALLS W/OUT INJ PAST YR: ICD-10-PCS | Mod: HCNC,CPTII,S$GLB, | Performed by: STUDENT IN AN ORGANIZED HEALTH CARE EDUCATION/TRAINING PROGRAM

## 2019-02-26 PROCEDURE — 99205 PR OFFICE/OUTPT VISIT, NEW, LEVL V, 60-74 MIN: ICD-10-PCS | Mod: HCNC,S$GLB,, | Performed by: STUDENT IN AN ORGANIZED HEALTH CARE EDUCATION/TRAINING PROGRAM

## 2019-02-26 PROCEDURE — 3079F DIAST BP 80-89 MM HG: CPT | Mod: HCNC,CPTII,S$GLB, | Performed by: STUDENT IN AN ORGANIZED HEALTH CARE EDUCATION/TRAINING PROGRAM

## 2019-02-26 PROCEDURE — 72100 XR LUMBAR SPINE AP AND LAT WITH FLEX/EXT: ICD-10-PCS | Mod: 26,HCNC,, | Performed by: RADIOLOGY

## 2019-02-26 PROCEDURE — 1101F PT FALLS ASSESS-DOCD LE1/YR: CPT | Mod: HCNC,CPTII,S$GLB, | Performed by: STUDENT IN AN ORGANIZED HEALTH CARE EDUCATION/TRAINING PROGRAM

## 2019-02-26 PROCEDURE — 72120 X-RAY BEND ONLY L-S SPINE: CPT | Mod: 26,HCNC,, | Performed by: RADIOLOGY

## 2019-02-26 PROCEDURE — 71046 XR CHEST PA AND LATERAL: ICD-10-PCS | Mod: 26,HCNC,, | Performed by: RADIOLOGY

## 2019-02-26 PROCEDURE — 3079F PR MOST RECENT DIASTOLIC BLOOD PRESSURE 80-89 MM HG: ICD-10-PCS | Mod: HCNC,CPTII,S$GLB, | Performed by: STUDENT IN AN ORGANIZED HEALTH CARE EDUCATION/TRAINING PROGRAM

## 2019-02-26 PROCEDURE — 99999 PR PBB SHADOW E&M-EST. PATIENT-LVL III: CPT | Mod: PBBFAC,HCNC,, | Performed by: STUDENT IN AN ORGANIZED HEALTH CARE EDUCATION/TRAINING PROGRAM

## 2019-02-26 PROCEDURE — 3077F PR MOST RECENT SYSTOLIC BLOOD PRESSURE >= 140 MM HG: ICD-10-PCS | Mod: HCNC,CPTII,S$GLB, | Performed by: STUDENT IN AN ORGANIZED HEALTH CARE EDUCATION/TRAINING PROGRAM

## 2019-02-26 PROCEDURE — 3077F SYST BP >= 140 MM HG: CPT | Mod: HCNC,CPTII,S$GLB, | Performed by: STUDENT IN AN ORGANIZED HEALTH CARE EDUCATION/TRAINING PROGRAM

## 2019-02-26 PROCEDURE — 71046 X-RAY EXAM CHEST 2 VIEWS: CPT | Mod: 26,HCNC,, | Performed by: RADIOLOGY

## 2019-02-26 PROCEDURE — 99205 OFFICE O/P NEW HI 60 MIN: CPT | Mod: HCNC,S$GLB,, | Performed by: STUDENT IN AN ORGANIZED HEALTH CARE EDUCATION/TRAINING PROGRAM

## 2019-02-26 PROCEDURE — 72120 XR LUMBAR SPINE AP AND LAT WITH FLEX/EXT: ICD-10-PCS | Mod: 26,HCNC,, | Performed by: RADIOLOGY

## 2019-02-26 NOTE — PROGRESS NOTES
"[facility] - Neurosurgery  Clinic Consult     Consult Requested By: Mayco Shultz MD, Mayco Shultz MD        SUBJECTIVE:     Chief Complaint:   Chief Complaint   Patient presents with    Lumbar Spine Pain (L-Spine)     reports low back pain radiating into the buttocks; reports "heavy" legs; patient denies numbness and tingling; denies any recent falls; pain 6-8 weeks; pain 5/10       History of Present Illness:  Lyudmila Neri is a 69 y.o. female who presents with   Chronic back pain.  Over the past 8 weeks she has had significant increase in her back pain.  She has difficulty with sitting standing as high frequency of disability  She has significant pain in her bilateral hips and lateral legs proximally  Her pain does not travel below the knee  She feels like she has difficulty with ambulation and her legs are weak    She has and she has no red flags    Pertinent and Recent history, provider evaluations, imaging and data reviewed in EPIC          Review of patient's allergies indicates:   Allergen Reactions    Codeine      Other reaction(s): Nausea       Past Medical History:   Diagnosis Date    Arthritis     osteoarthritis    Blood transfusion     Cancer 2011    stromal tumor, stomach    Depression     Disorder of kidney and ureter     CKD 2    GERD (gastroesophageal reflux disease)     GIST (gastrointestinal stromal tumor), malignant     H. pylori infection     Hypertension     KATHY (obstructive sleep apnea) 6/24/2013    Psoriasis 6/24/2013    Trouble in sleeping     arthritic pain wakes her up    Urinary incontinence     stress incontinence     Past Surgical History:   Procedure Laterality Date    ANKLE FRACTURE SURGERY      RT ankle    APPENDECTOMY      BLADDER SUSPENSION  1995    CARPAL TUNNEL RELEASE      left    CERVICAL FUSION      CHOLECYSTECTOMY  12/7/2011  Dr. Cai    COLONOSCOPY  7/26/2005  Thomas    Non-erosive esophageal reflux (NERD) disease?.  Post-surgical " deformity in the gastric body.   Gastric mucosal abnormality (erythema) in the greater  curve of antrum.  STEVIE Test performed on 02/24/12 was Negative.    COLONOSCOPY  12/12/2008  Thomas    Small internal hemorrhoids.  Slightly redundant left colon.    COLONOSCOPY N/A 4/18/2018    Performed by [facility] at [facility]    COLONOSCOPY N/A 12/13/2013    Performed by [facility] Jr[facility] at [facility]    CYST REMOVAL Left 2016    left middle finger, Dr. Johnson    EGD (ESOPHAGOGASTRODUODENOSCOPY) N/A 2/24/2012    Per[facility] Jr[facility] at [facility]    ESOPHAGOGASTRODUODENOSCOPY (EGD) N/A 4/18/2018    Performed [facility] at [facility]    EYE SURGERY Bilateral 1995    RK surgery    EYE SURGERY Bilateral 2015    cataract removal    GASTRECTOMY      HYSTERECTOMY      KNEE ARTHROSCOPY W/ DEBRIDEMENT      lt knee    SHOULDER OPEN ROTATOR CUFF REPAIR      left    STOMACH SURGERY  12/7/2011  Dr. Cai    removal of GIST tumor from wall of the stomach, 2.3 cm in size.    UPPER GASTROINTESTINAL ENDOSCOPY  2/24/2012  Thomas    Non-erosive esophageal reflux (NERD) disease?.  Post-surgical deformity in the gastric body.   Gastric mucosal abnormality (erythema) in the greater  curve of antrum.  STEVIE Test performed on 02/24/12 was Negative.     Family History   Problem Relation Age of Onset    Cancer Mother         colon    Heart disease Mother     Arthritis Mother         osteoarthritis    COPD Mother     Hyperlipidemia Mother     Hypertension Mother     Kidney disease Mother     Stroke Mother     Cancer Father         brain and lung    Arthritis Father     COPD Sister     Depression Daughter     Arthritis Maternal Aunt         rheumatoid arthritis    Heart disease Maternal Uncle     Early death Maternal Uncle         in 50s due to heart disease    Arthritis Paternal Aunt         rheuamtoid arthritis    Heart disease Maternal Grandfather     Arthritis  Paternal Grandmother         rheumatoid arthritis    Diabetes Paternal Grandmother     Diabetes Cousin     Heart disease Cousin      Social History     Tobacco Use    Smoking status: Never Smoker    Smokeless tobacco: Never Used   Substance Use Topics    Alcohol use: No    Drug use: No        Review of Systems:    Constitutional: no fever, chills or night sweats. No changes in weight   Eyes: no diplopia, lid drooping, loss of vision   ENT: no nasal congestion, sore throat, discharge  Respiratory: no cough, shortness of breath, or pain  Cardiovascular: no chest pain or palpitations   Gastrointestinal: no nausea or vomiting  Genitourinary: no hematuria or dysuria   Integument/Breast: no rash or pruritis   Hematologic/Lymphatic: no easy bruising or lymphadenopathy   Musculoskeletal: no arthralgias or myalgias.   Neurological: no seizures or tremors   Behavioral/Psych: no auditory or visual hallucinations   Endocrine: no heat or cold intolerance   All other systems reviewed and are negative.      OBJECTIVE:     Vital Signs (Most Recent):  Temp: 97.8 °F (36.6 °C) (02/26/19 1430)  Pulse: 65 (02/26/19 1430)  BP: (!) 171/83 (02/26/19 1430)    Physical Exam:    General: well developed, well nourished, no distress. .  Mental Status: Awake, Alert, Oriented x 4  Language: No aphasia  Speech: No dysarthria  Head: normocephalic, atraumatic.  Neck: trachea midline, no JVD   Cardiovascular: no LE edema  Pulmonary: normal respirations, no signs of respiratory distress  Abdomen: soft, non-distended  Skin: Skin is warm, dry and intact.    Motor Strength:  No abnormal movements seen.     Strength  Deltoids Triceps Biceps Wrist Extension Wrist Flexion Hand  Interossei     Upper: R 5/5 5/5 5/5 5/5 5/5 5/5 5/5      L 5/5 5/5 5/5 5/5 5/5 5/5 5/5       Iliopsoas Quadriceps Knee  Flexion Tibialis  anterior Gastro- cnemius EHL  Foot Eversion Foot inversion   Lower: R 5/5 5/5 5/5 5/5 5/5 5/5 5/5 5/5 5/5    L 5/5 5/5 5/5 5/5 5/5 5/5  5/5 5/5 5/5     SILT,PP      DTR's: 2 + and symmetric in UE and LE  Meza: absent  Clonus: absent  Babinski: absent    She has a slow antalgic gait  Pre she has significant tender to palpation in IT band bilaterally and the gluteal region       Pain on Hip ROM: Negative  Straight leg raise: negative bilaterally     SI Joint tenderness:  Positive           Diagnostic Results:  I have independently reviewed the following imaging:  MRI: Reviewed  MRI was reviewed which shows multilevel thoracolumbar spondylosis degenerative disc disease.  There is no significant central stenosis at any level she has variable degrees of moderate foraminal stenosis.  Most significant is at L4-5 where she has facet hypertrophy increased T2 signal/diastasis with right greater than left lateral recess and foraminal stenosis    ASSESSMENT/PLAN:     Degenerative disc disease, lumbar    Iliotibial band syndrome, unspecified laterality      Very pleasant 69-year-old female who has chronic back proximal leg pain  She has had a flare-up over the last 8 weeks she has high frequency and severity  Of symptoms primarily back hip and proximal leg pain  She has chronic findings on her lumbar MRI with multilevel spondylosis degenerative disc disease  She has significant facet arthropathy worse  At L4-5 also L5-S1 at L4-5 she has increased T2 signal within her facet joint worse on the right also lateral recess and foraminal stenosis without correlating L4-5 radiculopathy  Overall think she has multifactorial etiology for her pain and limitation  She is physical exam correlating with ID band syndrome is quite tight and severe tenderness to palpation  She has exam correlating with SI joint pathology in addition    I recommend physical therapy  Referral for consultation to pain management with consideration of trigger point injection, L4-5 facet injections, and fail SI joint injections  Obtain lumbar flexion-extension x-rays to rule out a mobile  spondylolisthesis    Her chief complaints appears to be myofascial I recommend conservative treatment    We discussed non-surgical treatment options.  We discussed medical management and referral options including anti-inflammatories, muscle relaxants, narcotics and neuropathic pain medications.  We discussed physical therapy for back strengthening and flexibility.  We talked about injection therapy for both treatment and diagnosis.                Siva Mcmanus MD  Neurosurgery

## 2019-03-01 ENCOUNTER — OFFICE VISIT (OUTPATIENT)
Dept: HEMATOLOGY/ONCOLOGY | Facility: CLINIC | Age: 69
End: 2019-03-01
Payer: MEDICARE

## 2019-03-01 VITALS
WEIGHT: 164.44 LBS | BODY MASS INDEX: 29.14 KG/M2 | HEIGHT: 63 IN | TEMPERATURE: 98 F | DIASTOLIC BLOOD PRESSURE: 75 MMHG | SYSTOLIC BLOOD PRESSURE: 138 MMHG | HEART RATE: 78 BPM | RESPIRATION RATE: 18 BRPM

## 2019-03-01 DIAGNOSIS — Z85.09 HISTORY OF GASTROINTESTINAL STROMAL TUMOR (GIST): Primary | ICD-10-CM

## 2019-03-01 PROCEDURE — 3078F DIAST BP <80 MM HG: CPT | Mod: HCNC,CPTII,S$GLB, | Performed by: NURSE PRACTITIONER

## 2019-03-01 PROCEDURE — 1101F PT FALLS ASSESS-DOCD LE1/YR: CPT | Mod: HCNC,CPTII,S$GLB, | Performed by: NURSE PRACTITIONER

## 2019-03-01 PROCEDURE — 3075F PR MOST RECENT SYSTOLIC BLOOD PRESS GE 130-139MM HG: ICD-10-PCS | Mod: HCNC,CPTII,S$GLB, | Performed by: NURSE PRACTITIONER

## 2019-03-01 PROCEDURE — 99213 OFFICE O/P EST LOW 20 MIN: CPT | Mod: HCNC,S$GLB,, | Performed by: NURSE PRACTITIONER

## 2019-03-01 PROCEDURE — 1101F PR PT FALLS ASSESS DOC 0-1 FALLS W/OUT INJ PAST YR: ICD-10-PCS | Mod: HCNC,CPTII,S$GLB, | Performed by: NURSE PRACTITIONER

## 2019-03-01 PROCEDURE — 99213 PR OFFICE/OUTPT VISIT, EST, LEVL III, 20-29 MIN: ICD-10-PCS | Mod: HCNC,S$GLB,, | Performed by: NURSE PRACTITIONER

## 2019-03-01 PROCEDURE — 3075F SYST BP GE 130 - 139MM HG: CPT | Mod: HCNC,CPTII,S$GLB, | Performed by: NURSE PRACTITIONER

## 2019-03-01 PROCEDURE — 3078F PR MOST RECENT DIASTOLIC BLOOD PRESSURE < 80 MM HG: ICD-10-PCS | Mod: HCNC,CPTII,S$GLB, | Performed by: NURSE PRACTITIONER

## 2019-03-01 PROCEDURE — 99999 PR PBB SHADOW E&M-EST. PATIENT-LVL IV: CPT | Mod: PBBFAC,HCNC,, | Performed by: NURSE PRACTITIONER

## 2019-03-01 PROCEDURE — 99999 PR PBB SHADOW E&M-EST. PATIENT-LVL IV: ICD-10-PCS | Mod: PBBFAC,HCNC,, | Performed by: NURSE PRACTITIONER

## 2019-03-01 NOTE — Clinical Note
Hx of resected GIST December 2011.  Having abdominal burning similar to what she experienced when she was diagnosed with H pylori on different occassions.  CT ABD/PELVIS done 02/20 by Dr. Shultz - see Impression.  Appt with your NP on 03/14

## 2019-03-01 NOTE — PROGRESS NOTES
"HISTORY OF PRESENT ILLNESS:  This is a 69-year-old white female known   to Dr. Pro for resected low-grade GIST (December 2011).      She presents to the clinic today for her approximate 86-month post-therapy   evaluation.  She reports increasing discomfort with abdominal "burning".    She states that "this is how her stomach felt the last several times she   had H. pylori".  She reported this to Dr. Shultz who had a CT done on 02/20/19.     She denies any difficulties with fevers, chills, vomiting, constipation, diarrhea,   painful lymphadenopathy, drenching night sweats, bleeding, nausea, etc.    No other new complaints or pertinent findings on a 14-point review of systems.    PHYSICAL EXAMINATION:  GENERAL:  Well-developed, well-nourished white female in no acute distress.    Alert and oriented x3.  VITAL SIGNS:  Weight:  Loss of 2 pounds in 1 year  Wt Readings from Last 3 Encounters:   03/01/19 74.6 kg (164 lb 7.4 oz)   02/26/19 76.8 kg (169 lb 5 oz)   02/13/19 73.8 kg (162 lb 11.2 oz)     Temp Readings from Last 3 Encounters:   03/01/19 98.4 °F (36.9 °C)   02/26/19 97.8 °F (36.6 °C) (Oral)   04/18/18 97 °F (36.1 °C) (Skin)     BP Readings from Last 3 Encounters:   03/01/19 138/75   02/26/19 (!) 171/83   02/13/19 (!) 140/84     Pulse Readings from Last 3 Encounters:   03/01/19 78   02/26/19 65   02/13/19 (!) 59     HEENT:  Normocephalic, atraumatic.  Oral mucosa pink and moist.  Lips without   lesions.  Tongue midline.  Oropharynx clear.  Nonicteric sclerae.   NECK:  Supple.  No adenopathy.                                               HEART:  Regular rate and rhythm without murmur, gallop or rub.               LUNGS:  Clear to auscultation bilaterally.  NL respiratory effort.                            ABDOMEN:  Soft, lipoma to RUQ, tender to epigastric & LUQ, nondistended with positive   normoactive bowel sounds.  No hepatosplenomegaly.                                              EXTREMITIES:  No " cyanosis, clubbing or edema.  Distal pulses are intact.     AXILLAE AND GROIN:  No palpable pathologic lymphadenopathy is appreciated.    LABORATORY:    Lab Results   Component Value Date    WBC 6.91 02/26/2019    HGB 12.9 02/26/2019    HCT 39.2 02/26/2019    MCV 92 02/26/2019     02/26/2019     Unremarkable differential    CMP  Sodium   Date Value Ref Range Status   02/26/2019 141 136 - 145 mmol/L Final     Potassium   Date Value Ref Range Status   02/26/2019 4.4 3.5 - 5.1 mmol/L Final     Chloride   Date Value Ref Range Status   02/26/2019 106 95 - 110 mmol/L Final     CO2   Date Value Ref Range Status   02/26/2019 27 23 - 29 mmol/L Final     Glucose   Date Value Ref Range Status   02/26/2019 89 70 - 110 mg/dL Final     BUN, Bld   Date Value Ref Range Status   02/26/2019 19 8 - 23 mg/dL Final     Creatinine   Date Value Ref Range Status   02/26/2019 0.8 0.5 - 1.4 mg/dL Final     Calcium   Date Value Ref Range Status   02/26/2019 9.0 8.7 - 10.5 mg/dL Final     Total Protein   Date Value Ref Range Status   02/26/2019 6.6 6.0 - 8.4 g/dL Final     Albumin   Date Value Ref Range Status   02/26/2019 3.2 (L) 3.5 - 5.2 g/dL Final     Total Bilirubin   Date Value Ref Range Status   02/26/2019 0.3 0.1 - 1.0 mg/dL Final     Comment:     For infants and newborns, interpretation of results should be based  on gestational age, weight and in agreement with clinical  observations.  Premature Infant recommended reference ranges:  Up to 24 hours.............<8.0 mg/dL  Up to 48 hours............<12.0 mg/dL  3-5 days..................<15.0 mg/dL  6-29 days.................<15.0 mg/dL       Alkaline Phosphatase   Date Value Ref Range Status   02/26/2019 47 (L) 55 - 135 U/L Final     AST   Date Value Ref Range Status   02/26/2019 12 10 - 40 U/L Final     ALT   Date Value Ref Range Status   02/26/2019 12 10 - 44 U/L Final     Anion Gap   Date Value Ref Range Status   02/26/2019 8 8 - 16 mmol/L Final     eGFR if     Date Value Ref Range Status   02/26/2019 >60 >60 mL/min/1.73 m^2 Final     eGFR if non    Date Value Ref Range Status   02/26/2019 >60 >60 mL/min/1.73 m^2 Final     Comment:     Calculation used to obtain the estimated glomerular filtration  rate (eGFR) is the CKD-EPI equation.            RADIOLOGY:  Chest x-ray dated 02/26/19:  Impression   No acute cardiopulmonary abnormality appreciated radiographically.  No interval detrimental change in the radiographic appearance of the chest when compared to the previous study.    CT ABDOMEN/PELVIS W/WO dated 02/20/18:  Impression:  1. Postsurgical changes involving the stomach with some apparent wall thickening in the region of the antrum that appears smooth and near fusiform.  This could relate to scarring or postsurgical change or be  related to radiation but an infiltrative or inflammatory process is not excluded.  Given the patient's history of GIST tumor correlation with endoscopy would be helpful  2. Bilateral prominent renal pelves or peripelvic cysts without worrisome detrimental changes since the prior  3. Evidence of hysterectomy and cholecystectomy    IMPRESSION:    1.  Completely resected gastrointestinal stromal tumor -- no evidence of disease.  2.  Nausea - resolved  3.  Abdominal burning - CT ABDOMEN/PELVIS done; refer back to GI for possible UGI.    PLAN:   1.  Return in one year with interval CBC, CMP, LDH and chest x-ray prior.  2.  In regards to #3, Appointment with GORDO Goss NP on 03/14/19.    Assessment/plan reviewed and approved by Dr. Pro.

## 2019-03-14 ENCOUNTER — CLINICAL SUPPORT (OUTPATIENT)
Dept: REHABILITATION | Facility: HOSPITAL | Age: 69
End: 2019-03-14
Attending: STUDENT IN AN ORGANIZED HEALTH CARE EDUCATION/TRAINING PROGRAM
Payer: MEDICARE

## 2019-03-14 ENCOUNTER — OFFICE VISIT (OUTPATIENT)
Dept: GASTROENTEROLOGY | Facility: CLINIC | Age: 69
End: 2019-03-14
Payer: MEDICARE

## 2019-03-14 VITALS
RESPIRATION RATE: 18 BRPM | HEIGHT: 63 IN | HEART RATE: 78 BPM | BODY MASS INDEX: 28.58 KG/M2 | SYSTOLIC BLOOD PRESSURE: 125 MMHG | DIASTOLIC BLOOD PRESSURE: 80 MMHG | WEIGHT: 161.31 LBS

## 2019-03-14 DIAGNOSIS — R10.13 EPIGASTRIC PAIN: Primary | ICD-10-CM

## 2019-03-14 DIAGNOSIS — R19.7 INTERMITTENT DIARRHEA: ICD-10-CM

## 2019-03-14 DIAGNOSIS — Z85.09 HISTORY OF GASTROINTESTINAL STROMAL TUMOR (GIST): ICD-10-CM

## 2019-03-14 DIAGNOSIS — Z86.19 HISTORY OF HELICOBACTER PYLORI INFECTION: ICD-10-CM

## 2019-03-14 DIAGNOSIS — R12 HEARTBURN: ICD-10-CM

## 2019-03-14 DIAGNOSIS — M62.81 WEAKNESS OF TRUNK MUSCULATURE: ICD-10-CM

## 2019-03-14 DIAGNOSIS — R93.3 ABNORMAL CT SCAN, GASTROINTESTINAL TRACT: ICD-10-CM

## 2019-03-14 DIAGNOSIS — R11.0 NAUSEA: ICD-10-CM

## 2019-03-14 DIAGNOSIS — M54.50 ACUTE BILATERAL LOW BACK PAIN WITHOUT SCIATICA: ICD-10-CM

## 2019-03-14 PROCEDURE — 1101F PR PT FALLS ASSESS DOC 0-1 FALLS W/OUT INJ PAST YR: ICD-10-PCS | Mod: HCNC,CPTII,S$GLB, | Performed by: NURSE PRACTITIONER

## 2019-03-14 PROCEDURE — 99214 PR OFFICE/OUTPT VISIT, EST, LEVL IV, 30-39 MIN: ICD-10-PCS | Mod: HCNC,S$GLB,, | Performed by: NURSE PRACTITIONER

## 2019-03-14 PROCEDURE — 97161 PT EVAL LOW COMPLEX 20 MIN: CPT | Mod: HCNC,PO | Performed by: PHYSICAL THERAPIST

## 2019-03-14 PROCEDURE — 99999 PR PBB SHADOW E&M-EST. PATIENT-LVL IV: CPT | Mod: PBBFAC,HCNC,, | Performed by: NURSE PRACTITIONER

## 2019-03-14 PROCEDURE — 3079F DIAST BP 80-89 MM HG: CPT | Mod: HCNC,CPTII,S$GLB, | Performed by: NURSE PRACTITIONER

## 2019-03-14 PROCEDURE — 97110 THERAPEUTIC EXERCISES: CPT | Mod: HCNC,PO | Performed by: PHYSICAL THERAPIST

## 2019-03-14 PROCEDURE — 3074F SYST BP LT 130 MM HG: CPT | Mod: HCNC,CPTII,S$GLB, | Performed by: NURSE PRACTITIONER

## 2019-03-14 PROCEDURE — 97140 MANUAL THERAPY 1/> REGIONS: CPT | Mod: HCNC,PO | Performed by: PHYSICAL THERAPIST

## 2019-03-14 PROCEDURE — 3074F PR MOST RECENT SYSTOLIC BLOOD PRESSURE < 130 MM HG: ICD-10-PCS | Mod: HCNC,CPTII,S$GLB, | Performed by: NURSE PRACTITIONER

## 2019-03-14 PROCEDURE — 99999 PR PBB SHADOW E&M-EST. PATIENT-LVL IV: ICD-10-PCS | Mod: PBBFAC,HCNC,, | Performed by: NURSE PRACTITIONER

## 2019-03-14 PROCEDURE — 99214 OFFICE O/P EST MOD 30 MIN: CPT | Mod: HCNC,S$GLB,, | Performed by: NURSE PRACTITIONER

## 2019-03-14 PROCEDURE — 1101F PT FALLS ASSESS-DOCD LE1/YR: CPT | Mod: HCNC,CPTII,S$GLB, | Performed by: NURSE PRACTITIONER

## 2019-03-14 PROCEDURE — 3079F PR MOST RECENT DIASTOLIC BLOOD PRESSURE 80-89 MM HG: ICD-10-PCS | Mod: HCNC,CPTII,S$GLB, | Performed by: NURSE PRACTITIONER

## 2019-03-14 NOTE — PATIENT INSTRUCTIONS
Epigastric Pain (Uncertain Cause)     Epigastric pain can be a sign of disease in the upper abdomen. Common causes include:  · Acid reflux (stomach acid flowing up into the esophagus)  · Gastritis (irritation of the stomach lining)  · Peptic Ulcer Disease  · Inflammation of the pancreas  · Gallstone  · Infection in the gallbladder  Pain may be dull or burning. It may spread upward to the chest or to the back. There may be other symptoms such as belching, bloating, cramps or hunger pains. There may be weight loss or poor appetite, nausea or vomiting.  Since the diagnosis of your pain is not certain yet, further tests may sometimes be needed. Sometimes the doctor will treat you for the most likely condition to see if there is improvement before doing further tests.  Home care  Medicines  · Antacids help neutralize the normal acids in your stomach. Examples are Maalox, Mylanta, Rolaids, and Tums. If you dont like the liquid, you can also try a chewable one. You may find one works better than another for you. Overuse can cause diarrhea or constipation.  · Acid blockers (H2 blockers) decrease acid production. Examples are cimetidine (Tagamet), famotidine (Pepcid) and ranitidine (Zantac).  · Acid inhibitors (PPIs) decrease acid production in a different way than the blockers. You may find they work better, but can take a little longer to take effect.  Examples are omeprazole (Prilosec), lansoprazole (Prevacid), pantoprazole (Protonix), rabeprazole (Aciphex), and esomeprazole (Nexium).  · Take an antacid 30-60 minutes after eating and at bedtime, but not at the same time as an acid blocker.  · Try not to take NSAIDs. Aspirin may also cause problems, but if taking it for your heart or other medical reasons, talk to your doctor before stopping it; you do not want to cause a worse problem, like a heart attack or stroke.  Diet  · If certain foods seem to cause your spasm, try to avoid them.   · Eat slowly and chew food well  before swallowing. Symptoms of gastritis can be worsened by certain foods. Limit or avoid fatty, fried, and spicy foods, as well as coffee, chocolate, mint, and foods with high acid content such as tomatoes and citrus fruit and juices (orange, grapefruit, lemon).  · Avoid alcohol, caffeine, and tobacco, which can delay healing and worsen your problem.  · Try eating smaller meals with snacks in between  Follow-up care  Follow up with your healthcare provider or as advised.  When to seek medical advice  Call your healthcare provider right away if any of the following occur:  · Stomach pain worsens or moves to the right lower part of the abdomen  · Chest pain appears, or if it worsens or spreads to the chest, back, neck, shoulder, or arm  · Frequent vomiting (cant keep down liquids)  · Blood in the stool or vomit (red or black color)  · Feeling weak or dizzy, fainting, or having trouble breathing  · Fever of 100.4ºF (38ºC) or higher, or as directed by your healthcare provider  · Abdominal swelling  Date Last Reviewed: 9/25/2015  © 4073-2923 [employer]. [address] [place] [id number]. All rights reserved. This information is not intended as a substitute for professional medical care. Always follow your healthcare professional's instructions.

## 2019-03-14 NOTE — PLAN OF CARE
[facility]  Physical Therapy Initial Evaluation    Name: Lyudmila Neri  Clinic Number: [id number]    Therapy Diagnosis:   Encounter Diagnoses   Name Primary?    Acute bilateral low back pain without sciatica     Weakness of trunk musculature      Physician: Siva Mcmanus MD    Physician Orders: PT Eval and Treat 3x/wk x 60 days  Medical Diagnosis from Referral: DDD lumbar, Iliotibial band syndrome  Evaluation Date: 3/14/2019  Authorization Period Expiration: 12/31/19  Plan of Care Expiration: 5/10/19  Visit # / Visits authorized: 1/ 20    Time In: 9:00AM  Time Out: 10:00AM  Total Billable Time: 60 minutes    Precautions: Standard    Subjective   Date of onset: Recent flare-up 6-8 weeks ago  History of current condition - Lyudmila reports: Long standing hx of back pain with flare-ups 4-5x/year.  This episode of pain has not resolved on its own. She reports constant pain across low back with occasional twinges of pain into buttocks.  She also has significant tenderness bilateral lateral hips and upper thighs.     Medical History:   Past Medical History:   Diagnosis Date    Arthritis     osteoarthritis    Blood transfusion     Cancer 2011    stromal tumor, stomach    Depression     Disorder of kidney and ureter     CKD 2    GERD (gastroesophageal reflux disease)     GIST (gastrointestinal stromal tumor), malignant     H. pylori infection     Hypertension     KATHY (obstructive sleep apnea) 6/24/2013    Psoriasis 6/24/2013    Trouble in sleeping     arthritic pain wakes her up    Urinary incontinence     stress incontinence       Surgical History:   Lyudmila Neri  has a past surgical history that includes Cholecystectomy (12/7/2011  Dr. Cai); Stomach surgery (12/7/2011  Dr. Cai); Hysterectomy; Appendectomy; Cervical fusion; Knee arthroscopy w/ debridement; Shoulder open rotator cuff repair; Ankle fracture surgery; Carpal tunnel release; Colonoscopy (7/26/2005  [facility]);  Colonoscopy (12/12/2008  [facility]); Upper gastrointestinal endoscopy (2/24/2012  [facility]); Eye surgery (Bilateral, 1995); Eye surgery (Bilateral, 2015); Bladder suspension (1995); Cyst Removal (Left, 2016); Gastrectomy; and Colonoscopy (N/A, 4/18/2018).    Medications:   Lyudmila has a current medication list which includes the following prescription(s): amlodipine, aspirin, celecoxib, cholecalciferol (vitamin d3), clobetasol, fish oil-omega-3 fatty acids, folic acid/multivit-min/lutein, lidocaine, losartan-hydrochlorothiazide 100-12.5 mg, methylprednisolone, and omeprazole, and the following Facility-Administered Medications: estradiol.    Allergies:   Review of patient's allergies indicates:   Allergen Reactions    Codeine      Other reaction(s): Nausea        Imaging, MRI studies: 1. There is extensive degenerative change including degenerative disc and facet disease.  This has progressed at multiple levels as discussed above.  There is no acute fracture but there is some degree of disc space narrowing, disc bulge with osteophytic ridging and facet joint arthropathy at multiple levels in addition to mild degenerative listhesis.  2. There is no spinal canal or significant foraminal stenosis at the T10-11, T11-12, T12-L1 levels.  3. At the L1-2 level there is moderate-to-marked left and mild right foraminal stenosis with interval progression but there is no spinal stenosis.  4. At the L2-3 level there is mild spinal stenosis with mild-to-moderate bilateral foraminal stenosis with interval progression.  5. At the L3-4 level there is no significant spinal stenosis.  There is mild right foraminal stenosis without definite change.  6. At the L4-5 level there is mild spinal stenosis with mild-to-moderate right lateral recess stenosis.  Additionally, there is moderate-to-marked right foraminal stenosis.  These findings have progressed.  The left foramen remains patent.  7. At the L5-S1 level there is borderline to mild spinal  stenosis.  There is a broad disc bulge which approaches and contacts both S1 roots which are not obviously compressed or displaced.  This is unchanged.  There is mild-to-moderate right foraminal stenosis without change.  The left foramen remains patent    Xray: A dextrocurvature is noted centered in the upper lumbar spine.  Surgical clips are noted in the right upper quadrant of the abdomen suggestive cholecystectomy clips.  A retrolisthesis of 4 mm is noted of the L1 on L2 vertebral body and of the L2 on L3 vertebral body.  Anterolisthesis of 5 mm is noted of the L4 on L5 vertebral body.  A 6 mm anterolisthesis is noted of the L5 on S1 vertebral body.  Vertebral body heights appear well preserved.  Facet arthropathy is noted at the L4-L5 and L5-S1 levels.  Intervertebral disc height loss is noted at the L1-L2, L2-L3, L3-L4, L4-L5, and L5-S1 levels.  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  Prior Level of Function: She has had to limit prolonged standing/walking and sitting for a number of years  Current Level of Function: Significant difficulty walking 2 blocks, doing usual work and bending    Pain:  Current 6/10, worst 9/10, best 3/10   Location: bilateral back , and bilateral lateral hips/thighs  Description: Aching, Grabbing and Tight  Aggravating Factors: Standing, Walking, Extension, Flexing and Getting out of bed/chair  Easing Factors: TENS unit and icy hot patches and Celebrex    Pts goals: To get through this flare up of pain.    Objective     Observation: Pt is using hands to push herself up from flexed posture and from transitions from sit to stand and supine to sit.    Posture:  Pt presents with left pelvic crest elevated, increased lumbar lordosis with prominent lumbar paravertebrals and right knee genu valgus.    Lumbar Range of  Motion:    Degrees Pain   Flexion 40 degree forward bend occurring through hips, poor reversal of lordosis   yes        Extension 40   yes        Left Side Bending 30 Pain across low back        Right Side Bending 25 Sharp right L-S pain             Lower Extremity Strength  Right LE  Left LE    Knee extension: 5/5 Knee extension: 5/5   Knee flexion: 5/5 Knee flexion: 5/5   Hip flexion: 4/5 Hip flexion: 4/5   Hip extension:  4/5 Hip extension: 4/5   Hip abduction: 4/5 Hip abduction: 4/5   Hip adduction: 5/5 Hip adduction 5/5   Ankle dorsiflexion: 5/5 Ankle dorsiflexion: 5/5   Ankle plantarflexion: 5/5 Ankle plantarflexion: 5/5         Special Tests:  -Repeated Flexion: Slight decrease in pain. Unable to rise from flexion without use of hands walking up her thighs.  -Repeated Ext: Increased low back pain  -Piriformis Test: neg  -Prone Instability Test: positive  -Bridge Test: positive        Neuro Dynamic Testing:    Sciatic nerve:      SLR: R = neg     L = neg       Femoral Nerve:    Femoral nerve test: neg      Joint Mobility: Moderate restriction of AP glide    Palpation: Moderate palpable tightness of lumbar paraspinals.  Significant tenderness with palpation of ITband and TFL bilaterally.    Sensation: intact    Flexibility: Hamstrings tight at 80 degree SLR bilaterally         CMS Impairment/Limitation/Restriction for FOTO lumbar Survey    Therapist reviewed FOTO scores for Lyudmila Neri on 3/14/2019.   FOTO documents entered into [employer] - see Media section.    Limitation Score: 52%       TREATMENT   Treatment Time In: 9:30AM  Treatment Time Out: 10:00AM  Total Treatment time separate from Evaluation: 30 minutes    Lyudmila received therapeutic exercises to develop strength, ROM, flexibility, posture and core stabilization for 20 minutes including:  KTC 5x 5sec ea  Bilateral KTC 5x 5sec  Posterior pelvic tilt x 20  Partial sit-up 2/10  Hamstring stretch 3x 30 sec ea  Seated lumbar flexion 5x 5sec    Lyudmila  received the following manual therapy techniques: Soft tissue Mobilization were applied to the: bilateral ITBand for 10 minutes, including:  Rolling along ITBand bilaterally    Home Exercises and Patient Education Provided    Education provided:   - Pt instructed to perform flexion based exercise 2-3x/day for first week while in acute flare-up.    Written Home Exercises Provided: yes.  Exercises were reviewed and Lyudmila was able to demonstrate them prior to the end of the session.  Lyudmila demonstrated good  understanding of the education provided.     See EMR under Patient Instructions for exercises provided 3/14/2019.    Assessment   Lyudmila is a 69 y.o. female referred to outpatient Physical Therapy with a medical diagnosis of DDD lumbar, Iliotibial band syndrome. Pt presents with low back pain, lateral hip and upper thigh pain.  Lumbar mobility testing does not reproduce LE sx. She has pain with palpation along lateral hips and thighs.  She has significant weakness of her core musculature and tightness of her lumbar paraspinals.  Pt has had recurring episodes of pain with this episode lasting longer than most.    Pt prognosis is Good.   Pt will benefit from skilled outpatient Physical Therapy to address the deficits stated above and in the chart below, provide pt/family education, and to maximize pt's level of independence.     Plan of care discussed with patient: Yes  Pt's spiritual, cultural and educational needs considered and patient is agreeable to the plan of care and goals as stated below:     Anticipated Barriers for therapy: none    Medical Necessity is demonstrated by the following  History  Co-morbidities and personal factors that may impact the plan of care Co-morbidities:   difficulty sleeping, history of cancer, HTN and prior abdominal surgery    Personal Factors:   no deficits     moderate   Examination  Body Structures and Functions, activity limitations and participation restrictions that may  impact the plan of care Body Regions:   back  lower extremities  trunk    Body Systems:    gross symmetry  ROM  strength  gross coordinated movement  gait    Participation Restrictions:   none    Activity limitations:   Learning and applying knowledge  no deficits    General Tasks and Commands  no deficits    Communication  no deficits    Mobility  lifting and carrying objects  walking    Self care  no deficits    Domestic Life  doing house work (cleaning house, washing dishes, laundry)    Interactions/Relationships  no deficits    Life Areas  no deficits    Community and Social Life  no deficits         moderate   Clinical Presentation stable and uncomplicated low   Decision Making/ Complexity Score: low     Goals:  Short Term Goals: 4 weeks   Pt will have pain less than 4/10 with normal ADLs.  Pt will have increased core strength by 1/3 grade to allow for improved transitions without using hands for support.  Increased hamstring length by 10 degrees for improved bending.    Long Term Goals: 8 weeks   Pt will have pain less than 2/10 with normal ADLs.  Pt will increase core and ip strength by 1 grade for improved function in ADLs  Pt will be independent with HEP for sx management.    Plan   Plan of care Certification: 3/14/2019 to 5/10/19.    Outpatient Physical Therapy 2 times weekly for 8 weeks to include the following interventions: Electrical Stimulation IFC, Manual Therapy, Moist Heat/ Ice, Neuromuscular Re-ed, Patient Education, Therapeutic Activites, Therapeutic Exercise, Ultrasound and dry needling.     Mikayla Avila, PT

## 2019-03-14 NOTE — PATIENT INSTRUCTIONS
SINGLE KNEE TO CHEST STRETCH - SKTC    While Lying on your back, hold your knee and genatly pull it up towards your chest. Hold 5 seconds.  Repeat 5x each. Do 2x/day.        DOUBLE KNEE TO CHEST STRETCH - DKTC    While Lying on your back,  hold your knees and gently pull them up towards your chest. Hold 5 seconds.  Repeat 5x. Do 2x/day.        POSTERIOR PELVIC TILT    Lie on your back on a firm surface with knees comfortably bent (top picture).  Then flatten back against the table while elijah abdominal muscles as if pulling belly button toward ribs (bottom picture).    Hold. 2-3 seconds.  Repeat 20x. Do 2x/day        CURL UP - TYPE 2    While lying on your back with your arms crossed over your chest, partially curl up  your trunk and lift your shoulder blades off the ground. Then Slowly return to lying down. Hold 2-3 seconds. Do 2 sets of 10 twice a day.        HAMSTRING STRETCH WITH TOWEL    While lying down on your back, hook a towel or strap under  your foot and draw up your leg until a stretch is felt under your leg. calf area. Hold 30 seconds.  Repeat 3x on each leg. Do 2x/day.    Keep your knee in a straightened position during the stretch.        Seated Lumbar Flexion    Sit on the edge of a chair or bench with your legs apart. Round your low back and reach to the ground in between your legs. Engage your abs like you are doing a crunch to help you round your low back. Return to an upright seated position and repeat. Hold 5 seconds. Repeat 5x. Do several times throughout day.

## 2019-03-14 NOTE — LETTER
March 14, 2019        Mayco Shultz MD  [address] [place] [address]             [facility]  [address] [place] [address]  Phone: [phone]   Patient: Lyudmila Neri   MR Number: [id number]   YOB: 1950   Date of Visit: 3/14/2019       Dear Dr. Shultz:    Thank you for referring Lyudmila Neri to me for evaluation. Below are the relevant portions of my assessment and plan of care.        1. Abdominal pain, unspecified abdominal location    2. Heartburn    3. Nausea    4. History of Helicobacter pylori infection    5. Abnormal CT scan, gastrointestinal tract    6. History of gastrointestinal stromal tumor (GIST)          Abdominal pain, unspecified abdominal location    Heartburn    Nausea    History of Helicobacter pylori infection    Abnormal CT scan, gastrointestinal tract    History of gastrointestinal stromal tumor (GIST)      No Follow-up on file.      If you have questions, please do not hesitate to call me. I look forward to following Lyudmila along with you.    Sincerely,      HELIO Hewitt           CC  Aleksandr Das NP

## 2019-03-14 NOTE — PROGRESS NOTES
Subjective:       Patient ID: Lyudmila Neri is a 69 y.o. female Body mass index is 28.57 kg/m².    Chief Complaint: Gastroesophageal Reflux (abd burning, nausea, hx of h. pylori )    This patient is new to me.  Referring Provider:  Dr. Shultz & SERA Das NP for abnormal CT scan & history of GIST.  Established patient of Dr. Guzman.    Gastroesophageal Reflux   She complains of abdominal pain (started early 2/2019; epigastric pain described as burning, constant, worse at night), belching (not more than usual), heartburn (rare heartburn or reflux) and nausea (mild, typically at night). She reports no chest pain, no choking, no coughing, no dysphagia, no globus sensation, no hoarse voice or no sore throat. This is a chronic problem. The current episode started more than 1 year ago. Pertinent negatives include no fatigue, melena or weight loss. Risk factors include NSAIDs (celebrex once daily (was taking bid for 9 months but decreased to once daily dosing a few months ago)). She has tried a PPI (prilosec 40 mg once daily) for the symptoms. GASTRECTOMY FOR GIST.     Review of Systems   Constitutional: Negative for appetite change, chills, fatigue, fever and weight loss.   HENT: Negative for hoarse voice, sore throat and trouble swallowing.    Respiratory: Negative for cough, choking and shortness of breath.    Cardiovascular: Negative for chest pain.   Gastrointestinal: Positive for abdominal pain (started early 2/2019; epigastric pain described as burning, constant, worse at night), diarrhea (intermittent diarrhea over the past few weeks; occasional fecal incontinence; diarrhea occurs about 3-4 times a week; otherwise bowel movements are once daily of soft pasty stool; PAST: fiber supplement without relief), heartburn (rare heartburn or reflux) and nausea (mild, typically at night). Negative for anal bleeding, blood in stool, constipation, dysphagia, melena, rectal pain and vomiting.   Genitourinary: Negative for  difficulty urinating, dysuria and flank pain.   Neurological: Negative for weakness.       No LMP recorded. Patient has had a hysterectomy.  Past Medical History:   Diagnosis Date    Arthritis     osteoarthritis    Blood transfusion     Cancer 2011    stromal tumor, stomach    Depression     Disorder of kidney and ureter     CKD 2    GERD (gastroesophageal reflux disease)     GIST (gastrointestinal stromal tumor), malignant     H. pylori infection     Hypertension     KATHY (obstructive sleep apnea) 6/24/2013    Psoriasis 6/24/2013    Trouble in sleeping     arthritic pain wakes her up    Urinary incontinence     stress incontinence     Past Surgical History:   Procedure Laterality Date    ANKLE FRACTURE SURGERY      RT ankle    APPENDECTOMY      BLADDER SUSPENSION  1995    CARPAL TUNNEL RELEASE      left    CERVICAL FUSION      CHOLECYSTECTOMY  12/7/2011  Dr. Cai    COLONOSCOPY  7/26/2005  Thomas    Non-erosive esophageal reflux (NERD) disease?.  Post-surgical deformity in the gastric body.   Gastric mucosal abnormality (erythema) in the greater  curve of antrum.  STEVIE Test performed on 02/24/12 was Negative.    COLONOSCOPY  12/12/2008  Thomas    Small internal hemorrhoids.  Slightly redundant left colon.    COLONOSCOPY  04/18/2018    Dr. Guzman; hemorrhoids, otherwise unremarkable, repeat 5 years for surveillance due to family history of colon cancer    COLONOSCOPY N/A 4/18/2018    Performed by [facility] Gu[facility] Jr[facility]    COLONOSCOPY N/A 12/13/2013    Performed by [facility] Gu[facility] Jr., [facility] at [facility]    CYST REMOVAL Left 2016    left middle finger, Dr. Johnson    EGD (ESOPHAGOGASTRODUODENOSCOPY) N/A 2/24/2012    Per[facility] by [facility] Jr[facility] at [facility]    ESOPHAGOGASTRODUODENOSCOPY (EGD) N/A 4/18/2018    Performed by [facility] Jr[facility] at [facility]    EYE SURGERY Bilateral 1995    RK surgery    EYE SURGERY Bilateral 2015    cataract removal    GASTRECTOMY       HYSTERECTOMY      KNEE ARTHROSCOPY W/ DEBRIDEMENT      lt knee    SHOULDER OPEN ROTATOR CUFF REPAIR      left    STOMACH SURGERY  12/7/2011  Dr. Cai    removal of GIST tumor from wall of the stomach, 2.3 cm in size.    UPPER GASTROINTESTINAL ENDOSCOPY  2/24/2012  Thomas    Non-erosive esophageal reflux (NERD) disease?.  Post-surgical deformity in the gastric body.   Gastric mucosal abnormality (erythema) in the greater  curve of antrum.  STEVIE Test performed on 02/24/12 was Negative.    UPPER GASTROINTESTINAL ENDOSCOPY  04/18/2018    Dr. Guzman, antritis, evidence of prior surgery with healthy mucosa     Family History   Problem Relation Age of Onset    Heart disease Mother     Arthritis Mother         osteoarthritis    COPD Mother     Hyperlipidemia Mother     Hypertension Mother     Kidney disease Mother     Stroke Mother     Colon cancer Mother 75    Cancer Father         brain and lung    Arthritis Father     COPD Sister     Depression Daughter     Arthritis Maternal Aunt         rheumatoid arthritis    Heart disease Maternal Uncle     Early death Maternal Uncle         in 50s due to heart disease    Arthritis Paternal Aunt         rheuamtoid arthritis    Heart disease Maternal Grandfather     Arthritis Paternal Grandmother         rheumatoid arthritis    Diabetes Paternal Grandmother     Diabetes Cousin     Heart disease Cousin     Crohn's disease Neg Hx     Stomach cancer Neg Hx     Ulcerative colitis Neg Hx     Esophageal cancer Neg Hx      Wt Readings from Last 10 Encounters:   03/14/19 73.2 kg (161 lb 4.8 oz)   03/01/19 74.6 kg (164 lb 7.4 oz)   02/26/19 76.8 kg (169 lb 5 oz)   02/13/19 73.8 kg (162 lb 11.2 oz)   04/17/18 72.6 kg (160 lb)   03/20/18 75.6 kg (166 lb 10.7 oz)   03/01/18 75.5 kg (166 lb 7.2 oz)   02/22/18 73.6 kg (162 lb 4.1 oz)   02/20/18 75.2 kg (165 lb 12.6 oz)   11/01/17 76.6 kg (168 lb 14 oz)     Lab Results   Component Value Date    WBC 6.91 02/26/2019     HGB 12.9 02/26/2019    HCT 39.2 02/26/2019    MCV 92 02/26/2019     02/26/2019     CMP  Sodium   Date Value Ref Range Status   02/26/2019 141 136 - 145 mmol/L Final     Potassium   Date Value Ref Range Status   02/26/2019 4.4 3.5 - 5.1 mmol/L Final     Chloride   Date Value Ref Range Status   02/26/2019 106 95 - 110 mmol/L Final     CO2   Date Value Ref Range Status   02/26/2019 27 23 - 29 mmol/L Final     Glucose   Date Value Ref Range Status   02/26/2019 89 70 - 110 mg/dL Final     BUN, Bld   Date Value Ref Range Status   02/26/2019 19 8 - 23 mg/dL Final     Creatinine   Date Value Ref Range Status   02/26/2019 0.8 0.5 - 1.4 mg/dL Final     Calcium   Date Value Ref Range Status   02/26/2019 9.0 8.7 - 10.5 mg/dL Final     Total Protein   Date Value Ref Range Status   02/26/2019 6.6 6.0 - 8.4 g/dL Final     Albumin   Date Value Ref Range Status   02/26/2019 3.2 (L) 3.5 - 5.2 g/dL Final     Total Bilirubin   Date Value Ref Range Status   02/26/2019 0.3 0.1 - 1.0 mg/dL Final     Comment:     For infants and newborns, interpretation of results should be based  on gestational age, weight and in agreement with clinical  observations.  Premature Infant recommended reference ranges:  Up to 24 hours.............<8.0 mg/dL  Up to 48 hours............<12.0 mg/dL  3-5 days..................<15.0 mg/dL  6-29 days.................<15.0 mg/dL       Alkaline Phosphatase   Date Value Ref Range Status   02/26/2019 47 (L) 55 - 135 U/L Final     AST   Date Value Ref Range Status   02/26/2019 12 10 - 40 U/L Final     ALT   Date Value Ref Range Status   02/26/2019 12 10 - 44 U/L Final     Anion Gap   Date Value Ref Range Status   02/26/2019 8 8 - 16 mmol/L Final     eGFR if    Date Value Ref Range Status   02/26/2019 >60 >60 mL/min/1.73 m^2 Final     eGFR if non    Date Value Ref Range Status   02/26/2019 >60 >60 mL/min/1.73 m^2 Final     Comment:     Calculation used to obtain the estimated  glomerular filtration  rate (eGFR) is the CKD-EPI equation.        Lab Results   Component Value Date    TSH 1.259 02/23/2013     Reviewed prior medical records including radiology report of 2/20/19 ct abdomen pelvis & endoscopy history (see surgical history).    Objective:      Physical Exam   Constitutional: She is oriented to person, place, and time. She appears well-developed and well-nourished. No distress.   HENT:   Mouth/Throat: Oropharynx is clear and moist and mucous membranes are normal. No oral lesions. No oropharyngeal exudate.   Eyes: Conjunctivae are normal. Pupils are equal, round, and reactive to light. No scleral icterus.   Pulmonary/Chest: Effort normal and breath sounds normal. No respiratory distress. She has no wheezes.   Abdominal: Soft. Normal appearance and bowel sounds are normal. She exhibits no distension, no abdominal bruit and no mass. There is tenderness (mild generalized abdomen, moderate to epigastric). There is no rigidity, no rebound, no guarding, no tenderness at McBurney's point and negative Day's sign.   Well-healed surgical scars noted.   Neurological: She is alert and oriented to person, place, and time.   Skin: Skin is warm and dry. No rash noted. She is not diaphoretic. No erythema. No pallor.   Non-jaundiced   Psychiatric: She has a normal mood and affect. Her behavior is normal. Judgment and thought content normal.   Nursing note and vitals reviewed.      Assessment:       1. Epigastric pain    2. Heartburn    3. Nausea    4. History of Helicobacter pylori infection    5. Abnormal CT scan, gastrointestinal tract    6. History of gastrointestinal stromal tumor (GIST)    7. Intermittent diarrhea        Plan:     discussed with Dr. Guzman; Dr. Guzman agreed with below management plan    Epigastric pain  - schedule EGD, discussed procedure with patient, patient verbalized understanding    Heartburn  - schedule EGD, discussed procedure with patient, patient verbalized  understanding  - CONTINUE PRILOSEC 40MG ONCE DAILY AS DIRECTED, take in the morning 30-60 minutes before breakfast, discussed about possible long term use of medication (prefer to use lowest effective dose or discontinuing if possible) and discussed the risks & benefits with taking a reflux medication long term, and to take OTC calcium and vitamin d supplements as directed (such as Citracal +D), pt verbalized understanding  -discussed about the different types of medications used to treat reflux and how to use them, antacids can be used PRN for breakthrough heartburn symptoms by reducing stomach acid that is already produced, H2 blockers (zantac) work by limiting the amount acid production, & PPI's work to block acid production and are taken daily, patient verbalized understanding.  -Educated patient on lifestyle modifications to help control/reduce reflux/abdominal pain including: avoid large meals, avoid eating within 2-3 hours of bedtime (avoid late night eating & lying down soon after eating), elevate head of bed if nocturnal symptoms are present, smoking cessation (if current smoker), & weight loss (if overweight).   -Educated to avoid known foods which trigger reflux symptoms & to minimize/avoid high-fat foods, chocolate, caffeine, citrus, alcohol, & tomato products.  -Advised to avoid/limit use of NSAID's, since they can cause GI upset, bleeding, and/or ulcers. If needed, take with food.     Nausea  - schedule EGD, discussed procedure with patient, patient verbalized understanding    History of Helicobacter pylori infection  - schedule EGD, discussed procedure with patient, patient verbalized understanding    Abnormal CT scan, gastrointestinal tract  - schedule EGD, discussed procedure with patient, patient verbalized understanding    History of gastrointestinal stromal tumor (GIST)  - schedule EGD, discussed procedure with patient, patient verbalized understanding  Recommend follow-up with [facility]EM/[facility]  NP, for continued evaluation and management.    Intermittent diarrhea  - schedule EGD, discussed procedure with patient, patient verbalized understanding  - recommended OTC probiotic, such as Align or Culturelle, as directed  - avoid lactose  - recommended increase fiber in diet, especially soluble fiber since this can help bulk up the stool consistency and may help to slow down how fast the stool goes through the colon and can prevent diarrhea  - Possible STOOL STUDIES & TRIAL OF COLESTID pending results of testing and if symptoms persist    Follow-up in about 1 month (around 4/14/2019), or if symptoms worsen or fail to improve.      If no improvement in symptoms or symptoms worsen, call/follow-up at clinic or go to ER.

## 2019-03-19 ENCOUNTER — CLINICAL SUPPORT (OUTPATIENT)
Dept: REHABILITATION | Facility: HOSPITAL | Age: 69
End: 2019-03-19
Attending: STUDENT IN AN ORGANIZED HEALTH CARE EDUCATION/TRAINING PROGRAM
Payer: MEDICARE

## 2019-03-19 DIAGNOSIS — M54.50 ACUTE BILATERAL LOW BACK PAIN WITHOUT SCIATICA: ICD-10-CM

## 2019-03-19 DIAGNOSIS — M62.81 WEAKNESS OF TRUNK MUSCULATURE: ICD-10-CM

## 2019-03-19 PROCEDURE — 97110 THERAPEUTIC EXERCISES: CPT | Mod: HCNC,PO | Performed by: PHYSICAL THERAPIST

## 2019-03-19 NOTE — PATIENT INSTRUCTIONS
"LUMBAR STABILIZATION    NEUTRAL SPINE POSITION:   Using your abdominal muscles, rotate the pelvis forward and backwards until a balance position is achieved.  This should be a pain-free position in about mid-range.  The abdominal muscles should be contracted to "corset" and maintain this position.  This should be performed in all positions- standing, lying on back, sitting, etc. This position of stabilization should be maintained at all times even though the rest of the body may move in a variety of postures and activities.    ABDOMINAL BRACING/SETTING:     Find your neutral spine position.  Then tighten your abdominal muscles, particularly your abdomen.  This activity should push the small of your back flat against the floor.  You should feel the "corset" your contracted abdominal muscles will provide.    STABILIZATION EXERCISES:     In the following exercise you should find your neutral position and maintain this position throughout the exercise.  If you feel yourself loosing this position---STOP---regain the neutral position and continue.  Please remember to breathe throughout these exercises.  All movements should be controlled, even and steady stabilizing your trunk with your abdominal muscles.    #1  Neutral spine.  Keep elijah the abdominal muscles as you begin to raise one arm then the other over your head.  After you accomplish this, raise both arms overhead and back down. Do each exercise 10 times, with 1 lbs, 2 times per day.        #2  Neutral spine.  Elijah your abdominal muscles, raise your leg from the hip, as if you are marching.  Return to the starting position then repeat with the opposite leg.  Do each leg 10 times, 2 times per day.    #3 Neutral spine.  Elijah your abdominal muscles, raise one arm and the leg on the same side.Then lower and repeat with the opposite arm and leg.  If this is achieved then attempt to raise your arm with the opposite leg and repeat.  Do each exercise " 10 times with 1 lbs. 2 times per day.    #4 Neutral spine.  Berenice the abdominal muscles, raise both arms and one leg.  Then lower and repeat with the opposite leg.  Do each exercise 10 times with 1 lbs, 2 times per day.        LOWER TRUNK ROTATIONS - LTR    Lying on your back with your knees bent, gently move your knees side-to-side. 10x

## 2019-03-19 NOTE — PROGRESS NOTES
Physical Therapy Daily Treatment Note     Name: Lyudmila Neri  Clinic Number: [id number]    Therapy Diagnosis:   Encounter Diagnoses   Name Primary?    Acute bilateral low back pain without sciatica     Weakness of trunk musculature      Physician: Siva Mcmanus MD    Visit Date: 3/19/2019  Physician Orders: PT Eval and Treat 3x/wk x 60 days  Medical Diagnosis from Referral: DDD lumbar, Iliotibial band syndrome  Evaluation Date: 3/14/2019  Authorization Period Expiration: 12/31/19  Plan of Care Expiration: 5/10/19  Visit # / Visits authorized: 2/ 20    Time In: 8:15AM  Time Out: 9:00AM  Total Billable Time: 30 minutes    Precautions: Standard    Subjective     Pt reports: Continued back pain, even with exercise. No change or worse pain following exercise  She was compliant with home exercise program.  Response to previous treatment: no change  Functional change: no change yet    Pain: 5/10  Location: bilateral back      Objective     Lyudmila received therapeutic exercises to develop strength, ROM, flexibility, posture and core stabilization for 30 minutes including:  KTC 5x 5sec ea  Bilateral KTC 5x 5sec  Posterior pelvic tilt x 20  Partial sit-up 2/10  Hamstring stretch 3x 30 sec ea  Seated lumbar flexion 5x 5sec  LTR x 10    Lumbar stabilization:  One arm up 1lb x 10ea  Both arms up 1lb x 10  One knee up x 10ea  Same arm/knee 1lb x 10 ea  Opposite arm/ knee 1lb x 10 ea  Both arms/ one knee 1lb x 10ea  LTR with TA contraction on red ball, supine a 10ea     Lyudmila received the following manual therapy techniques: Soft tissue Mobilization were applied to the: bilateral ITBand for 00 minutes, including:  Rolling along ITBand bilaterally      Home Exercises Provided and Patient Education Provided     Education provided:   - Instructed pt in neutral lumbar spine and lumbar stabilization in exercise, sitting and standing positions.  Best with flexion bias.    Written Home Exercises Provided: yes.  Exercises were  reviewed and Lyudmila was able to demonstrate them prior to the end of the session.  Lyudmila demonstrated good  understanding of the education provided.     See EMR under Patient Instructions for exercises provided prior visit and 3/19/19    Assessment     Pt not having pain relief yet, but does improve with flexion biased lumbar stabilization.  Abdominals very weak.  Pt has had 3 abdominal surgeries.  Able to reduce sx following exercise.  Lyudmila is progressing well towards her goals.   Pt prognosis is Good.     Pt will continue to benefit from skilled outpatient physical therapy to address the deficits listed in the problem list box on initial evaluation, provide pt/family education and to maximize pt's level of independence in the home and community environment.     Pt's spiritual, cultural and educational needs considered and pt agreeable to plan of care and goals.    Anticipated barriers to physical therapy: none    Goals:   Short Term Goals: 4 weeks   Pt will have pain less than 4/10 with normal ADLs. PROGRESSING  Pt will have increased core strength by 1/3 grade to allow for improved transitions without using hands for support. PROGRESSING  Increased hamstring length by 10 degrees for improved bending. PROGRESSING     Long Term Goals: 8 weeks   Pt will have pain less than 2/10 with normal ADLs. PROGRESSING  Pt will increase core and ip strength by 1 grade for improved function in ADLs. PROGRESSING  Pt will be independent with HEP for sx management. PROGRESSING    Plan     Cont per POC with focus on lumbar stabilization, abdominal strengthening, flexion bias    Mikayla Avila, PT

## 2019-03-21 ENCOUNTER — HOSPITAL ENCOUNTER (OUTPATIENT)
Dept: RADIOLOGY | Facility: HOSPITAL | Age: 69
Discharge: HOME | End: 2019-03-21
Attending: FAMILY MEDICINE
Payer: MEDICARE

## 2019-03-21 ENCOUNTER — CLINICAL SUPPORT (OUTPATIENT)
Dept: REHABILITATION | Facility: HOSPITAL | Age: 69
End: 2019-03-21
Payer: MEDICARE

## 2019-03-21 DIAGNOSIS — M54.50 ACUTE BILATERAL LOW BACK PAIN WITHOUT SCIATICA: ICD-10-CM

## 2019-03-21 DIAGNOSIS — Z12.39 BREAST CANCER SCREENING: ICD-10-CM

## 2019-03-21 DIAGNOSIS — M62.81 WEAKNESS OF TRUNK MUSCULATURE: ICD-10-CM

## 2019-03-21 PROCEDURE — 77063 MAMMO DIGITAL SCREENING BILAT WITH TOMOSYNTHESIS_CAD: ICD-10-PCS | Mod: 26,HCNC,, | Performed by: RADIOLOGY

## 2019-03-21 PROCEDURE — 77063 BREAST TOMOSYNTHESIS BI: CPT | Mod: 26,HCNC,, | Performed by: RADIOLOGY

## 2019-03-21 PROCEDURE — 77067 SCR MAMMO BI INCL CAD: CPT | Mod: 26,HCNC,, | Performed by: RADIOLOGY

## 2019-03-21 PROCEDURE — 77067 MAMMO DIGITAL SCREENING BILAT WITH TOMOSYNTHESIS_CAD: ICD-10-PCS | Mod: 26,HCNC,, | Performed by: RADIOLOGY

## 2019-03-21 PROCEDURE — 77067 SCR MAMMO BI INCL CAD: CPT | Mod: TC,HCNC,PO

## 2019-03-21 PROCEDURE — 97110 THERAPEUTIC EXERCISES: CPT | Mod: HCNC,PO | Performed by: PHYSICAL THERAPIST

## 2019-03-21 NOTE — PROGRESS NOTES
Physical Therapy Daily Treatment Note     Name: Lyudmila Neri  Clinic Number: [id number]    Therapy Diagnosis:   Encounter Diagnoses   Name Primary?    Acute bilateral low back pain without sciatica     Weakness of trunk musculature      Physician: Siva Mcmanus MD    Visit Date: 3/21/2019  Physician Orders: PT Eval and Treat 3x/wk x 60 days  Medical Diagnosis from Referral: DDD lumbar, Iliotibial band syndrome  Evaluation Date: 3/14/2019  Authorization Period Expiration: 12/31/19  Plan of Care Expiration: 5/10/19  Visit # / Visits authorized: 3/ 20    Time In: 10:00AM  Time Out: 10:50AM  Total Billable Time: 50 minutes    Precautions: Standard    Subjective     Pt reports: Pain across low back, but less intense. Still best in flexion bias.  She was compliant with home exercise program.  Response to previous treatment: decreased pain  Functional change: some improvement with getting out of bed    Pain: 2/10  Location: bilateral back      Objective     Lyudmila received therapeutic exercises to develop strength, ROM, flexibility, posture and core stabilization for 50 minutes including:  Recumbent bike 10 min  KTC 5x 5sec ea  Bilateral KTC 5x 5sec  Posterior pelvic tilt x 20  Partial sit-up 2/10  Hamstring stretch 3x 30 sec ea  Seated lumbar flexion 5x 5sec  LTR x 10    Lumbar stabilization:  One arm up 1lb x 10ea  Both arms up 1lb x 10  One knee up x 10ea  Same arm/knee 1lb x 10 ea  Opposite arm/ knee 1lb x 10 ea  Both arms/ one knee 1lb x 10ea  LTR with TA contraction on orange ball, supine a 10ea  HS curls on ball orange x 10  Seated ball roll out forward, each side x 5 ea     Lyudmila received the following manual therapy techniques: Soft tissue Mobilization were applied to the: bilateral ITBand for 00 minutes, including:  Rolling along ITBand bilaterally      Home Exercises Provided and Patient Education Provided     Education provided:   - Instructed pt in neutral lumbar spine and lumbar stabilization in  exercise, sitting and standing positions.  Best with flexion bias.    Written Home Exercises Provided: yes.  Exercises were reviewed and Lyudmila was able to demonstrate them prior to the end of the session.  Lyudmila demonstrated good  understanding of the education provided.     See EMR under Patient Instructions for exercises provided prior visit and 3/19/19    Assessment     Pt aware of decreasing back pain.  Good relief with flexion bias. Most difficulty often with getting out of bed until she moves around a bit and then improves.  Static positioning is painful.  However, able to get out of bed this morning with slightly greater ease.  Lyudmila is progressing well towards her goals.   Pt prognosis is Good.     Pt will continue to benefit from skilled outpatient physical therapy to address the deficits listed in the problem list box on initial evaluation, provide pt/family education and to maximize pt's level of independence in the home and community environment.     Pt's spiritual, cultural and educational needs considered and pt agreeable to plan of care and goals.    Anticipated barriers to physical therapy: none    Goals:   Short Term Goals: 4 weeks   Pt will have pain less than 4/10 with normal ADLs. PROGRESSING  Pt will have increased core strength by 1/3 grade to allow for improved transitions without using hands for support. PROGRESSING  Increased hamstring length by 10 degrees for improved bending. PROGRESSING     Long Term Goals: 8 weeks   Pt will have pain less than 2/10 with normal ADLs. PROGRESSING  Pt will increase core and ip strength by 1 grade for improved function in ADLs. PROGRESSING  Pt will be independent with HEP for sx management. PROGRESSING    Plan     Cont per POC with focus on lumbar stabilization, abdominal strengthening, flexion bias    Mikayla Avila, PT

## 2019-03-26 ENCOUNTER — TELEPHONE (OUTPATIENT)
Dept: PAIN MEDICINE | Facility: CLINIC | Age: 69
End: 2019-03-26

## 2019-03-26 NOTE — TELEPHONE ENCOUNTER
----- Message from Amelie Bond RT sent at 3/26/2019  9:39 AM CDT -----  Contact: pt    pt , requesting a call back soon to confirm if she will need some one to drive her home after her appt tomorrow, thanks.

## 2019-03-27 ENCOUNTER — OFFICE VISIT (OUTPATIENT)
Dept: PAIN MEDICINE | Facility: CLINIC | Age: 69
End: 2019-03-27
Payer: MEDICARE

## 2019-03-27 VITALS
DIASTOLIC BLOOD PRESSURE: 63 MMHG | HEIGHT: 63 IN | OXYGEN SATURATION: 98 % | BODY MASS INDEX: 28.95 KG/M2 | SYSTOLIC BLOOD PRESSURE: 125 MMHG | TEMPERATURE: 96 F | HEART RATE: 66 BPM | WEIGHT: 163.38 LBS | RESPIRATION RATE: 18 BRPM

## 2019-03-27 DIAGNOSIS — M54.16 LUMBAR RADICULOPATHY: Primary | ICD-10-CM

## 2019-03-27 DIAGNOSIS — M54.16 BILATERAL LUMBAR RADICULOPATHY: ICD-10-CM

## 2019-03-27 DIAGNOSIS — M51.36 DDD (DEGENERATIVE DISC DISEASE), LUMBAR: ICD-10-CM

## 2019-03-27 DIAGNOSIS — M47.816 LUMBAR SPONDYLOSIS: ICD-10-CM

## 2019-03-27 PROCEDURE — 99999 PR PBB SHADOW E&M-EST. PATIENT-LVL V: ICD-10-PCS | Mod: PBBFAC,HCNC,, | Performed by: ANESTHESIOLOGY

## 2019-03-27 PROCEDURE — 99999 PR PBB SHADOW E&M-EST. PATIENT-LVL V: CPT | Mod: PBBFAC,HCNC,, | Performed by: ANESTHESIOLOGY

## 2019-03-27 PROCEDURE — 3074F SYST BP LT 130 MM HG: CPT | Mod: HCNC,CPTII,S$GLB, | Performed by: ANESTHESIOLOGY

## 2019-03-27 PROCEDURE — 3074F PR MOST RECENT SYSTOLIC BLOOD PRESSURE < 130 MM HG: ICD-10-PCS | Mod: HCNC,CPTII,S$GLB, | Performed by: ANESTHESIOLOGY

## 2019-03-27 PROCEDURE — 3078F DIAST BP <80 MM HG: CPT | Mod: HCNC,CPTII,S$GLB, | Performed by: ANESTHESIOLOGY

## 2019-03-27 PROCEDURE — 1101F PT FALLS ASSESS-DOCD LE1/YR: CPT | Mod: HCNC,CPTII,S$GLB, | Performed by: ANESTHESIOLOGY

## 2019-03-27 PROCEDURE — 1101F PR PT FALLS ASSESS DOC 0-1 FALLS W/OUT INJ PAST YR: ICD-10-PCS | Mod: HCNC,CPTII,S$GLB, | Performed by: ANESTHESIOLOGY

## 2019-03-27 PROCEDURE — 99204 OFFICE O/P NEW MOD 45 MIN: CPT | Mod: HCNC,S$GLB,, | Performed by: ANESTHESIOLOGY

## 2019-03-27 PROCEDURE — 99204 PR OFFICE/OUTPT VISIT, NEW, LEVL IV, 45-59 MIN: ICD-10-PCS | Mod: HCNC,S$GLB,, | Performed by: ANESTHESIOLOGY

## 2019-03-27 PROCEDURE — 3078F PR MOST RECENT DIASTOLIC BLOOD PRESSURE < 80 MM HG: ICD-10-PCS | Mod: HCNC,CPTII,S$GLB, | Performed by: ANESTHESIOLOGY

## 2019-03-27 RX ORDER — ALPRAZOLAM 0.5 MG/1
1 TABLET, ORALLY DISINTEGRATING ORAL ONCE AS NEEDED
Status: CANCELLED | OUTPATIENT
Start: 2019-04-09 | End: 2030-09-04

## 2019-03-27 NOTE — PROGRESS NOTES
This note was completed with dictation software and grammatical errors may exist.    CC:  Back and hip pain    HPI:  The patient is a 69-year-old woman with a history of GERD, previous gastrointestinal stromal tumor, KATHY, hypertension who presents in referral from Dr. Siva Mcmanus for back pain. The patient states that she has had back pain for many years but in the last year or so it has become worse.  It use to be more intermittent but it has become more constant now.  It is located across the bilateral low back and radiates into the lateral hips and somewhat into the buttock.  She denies any major radiation into the legs although she did have episodes in the past where she had pain radiating into her right anterior shin but that resolved.  She states that the pain is worse especially in the morning when she gets up, states that her legs start to feel heavy the further she walks.  She denies any kell weakness, no bowel or bladder incontinence.        Pain intervention history:  She does report getting some relief with hot baths and lidocaine patches.  She has had some mild relief with physical therapy, also had been using Celebrex twice daily with fairly good relief but her rheumatologist recommended that she cut back to once daily because of chronic renal insufficiency and she feels that her pain is worse.  She did recently have a steroid Dosepak for a week that actually gave her 95% relief of her back pain during that time but the pain quickly returned.      ROS:  She reports vision change, diarrhea, stomach pain, nausea, flank pain and urgency, joint stiffness, joint swelling and back pain.  Balance of review of systems is negative.    Past Medical History:   Diagnosis Date    Arthritis     osteoarthritis    Blood transfusion     Cancer 2011    stromal tumor, stomach    Depression     Disorder of kidney and ureter     CKD 2    GERD (gastroesophageal reflux disease)     GIST (gastrointestinal stromal  tumor), malignant     H. pylori infection     Hypertension     KATHY (obstructive sleep apnea) 6/24/2013    Psoriasis 6/24/2013    Trouble in sleeping     arthritic pain wakes her up    Urinary incontinence     stress incontinence       Past Surgical History:   Procedure Laterality Date    ANKLE FRACTURE SURGERY      RT ankle    APPENDECTOMY      BLADDER SUSPENSION  1995    CARPAL TUNNEL RELEASE      left    CERVICAL FUSION      CHOLECYSTECTOMY  12/7/2011  Dr. Cai    COLONOSCOPY  7/26/2005  [facility]    Non-erosive esophageal reflux (NERD) disease?.  Post-surgical deformity in the gastric body.   Gastric mucosal abnormality (erythema) in the greater  curve of antrum.  STEVIE Test performed on 02/24/12 was Negative.    COLONOSCOPY  12/12/2008  [facility]    Small internal hemorrhoids.  Slightly redundant left colon.    COLONOSCOPY  04/18/2018    Dr. Guzman; hemorrhoids, otherwise unremarkable, repeat 5 years for surveillance due to family history of colon cancer    COLONOSCOPY N/A 4/18/2018    [facility]    COLONOSCOPY N/A 12/13/2013    [facility]    CYST REMOVAL Left 2016    left middle finger, Dr. Johnson    EGD (ESOPHAGOGASTRODUODENOSCOPY) N/A 2/24/2012    [facility]    ESOPHAGOGASTRODUODENOSCOPY (EGD) N/A 4/18/2018    [facility]    EYE SURGERY Bilateral 1995    RK surgery    EYE SURGERY Bilateral 2015    cataract removal    GASTRECTOMY      HYSTERECTOMY      KNEE ARTHROSCOPY W/ DEBRIDEMENT      lt knee    SHOULDER OPEN ROTATOR CUFF REPAIR      left    STOMACH SURGERY  12/7/2011  Dr. Cai    removal of GIST tumor from wall of the stomach, 2.3 cm in size.    UPPER GASTROINTESTINAL ENDOSCOPY  2/24/2012  [facility]    Non-erosive esophageal reflux (NERD) disease?.  Post-surgical deformity in the gastric body.   Gastric mucosal abnormality  "(erythema) in the greater  curve of antrum.  STEVIE Test performed on 02/24/12 was Negative.    UPPER GASTROINTESTINAL ENDOSCOPY  04/18/2018    Dr. Guzman, antritis, evidence of prior surgery with healthy mucosa       Social History     Socioeconomic History    Marital status:      Spouse name: None    Number of children: None    Years of education: None    Highest education level: None   Occupational History    None   Social Needs    Financial resource strain: None    Food insecurity:     Worry: None     Inability: None    Transportation needs:     Medical: None     Non-medical: None   Tobacco Use    Smoking status: Never Smoker    Smokeless tobacco: Never Used   Substance and Sexual Activity    Alcohol use: No    Drug use: No    Sexual activity: Yes     Partners: Male   Lifestyle    Physical activity:     Days per week: None     Minutes per session: None    Stress: None   Relationships    Social connections:     Talks on phone: None     Gets together: None     Attends [employer] service: None     Active member of club or organization: None     Attends meetings of clubs or organizations: None     Relationship status: None    Intimate partner violence:     Fear of current or ex partner: None     Emotionally abused: None     Physically abused: None     Forced sexual activity: None   Other Topics Concern    None   Social History Narrative    None         Medications/Allergies: See med card    Vitals:    03/27/19 0924   BP: 125/63   Pulse: 66   Resp: 18   Temp: 96.4 °F (35.8 °C)   TempSrc: Oral   SpO2: 98%   Weight: 74.1 kg (163 lb 5.8 oz)   Height: 5' 3" (1.6 m)   PainSc:   4   PainLoc: Back         Physical exam:  Gen: A and O x3, pleasant, well-groomed  Skin: No rashes or obvious lesions  HEENT: PERRLA, no obvious deformities on ears or in canals. Trachea midline.  CVS: Regular rate and rhythm, normal palpable pulses.  Resp:No increased work of breathing, symmetrical chest rise.  Abdomen: Soft, " NT/ND.  Musculoskeletal:  Slow to move from sitting to standing, waddling gait.    Neuro:  Lower extremities: 5/5 strength bilaterally  Reflexes: Patellar 3+, Achilles 1+ bilaterally.  Sensory:  Intact and symmetrical to light touch and pinprick in L2-S1 dermatomes bilaterally.    Lumbar spine:  Lumbar spine:  Range of motion is mildly reduced with forward flexion, moderately reduced with extension with increased pain in bilateral buttock, especially with oblique extension either side.  Wade's test causes no increased pain on either side.    Supine straight leg raise is negative bilaterally.    Internal and external rotation of the hip causes right groin pain.  Myofascial exam: No tenderness to palpation across lumbar paraspinous muscles.    Imagin19 MRI L-spine:  T12-L1: There is mild facet joint arthropathy.  There is no spinal canal or significant foraminal stenosis.  There is no significant change.  L1-2: There is marked disc space narrowing.  This has slightly progressed.  There is stable mild degenerative retrolisthesis of L1 on L2 which is exaggerated by osteophyte formation.  There is a diffuse disc bulge with osteophytic ridging eccentric to the left.  There is mild-to-moderate left greater than right facet joint arthropathy with ligamentum flavum thickening.  There is flattening of the ventral dural sac.  There is no spinal stenosis.  There is moderate-to-marked left and mild right foraminal stenosis with interval progression.  L2-3: There is marked disc space narrowing.  There is stable trace retrolisthesis of L2 on L3.  There is a diffuse disc bulge with osteophytic ridging eccentric to the left.  There is a small superimposed central disc protrusion with annular fissure.  These findings were present previously and have mildly progressed.  There is flattening of the ventral dural sac.  There is moderate facet joint arthropathy with ligamentum flavum thickening.  There is mild spinal stenosis  with mild-to-moderate bilateral foraminal stenosis.  The findings have progressed.  L3-4: There is disc space narrowing.  There is a diffuse disc bulge with superimposed broad central disc protrusion with annular fissure.  There is facet joint arthropathy.  There is flattening of the ventral dural sac but there is no significant spinal stenosis.  There is mild right foraminal stenosis without significant change.  L4-5: There is marked disc space narrowing.  There is moderate-to-marked facet joint arthropathy with ligamentum flavum thickening, right greater than left.  There is a right facet joint effusion.  The facet joint changes have definitely progressed.  There is a diffuse disc bulge with osteophytic ridging and small central/right paracentral disc extrusion with annular fissure.  There is flattening of the ventral dural sac.  There is mild spinal stenosis with mild-to-moderate right lateral recess stenosis.  There is moderate-to-marked right foraminal stenosis.  The left foramina is patent.  The findings have progressed.  L5-S1: There is stable mild anterolisthesis of L5 on S1.  There is moderate facet joint arthropathy with small facet joint effusions.  There is unroofing of a diffuse disc bulge.  This diffuse disc bulge approaches and does contact both S1 roots.  This is without change.  There is borderline to mild spinal stenosis at this level.  There is mild to moderate right foraminal stenosis without significant change.  The left foramina is patent.    2/26/19 Xray C-spine:  A dextrocurvature is noted centered in the upper lumbar spine.  Surgical clips are noted in the right upper quadrant of the abdomen suggestive cholecystectomy clips.  A retrolisthesis of 4 mm is noted of the L1 on L2 vertebral body and of the L2 on L3 vertebral body.  Anterolisthesis of 5 mm is noted of the L4 on L5 vertebral body.  A 6 mm anterolisthesis is noted of the L5 on S1 vertebral body.  Vertebral body heights appear well  preserved.  Facet arthropathy is noted at the L4-L5 and L5-S1 levels.  Intervertebral disc height loss is noted at the L1-L2, L2-L3, L3-L4, L4-L5, and L5-S1 levels.  No change in alignment is noted upon flexion and extension    Assessment:  The patient is a 69-year-old woman with a history of GERD, previous gastrointestinal stromal tumor, KATHY, hypertension who presents in referral from Dr. Siva Mcmanus for back pain.   1. Lumbar radiculopathy  Vital signs    Verify informed consent    Notify physician     Notify physician     Notify physician (specify)    Diet NPO    Case Request Operating Room: Injection,steroid,epidural,transforaminal approach L4/5    Place in Outpatient    alprazolam ODT dissolvable tablet 1 mg   2. DDD (degenerative disc disease), lumbar     3. Bilateral lumbar radiculopathy     4. Lumbar spondylosis           Plan:  1.  We reviewed her imaging in terms of her x-ray and lumbar spine and it shows a spondylolisthesis of L4 on L5 causing canal and foraminal narrowing at that level in addition to severe facet arthropathy.  It is difficult to say 100% if her pain is more secondary to lumbar radiculopathy or facet joint pain but we are going to start with a transforaminal injection at L4/5 bilaterally and have her follow up in several weeks afterwards.  If she does not have relief with this, we could consider medial branch blocks at the lower two lumbar levels.    Thank you for referring this interesting patient, and I look forward to continuing to collaborate in her care.

## 2019-03-27 NOTE — LETTER
March 31, 2019      Siva Mcmanus MD  [address] [facility] [address] [place] [address]           [facility]  [address] [facility] [address] [place] [address]  Phone: [phone]  Fax: [phone]          Patient: Lyudmila Neri   MR Number: [id number]   YOB: 1950   Date of Visit: 3/27/2019       Dear Dr. Siva Mcmanus:    Thank you for referring Lyudmila Neri to me for evaluation. Attached you will find relevant portions of my assessment and plan of care.    If you have questions, please do not hesitate to call me. I look forward to following Lyudmila Neri along with you.    Sincerely,    Pa Pruett MD    Enclosure  CC:  No Recipients    If you would like to receive this communication electronically, please contact [email or web address] or [phone] to request more information on [employer] access.    For providers and/or their staff who would like to refer a patient to [facility], please contact us through our one-stop-shop provider referral line, [facility], at [phone].    If you feel you have received this communication in error or would no longer like to receive these types of communications, please e-mail [email or web address]

## 2019-03-27 NOTE — H&P (VIEW-ONLY)
This note was completed with dictation software and grammatical errors may exist.    CC:  Back and hip pain    HPI:  The patient is a 69-year-old woman with a history of GERD, previous gastrointestinal stromal tumor, KATHY, hypertension who presents in referral from Dr. Siva Mcmanus for back pain. The patient states that she has had back pain for many years but in the last year or so it has become worse.  It use to be more intermittent but it has become more constant now.  It is located across the bilateral low back and radiates into the lateral hips and somewhat into the buttock.  She denies any major radiation into the legs although she did have episodes in the past where she had pain radiating into her right anterior shin but that resolved.  She states that the pain is worse especially in the morning when she gets up, states that her legs start to feel heavy the further she walks.  She denies any kell weakness, no bowel or bladder incontinence.        Pain intervention history:  She does report getting some relief with hot baths and lidocaine patches.  She has had some mild relief with physical therapy, also had been using Celebrex twice daily with fairly good relief but her rheumatologist recommended that she cut back to once daily because of chronic renal insufficiency and she feels that her pain is worse.  She did recently have a steroid Dosepak for a week that actually gave her 95% relief of her back pain during that time but the pain quickly returned.      ROS:  She reports vision change, diarrhea, stomach pain, nausea, flank pain and urgency, joint stiffness, joint swelling and back pain.  Balance of review of systems is negative.    Past Medical History:   Diagnosis Date    Arthritis     osteoarthritis    Blood transfusion     Cancer 2011    stromal tumor, stomach    Depression     Disorder of kidney and ureter     CKD 2    GERD (gastroesophageal reflux disease)     GIST (gastrointestinal stromal  tumor), malignant     H. pylori infection     Hypertension     KATHY (obstructive sleep apnea) 6/24/2013    Psoriasis 6/24/2013    Trouble in sleeping     arthritic pain wakes her up    Urinary incontinence     stress incontinence       Past Surgical History:   Procedure Laterality Date    ANKLE FRACTURE SURGERY      RT ankle    APPENDECTOMY      BLADDER SUSPENSION  1995    CARPAL TUNNEL RELEASE      left    CERVICAL FUSION      CHOLECYSTECTOMY  12/7/2011  Dr. Cai    COLONOSCOPY  7/26/2005  [facility]    Non-erosive esophageal reflux (NERD) disease?.  Post-surgical deformity in the gastric body.   Gastric mucosal abnormality (erythema) in the greater  curve of antrum.  STEVIE Test performed on 02/24/12 was Negative.    COLONOSCOPY  12/12/2008  [facility]    Small internal hemorrhoids.  Slightly redundant left colon.    COLONOSCOPY  04/18/2018    Dr. Guzman; hemorrhoids, otherwise unremarkable, repeat 5 years for surveillance due to family history of colon cancer    COLONOSCOPY N/A 4/18/2018    [facility] ENDO    COLONOSCOPY N/A 12/13/2013    [facility]    CYST REMOVAL Left 2016    left middle finger, Dr. Johnson    EGD (ESOPHAGOGASTRODUODENOSCOPY) N/A 2/24/2012    [facility]    ESOPHAGOGASTRODUODENOSCOPY (EGD) N/A 4/18/2018    [facility]    EYE SURGERY Bilateral 1995    RK surgery    EYE SURGERY Bilateral 2015    cataract removal    GASTRECTOMY      HYSTERECTOMY      KNEE ARTHROSCOPY W/ DEBRIDEMENT      lt knee    SHOULDER OPEN ROTATOR CUFF REPAIR      left    STOMACH SURGERY  12/7/2011  Dr. Cai    removal of GIST tumor from wall of the stomach, 2.3 cm in size.    UPPER GASTROINTESTINAL ENDOSCOPY  2/24/2012  [facility]    Non-erosive esophageal reflux (NERD) disease?.  Post-surgical deformity in the gastric body.   Gastric mucosal abnormality  "(erythema) in the greater  curve of antrum.  STEVIE Test performed on 02/24/12 was Negative.    UPPER GASTROINTESTINAL ENDOSCOPY  04/18/2018    Dr. Guzman, antritis, evidence of prior surgery with healthy mucosa       Social History     Socioeconomic History    Marital status:      Spouse name: None    Number of children: None    Years of education: None    Highest education level: None   Occupational History    None   Social Needs    Financial resource strain: None    Food insecurity:     Worry: None     Inability: None    Transportation needs:     Medical: None     Non-medical: None   Tobacco Use    Smoking status: Never Smoker    Smokeless tobacco: Never Used   Substance and Sexual Activity    Alcohol use: No    Drug use: No    Sexual activity: Yes     Partners: Male   Lifestyle    Physical activity:     Days per week: None     Minutes per session: None    Stress: None   Relationships    Social connections:     Talks on phone: None     Gets together: None     Attends Anabaptism service: None     Active member of club or organization: None     Attends meetings of clubs or organizations: None     Relationship status: None    Intimate partner violence:     Fear of current or ex partner: None     Emotionally abused: None     Physically abused: None     Forced sexual activity: None   Other Topics Concern    None   Social History Narrative    None         Medications/Allergies: See med card    Vitals:    03/27/19 0924   BP: 125/63   Pulse: 66   Resp: 18   Temp: 96.4 °F (35.8 °C)   TempSrc: Oral   SpO2: 98%   Weight: 74.1 kg (163 lb 5.8 oz)   Height: 5' 3" (1.6 m)   PainSc:   4   PainLoc: Back         Physical exam:  Gen: A and O x3, pleasant, well-groomed  Skin: No rashes or obvious lesions  HEENT: PERRLA, no obvious deformities on ears or in canals. Trachea midline.  CVS: Regular rate and rhythm, normal palpable pulses.  Resp:No increased work of breathing, symmetrical chest rise.  Abdomen: Soft, " NT/ND.  Musculoskeletal:  Slow to move from sitting to standing, waddling gait.    Neuro:  Lower extremities: 5/5 strength bilaterally  Reflexes: Patellar 3+, Achilles 1+ bilaterally.  Sensory:  Intact and symmetrical to light touch and pinprick in L2-S1 dermatomes bilaterally.    Lumbar spine:  Lumbar spine:  Range of motion is mildly reduced with forward flexion, moderately reduced with extension with increased pain in bilateral buttock, especially with oblique extension either side.  Wade's test causes no increased pain on either side.    Supine straight leg raise is negative bilaterally.    Internal and external rotation of the hip causes right groin pain.  Myofascial exam: No tenderness to palpation across lumbar paraspinous muscles.    Imagin19 MRI L-spine:  T12-L1: There is mild facet joint arthropathy.  There is no spinal canal or significant foraminal stenosis.  There is no significant change.  L1-2: There is marked disc space narrowing.  This has slightly progressed.  There is stable mild degenerative retrolisthesis of L1 on L2 which is exaggerated by osteophyte formation.  There is a diffuse disc bulge with osteophytic ridging eccentric to the left.  There is mild-to-moderate left greater than right facet joint arthropathy with ligamentum flavum thickening.  There is flattening of the ventral dural sac.  There is no spinal stenosis.  There is moderate-to-marked left and mild right foraminal stenosis with interval progression.  L2-3: There is marked disc space narrowing.  There is stable trace retrolisthesis of L2 on L3.  There is a diffuse disc bulge with osteophytic ridging eccentric to the left.  There is a small superimposed central disc protrusion with annular fissure.  These findings were present previously and have mildly progressed.  There is flattening of the ventral dural sac.  There is moderate facet joint arthropathy with ligamentum flavum thickening.  There is mild spinal stenosis  with mild-to-moderate bilateral foraminal stenosis.  The findings have progressed.  L3-4: There is disc space narrowing.  There is a diffuse disc bulge with superimposed broad central disc protrusion with annular fissure.  There is facet joint arthropathy.  There is flattening of the ventral dural sac but there is no significant spinal stenosis.  There is mild right foraminal stenosis without significant change.  L4-5: There is marked disc space narrowing.  There is moderate-to-marked facet joint arthropathy with ligamentum flavum thickening, right greater than left.  There is a right facet joint effusion.  The facet joint changes have definitely progressed.  There is a diffuse disc bulge with osteophytic ridging and small central/right paracentral disc extrusion with annular fissure.  There is flattening of the ventral dural sac.  There is mild spinal stenosis with mild-to-moderate right lateral recess stenosis.  There is moderate-to-marked right foraminal stenosis.  The left foramina is patent.  The findings have progressed.  L5-S1: There is stable mild anterolisthesis of L5 on S1.  There is moderate facet joint arthropathy with small facet joint effusions.  There is unroofing of a diffuse disc bulge.  This diffuse disc bulge approaches and does contact both S1 roots.  This is without change.  There is borderline to mild spinal stenosis at this level.  There is mild to moderate right foraminal stenosis without significant change.  The left foramina is patent.    2/26/19 Xray C-spine:  A dextrocurvature is noted centered in the upper lumbar spine.  Surgical clips are noted in the right upper quadrant of the abdomen suggestive cholecystectomy clips.  A retrolisthesis of 4 mm is noted of the L1 on L2 vertebral body and of the L2 on L3 vertebral body.  Anterolisthesis of 5 mm is noted of the L4 on L5 vertebral body.  A 6 mm anterolisthesis is noted of the L5 on S1 vertebral body.  Vertebral body heights appear well  preserved.  Facet arthropathy is noted at the L4-L5 and L5-S1 levels.  Intervertebral disc height loss is noted at the L1-L2, L2-L3, L3-L4, L4-L5, and L5-S1 levels.  No change in alignment is noted upon flexion and extension    Assessment:  The patient is a 69-year-old woman with a history of GERD, previous gastrointestinal stromal tumor, KATHY, hypertension who presents in referral from Dr. Siva Mcmanus for back pain.   1. Lumbar radiculopathy  Vital signs    Verify informed consent    Notify physician     Notify physician     Notify physician (specify)    Diet NPO    Case Request Operating Room: Injection,steroid,epidural,transforaminal approach L4/5    Place in Outpatient    alprazolam ODT dissolvable tablet 1 mg   2. DDD (degenerative disc disease), lumbar     3. Bilateral lumbar radiculopathy     4. Lumbar spondylosis           Plan:  1.  We reviewed her imaging in terms of her x-ray and lumbar spine and it shows a spondylolisthesis of L4 on L5 causing canal and foraminal narrowing at that level in addition to severe facet arthropathy.  It is difficult to say 100% if her pain is more secondary to lumbar radiculopathy or facet joint pain but we are going to start with a transforaminal injection at L4/5 bilaterally and have her follow up in several weeks afterwards.  If she does not have relief with this, we could consider medial branch blocks at the lower two lumbar levels.    Thank you for referring this interesting patient, and I look forward to continuing to collaborate in her care.

## 2019-04-08 DIAGNOSIS — M51.36 DDD (DEGENERATIVE DISC DISEASE), LUMBAR: Primary | ICD-10-CM

## 2019-04-09 ENCOUNTER — HOSPITAL ENCOUNTER (OUTPATIENT)
Dept: RADIOLOGY | Facility: HOSPITAL | Age: 69
Discharge: HOME | End: 2019-04-09
Attending: ANESTHESIOLOGY | Admitting: ANESTHESIOLOGY
Payer: MEDICARE

## 2019-04-09 ENCOUNTER — HOSPITAL ENCOUNTER (OUTPATIENT)
Facility: HOSPITAL | Age: 69
Discharge: HOME | End: 2019-04-09
Attending: ANESTHESIOLOGY | Admitting: ANESTHESIOLOGY
Payer: MEDICARE

## 2019-04-09 DIAGNOSIS — M54.16 LUMBAR RADICULOPATHY: Primary | ICD-10-CM

## 2019-04-09 DIAGNOSIS — M51.36 DDD (DEGENERATIVE DISC DISEASE), LUMBAR: ICD-10-CM

## 2019-04-09 PROCEDURE — 25000003 PHARM REV CODE 250: Mod: HCNC,PO | Performed by: ANESTHESIOLOGY

## 2019-04-09 PROCEDURE — 63600175 PHARM REV CODE 636 W HCPCS: Mod: HCNC,PO | Performed by: ANESTHESIOLOGY

## 2019-04-09 PROCEDURE — 76000 FLUOROSCOPY <1 HR PHYS/QHP: CPT | Mod: TC,HCNC,PO

## 2019-04-09 PROCEDURE — 64483 NJX AA&/STRD TFRM EPI L/S 1: CPT | Mod: 50,HCNC,, | Performed by: ANESTHESIOLOGY

## 2019-04-09 PROCEDURE — 64483 PR EPIDURAL INJ, ANES/STEROID, TRANSFORAMINAL, LUMB/SACR, SNGL LEVL: ICD-10-PCS | Mod: 50,HCNC,, | Performed by: ANESTHESIOLOGY

## 2019-04-09 PROCEDURE — 64483 NJX AA&/STRD TFRM EPI L/S 1: CPT | Mod: 50,HCNC,PO | Performed by: ANESTHESIOLOGY

## 2019-04-09 PROCEDURE — 25500020 PHARM REV CODE 255: Mod: HCNC,PO | Performed by: ANESTHESIOLOGY

## 2019-04-09 RX ORDER — BUPIVACAINE HYDROCHLORIDE 2.5 MG/ML
INJECTION, SOLUTION EPIDURAL; INFILTRATION; INTRACAUDAL
Status: DISCONTINUED | OUTPATIENT
Start: 2019-04-09 | End: 2019-04-09 | Stop reason: HOSPADM

## 2019-04-09 RX ORDER — LIDOCAINE HYDROCHLORIDE 10 MG/ML
INJECTION, SOLUTION EPIDURAL; INFILTRATION; INTRACAUDAL; PERINEURAL
Status: DISCONTINUED | OUTPATIENT
Start: 2019-04-09 | End: 2019-04-09 | Stop reason: HOSPADM

## 2019-04-09 RX ORDER — ALPRAZOLAM 0.5 MG/1
1 TABLET, ORALLY DISINTEGRATING ORAL ONCE AS NEEDED
Status: COMPLETED | OUTPATIENT
Start: 2019-04-09 | End: 2019-04-09

## 2019-04-09 RX ORDER — METHYLPREDNISOLONE ACETATE 80 MG/ML
INJECTION, SUSPENSION INTRA-ARTICULAR; INTRALESIONAL; INTRAMUSCULAR; SOFT TISSUE
Status: DISCONTINUED | OUTPATIENT
Start: 2019-04-09 | End: 2019-04-09 | Stop reason: HOSPADM

## 2019-04-09 RX ADMIN — ALPRAZOLAM 1 MG: 0.5 TABLET, ORALLY DISINTEGRATING ORAL at 02:04

## 2019-04-09 NOTE — DISCHARGE INSTRUCTIONS
Home care instructions  Apply ice pack to the injection site for 20 minutes periods for the first 24 hrs for soreness/discomfort at injection site   DO NOT USE HEAT FOR 24 HOURS  Keep site clean and dry for 24 hours  MAY SHOWER TOMORROW  Do not drive until tomorrow  Take care when walking after YOUR LUMBAR LOWER BACK STEROID INJECTION  Avoid strenuous activities for 2 days  Make take 2 weeks to feel the full effects   Resume home medication as prescribed today  Resume Aspirin, Plavix, or Coumadin the day after the procedure unless otherwise instructed.    SEE IMMEDIATE MEDICAL HELP FOR:  Severe increase in your usual pain or appearance of new pain  Prolonged or increasing weakness or numbness in the legs or arms  Drainage, redness, active bleeding, or increased swelling at the injection site  Temperature over 100.0 degrees F.  Headache that increases when your head is upright and decreases when you lie flat    CALL 911 OR GO DIRECTLY TO EMERGENCY DEPARTMENT FOR:  Shortness of breath, chest pain, or problems breathing

## 2019-04-09 NOTE — DISCHARGE SUMMARY
[facility]  Discharge Note  Short Stay    Admit Date: 4/9/2019    Discharge Date: 4/9/2019    Attending Physician: Pa Pruett MD     Discharge Provider: Pa Pruett    Diagnoses:  Active Hospital Problems    Diagnosis  POA    *Lumbar radiculopathy [M54.16]  Yes      Resolved Hospital Problems   No resolved problems to display.       Discharged Condition: good    Final Diagnoses: Lumbar radiculopathy [M54.16]    Disposition: Home or Self Care    Hospital Course: no complications, uneventful    Outcome of Hospitalization, Treatment, Procedure, or Surgery:  Patient was admitted for outpatient procedure. The patient underwent procedure without complications and are discharged home    Follow up/Patient Instructions:  Follow up as scheduled in Pain Management clinic in 3-4 weeks/Patient has received instructions and follow up date and time    Medications:  Continue previous medications    Discharge Procedure Orders   Call MD for:  temperature >100.4     Call MD for:  severe uncontrolled pain     Call MD for:  redness, tenderness, or signs of infection (pain, swelling, redness, odor or green/yellow discharge around incision site)     Call MD for:  severe persistent headache     No dressing needed         Discharge Procedure Orders (must include Diet, Follow-up, Activity):   Discharge Procedure Orders (must include Diet, Follow-up, Activity)   Call MD for:  temperature >100.4     Call MD for:  severe uncontrolled pain     Call MD for:  redness, tenderness, or signs of infection (pain, swelling, redness, odor or green/yellow discharge around incision site)     Call MD for:  severe persistent headache     No dressing needed

## 2019-04-09 NOTE — OP NOTE
PROCEDURE DATE: 4/9/2019    PROCEDURE: Bilateral L4/5 transforaminal epidural steroid injection under fluoroscopy    DIAGNOSIS: Lumbar  Radiculopathy    Post op diagnosis: Same    PHYSICIAN: Pa Pruett MD    MEDICATIONS INJECTED:  Methylprednisolone 40mg (0.5ml) and 1.5ml 0.25% bupivicaine at each nerve root.     LOCAL ANESTHETIC INJECTED:  Lidocaine 1%. 4 ml per site.    SEDATION MEDICATIONS: none    ESTIMATED BLOOD LOSS:  none    COMPLICATIONS:  none    TECHNIQUE:   A time-out was taken to identify patient and procedure side prior to starting the procedure. The patient was placed in a prone position, prepped and draped in the usual sterile fashion using ChloraPrep and sterile towels.  The area to be injected was determined under fluoroscopic guidance in AP and oblique view.  Local anesthetic was given by raising a wheal and going down to the hub of a 25-gauge 1.5 inch needle.  In oblique view, a 5 inch 22-gauge bent-tip spinal needle was introduced towards 6 oclock position of the pedicle of each above named nerve root level.  The needle was walked medially then hinged into the neural foramen and position was confirmed in AP and lateral views.  Omnipaque contrast dye was injected to confirm appropriate placement and that there was no vascular uptake.  After negative aspiration for blood or CSF, the medication was then injected. This was performed at the right and then left L4/5 level(s). The patient tolerated the procedure well.    The patient was monitored after the procedure.  Patient was given post procedure and discharge instructions to follow at home. The patient was discharged in a stable condition.

## 2019-04-10 ENCOUNTER — PATIENT MESSAGE (OUTPATIENT)
Dept: FAMILY MEDICINE | Facility: CLINIC | Age: 69
End: 2019-04-10

## 2019-04-10 VITALS
OXYGEN SATURATION: 97 % | RESPIRATION RATE: 15 BRPM | WEIGHT: 165 LBS | TEMPERATURE: 98 F | BODY MASS INDEX: 29.23 KG/M2 | SYSTOLIC BLOOD PRESSURE: 144 MMHG | HEART RATE: 62 BPM | DIASTOLIC BLOOD PRESSURE: 70 MMHG | HEIGHT: 63 IN

## 2019-04-10 DIAGNOSIS — F41.9 ANXIETY: Primary | ICD-10-CM

## 2019-04-16 ENCOUNTER — OFFICE VISIT (OUTPATIENT)
Dept: FAMILY MEDICINE | Facility: CLINIC | Age: 69
End: 2019-04-16
Payer: MEDICARE

## 2019-04-16 ENCOUNTER — IMMUNIZATION (OUTPATIENT)
Dept: PHARMACY | Facility: CLINIC | Age: 69
End: 2019-04-16
Payer: MEDICARE

## 2019-04-16 VITALS
WEIGHT: 162.06 LBS | SYSTOLIC BLOOD PRESSURE: 128 MMHG | HEIGHT: 63 IN | BODY MASS INDEX: 28.71 KG/M2 | DIASTOLIC BLOOD PRESSURE: 80 MMHG | HEART RATE: 60 BPM | OXYGEN SATURATION: 99 %

## 2019-04-16 DIAGNOSIS — M62.81 WEAKNESS OF TRUNK MUSCULATURE: ICD-10-CM

## 2019-04-16 DIAGNOSIS — M51.36 DEGENERATIVE DISC DISEASE, LUMBAR: ICD-10-CM

## 2019-04-16 DIAGNOSIS — N18.2 CHRONIC KIDNEY DISEASE, STAGE II (MILD): ICD-10-CM

## 2019-04-16 DIAGNOSIS — Z85.09 HISTORY OF GASTROINTESTINAL STROMAL TUMOR (GIST): ICD-10-CM

## 2019-04-16 DIAGNOSIS — L40.9 PSORIASIS: ICD-10-CM

## 2019-04-16 DIAGNOSIS — M54.16 LUMBAR RADICULOPATHY: ICD-10-CM

## 2019-04-16 DIAGNOSIS — I65.23 BILATERAL CAROTID ARTERY STENOSIS: ICD-10-CM

## 2019-04-16 DIAGNOSIS — I10 ESSENTIAL HYPERTENSION: ICD-10-CM

## 2019-04-16 DIAGNOSIS — Z00.00 ENCOUNTER FOR PREVENTIVE HEALTH EXAMINATION: Primary | ICD-10-CM

## 2019-04-16 DIAGNOSIS — M54.50 ACUTE BILATERAL LOW BACK PAIN WITHOUT SCIATICA: ICD-10-CM

## 2019-04-16 DIAGNOSIS — G47.33 OSA (OBSTRUCTIVE SLEEP APNEA): ICD-10-CM

## 2019-04-16 DIAGNOSIS — Z80.0 FAMILY HISTORY OF COLON CANCER IN MOTHER: ICD-10-CM

## 2019-04-16 DIAGNOSIS — M15.9 PRIMARY OSTEOARTHRITIS INVOLVING MULTIPLE JOINTS: ICD-10-CM

## 2019-04-16 PROCEDURE — 3074F PR MOST RECENT SYSTOLIC BLOOD PRESSURE < 130 MM HG: ICD-10-PCS | Mod: HCNC,CPTII,S$GLB, | Performed by: NURSE PRACTITIONER

## 2019-04-16 PROCEDURE — 3074F SYST BP LT 130 MM HG: CPT | Mod: HCNC,CPTII,S$GLB, | Performed by: NURSE PRACTITIONER

## 2019-04-16 PROCEDURE — 3079F PR MOST RECENT DIASTOLIC BLOOD PRESSURE 80-89 MM HG: ICD-10-PCS | Mod: HCNC,CPTII,S$GLB, | Performed by: NURSE PRACTITIONER

## 2019-04-16 PROCEDURE — 99999 PR PBB SHADOW E&M-EST. PATIENT-LVL V: CPT | Mod: PBBFAC,HCNC,, | Performed by: NURSE PRACTITIONER

## 2019-04-16 PROCEDURE — G0439 PPPS, SUBSEQ VISIT: HCPCS | Mod: HCNC,S$GLB,, | Performed by: NURSE PRACTITIONER

## 2019-04-16 PROCEDURE — 3079F DIAST BP 80-89 MM HG: CPT | Mod: HCNC,CPTII,S$GLB, | Performed by: NURSE PRACTITIONER

## 2019-04-16 PROCEDURE — 99999 PR PBB SHADOW E&M-EST. PATIENT-LVL V: ICD-10-PCS | Mod: PBBFAC,HCNC,, | Performed by: NURSE PRACTITIONER

## 2019-04-16 PROCEDURE — G0439 PR MEDICARE ANNUAL WELLNESS SUBSEQUENT VISIT: ICD-10-PCS | Mod: HCNC,S$GLB,, | Performed by: NURSE PRACTITIONER

## 2019-04-16 RX ORDER — LOSARTAN POTASSIUM AND HYDROCHLOROTHIAZIDE 12.5; 1 MG/1; MG/1
TABLET ORAL
Qty: 90 TABLET | Refills: 3 | Status: SHIPPED | OUTPATIENT
Start: 2019-04-16 | End: 2020-06-10 | Stop reason: SDUPTHER

## 2019-04-16 NOTE — PATIENT INSTRUCTIONS
Counseling and Referral of Other Preventative  (Italic type indicates deductible and co-insurance are waived)    Patient Name: Lyudmila Neri  Today's Date: 4/16/2019    Health Maintenance       Date Due Completion Date    DEXA SCAN 11/07/2019 11/7/2016    Mammogram 03/21/2020 3/21/2019    Lipid Panel 02/27/2023 2/27/2018    Colonoscopy 04/18/2023 4/18/2018    TETANUS VACCINE 09/23/2026 9/23/2016 (Declined)    Override on 9/23/2016: Declined        No orders of the defined types were placed in this encounter.    The following information is provided to all patients.  This information is to help you find resources for any of the problems found today that may be affecting your health:                Living healthy guide: www.[facility].[place].gov      Understanding Diabetes: www.diabetes.org      Eating healthy: www.cdc.gov/healthyweight      [facility] home safety checklist: www.cdc.gov/steadi/patient.html      Agency on Aging: www.goea.[place].gov      Alcoholics anonymous (AA): www.aa.org      Physical Activity: www.eliud.nih.gov/yr8rpgp      Tobacco use: www.quitwithusla.org

## 2019-04-18 PROBLEM — I65.23 BILATERAL CAROTID ARTERY STENOSIS: Status: ACTIVE | Noted: 2019-04-18

## 2019-04-18 NOTE — PROGRESS NOTES
"Lyudmila Neri presented for a  Medicare AWV and comprehensive Health Risk Assessment today. The following components were reviewed and updated:    · Medical history  · Family History  · Social history  · Allergies and Current Medications  · Health Risk Assessment  · Health Maintenance  · Care Team     ** See Completed Assessments for Annual Wellness Visit within the encounter summary.**       The following assessments were completed:  · Living Situation  · CAGE  · Depression Screening  · Timed Get Up and Go  · Whisper Test  · Cognitive Function Screening          · Nutrition Screening  · ADL Screening  · PAQ Screening    Vitals:    04/16/19 0900   BP: 128/80   BP Location: Left arm   Patient Position: Sitting   BP Method: Medium (Manual)   Pulse: 60   SpO2: 99%   Weight: 73.5 kg (162 lb 0.6 oz)   Height: 5' 3" (1.6 m)     Body mass index is 28.7 kg/m².  Physical Exam   Constitutional: She is oriented to person, place, and time. She appears well-developed and well-nourished.   HENT:   Head: Normocephalic.   Eyes: Conjunctivae are normal. No scleral icterus.   Cardiovascular: Normal rate, regular rhythm and normal heart sounds.   No murmur heard.  Pulmonary/Chest: Effort normal and breath sounds normal. She has no wheezes.   Neurological: She is alert and oriented to person, place, and time. She has normal strength. No cranial nerve deficit.   Skin: Skin is warm.   Vitals reviewed.        Diagnoses and health risks identified today and associated recommendations/orders:    1. Encounter for preventive health examination  Reviewed health maintenance and provided recommendations    written rx for shingrix and tdap provided    2. Chronic kidney disease, stage II (mild)  Continue to monitor  Followed by Mayco Shultz MD   Continue to drbelgicak omid amoutns of h20.      3. Essential hypertension  Continue to monitor  Followed by Mayco Shultz MD .      4. History of gastrointestinal stromal tumor " (GIST)  Continue to monitor  Followed by [facility].      5. Primary osteoarthritis involving multiple joints  Continue to monitor  Followed by [facility]o.      6. Acute bilateral low back pain without sciatica  Continue to monitor  Followed by [facility].      7. KATHY (obstructive sleep apnea)  Continue to monitor  Followed by Mayco Shultz MD .      8. Family history of colon cancer in mother  Continue to monitor  Followed by Mayco Shultz MD .      9. Weakness of trunk musculature  Continue to monitor  Followed by  Mayco Shultz MD .      10. Psoriasis  Continue to monitor  Followed by Mayco Shultz MD .      11. Lumbar radiculopathy  Continue to monitor  Followed by [facility]a.      12. Degenerative disc disease, lumbar  Continue to monitor  Followed by aixa.      13. Bilateral carotid artery stenosis  Continue to monitor  Followed by Mayco Shultz MD .        Provided Lyudmila with a 5-10 year written screening schedule and personal prevention plan. Recommendations were developed using the USPSTF age appropriate recommendations. Education, counseling, and referrals were provided as needed. After Visit Summary printed and given to patient which includes a list of additional screenings\tests needed.    Follow up in about 1 year (around 4/16/2020).    Tmaie Torres NP

## 2019-04-25 ENCOUNTER — PROCEDURE VISIT (OUTPATIENT)
Dept: OBSTETRICS AND GYNECOLOGY | Facility: CLINIC | Age: 69
End: 2019-04-25
Attending: OBSTETRICS & GYNECOLOGY

## 2019-04-25 VITALS
BODY MASS INDEX: 28.98 KG/M2 | HEIGHT: 63 IN | WEIGHT: 163.56 LBS | DIASTOLIC BLOOD PRESSURE: 76 MMHG | SYSTOLIC BLOOD PRESSURE: 114 MMHG

## 2019-04-25 DIAGNOSIS — N39.3 STRESS INCONTINENCE: ICD-10-CM

## 2019-04-25 DIAGNOSIS — Z78.0 MENOPAUSE: Primary | ICD-10-CM

## 2019-04-25 DIAGNOSIS — Z79.890 HORMONE REPLACEMENT THERAPY (HRT): ICD-10-CM

## 2019-04-25 PROCEDURE — 11980 IMPLANT HORMONE PELLET(S): CPT | Mod: CSM,S$GLB,, | Performed by: OBSTETRICS & GYNECOLOGY

## 2019-04-25 PROCEDURE — 11980 PR IMPLANT,HORMONE,SUBCUTANEOUS: ICD-10-PCS | Mod: CSM,S$GLB,, | Performed by: OBSTETRICS & GYNECOLOGY

## 2019-04-25 NOTE — PATIENT INSTRUCTIONS
Follow the information you have been given by your doctor. Here are some additional instructions and reminders on how to care for yourself once you're home.     Keep pressure on the area for 1 hour. You may have some discomfort. This will go away.   A slight bloody discharge is normal.   A slight yellow discharge of serum is normal.   Pellet can be felt below the skin cut. This is normal.   Breast and vulva tingling and feeling of fullness is normal.   Change your bandage daily for 5 to 7 days.   If small butterfly tape comes off, you do not need to do anything.   You may remove butterfly tape after 5 days.   You may shower the next morning after your procedure. Do NOT take a bath.   Change your bandage if it becomes wet.   If one small white pellet comes out, just wipe off with alcohol and throw away.   If area becomes tender and inflamed, take the antibiotic medication you have been given.   If the suture looks like it has melted, schedule an appointment to have it removed.   Do not do exercise or stretch for 3 days.   Some fluid retention can occur in first weeks or later.   In about 1 month, this area will feel like a lump. This is normal.    If you have any questions, contact the [facility] at [phone].    CHELSEA Morales MD  [facility]  [address], [address]  Phone: [phone]  Fax: [phone]

## 2019-04-25 NOTE — PROCEDURES
Procedures Lyudmila Neri  69 y.o. female is here for her numerous .pellet insertion. She did havea hysterectomy.  She has signed her informed consent outlining the procedure and the implant of the pellets . Pt is also aware of the risks and benefits of estradiol and testosterone and that this mode of delivery is not FDA approved even though the actual drug is FDA approved.      After proper informed consent was obtained, pt laid prone on the examining table. With alcohol prep in place, 10 cc of 1% lidocaine with out epinepherine was placed in a tracking formation for the trochar. This was allowed to set for 15 min. The left buttock was prepped and draped in the usual fashion for a sterile procedure using betadine.  With an 11 blade a one cm incision was made.  The sterile trochar was used to insert 100 mg of Testosterone in pellet form followed by 100 mg of Estradiol in pellet form.  Pressure was applied to the incision site for hemostasis.  A steri strip was placed and a pressure dressing placed to be removed no earlier than 4 hours.      Pt tolerated the procedure well and there were no complications.        Pt was instructed to call for fever or redness of the area.  We will call in Rx if infection occurs.    Pt also was instructed to call with adverse effects or in 6 months and to follow up with labs in 4 months

## 2019-05-06 ENCOUNTER — DOCUMENTATION ONLY (OUTPATIENT)
Dept: REHABILITATION | Facility: HOSPITAL | Age: 69
End: 2019-05-06

## 2019-05-06 DIAGNOSIS — M62.81 WEAKNESS OF TRUNK MUSCULATURE: ICD-10-CM

## 2019-05-06 DIAGNOSIS — M54.50 ACUTE BILATERAL LOW BACK PAIN WITHOUT SCIATICA: ICD-10-CM

## 2019-05-06 NOTE — PROGRESS NOTES
Outpatient Therapy Discharge Summary     Name: Lyudmila Neri  [facility] Number: [id number]    Therapy Diagnosis:   Encounter Diagnoses   Name Primary?    Acute bilateral low back pain without sciatica     Weakness of trunk musculature      Physician: Siva Mcmanus MD     Physician Orders: PT Eval and Treat 3x/wk x 60 days  Medical Diagnosis from Referral: DDD lumbar, Iliotibial band syndrome  Evaluation Date: 3/14/2019        Date of Last visit: 3/21/19  Total Visits Received: 3  Cancelled Visits: 4  No Show Visits: 0    Assessment    Goals:   Short Term Goals: 4 weeks   Pt will have pain less than 4/10 with normal ADLs. PROGRESSING  Pt will have increased core strength by 1/3 grade to allow for improved transitions without using hands for support. PROGRESSING  Increased hamstring length by 10 degrees for improved bending. PROGRESSING     Long Term Goals: 8 weeks   Pt will have pain less than 2/10 with normal ADLs. PROGRESSING  Pt will increase core and ip strength by 1 grade for improved function in ADLs. PROGRESSING  Pt will be independent with HEP for sx management. PROGRESSING      Discharge reason: Other:  Pt seeking pain management    Plan   This patient is discharged from Physical Therapy

## 2019-05-08 ENCOUNTER — OFFICE VISIT (OUTPATIENT)
Dept: PAIN MEDICINE | Facility: CLINIC | Age: 69
End: 2019-05-08
Payer: MEDICARE

## 2019-05-08 VITALS
WEIGHT: 162.75 LBS | HEART RATE: 60 BPM | SYSTOLIC BLOOD PRESSURE: 154 MMHG | BODY MASS INDEX: 28.84 KG/M2 | HEIGHT: 63 IN | DIASTOLIC BLOOD PRESSURE: 84 MMHG

## 2019-05-08 DIAGNOSIS — M54.16 LUMBAR RADICULOPATHY: Primary | ICD-10-CM

## 2019-05-08 DIAGNOSIS — M47.816 LUMBAR SPONDYLOSIS: ICD-10-CM

## 2019-05-08 DIAGNOSIS — M51.36 DDD (DEGENERATIVE DISC DISEASE), LUMBAR: ICD-10-CM

## 2019-05-08 PROCEDURE — 99499 UNLISTED E&M SERVICE: CPT | Mod: HCNC,S$GLB,, | Performed by: PHYSICIAN ASSISTANT

## 2019-05-08 PROCEDURE — 99999 PR PBB SHADOW E&M-EST. PATIENT-LVL III: CPT | Mod: PBBFAC,HCNC,, | Performed by: PHYSICIAN ASSISTANT

## 2019-05-08 PROCEDURE — 99214 OFFICE O/P EST MOD 30 MIN: CPT | Mod: HCNC,S$GLB,, | Performed by: PHYSICIAN ASSISTANT

## 2019-05-08 PROCEDURE — 99499 RISK ADDL DX/OHS AUDIT: ICD-10-PCS | Mod: HCNC,S$GLB,, | Performed by: PHYSICIAN ASSISTANT

## 2019-05-08 PROCEDURE — 3077F PR MOST RECENT SYSTOLIC BLOOD PRESSURE >= 140 MM HG: ICD-10-PCS | Mod: HCNC,CPTII,S$GLB, | Performed by: PHYSICIAN ASSISTANT

## 2019-05-08 PROCEDURE — 99214 PR OFFICE/OUTPT VISIT, EST, LEVL IV, 30-39 MIN: ICD-10-PCS | Mod: HCNC,S$GLB,, | Performed by: PHYSICIAN ASSISTANT

## 2019-05-08 PROCEDURE — 1101F PT FALLS ASSESS-DOCD LE1/YR: CPT | Mod: HCNC,CPTII,S$GLB, | Performed by: PHYSICIAN ASSISTANT

## 2019-05-08 PROCEDURE — 3077F SYST BP >= 140 MM HG: CPT | Mod: HCNC,CPTII,S$GLB, | Performed by: PHYSICIAN ASSISTANT

## 2019-05-08 PROCEDURE — 3079F PR MOST RECENT DIASTOLIC BLOOD PRESSURE 80-89 MM HG: ICD-10-PCS | Mod: HCNC,CPTII,S$GLB, | Performed by: PHYSICIAN ASSISTANT

## 2019-05-08 PROCEDURE — 3079F DIAST BP 80-89 MM HG: CPT | Mod: HCNC,CPTII,S$GLB, | Performed by: PHYSICIAN ASSISTANT

## 2019-05-08 PROCEDURE — 99999 PR PBB SHADOW E&M-EST. PATIENT-LVL III: ICD-10-PCS | Mod: PBBFAC,HCNC,, | Performed by: PHYSICIAN ASSISTANT

## 2019-05-08 PROCEDURE — 1101F PR PT FALLS ASSESS DOC 0-1 FALLS W/OUT INJ PAST YR: ICD-10-PCS | Mod: HCNC,CPTII,S$GLB, | Performed by: PHYSICIAN ASSISTANT

## 2019-05-08 NOTE — PROGRESS NOTES
This note was completed with dictation software and grammatical errors may exist.    CC:  Back and hip pain    HPI:  The patient is a 69-year-old woman with a history of GERD, previous gastrointestinal stromal tumor, KATHY, hypertension who presents in referral from Dr. Siva Mcmanus for back pain. She is status post bilateral L4/5 transforaminal epidural steroid injections on 04/09/2019 with 40% relief.  The patient is new to me.  She describes aching and burning pain across her low back and at the lumbosacral junction.  This occasionally radiates to the lateral hips and bilateral groin.  She has had improvement in the left buttock pain that she had prior to the injections.  She also reports having a discomfort or tight sensation in her lower legs and feet.  The heaviness in her legs has improved some since the injection.  Her pain is much worse with prolonged sitting or standing, improved with changing positions.  She denies having any numbness but she does report stress incontinence and urgency both bladder and bowel.  She has a gastroenterologist and is also going to see a urologist this week.    Pain intervention history:  She does report getting some relief with hot baths and lidocaine patches.  She has had some mild relief with physical therapy, also had been using Celebrex twice daily with fairly good relief but her rheumatologist recommended that she cut back to once daily because of chronic renal insufficiency and she feels that her pain is worse.  She did recently have a steroid Dosepak for a week that actually gave her 95% relief of her back pain during that time but the pain quickly returned. She is status post bilateral L4/5 transforaminal epidural steroid injections on 04/09/2019 with 40% relief.    ROS:  She reports vision change, diarrhea, stomach pain, nausea, flank pain and urgency, joint stiffness, joint swelling and back pain.  Balance of review of systems is negative.    Past Medical History:    Diagnosis Date    Arthritis     osteoarthritis    Blood transfusion     Cancer 2011    stromal tumor, stomach    Depression     Disorder of kidney and ureter     CKD 2    GERD (gastroesophageal reflux disease)     GIST (gastrointestinal stromal tumor), malignant     H. pylori infection     Hypertension     KATHY (obstructive sleep apnea) 6/24/2013    Psoriasis 6/24/2013    Trouble in sleeping     arthritic pain wakes her up    Urinary incontinence     stress incontinence       Past Surgical History:   Procedure Laterality Date    ANKLE FRACTURE SURGERY      RT ankle    APPENDECTOMY      BLADDER SUSPENSION  1995    CARPAL TUNNEL RELEASE      left    CERVICAL FUSION      CHOLECYSTECTOMY  12/7/2011  Dr. Cai    COLONOSCOPY  7/26/2005  [facility]    Non-erosive esophageal reflux (NERD) disease?.  Post-surgical deformity in the gastric body.   Gastric mucosal abnormality (erythema) in the greater  curve of antrum.  STEVIE Test performed on 02/24/12 was Negative.    COLONOSCOPY  12/12/2008  [facility]    Small internal hemorrhoids.  Slightly redundant left colon.    COLONOSCOPY  04/18/2018    Dr. Guzman; hemorrhoids, otherwise unremarkable, repeat 5 years for surveillance due to family history of colon cancer    COLONOSCOPY N/A 4/18/2018    [facility]    COLONOSCOPY N/A 12/13/2013    [facility]    CYST REMOVAL Left 2016    left middle finger, Dr. Johnson    EGD (ESOPHAGOGASTRODUODENOSCOPY) N/A 2/24/2012    [facility]    ESOPHAGOGASTRODUODENOSCOPY (EGD) N/A 4/18/2018    [facility]    EYE SURGERY Bilateral 1995    RK surgery    EYE SURGERY Bilateral 2015    cataract removal    GASTRECTOMY      HYSTERECTOMY      Injection,steroid,epidural,transforaminal approach L4/5 Bilateral 4/9/2019    [facility]ett [facility] OR    KNEE  "ARTHROSCOPY W/ DEBRIDEMENT      lt knee    SHOULDER OPEN ROTATOR CUFF REPAIR      left    STOMACH SURGERY  12/7/2011  Dr. Cai    removal of GIST tumor from wall of the stomach, 2.3 cm in size.    UPPER GASTROINTESTINAL ENDOSCOPY  2/24/2012  [facility]    Non-erosive esophageal reflux (NERD) disease?.  Post-surgical deformity in the gastric body.   Gastric mucosal abnormality (erythema) in the greater  curve of antrum.  STEVIE Test performed on 02/24/12 was Negative.    UPPER GASTROINTESTINAL ENDOSCOPY  04/18/2018    Dr. Guzman, antritis, evidence of prior surgery with healthy mucosa       Social History     Socioeconomic History    Marital status:      Spouse name: Not on file    Number of children: Not on file    Years of education: Not on file    Highest education level: Not on file   Occupational History    Not on file   Social Needs    Financial resource strain: Not on file    Food insecurity:     Worry: Not on file     Inability: Not on file    Transportation needs:     Medical: Not on file     Non-medical: Not on file   Tobacco Use    Smoking status: Never Smoker    Smokeless tobacco: Never Used   Substance and Sexual Activity    Alcohol use: No    Drug use: No    Sexual activity: Yes     Partners: Male   Lifestyle    Physical activity:     Days per week: Not on file     Minutes per session: Not on file    Stress: Not on file   Relationships    Social connections:     Talks on phone: Not on file     Gets together: Not on file     Attends [employer] service: Not on file     Active member of club or organization: Not on file     Attends meetings of clubs or organizations: Not on file     Relationship status: Not on file   Other Topics Concern    Not on file   Social History Narrative    Not on file         Medications/Allergies: See med card    Vitals:    05/08/19 0929   BP: (!) 154/84   Pulse: 60   Weight: 73.8 kg (162 lb 12.3 oz)   Height: 5' 3" (1.6 m)   PainSc:   3   PainLoc: Back "         Physical exam:  Gen: A and O x3, pleasant, well-groomed  Skin: No rashes or obvious lesions  HEENT: PERRLA, no obvious deformities on ears or in canals. Trachea midline.  CVS: Regular rate and rhythm, normal palpable pulses.  Resp:No increased work of breathing, symmetrical chest rise.  Abdomen: Soft, NT/ND.  Musculoskeletal:  Slow to move from sitting to standing, waddling gait.    Neuro:  Lower extremities: 5/5 strength bilaterally  Reflexes: Patellar 3+, Achilles 1+ bilaterally.  Sensory:  Intact and symmetrical to light touch and pinprick in L2-S1 dermatomes bilaterally.    Lumbar spine:  Lumbar spine:  Range of motion is mildly reduced with flexion, moderately reduced with extension with increased pain in bilateral buttock, especially with oblique extension either side.  Wade's test causes no increased pain on either side.    Supine straight leg raise causes back pain only.  Internal and external rotation of the hip causes right groin pain.  Myofascial exam: No tenderness to palpation across lumbar paraspinous muscles.    Imagin19 MRI L-spine:  T12-L1: There is mild facet joint arthropathy.  There is no spinal canal or significant foraminal stenosis.  There is no significant change.  L1-2: There is marked disc space narrowing.  This has slightly progressed.  There is stable mild degenerative retrolisthesis of L1 on L2 which is exaggerated by osteophyte formation.  There is a diffuse disc bulge with osteophytic ridging eccentric to the left.  There is mild-to-moderate left greater than right facet joint arthropathy with ligamentum flavum thickening.  There is flattening of the ventral dural sac.  There is no spinal stenosis.  There is moderate-to-marked left and mild right foraminal stenosis with interval progression.  L2-3: There is marked disc space narrowing.  There is stable trace retrolisthesis of L2 on L3.  There is a diffuse disc bulge with osteophytic ridging eccentric to the left.   There is a small superimposed central disc protrusion with annular fissure.  These findings were present previously and have mildly progressed.  There is flattening of the ventral dural sac.  There is moderate facet joint arthropathy with ligamentum flavum thickening.  There is mild spinal stenosis with mild-to-moderate bilateral foraminal stenosis.  The findings have progressed.  L3-4: There is disc space narrowing.  There is a diffuse disc bulge with superimposed broad central disc protrusion with annular fissure.  There is facet joint arthropathy.  There is flattening of the ventral dural sac but there is no significant spinal stenosis.  There is mild right foraminal stenosis without significant change.  L4-5: There is marked disc space narrowing.  There is moderate-to-marked facet joint arthropathy with ligamentum flavum thickening, right greater than left.  There is a right facet joint effusion.  The facet joint changes have definitely progressed.  There is a diffuse disc bulge with osteophytic ridging and small central/right paracentral disc extrusion with annular fissure.  There is flattening of the ventral dural sac.  There is mild spinal stenosis with mild-to-moderate right lateral recess stenosis.  There is moderate-to-marked right foraminal stenosis.  The left foramina is patent.  The findings have progressed.  L5-S1: There is stable mild anterolisthesis of L5 on S1.  There is moderate facet joint arthropathy with small facet joint effusions.  There is unroofing of a diffuse disc bulge.  This diffuse disc bulge approaches and does contact both S1 roots.  This is without change.  There is borderline to mild spinal stenosis at this level.  There is mild to moderate right foraminal stenosis without significant change.  The left foramina is patent.    2/26/19 Xray C-spine:  A dextrocurvature is noted centered in the upper lumbar spine.  Surgical clips are noted in the right upper quadrant of the abdomen  suggestive cholecystectomy clips.  A retrolisthesis of 4 mm is noted of the L1 on L2 vertebral body and of the L2 on L3 vertebral body.  Anterolisthesis of 5 mm is noted of the L4 on L5 vertebral body.  A 6 mm anterolisthesis is noted of the L5 on S1 vertebral body.  Vertebral body heights appear well preserved.  Facet arthropathy is noted at the L4-L5 and L5-S1 levels.  Intervertebral disc height loss is noted at the L1-L2, L2-L3, L3-L4, L4-L5, and L5-S1 levels.  No change in alignment is noted upon flexion and extension    Assessment:  The patient is a 69-year-old woman with a history of GERD, previous gastrointestinal stromal tumor, KATHY, hypertension who presents in referral from Dr. Siva Mcmanus for back pain.   1. Lumbar radiculopathy     2. DDD (degenerative disc disease), lumbar     3. Lumbar spondylosis           Plan:  1.  The patient had moderate relief following the bilateral L4/5 transforaminal epidural steroid injections and she will likely benefit from repeating this.  She has an appointment with a urologist for her incontinence this week.  She will contact me afterwards to pick a day for the injection because she does not know if any procedure or surgery will be recommended by urology.  She also has an EGD scheduled so we will need to work around these.  She is a high risk patient with hypertension and carotid artery stenosis and we will need to hold her aspirin prior to the injection. She will contact us to pick a date after her appointment this week and follow-up 4 weeks postprocedure sooner as needed.  If she does not have relief with  repeating the epidural steroid injections I will schedule her for bilateral L3, 4 and 5 diagnostic medial branch nerve blocks.

## 2019-05-08 NOTE — H&P (VIEW-ONLY)
This note was completed with dictation software and grammatical errors may exist.    CC:  Back and hip pain    HPI:  The patient is a 69-year-old woman with a history of GERD, previous gastrointestinal stromal tumor, KATHY, hypertension who presents in referral from Dr. Siva Mcmanus for back pain. She is status post bilateral L4/5 transforaminal epidural steroid injections on 04/09/2019 with 40% relief.  The patient is new to me.  She describes aching and burning pain across her low back and at the lumbosacral junction.  This occasionally radiates to the lateral hips and bilateral groin.  She has had improvement in the left buttock pain that she had prior to the injections.  She also reports having a discomfort or tight sensation in her lower legs and feet.  The heaviness in her legs has improved some since the injection.  Her pain is much worse with prolonged sitting or standing, improved with changing positions.  She denies having any numbness but she does report stress incontinence and urgency both bladder and bowel.  She has a gastroenterologist and is also going to see a urologist this week.    Pain intervention history:  She does report getting some relief with hot baths and lidocaine patches.  She has had some mild relief with physical therapy, also had been using Celebrex twice daily with fairly good relief but her rheumatologist recommended that she cut back to once daily because of chronic renal insufficiency and she feels that her pain is worse.  She did recently have a steroid Dosepak for a week that actually gave her 95% relief of her back pain during that time but the pain quickly returned. She is status post bilateral L4/5 transforaminal epidural steroid injections on 04/09/2019 with 40% relief.    ROS:  She reports vision change, diarrhea, stomach pain, nausea, flank pain and urgency, joint stiffness, joint swelling and back pain.  Balance of review of systems is negative.    Past Medical History:    Diagnosis Date    Arthritis     osteoarthritis    Blood transfusion     Cancer 2011    stromal tumor, stomach    Depression     Disorder of kidney and ureter     CKD 2    GERD (gastroesophageal reflux disease)     GIST (gastrointestinal stromal tumor), malignant     H. pylori infection     Hypertension     KATHY (obstructive sleep apnea) 6/24/2013    Psoriasis 6/24/2013    Trouble in sleeping     arthritic pain wakes her up    Urinary incontinence     stress incontinence       Past Surgical History:   Procedure Laterality Date    ANKLE FRACTURE SURGERY      RT ankle    APPENDECTOMY      BLADDER SUSPENSION  1995    CARPAL TUNNEL RELEASE      left    CERVICAL FUSION      CHOLECYSTECTOMY  12/7/2011  Dr. Cai    COLONOSCOPY  7/26/2005  [facility]    Non-erosive esophageal reflux (NERD) disease?.  Post-surgical deformity in the gastric body.   Gastric mucosal abnormality (erythema) in the greater  curve of antrum.  STEVIE Test performed on 02/24/12 was Negative.    COLONOSCOPY  12/12/2008  [facility]    Small internal hemorrhoids.  Slightly redundant left colon.    COLONOSCOPY  04/18/2018    Dr. Guzman; hemorrhoids, otherwise unremarkable, repeat 5 years for surveillance due to family history of colon cancer    COLONOSCOPY N/A 4/18/2018    [facility]    COLONOSCOPY N/A 12/13/2013    [facility]    CYST REMOVAL Left 2016    left middle finger, Dr. Johnson    EGD (ESOPHAGOGASTRODUODENOSCOPY) N/A 2/24/2012    [facility]    ESOPHAGOGASTRODUODENOSCOPY (EGD) N/A 4/18/2018    [facility]    EYE SURGERY Bilateral 1995    RK surgery    EYE SURGERY Bilateral 2015    cataract removal    GASTRECTOMY      HYSTERECTOMY      Injection,steroid,epidural,transforaminal approach L4/5 Bilateral 4/9/2019    [facility] at [facility] OR    KNEE  "ARTHROSCOPY W/ DEBRIDEMENT      lt knee    SHOULDER OPEN ROTATOR CUFF REPAIR      left    STOMACH SURGERY  12/7/2011  Dr. Cai    removal of GIST tumor from wall of the stomach, 2.3 cm in size.    UPPER GASTROINTESTINAL ENDOSCOPY  2/24/2012  [facility]    Non-erosive esophageal reflux (NERD) disease?.  Post-surgical deformity in the gastric body.   Gastric mucosal abnormality (erythema) in the greater  curve of antrum.  STEVIE Test performed on 02/24/12 was Negative.    UPPER GASTROINTESTINAL ENDOSCOPY  04/18/2018    Dr. Guzman, antritis, evidence of prior surgery with healthy mucosa       Social History     Socioeconomic History    Marital status:      Spouse name: Not on file    Number of children: Not on file    Years of education: Not on file    Highest education level: Not on file   Occupational History    Not on file   Social Needs    Financial resource strain: Not on file    Food insecurity:     Worry: Not on file     Inability: Not on file    Transportation needs:     Medical: Not on file     Non-medical: Not on file   Tobacco Use    Smoking status: Never Smoker    Smokeless tobacco: Never Used   Substance and Sexual Activity    Alcohol use: No    Drug use: No    Sexual activity: Yes     Partners: Male   Lifestyle    Physical activity:     Days per week: Not on file     Minutes per session: Not on file    Stress: Not on file   Relationships    Social connections:     Talks on phone: Not on file     Gets together: Not on file     Attends [employer] service: Not on file     Active member of club or organization: Not on file     Attends meetings of clubs or organizations: Not on file     Relationship status: Not on file   Other Topics Concern    Not on file   Social History Narrative    Not on file         Medications/Allergies: See med card    Vitals:    05/08/19 0929   BP: (!) 154/84   Pulse: 60   Weight: 73.8 kg (162 lb 12.3 oz)   Height: 5' 3" (1.6 m)   PainSc:   3   PainLoc: Back "         Physical exam:  Gen: A and O x3, pleasant, well-groomed  Skin: No rashes or obvious lesions  HEENT: PERRLA, no obvious deformities on ears or in canals. Trachea midline.  CVS: Regular rate and rhythm, normal palpable pulses.  Resp:No increased work of breathing, symmetrical chest rise.  Abdomen: Soft, NT/ND.  Musculoskeletal:  Slow to move from sitting to standing, waddling gait.    Neuro:  Lower extremities: 5/5 strength bilaterally  Reflexes: Patellar 3+, Achilles 1+ bilaterally.  Sensory:  Intact and symmetrical to light touch and pinprick in L2-S1 dermatomes bilaterally.    Lumbar spine:  Lumbar spine:  Range of motion is mildly reduced with flexion, moderately reduced with extension with increased pain in bilateral buttock, especially with oblique extension either side.  Wade's test causes no increased pain on either side.    Supine straight leg raise causes back pain only.  Internal and external rotation of the hip causes right groin pain.  Myofascial exam: No tenderness to palpation across lumbar paraspinous muscles.    Imagin19 MRI L-spine:  T12-L1: There is mild facet joint arthropathy.  There is no spinal canal or significant foraminal stenosis.  There is no significant change.  L1-2: There is marked disc space narrowing.  This has slightly progressed.  There is stable mild degenerative retrolisthesis of L1 on L2 which is exaggerated by osteophyte formation.  There is a diffuse disc bulge with osteophytic ridging eccentric to the left.  There is mild-to-moderate left greater than right facet joint arthropathy with ligamentum flavum thickening.  There is flattening of the ventral dural sac.  There is no spinal stenosis.  There is moderate-to-marked left and mild right foraminal stenosis with interval progression.  L2-3: There is marked disc space narrowing.  There is stable trace retrolisthesis of L2 on L3.  There is a diffuse disc bulge with osteophytic ridging eccentric to the left.   There is a small superimposed central disc protrusion with annular fissure.  These findings were present previously and have mildly progressed.  There is flattening of the ventral dural sac.  There is moderate facet joint arthropathy with ligamentum flavum thickening.  There is mild spinal stenosis with mild-to-moderate bilateral foraminal stenosis.  The findings have progressed.  L3-4: There is disc space narrowing.  There is a diffuse disc bulge with superimposed broad central disc protrusion with annular fissure.  There is facet joint arthropathy.  There is flattening of the ventral dural sac but there is no significant spinal stenosis.  There is mild right foraminal stenosis without significant change.  L4-5: There is marked disc space narrowing.  There is moderate-to-marked facet joint arthropathy with ligamentum flavum thickening, right greater than left.  There is a right facet joint effusion.  The facet joint changes have definitely progressed.  There is a diffuse disc bulge with osteophytic ridging and small central/right paracentral disc extrusion with annular fissure.  There is flattening of the ventral dural sac.  There is mild spinal stenosis with mild-to-moderate right lateral recess stenosis.  There is moderate-to-marked right foraminal stenosis.  The left foramina is patent.  The findings have progressed.  L5-S1: There is stable mild anterolisthesis of L5 on S1.  There is moderate facet joint arthropathy with small facet joint effusions.  There is unroofing of a diffuse disc bulge.  This diffuse disc bulge approaches and does contact both S1 roots.  This is without change.  There is borderline to mild spinal stenosis at this level.  There is mild to moderate right foraminal stenosis without significant change.  The left foramina is patent.    2/26/19 Xray C-spine:  A dextrocurvature is noted centered in the upper lumbar spine.  Surgical clips are noted in the right upper quadrant of the abdomen  suggestive cholecystectomy clips.  A retrolisthesis of 4 mm is noted of the L1 on L2 vertebral body and of the L2 on L3 vertebral body.  Anterolisthesis of 5 mm is noted of the L4 on L5 vertebral body.  A 6 mm anterolisthesis is noted of the L5 on S1 vertebral body.  Vertebral body heights appear well preserved.  Facet arthropathy is noted at the L4-L5 and L5-S1 levels.  Intervertebral disc height loss is noted at the L1-L2, L2-L3, L3-L4, L4-L5, and L5-S1 levels.  No change in alignment is noted upon flexion and extension    Assessment:  The patient is a 69-year-old woman with a history of GERD, previous gastrointestinal stromal tumor, KATHY, hypertension who presents in referral from Dr. Siva Mcmanus for back pain.   1. Lumbar radiculopathy     2. DDD (degenerative disc disease), lumbar     3. Lumbar spondylosis           Plan:  1.  The patient had moderate relief following the bilateral L4/5 transforaminal epidural steroid injections and she will likely benefit from repeating this.  She has an appointment with a urologist for her incontinence this week.  She will contact me afterwards to pick a day for the injection because she does not know if any procedure or surgery will be recommended by urology.  She also has an EGD scheduled so we will need to work around these.  She is a high risk patient with hypertension and carotid artery stenosis and we will need to hold her aspirin prior to the injection. She will contact us to pick a date after her appointment this week and follow-up 4 weeks postprocedure sooner as needed.  If she does not have relief with  repeating the epidural steroid injections I will schedule her for bilateral L3, 4 and 5 diagnostic medial branch nerve blocks.

## 2019-05-13 ENCOUNTER — INITIAL CONSULT (OUTPATIENT)
Dept: UROGYNECOLOGY | Facility: CLINIC | Age: 69
End: 2019-05-13
Payer: MEDICARE

## 2019-05-13 VITALS
BODY MASS INDEX: 28.9 KG/M2 | WEIGHT: 163.13 LBS | HEART RATE: 64 BPM | HEIGHT: 63 IN | SYSTOLIC BLOOD PRESSURE: 113 MMHG | DIASTOLIC BLOOD PRESSURE: 74 MMHG

## 2019-05-13 DIAGNOSIS — R35.0 URINARY FREQUENCY: Primary | ICD-10-CM

## 2019-05-13 DIAGNOSIS — N36.41 URETHRAL HYPERMOBILITY: ICD-10-CM

## 2019-05-13 DIAGNOSIS — N31.8 FREQUENCY-URGENCY SYNDROME: ICD-10-CM

## 2019-05-13 DIAGNOSIS — N39.3 SUI (STRESS URINARY INCONTINENCE, FEMALE): ICD-10-CM

## 2019-05-13 DIAGNOSIS — N81.89 PELVIC RELAXATION: ICD-10-CM

## 2019-05-13 LAB
BILIRUB SERPL-MCNC: ABNORMAL MG/DL
BLOOD URINE, POC: ABNORMAL
COLOR, POC UA: ABNORMAL
GLUCOSE UR QL STRIP: ABNORMAL
KETONES UR QL STRIP: ABNORMAL
LEUKOCYTE ESTERASE URINE, POC: ABNORMAL
NITRITE, POC UA: ABNORMAL
PH, POC UA: 5
PROTEIN, POC: ABNORMAL
SPECIFIC GRAVITY, POC UA: 1.02
UROBILINOGEN, POC UA: ABNORMAL

## 2019-05-13 PROCEDURE — 3074F PR MOST RECENT SYSTOLIC BLOOD PRESSURE < 130 MM HG: ICD-10-PCS | Mod: HCNC,CPTII,S$GLB, | Performed by: OBSTETRICS & GYNECOLOGY

## 2019-05-13 PROCEDURE — 3078F DIAST BP <80 MM HG: CPT | Mod: HCNC,CPTII,S$GLB, | Performed by: OBSTETRICS & GYNECOLOGY

## 2019-05-13 PROCEDURE — 3074F SYST BP LT 130 MM HG: CPT | Mod: HCNC,CPTII,S$GLB, | Performed by: OBSTETRICS & GYNECOLOGY

## 2019-05-13 PROCEDURE — 99204 OFFICE O/P NEW MOD 45 MIN: CPT | Mod: 25,HCNC,S$GLB, | Performed by: OBSTETRICS & GYNECOLOGY

## 2019-05-13 PROCEDURE — 1101F PR PT FALLS ASSESS DOC 0-1 FALLS W/OUT INJ PAST YR: ICD-10-PCS | Mod: HCNC,CPTII,S$GLB, | Performed by: OBSTETRICS & GYNECOLOGY

## 2019-05-13 PROCEDURE — 99999 PR PBB SHADOW E&M-EST. PATIENT-LVL III: ICD-10-PCS | Mod: PBBFAC,HCNC,, | Performed by: OBSTETRICS & GYNECOLOGY

## 2019-05-13 PROCEDURE — 81002 POCT URINE DIPSTICK WITHOUT MICROSCOPE: ICD-10-PCS | Mod: HCNC,S$GLB,, | Performed by: OBSTETRICS & GYNECOLOGY

## 2019-05-13 PROCEDURE — 1101F PT FALLS ASSESS-DOCD LE1/YR: CPT | Mod: HCNC,CPTII,S$GLB, | Performed by: OBSTETRICS & GYNECOLOGY

## 2019-05-13 PROCEDURE — 81002 URINALYSIS NONAUTO W/O SCOPE: CPT | Mod: HCNC,S$GLB,, | Performed by: OBSTETRICS & GYNECOLOGY

## 2019-05-13 PROCEDURE — 99204 PR OFFICE/OUTPT VISIT, NEW, LEVL IV, 45-59 MIN: ICD-10-PCS | Mod: 25,HCNC,S$GLB, | Performed by: OBSTETRICS & GYNECOLOGY

## 2019-05-13 PROCEDURE — 3078F PR MOST RECENT DIASTOLIC BLOOD PRESSURE < 80 MM HG: ICD-10-PCS | Mod: HCNC,CPTII,S$GLB, | Performed by: OBSTETRICS & GYNECOLOGY

## 2019-05-13 PROCEDURE — 99999 PR PBB SHADOW E&M-EST. PATIENT-LVL III: CPT | Mod: PBBFAC,HCNC,, | Performed by: OBSTETRICS & GYNECOLOGY

## 2019-05-13 NOTE — H&P
History & Physical - Short Stay  Gastroenterology      SUBJECTIVE:     Procedure: Gastroscopy    Chief Complaint/Indication for Procedure: Epigastric pain.  Abnl CT scan (stomach)    History of Present Illness:  Office Visit     3/14/2019  [place] - Gastroenterology      [facility]   Gastroenterology   Epigastric pain +6 more   Dx   Gastroesophageal Reflux     Reason for Visit        Progress Notes        Subjective:       Patient ID: Lyudmila Neri is a 69 y.o. female Body mass index is 28.57 kg/m².     Chief Complaint: Gastroesophageal Reflux (abd burning, nausea, hx of h. pylori )     This patient is new to me.  Referring Provider:  Dr. Shultz & SERA Das NP for abnormal CT scan & history of GIST.  Established patient of Dr. Guzman.     Gastroesophageal Reflux   She complains of abdominal pain (started early 2/2019; epigastric pain described as burning, constant, worse at night), belching (not more than usual), heartburn (rare heartburn or reflux) and nausea (mild, typically at night). She reports no chest pain, no choking, no coughing, no dysphagia, no globus sensation, no hoarse voice or no sore throat. This is a chronic problem. The current episode started more than 1 year ago. Pertinent negatives include no fatigue, melena or weight loss. Risk factors include NSAIDs (celebrex once daily (was taking bid for 9 months but decreased to once daily dosing a few months ago)). She has tried a PPI (prilosec 40 mg once daily) for the symptoms. GASTRECTOMY FOR GIST.     Assessment:       1. Epigastric pain    2. Heartburn    3. Nausea    4. History of Helicobacter pylori infection    5. Abnormal CT scan, gastrointestinal tract    6. History of gastrointestinal stromal tumor (GIST)    7. Intermittent diarrhea        Plan:     discussed with Dr. Guzman; Dr. Guzman agreed with below management plan     Epigastric pain  - schedule EGD, discussed procedure with patient, patient verbalized  understanding     Heartburn  - schedule EGD, discussed procedure with patient, patient verbalized understanding  - CONTINUE PRILOSEC 40MG ONCE DAILY AS DIRECTED, take in the morning 30-60 minutes before breakfast, discussed about possible long term use of medication (prefer to use lowest effective dose or discontinuing if possible) and discussed the risks & benefits with taking a reflux medication long term, and to take OTC calcium and vitamin d supplements as directed (such as Citracal +D), pt verbalized understanding  -discussed about the different types of medications used to treat reflux and how to use them, antacids can be used PRN for breakthrough heartburn symptoms by reducing stomach acid that is already produced, H2 blockers (zantac) work by limiting the amount acid production, & PPI's work to block acid production and are taken daily, patient verbalized understanding.  -Educated patient on lifestyle modifications to help control/reduce reflux/abdominal pain including: avoid large meals, avoid eating within 2-3 hours of bedtime (avoid late night eating & lying down soon after eating), elevate head of bed if nocturnal symptoms are present, smoking cessation (if current smoker), & weight loss (if overweight).   -Educated to avoid known foods which trigger reflux symptoms & to minimize/avoid high-fat foods, chocolate, caffeine, citrus, alcohol, & tomato products.  -Advised to avoid/limit use of NSAID's, since they can cause GI upset, bleeding, and/or ulcers. If needed, take with food.      Nausea  - schedule EGD, discussed procedure with patient, patient verbalized understanding     History of Helicobacter pylori infection  - schedule EGD, discussed procedure with patient, patient verbalized understanding     Abnormal CT scan, gastrointestinal tract  - schedule EGD, discussed procedure with patient, patient verbalized understanding     History of gastrointestinal stromal tumor (GIST)  - schedule EGD, discussed  procedure with patient, patient verbalized understanding  Recommend follow-up with HEM/ONCSERA NP, for continued evaluation and management.     Intermittent diarrhea  - schedule EGD, discussed procedure with patient, patient verbalized understanding  - recommended OTC probiotic, such as Align or Culturelle, as directed  - avoid lactose  - recommended increase fiber in diet, especially soluble fiber since this can help bulk up the stool consistency and may help to slow down how fast the stool goes through the colon and can prevent diarrhea  - Possible STOOL STUDIES & TRIAL OF COLESTID pending results of testing and if symptoms persist     Follow-up in about 1 month (around 4/14/2019), or if symptoms worsen or fail to improve.                CT Scan 2/20/2019  Impression       1. Postsurgical changes involving the stomach with some apparent wall thickening in the region of the antrum that appears smooth and near fusiform.  This could relate to scarring or postsurgical change or be  related to radiation but an infiltrative or inflammatory process is not excluded.  Given the patient's history of GIST tumor correlation with endoscopy would be helpful  2. Bilateral prominent renal pelves or peripelvic cysts without worrisome detrimental changes since the prior  3. Evidence of hysterectomy and cholecystectomy      Electronically signed by: Alirio De La Paz MD  Date: 02/20/2019       See last EGD 4/18/2018  Impression:  - Normal oropharynx.                       - Normal esophagus.                       - Slight reflux esophagitis.                       - Z-line regular, 35-36 cm from the incisors.                       - Patulous lower esophageal sphincter.                       - Non-erosive esophageal reflux (NERD) disease?.                       - Evidence of prior surgery was found, characterized                        by healthy appearing mucosa.                       - Antritis. Biopsied.                       -  Normal stomach otherwise.                       - Normal examined duodenum.  Recommendation:      - Perform a colonoscopy as previously scheduled.                       - Discharge patient to home after that.                       - Await pathology and CLOtest results.                       - Follow an antireflux regimen.                       - Use Prilosec (omeprazole) 40 mg PO daily for 3                        months.                       - Return to primary care physician as previously                        scheduled.                       - Return to GI clinic PRN.  David Guzman MD  4/18/2018     SPECIMEN  1) Antral gastritis biopsy.  FINAL PATHOLOGIC DIAGNOSIS  STOMACH, BIOPSY:  -Gastric mucosa with reactive/regenerative changes.  -No Helicobacter pylori on H&E stain.        Facility-Administered Medications Prior to Admission   Medication    estradiol 50 mg pellet    estradiol 50 mg pellet     PTA Medications   Medication Sig    amLODIPine (NORVASC) 5 MG tablet TAKE 1 TABLET(5 MG) BY MOUTH EVERY DAY    aspirin (ECOTRIN) 81 MG EC tablet Take 81 mg by mouth once daily.    celecoxib (CELEBREX) 200 MG capsule TAKE ONE CAPSULE BY MOUTH TWICE DAILY (Patient taking differently: TAKE ONE CAPSULE BY MOUTH DAILY)    cholecalciferol, vitamin D3, (VITAMIN D3) 1,000 unit capsule Take 1,000 Units by mouth once daily.    fish oil-omega-3 fatty acids 300-1,000 mg capsule Take by mouth once daily.    FOLIC ACID/MULTIVIT-MIN/LUTEIN (CENTRUM SILVER ORAL) Take by mouth.    lidocaine 3.75 % Crea Apply 1 application topically 2 (two) times daily as needed (Pain).    losartan-hydrochlorothiazide 100-12.5 mg (HYZAAR) 100-12.5 mg Tab TAKE 1 TABLET BY MOUTH EVERY DAY    omeprazole (PRILOSEC) 40 MG capsule Take 1 capsule (40 mg total) by mouth before breakfast.    clobetasol (TEMOVATE) 0.05 % external solution Apply topically as needed.        Review of patient's allergies indicates:   Allergen Reactions     Codeine      Other reaction(s): Nausea        Past Medical History:   Diagnosis Date    Anticoagulant long-term use     Arthritis     osteoarthritis    Blood transfusion     Cancer 2011    stromal tumor, stomach    Depression     Disorder of kidney and ureter     CKD 2    GERD (gastroesophageal reflux disease)     GIST (gastrointestinal stromal tumor), malignant     H. pylori infection     Hypertension     KATHY (obstructive sleep apnea) 6/24/2013    Psoriasis 6/24/2013    Trouble in sleeping     arthritic pain wakes her up    Urinary incontinence     stress incontinence     Past Surgical History:   Procedure Laterality Date    ANKLE FRACTURE SURGERY      RT ankle    APPENDECTOMY      BLADDER SUSPENSION  1995    CARPAL TUNNEL RELEASE      left    CERVICAL FUSION      CHOLECYSTECTOMY  12/7/2011  Dr. Cai    COLONOSCOPY  7/26/2005  [facility]    Non-erosive esophageal reflux (NERD) disease?.  Post-surgical deformity in the gastric body.   Gastric mucosal abnormality (erythema) in the greater  curve of antrum.  STEVIE Test performed on 02/24/12 was Negative.    COLONOSCOPY  12/12/2008  [facility]    Small internal hemorrhoids.  Slightly redundant left colon.    COLONOSCOPY  04/18/2018    Dr. Guzman; hemorrhoids, otherwise unremarkable, repeat 5 years for surveillance due to family history of colon cancer    COLONOSCOPY N/A 4/18/2018    [facility]    COLONOSCOPY N/A 12/13/2013    [facility]    CYST REMOVAL Left 2016    left middle finger, Dr. Johnson    EGD (ESOPHAGOGASTRODUODENOSCOPY) N/A 2/24/2012    [facility]    ESOPHAGOGASTRODUODENOSCOPY (EGD) N/A 4/18/2018    [facility]    EYE SURGERY Bilateral 1995    RK surgery    EYE SURGERY Bilateral 2015    cataract removal    GASTRECTOMY      HYSTERECTOMY       Injection,steroid,epidural,transforaminal approach L4/5 Bilateral 4/9/2019    [facility]    KNEE ARTHROSCOPY W/ DEBRIDEMENT      lt knee    SHOULDER OPEN ROTATOR CUFF REPAIR      left    STOMACH SURGERY  12/7/2011  Dr. Cai    removal of GIST tumor from wall of the stomach, 2.3 cm in size.    UPPER GASTROINTESTINAL ENDOSCOPY  2/24/2012  [facility]    Non-erosive esophageal reflux (NERD) disease?.  Post-surgical deformity in the gastric body.   Gastric mucosal abnormality (erythema) in the greater  curve of antrum.  STEVIE Test performed on 02/24/12 was Negative.    UPPER GASTROINTESTINAL ENDOSCOPY  04/18/2018    Dr. Guzman, antritis, evidence of prior surgery with healthy mucosa     Family History   Problem Relation Age of Onset    Heart disease Mother     Arthritis Mother         osteoarthritis    COPD Mother     Hyperlipidemia Mother     Hypertension Mother     Kidney disease Mother     Stroke Mother     Colon cancer Mother 75    Cancer Mother 70        colon cancer    Cancer Father         brain and lung    Arthritis Father     COPD Sister     Depression Daughter     Arthritis Maternal Aunt         rheumatoid arthritis    Heart disease Maternal Uncle     Early death Maternal Uncle         in 50s due to heart disease    Arthritis Paternal Aunt         rheuamtoid arthritis    Heart disease Maternal Grandfather     Arthritis Paternal Grandmother         rheumatoid arthritis    Diabetes Paternal Grandmother     Diabetes Cousin     Heart disease Cousin     Crohn's disease Neg Hx     Stomach cancer Neg Hx     Ulcerative colitis Neg Hx     Esophageal cancer Neg Hx      Social History     Tobacco Use    Smoking status: Never Smoker    Smokeless tobacco: Never Used   Substance Use Topics    Alcohol use: No    Drug use: No         OBJECTIVE:     Vital Signs (Most Recent)  Temp: 97.5 °F (36.4 °C) (05/14/19 0745)  Pulse: 61 (05/14/19 0745)  Resp: 18 (05/14/19  "1241)  BP: 120/75 (05/14/19 0745)  SpO2: 98 % (05/14/19 0745)    Physical Exam:  :Ht 5' 3" (1.6 m)   Wt 73.2 kg (161 lb 4.8 oz)   BMI 28.57 kg/m²          GENERAL:  Comfortable, in no acute distress.                                 HEENT EXAM:  Nonicteric.  No adenopathy.  Oropharynx is clear.    NECK:  Supple.                                                               LUNGS:  Clear.                                                               CARDIAC:  Regular rate and rhythm.  S1, S2.  No murmur.              ABDOMEN:  Soft, positive bowel sounds, nontender.  No hepatosplenomegaly or masses.  No rebound or guarding.               EXTREMITIES:  No edema.     MENTAL STATUS:  Alert and oriented.    ASSESSMENT/PLAN:     Assessment: Epig pain.  Abnl CT scan (stomach)    Plan: Gastroscopy    Anesthesia Plan:   MAC / General Anaesthesia    ASA Grade: ASA 2 - Patient with mild systemic disease with no functional limitations    MALLAMPATI SCORE: I (soft palate, uvula, fauces, and tonsillar pillars visible)    "

## 2019-05-13 NOTE — PROGRESS NOTES
"Subjective:     Chief Complaint:  Mixed incontinence  History of Present Illness: Lyudmila Neri is a 69 y.o.  1 female who presents for stress dominant mixed incontinence.  She has had stress incontinence for years predominantly with laugh cough sneezing or exercise.  The last few years she has gradually noticed the onset of urgency and occasional urge incontinence.  She does not typically lose urine but says she has to remain close to the bathroom.  She has daytime frequency but nocturia only 1 time per night.  She does leak on  the way to the bathroom in  the morning when she 1st rises.  She gets hormonal pellets.  She does not have dyspareunia except when her pellets began to run out.  She denies coital incontinence.  She has no history of stones recurrent UTIs or hematuria.  She questions whether 0" shots would be beneficial and whether laser of the vagina would be beneficial    Review of Systems    Constitutional: No acute distress. No weight gain/loss.  Eyes: cataracts  ENT: No headaches.   Respiratory: No SOB.  Cardiovascular: No chest pain. No edema. Hypertension  Gastrointestinal: gastric CA  Genitourinary: CKD  Integument/Breast: Negative  Hematologic/Lymphatic: No history of anemia.  Musculoskeletal:OA  Neurological: DDD  Behavioral/Psych: KATHY  Endocrine:  hormonal replacement.  Allergy/Immune: no recent reactions     Objective:   General Exam:  General appearance: WDNF. NAD.   HEENT: Kim. EOM's intact.  Neck: Normal thyroid.   Back: No CVA tenderness.  RESP: No SOB.  Breasts: deferred  Abdomen: Benign without localizing signs.  Extremities: No edema. No varices.  Lymphatic: noncontributory  Skin: No rashes. No lesions.  Neurologic: Intact.   Psych: Oriented.   Pelvic Exam:  V:  No lesions. No palpable nodes.   Va:No d/c bleeding or lesions.  Diffuse laxity with apical cystocele to a minus 1 position and mild full length rectocele  .Meatus:No caruncle or stenosis  Urethra: Non tender. No " suburethral masses.  Hypermobility  Cx/Cuff:  Descends only about 3 cm with maximal straining standing  Uterus: Surgically absent.  Ad: No mass or tenderness.  Levators :Symmetrical. Normal tone. Non tender.4/5 contractions and she keeps the abdomen soft  BL: Non tender  RV: No hemorrhoids.  Assessment:   Long-term stress incontinence with urethral hypermobility.  She has developed some urgency and frequency recently  Diffuse relaxation       Plan:    Pelvic floor exercises including slow and fast which fibers  She was given a sample of Myrbetriq  We discussed conservative measures, suburethral sling and vaginal injections and laser

## 2019-05-13 NOTE — LETTER
May 13, 2019      Afua Morales MD  [address] [address]           [facility]rogynecology  [address] [place] [id number]  Phone: [phone]  Fax: [phone]          Patient: Lyudmila Neri   MR Number: [id number]   YOB: 1950   Date of Visit: 5/13/2019       Dear Dr. Afua Morales:    Thank you for referring Lyudmila Neri to me for evaluation. Attached you will find relevant portions of my assessment and plan of care.    If you have questions, please do not hesitate to call me. I look forward to following Lyudmila Neri along with you.    Sincerely,    Patrick Dhillon MD    Enclosure  CC:  No Recipients    If you would like to receive this communication electronically, please contact [email or web address] or [phone] to request more information on [facility] access.    For providers and/or their staff who would like to refer a patient to [facility], please contact us through our one-stop-shop provider referral line, [facility], at [phone].    If you feel you have received this communication in error or would no longer like to receive these types of communications, please e-mail [email or web address]

## 2019-05-14 ENCOUNTER — HOSPITAL ENCOUNTER (OUTPATIENT)
Facility: HOSPITAL | Age: 69
Discharge: HOME | End: 2019-05-14
Attending: INTERNAL MEDICINE | Admitting: INTERNAL MEDICINE
Payer: MEDICARE

## 2019-05-14 ENCOUNTER — ANESTHESIA EVENT (OUTPATIENT)
Dept: ENDOSCOPY | Facility: HOSPITAL | Age: 69
End: 2019-05-14
Payer: MEDICARE

## 2019-05-14 ENCOUNTER — ANESTHESIA (OUTPATIENT)
Dept: ENDOSCOPY | Facility: HOSPITAL | Age: 69
End: 2019-05-14
Payer: MEDICARE

## 2019-05-14 VITALS
HEART RATE: 89 BPM | BODY MASS INDEX: 28.53 KG/M2 | HEIGHT: 63 IN | SYSTOLIC BLOOD PRESSURE: 128 MMHG | TEMPERATURE: 97 F | OXYGEN SATURATION: 98 % | DIASTOLIC BLOOD PRESSURE: 79 MMHG | RESPIRATION RATE: 14 BRPM | WEIGHT: 161 LBS

## 2019-05-14 DIAGNOSIS — R10.13 EPIGASTRIC PAIN: ICD-10-CM

## 2019-05-14 LAB — H PYLORI INDEX VALUE: NEGATIVE

## 2019-05-14 PROCEDURE — 27201012 HC FORCEPS, HOT/COLD, DISP: Mod: HCNC,PO | Performed by: INTERNAL MEDICINE

## 2019-05-14 PROCEDURE — 63600175 PHARM REV CODE 636 W HCPCS: Mod: HCNC,PO | Performed by: NURSE ANESTHETIST, CERTIFIED REGISTERED

## 2019-05-14 PROCEDURE — 25000003 PHARM REV CODE 250: Mod: HCNC,PO | Performed by: NURSE ANESTHETIST, CERTIFIED REGISTERED

## 2019-05-14 PROCEDURE — 88305 TISSUE EXAM BY PATHOLOGIST: CPT | Mod: 26,HCNC,, | Performed by: PATHOLOGY

## 2019-05-14 PROCEDURE — 88342 IMHCHEM/IMCYTCHM 1ST ANTB: CPT | Mod: 26,HCNC,, | Performed by: PATHOLOGY

## 2019-05-14 PROCEDURE — 43239 EGD BIOPSY SINGLE/MULTIPLE: CPT | Mod: HCNC,PO | Performed by: INTERNAL MEDICINE

## 2019-05-14 PROCEDURE — D9220A PRA ANESTHESIA: Mod: HCNC,ANES,, | Performed by: ANESTHESIOLOGY

## 2019-05-14 PROCEDURE — 43239 EGD BIOPSY SINGLE/MULTIPLE: CPT | Mod: HCNC,,, | Performed by: INTERNAL MEDICINE

## 2019-05-14 PROCEDURE — 37000008 HC ANESTHESIA 1ST 15 MINUTES: Mod: HCNC,PO | Performed by: INTERNAL MEDICINE

## 2019-05-14 PROCEDURE — 88342 IMHCHEM/IMCYTCHM 1ST ANTB: CPT | Mod: HCNC | Performed by: PATHOLOGY

## 2019-05-14 PROCEDURE — 88342 TISSUE SPECIMEN TO PATHOLOGY - SURGERY: ICD-10-PCS | Mod: 26,HCNC,, | Performed by: PATHOLOGY

## 2019-05-14 PROCEDURE — 25000003 PHARM REV CODE 250: Mod: HCNC,PO | Performed by: INTERNAL MEDICINE

## 2019-05-14 PROCEDURE — 88305 TISSUE SPECIMEN TO PATHOLOGY - SURGERY: ICD-10-PCS | Mod: 26,HCNC,, | Performed by: PATHOLOGY

## 2019-05-14 PROCEDURE — 87449 NOS EACH ORGANISM AG IA: CPT | Mod: HCNC,PO | Performed by: INTERNAL MEDICINE

## 2019-05-14 PROCEDURE — 43239 PR EGD, FLEX, W/BIOPSY, SGL/MULTI: ICD-10-PCS | Mod: HCNC,,, | Performed by: INTERNAL MEDICINE

## 2019-05-14 PROCEDURE — D9220A PRA ANESTHESIA: ICD-10-PCS | Mod: HCNC,ANES,, | Performed by: ANESTHESIOLOGY

## 2019-05-14 PROCEDURE — 88305 TISSUE EXAM BY PATHOLOGIST: CPT | Mod: HCNC | Performed by: PATHOLOGY

## 2019-05-14 PROCEDURE — 37000009 HC ANESTHESIA EA ADD 15 MINS: Mod: HCNC,PO | Performed by: INTERNAL MEDICINE

## 2019-05-14 RX ORDER — SODIUM CHLORIDE 0.9 % (FLUSH) 0.9 %
10 SYRINGE (ML) INJECTION
Status: DISCONTINUED | OUTPATIENT
Start: 2019-05-14 | End: 2019-05-14 | Stop reason: HOSPADM

## 2019-05-14 RX ORDER — LIDOCAINE HCL/PF 100 MG/5ML
SYRINGE (ML) INTRAVENOUS
Status: DISCONTINUED | OUTPATIENT
Start: 2019-05-14 | End: 2019-05-14

## 2019-05-14 RX ORDER — FENTANYL CITRATE 50 UG/ML
INJECTION, SOLUTION INTRAMUSCULAR; INTRAVENOUS
Status: DISCONTINUED | OUTPATIENT
Start: 2019-05-14 | End: 2019-05-14

## 2019-05-14 RX ORDER — PROPOFOL 10 MG/ML
VIAL (ML) INTRAVENOUS
Status: DISCONTINUED | OUTPATIENT
Start: 2019-05-14 | End: 2019-05-14

## 2019-05-14 RX ORDER — SODIUM CHLORIDE, SODIUM LACTATE, POTASSIUM CHLORIDE, CALCIUM CHLORIDE 600; 310; 30; 20 MG/100ML; MG/100ML; MG/100ML; MG/100ML
INJECTION, SOLUTION INTRAVENOUS CONTINUOUS
Status: DISCONTINUED | OUTPATIENT
Start: 2019-05-14 | End: 2019-05-14 | Stop reason: HOSPADM

## 2019-05-14 RX ORDER — GLYCOPYRROLATE 0.2 MG/ML
INJECTION INTRAMUSCULAR; INTRAVENOUS
Status: DISCONTINUED | OUTPATIENT
Start: 2019-05-14 | End: 2019-05-14

## 2019-05-14 RX ADMIN — PROPOFOL 20 MG: 10 INJECTION, EMULSION INTRAVENOUS at 08:05

## 2019-05-14 RX ADMIN — FENTANYL CITRATE 25 MCG: 50 INJECTION, SOLUTION INTRAMUSCULAR; INTRAVENOUS at 08:05

## 2019-05-14 RX ADMIN — PROPOFOL 30 MG: 10 INJECTION, EMULSION INTRAVENOUS at 08:05

## 2019-05-14 RX ADMIN — GLYCOPYRROLATE 0.2 MG: 0.2 INJECTION, SOLUTION INTRAMUSCULAR; INTRAVENOUS at 08:05

## 2019-05-14 RX ADMIN — SODIUM CHLORIDE, SODIUM LACTATE, POTASSIUM CHLORIDE, AND CALCIUM CHLORIDE: .6; .31; .03; .02 INJECTION, SOLUTION INTRAVENOUS at 07:05

## 2019-05-14 RX ADMIN — LIDOCAINE HYDROCHLORIDE 100 MG: 20 INJECTION, SOLUTION INTRAVENOUS at 08:05

## 2019-05-14 RX ADMIN — PROPOFOL 100 MG: 10 INJECTION, EMULSION INTRAVENOUS at 08:05

## 2019-05-14 NOTE — BRIEF OP NOTE
Discharge Note  Short Stay      SUMMARY     Admit Date: 5/14/2019    Attending Physician: David Guzman Jr., MD     Discharge Physician: David Guzman Jr., MD    Discharge Date: 5/14/2019 9:18 AM    Final Diagnosis: Abnormal CT scan [R93.89]  Hx of malignant gastrointestinal stromal tumor (GIST) [Z85.09]  History of Helicobacter pylori infection [Z86.19]  Abdominal pain, unspecified abdominal location [R10.9]  Heartburn [R12]    Impression:          - Normal oropharynx.                       - Normal esophagus.                       - Z-line regular, 35-36 cm from the incisors.                       - Patulous lower esophageal sphincter.                       - Non-erosive esophageal reflux (NERD) disease(?).                       - Evidence of previous surgery was found in the                        fundus near the cardia, characterized by healthy                        appearing mucosa.                       - Minimal Gastritis. Biopsied.                       - Minimal antritis. Biopsied.                       - Normal stomach otherwise.                       - Normal pylorus.                       - Normal examined duodenum. Biopsied.                       - Normal major papilla.  Recommendation:      - Discharge patient to home.                       - Await pathology and CLOtest results.                       - Follow an antireflux regimen.                       - Continue present medications.                       - Use Prilosec (omeprazole) 40 mg PO daily.                       - Add Zantac (ranitidine) 300 mg PO daily.                       - Call the G.I. clinic in 2 weeks for reports (if                        you haven't heard from us sooner) [phone].                       - Return to GI office in 6-8 weeks.  David Guzman MD  5/14/2019   Disposition: HOME OR SELF CARE    Patient Instructions:   Current Discharge Medication List      START taking these medications    Details   ranitidine  (ZANTAC) 300 MG tablet Take 1 tablet (300 mg total) by mouth every evening. , about 30-60 minutes before bedtime.  Qty: 60 tablet, Refills: 5         CONTINUE these medications which have NOT CHANGED    Details   amLODIPine (NORVASC) 5 MG tablet TAKE 1 TABLET(5 MG) BY MOUTH EVERY DAY  Qty: 90 tablet, Refills: 3    Associated Diagnoses: Essential hypertension      aspirin (ECOTRIN) 81 MG EC tablet Take 81 mg by mouth once daily.      celecoxib (CELEBREX) 200 MG capsule TAKE ONE CAPSULE BY MOUTH TWICE DAILY  Qty: 180 capsule, Refills: 2    Associated Diagnoses: Arthritis      cholecalciferol, vitamin D3, (VITAMIN D3) 1,000 unit capsule Take 1,000 Units by mouth once daily.      fish oil-omega-3 fatty acids 300-1,000 mg capsule Take by mouth once daily.      FOLIC ACID/MULTIVIT-MIN/LUTEIN (CENTRUM SILVER ORAL) Take by mouth.      lidocaine 3.75 % Crea Apply 1 application topically 2 (two) times daily as needed (Pain).  Qty: 60 g, Refills: 0    Associated Diagnoses: Rectal pain      losartan-hydrochlorothiazide 100-12.5 mg (HYZAAR) 100-12.5 mg Tab TAKE 1 TABLET BY MOUTH EVERY DAY  Qty: 90 tablet, Refills: 3      omeprazole (PRILOSEC) 40 MG capsule Take 1 capsule (40 mg total) by mouth before breakfast.  Qty: 90 capsule, Refills: 3      clobetasol (TEMOVATE) 0.05 % external solution Apply topically as needed.     Associated Diagnoses: GIST (gastrointestinal stromal tumor), malignant             Discharge Procedure Orders (must include Diet, Follow-up, Activity)    Follow Up:  Follow up with PCP as per your routine.  Please follow an anti reflux diet.  Activity as tolerated.    No driving day of procedure.

## 2019-05-14 NOTE — [employer] PATIENT INSTRUCTIONS
Discharge Summary/Instructions after an Endoscopic Procedure  Patient Name: Lyudmila Neri  Patient MRN: [id number]  Patient YOB: 1950  Tuesday, May 14, 2019  David Guzman MD  RESTRICTIONS:  During your procedure today, you received medications for sedation.  These   medications may affect your judgment, balance and coordination.  Therefore,   for 24 hours, you have the following restrictions:   - DO NOT drive a car, operate machinery, make legal/financial decisions,   sign important papers or drink alcohol.    ACTIVITY:  Today: no heavy lifting, straining or running due to procedural   sedation/anesthesia.  The following day: return to full activity including work.  DIET:  Eat and drink normally unless instructed otherwise.     TREATMENT FOR COMMON SIDE EFFECTS:  - Mild abdominal pain, nausea, belching, bloating or excessive gas:  rest,   eat lightly and use a heating pad.  - Sore Throat: treat with throat lozenges and/or gargle with warm salt   water.  - Because air was used during the procedure, expelling large amounts of air   from your rectum or belching is normal.  - If a bowel prep was taken, you may not have a bowel movement for 1-3 days.    This is normal.  SYMPTOMS TO WATCH FOR AND REPORT TO YOUR PHYSICIAN:  1. Abdominal pain or bloating, other than gas cramps.  2. Chest pain.  3. Back pain.  4. Signs of infection such as: chills or fever occurring within 24 hours   after the procedure.  5. Rectal bleeding, which would show as bright red, maroon, or black stools.   (A tablespoon of blood from the rectum is not serious, especially if   hemorrhoids are present.)  6. Vomiting.  7. Weakness or dizziness.  GO DIRECTLY TO THE NEAREST EMERGENCY ROOM IF YOU HAVE ANY OF THE FOLLOWING:      Difficulty breathing              Chills and/or fever over 101 F   Persistent vomiting and/or vomiting blood   Severe abdominal pain   Severe chest pain   Black, tarry stools   Bleeding- more than one  tablespoon   Any other symptom or condition that you feel may need urgent attention  Your doctor recommends these additional instructions:  If any biopsies were taken, your doctors clinic will contact you in 1 to 2   weeks with any results.  Follow an antireflux regimen.  This includes:       - Do not lie down for at least 3 to 4 hours after meals.        - Raise the head of the bed 4 to 6 inches.        - Decrease excess weight.        - Avoid citrus juices and other acidic foods, alcohol, chocolate, mints,   coffee and other caffeinated beverages, carbonated beverages, fatty and   fried foods.        - Avoid tight-fitting clothing.        - Avoid cigarettes and other tobacco products.   Continue your present medications.   Take Prilosec (omeprazole) 40 mg by mouth once a day.   Add Zantac (ranitidine) 300 mg by mouth nightly.   Return to GI office in 6-8 weeks.  For questions, problems or results please call your physician - David Guzman MD at Work:  [phone].  EMERGENCY PHONE NUMBER: [phone], LAB RESULTS: [phone]  IF A COMPLICATION OR EMERGENCY SITUATION ARISES AND YOU ARE UNABLE TO REACH   YOUR PHYSICIAN - GO DIRECTLY TO THE EMERGENCY ROOM.  ___________________________________________  Nurse Signature  ___________________________________________  Patient/Designated Responsible Party Signature  David Guzman MD  5/14/2019 9:09:41 AM  This report has been verified and signed electronically.  [employer]

## 2019-05-14 NOTE — ANESTHESIA PREPROCEDURE EVALUATION
05/14/2019  Lyudmila Neri is a 69 y.o., female.    Pre-op Assessment    I have reviewed the Patient Summary Reports.     I have reviewed the Nursing Notes.   I have reviewed the Medications.     Review of Systems  Anesthesia Hx:  No problems with previous Anesthesia    Hematology/Oncology:         -- Cancer in past history: Other (see Oncology comments) surgery    Cardiovascular:   Hypertension, well controlled    Pulmonary:   Sleep Apnea    Renal/:   Chronic Renal Disease    Hepatic/GI:   Bowel Prep. GERD H/o GIST    Musculoskeletal:   Arthritis     Neurological:  Neurology Normal    Endocrine:  Endocrine Normal    Psych:   Psychiatric History depression          Physical Exam  General:  Well nourished    Airway/Jaw/Neck:  Airway Findings: Mouth Opening: Normal Tongue: Normal  General Airway Assessment: Adult  Mallampati: I  TM Distance: Normal, at least 6 cm        Eyes/Ears/Nose:  EYES/EARS/NOSE FINDINGS: Normal   Dental:  Dental Findings:Upper veneers    Chest/Lungs:  Chest/Lungs Findings: Clear to auscultation, Normal Respiratory Rate     Heart/Vascular:  Heart Findings: Rate: Normal  Rhythm: Regular Rhythm  Sounds: Normal        Mental Status:  Mental Status Findings:  Cooperative, Alert and Oriented         Anesthesia Plan  Type of Anesthesia, risks & benefits discussed:  Anesthesia Type:  general  Patient's Preference:   Intra-op Monitoring Plan: standard ASA monitors  Intra-op Monitoring Plan Comments:   Post Op Pain Control Plan:   Post Op Pain Control Plan Comments:   Induction:   IV  Beta Blocker:  Patient is not currently on a Beta-Blocker (No further documentation required).       Informed Consent: Patient understands risks and agrees with Anesthesia plan.  Questions answered. Anesthesia consent signed with patient.  ASA Score: 3     Day of Surgery Review of History & Physical: I have  interviewed and examined the patient. I have reviewed the patient's H&P dated:  There are no significant changes.  H&P update referred to the provider.         Ready For Surgery From Anesthesia Perspective.

## 2019-05-14 NOTE — ANESTHESIA POSTPROCEDURE EVALUATION
Anesthesia Post Evaluation    Patient: Lyudmila Neri    Procedure(s) Performed: Procedure(s) (LRB):  EGD (ESOPHAGOGASTRODUODENOSCOPY) (N/A)    Final Anesthesia Type: general  Patient location during evaluation: PACU  Patient participation: Yes- Able to Participate  Level of consciousness: awake and alert, oriented and awake  Post-procedure vital signs: reviewed and stable  Pain management: adequate  Airway patency: patent  PONV status at discharge: No PONV  Anesthetic complications: no      Cardiovascular status: blood pressure returned to baseline and hemodynamically stable  Respiratory status: unassisted, spontaneous ventilation and room air  Hydration status: euvolemic  Follow-up not needed.          Vitals Value Taken Time   /75 5/14/2019  9:01 AM   Temp 36.2 °C (97.2 °F) 5/14/2019  8:46 AM   Pulse 86 5/14/2019  9:01 AM   Resp 14 5/14/2019  9:01 AM   SpO2 99 % 5/14/2019  9:01 AM         No case tracking events are documented in the log.      Pain/Jacquelin Score: Jacquelin Score: 10 (5/14/2019  9:16 AM)

## 2019-05-14 NOTE — TRANSFER OF CARE
"Anesthesia Transfer of Care Note    Patient: Lyudmila Neri    Procedure(s) Performed: Procedure(s) (LRB):  EGD (ESOPHAGOGASTRODUODENOSCOPY) (N/A)    Patient location: PACU    Anesthesia Type: general    Transport from OR: Transported from OR on room air with adequate spontaneous ventilation    Post pain: adequate analgesia    Post assessment: no apparent anesthetic complications and tolerated procedure well    Post vital signs: stable    Level of consciousness: awake and sedated    Nausea/Vomiting: no nausea/vomiting    Complications: none    Transfer of care protocol was followed      Last vitals:   Visit Vitals  /75 (BP Location: Right arm, Patient Position: Lying)   Pulse 61   Temp 36.4 °C (97.5 °F) (Skin)   Resp 18   Ht 5' 3" (1.6 m)   Wt 73 kg (161 lb)   SpO2 98%   BMI 28.52 kg/m²     "

## 2019-05-14 NOTE — DISCHARGE INSTRUCTIONS
Recovery After Procedural Sedation (Adult)  You have been given medicine by vein to make you sleep during your surgery. This may have included both a pain medicine and sleeping medicine. Most of the effects have worn off. But you may still have some drowsiness for the next 6 to 8 hours.  Home care  Follow these guidelines when you get home:  · For the next 8 hours, you should be watched by a responsible adult. This person should make sure your condition is not getting worse.  · Don't drink any alcohol for the next 24 hours.  · Don't drive, operate dangerous machinery, or make important business or personal decisions during the next 24 hours.  Note: Your healthcare provider may tell you not to take any medicine by mouth for pain or sleep in the next 4 hours. These medicines may react with the medicines you were given in the hospital. This could cause a much stronger response than usual.  Follow-up care  Follow up with your healthcare provider if you are not alert and back to your usual level of activity within 12 hours.  When to seek medical advice  Call your healthcare provider right away if any of these occur:  · Drowsiness gets worse  · Weakness or dizziness gets worse  · Repeated vomiting  · You can't be awakened   Date Last Reviewed: 10/18/2016  © 1436-5253 [employer]. [address] [place] [address], [place] [address]. All rights reserved. This information is not intended as a substitute for professional medical care. Always follow your healthcare professional's instructions.        Tips to Control Acid Reflux    To control acid reflux, youll need to make some basic diet and lifestyle changes. The simple steps outlined below may be all youll need to ease discomfort.  Watch what you eat  · Avoid fatty foods and spicy foods.  · Eat fewer acidic foods, such as citrus and tomato-based foods. These can increase symptoms.  · Limit drinking alcohol, caffeine, and fizzy beverages. All increase acid  reflux.  · Try limiting chocolate, peppermint, and spearmint. These can worsen acid reflux in some people.  Watch when you eat  · Avoid lying down for 3 hours after eating.  · Do not snack before going to bed.  Raise your head  Raising your head and upper body by 4 to 6 inches helps limit reflux when youre lying down. Put blocks under the head of your bed frame to raise it.  Other changes  · Lose weight, if you need to  · Dont exercise near bedtime  · Avoid tight-fitting clothes  · Limit aspirin and ibuprofen  · Stop smoking   Date Last Reviewed: 7/1/2016  © 8736-7845 [employer]. [address] [place] [address], [place] [address]. All rights reserved. This information is not intended as a substitute for professional medical care. Always follow your healthcare professional's instructions.

## 2019-05-14 NOTE — OR NURSING
See anesthesia record for meds and vitals.    UPON SETTLING PT IN ENDO [facility]EROZ AND CONNECTING HER TO MONITORS... NOTED BY CRNA,  MAYITO MCCLELLAN.    PT DENIES C/O AND STATES SHE DOES NOT KNOW WHERE IT CAME FROM BUT HAS BEEN THERE FOR WEEKS.

## 2019-05-15 ENCOUNTER — TELEPHONE (OUTPATIENT)
Dept: PAIN MEDICINE | Facility: CLINIC | Age: 69
End: 2019-05-15

## 2019-05-15 NOTE — TELEPHONE ENCOUNTER
----- Message from Avril Suarez sent at 5/14/2019  4:30 PM CDT -----  Contact: self [phone]  Please call her to schedule the second epidural injection.  Thank you!

## 2019-05-17 ENCOUNTER — TELEPHONE (OUTPATIENT)
Dept: PAIN MEDICINE | Facility: CLINIC | Age: 69
End: 2019-05-17

## 2019-05-17 DIAGNOSIS — M54.16 LUMBAR RADICULOPATHY: Primary | ICD-10-CM

## 2019-05-17 RX ORDER — ALPRAZOLAM 0.5 MG/1
1 TABLET, ORALLY DISINTEGRATING ORAL ONCE AS NEEDED
Status: CANCELLED | OUTPATIENT
Start: 2019-06-05 | End: 2030-10-31

## 2019-05-17 NOTE — TELEPHONE ENCOUNTER
Spoke with patient. The daniel TF BRIGITTE has been scheduled for 6/5 with a 4 week follow up. Pre-op instructions were reviewed and sent to patient.

## 2019-05-17 NOTE — TELEPHONE ENCOUNTER
----- Message from Ashley Rose sent at 5/17/2019 10:45 AM CDT -----  Contact: self  Type:  Patient Returning Call    Who Called:  self  Who Left Message for Patient:  Jailene  Does the patient know what this is regarding?:  yes  Best Call Back Number:  [phone]  Additional Information:  Patient is returning call from 05/15/19 regarding apointment for a injection.Thanks!

## 2019-05-21 ENCOUNTER — PATIENT MESSAGE (OUTPATIENT)
Dept: UROGYNECOLOGY | Facility: CLINIC | Age: 69
End: 2019-05-21

## 2019-05-21 ENCOUNTER — OFFICE VISIT (OUTPATIENT)
Dept: RHEUMATOLOGY | Facility: CLINIC | Age: 69
End: 2019-05-21
Payer: MEDICARE

## 2019-05-21 VITALS
HEART RATE: 66 BPM | DIASTOLIC BLOOD PRESSURE: 68 MMHG | WEIGHT: 166.13 LBS | SYSTOLIC BLOOD PRESSURE: 118 MMHG | HEIGHT: 63 IN | BODY MASS INDEX: 29.44 KG/M2

## 2019-05-21 DIAGNOSIS — M19.90 ARTHRITIS: ICD-10-CM

## 2019-05-21 DIAGNOSIS — M15.9 PRIMARY OSTEOARTHRITIS INVOLVING MULTIPLE JOINTS: Primary | ICD-10-CM

## 2019-05-21 PROCEDURE — 1101F PR PT FALLS ASSESS DOC 0-1 FALLS W/OUT INJ PAST YR: ICD-10-PCS | Mod: HCNC,CPTII,S$GLB, | Performed by: INTERNAL MEDICINE

## 2019-05-21 PROCEDURE — 3074F SYST BP LT 130 MM HG: CPT | Mod: HCNC,CPTII,S$GLB, | Performed by: INTERNAL MEDICINE

## 2019-05-21 PROCEDURE — 3078F PR MOST RECENT DIASTOLIC BLOOD PRESSURE < 80 MM HG: ICD-10-PCS | Mod: HCNC,CPTII,S$GLB, | Performed by: INTERNAL MEDICINE

## 2019-05-21 PROCEDURE — 99999 PR PBB SHADOW E&M-EST. PATIENT-LVL III: CPT | Mod: PBBFAC,HCNC,, | Performed by: INTERNAL MEDICINE

## 2019-05-21 PROCEDURE — 99214 OFFICE O/P EST MOD 30 MIN: CPT | Mod: HCNC,S$GLB,, | Performed by: INTERNAL MEDICINE

## 2019-05-21 PROCEDURE — 1101F PT FALLS ASSESS-DOCD LE1/YR: CPT | Mod: HCNC,CPTII,S$GLB, | Performed by: INTERNAL MEDICINE

## 2019-05-21 PROCEDURE — 99499 UNLISTED E&M SERVICE: CPT | Mod: HCNC,S$GLB,, | Performed by: INTERNAL MEDICINE

## 2019-05-21 PROCEDURE — 99214 PR OFFICE/OUTPT VISIT, EST, LEVL IV, 30-39 MIN: ICD-10-PCS | Mod: HCNC,S$GLB,, | Performed by: INTERNAL MEDICINE

## 2019-05-21 PROCEDURE — 99999 PR PBB SHADOW E&M-EST. PATIENT-LVL III: ICD-10-PCS | Mod: PBBFAC,HCNC,, | Performed by: INTERNAL MEDICINE

## 2019-05-21 PROCEDURE — 99499 RISK ADDL DX/OHS AUDIT: ICD-10-PCS | Mod: HCNC,S$GLB,, | Performed by: INTERNAL MEDICINE

## 2019-05-21 PROCEDURE — 3074F PR MOST RECENT SYSTOLIC BLOOD PRESSURE < 130 MM HG: ICD-10-PCS | Mod: HCNC,CPTII,S$GLB, | Performed by: INTERNAL MEDICINE

## 2019-05-21 PROCEDURE — 3078F DIAST BP <80 MM HG: CPT | Mod: HCNC,CPTII,S$GLB, | Performed by: INTERNAL MEDICINE

## 2019-05-21 RX ORDER — CELECOXIB 200 MG/1
200 CAPSULE ORAL 2 TIMES DAILY
Qty: 180 CAPSULE | Refills: 2 | Status: SHIPPED | OUTPATIENT
Start: 2019-05-21 | End: 2019-11-21 | Stop reason: SDUPTHER

## 2019-05-21 RX ORDER — DICLOFENAC SODIUM 10 MG/G
2 GEL TOPICAL 3 TIMES DAILY PRN
Qty: 100 G | Refills: 5 | Status: SHIPPED | OUTPATIENT
Start: 2019-05-21 | End: 2023-10-10

## 2019-05-21 NOTE — PROGRESS NOTES
Subjective:          Chief Complaint: Lyudmila Neri is a 69 y.o. female who had concerns including Osteoarthritis (6 month).    HPI:    Patient is a 69-year-old female her for annual f/u of OA  we did start Celebrex and having significantly improved overall.    Feet now painful all the time, previously only end of day. Pain first thing in AM, pain at rest. Dorsal mid foot predominant. Not as much in toes  Exacerbated with standing. Trouble in past with orthotics and tolerance. Current sneakers not comfortable.   Notes much better when taking Celebrex 200mg BID>   Not using any voltaren.       Taking Celebrex 200mg twice daily  BP has been good     As a history of nonerosive erosive reflux gastritis as well as GIST resected 2011 follows with Dr. Pro.  Recent serologies rheumatoid factor negative, sedimentation rate within normal limits, SINDI negative, C-reactive protein is slightly elevated.  Affected joints: knees, shoulders cervical spine (hx of fusion), hips, hands are stiff in the CMC and MCP and PIP, feet. Right ankle with known fx and ORIF.   Stifffness in AM 45 min with hot shower.   No dactylitis, no known other joint swelling. No IBD. She notes drys eyes, no scleritis, episcleritis  She did use aleve which helps, meloxicam not really. Celebrex does help but cannot use BID due to HTN. Did recently have BP meds adjusted.  Is having relief but not complete. No GI upset.   She has sensitivity to soft tissue pain.  She has hx of Psoriasis that is controlled for at least 10 years-scalp predominant but diffusely.   A new well marginated erosion present at the radial aspect of the base of the right fourth proximal phalanx joint space narrowing in the third fourth MCP and the left third MCP joint degenerative changes otherwise.    REVIEW OF SYSTEMS:    Review of Systems   Constitutional: Positive for malaise/fatigue. Negative for fever and weight loss.   HENT: Negative for sore throat.    Eyes: Negative for  double vision, photophobia and redness.   Respiratory: Negative for cough, shortness of breath and wheezing.    Cardiovascular: Negative for chest pain, palpitations and orthopnea.   Gastrointestinal: Negative for abdominal pain, constipation and diarrhea.   Genitourinary: Negative for dysuria, hematuria and urgency.   Musculoskeletal: Positive for joint pain and myalgias. Negative for back pain.   Skin: Negative for rash.   Neurological: Negative for dizziness, tingling, focal weakness and headaches.   Endo/Heme/Allergies: Does not bruise/bleed easily.   Psychiatric/Behavioral: Negative for depression, hallucinations and suicidal ideas.               Objective:            Past Medical History:   Diagnosis Date    Anticoagulant long-term use     Arthritis     osteoarthritis    Blood transfusion     Cancer 2011    stromal tumor, stomach    Depression     Disorder of kidney and ureter     CKD 2    GERD (gastroesophageal reflux disease)     GIST (gastrointestinal stromal tumor), malignant     H. pylori infection     Hypertension     KATHY (obstructive sleep apnea) 6/24/2013    Psoriasis 6/24/2013    Trouble in sleeping     arthritic pain wakes her up    Urinary incontinence     stress incontinence     Family History   Problem Relation Age of Onset    Heart disease Mother     Arthritis Mother         osteoarthritis    COPD Mother     Hyperlipidemia Mother     Hypertension Mother     Kidney disease Mother     Stroke Mother     Colon cancer Mother 75    Cancer Mother 70        colon cancer    Cancer Father         brain and lung    Arthritis Father     COPD Sister     Depression Daughter     Arthritis Maternal Aunt         rheumatoid arthritis    Heart disease Maternal Uncle     Early death Maternal Uncle         in 50s due to heart disease    Arthritis Paternal Aunt         rheuamtoid arthritis    Heart disease Maternal Grandfather     Arthritis Paternal Grandmother         rheumatoid  arthritis    Diabetes Paternal Grandmother     Diabetes Cousin     Heart disease Cousin     Crohn's disease Neg Hx     Stomach cancer Neg Hx     Ulcerative colitis Neg Hx     Esophageal cancer Neg Hx      Social History     Tobacco Use    Smoking status: Never Smoker    Smokeless tobacco: Never Used   Substance Use Topics    Alcohol use: No    Drug use: No         Current Outpatient Medications on File Prior to Visit   Medication Sig Dispense Refill    amLODIPine (NORVASC) 5 MG tablet TAKE 1 TABLET(5 MG) BY MOUTH EVERY DAY 90 tablet 3    aspirin (ECOTRIN) 81 MG EC tablet Take 81 mg by mouth once daily.      celecoxib (CELEBREX) 200 MG capsule TAKE ONE CAPSULE BY MOUTH TWICE DAILY (Patient taking differently: TAKE ONE CAPSULE BY MOUTH DAILY) 180 capsule 2    cholecalciferol, vitamin D3, (VITAMIN D3) 1,000 unit capsule Take 1,000 Units by mouth once daily.      fish oil-omega-3 fatty acids 300-1,000 mg capsule Take by mouth once daily.      FOLIC ACID/MULTIVIT-MIN/LUTEIN (CENTRUM SILVER ORAL) Take by mouth.      lidocaine 3.75 % Crea Apply 1 application topically 2 (two) times daily as needed (Pain). 60 g 0    losartan-hydrochlorothiazide 100-12.5 mg (HYZAAR) 100-12.5 mg Tab TAKE 1 TABLET BY MOUTH EVERY DAY 90 tablet 3    ranitidine (ZANTAC) 300 MG tablet Take 1 tablet (300 mg total) by mouth every evening. , about 30-60 minutes before bedtime. 60 tablet 5    clobetasol (TEMOVATE) 0.05 % external solution Apply topically as needed.       omeprazole (PRILOSEC) 40 MG capsule Take 1 capsule (40 mg total) by mouth before breakfast. 90 capsule 3     Current Facility-Administered Medications on File Prior to Visit   Medication Dose Route Frequency Provider Last Rate Last Dose    estradiol 50 mg pellet  100 mg Intramuscular Once Afua Morales MD        estradiol 50 mg pellet  100 mg Intramuscular Once Afua Morales MD           Vitals:    05/21/19 1513   BP: 118/68   Pulse: 66        Physical Exam:    Physical Exam   Constitutional: She appears well-developed and well-nourished.   HENT:   Nose: No septal deviation.   Mouth/Throat: Mucous membranes are normal. No oral lesions.   Eyes: Pupils are equal, round, and reactive to light. Right conjunctiva is not injected. Left conjunctiva is not injected.   Neck: No JVD present. No thyroid mass and no thyromegaly present.   Cardiovascular: Normal rate, regular rhythm and normal pulses.   No edema   Pulmonary/Chest: Effort normal and breath sounds normal.   Abdominal: Soft. Normal appearance. There is no hepatosplenomegaly.   Musculoskeletal:        Right shoulder: She exhibits tenderness. She exhibits normal range of motion and no swelling.        Left shoulder: She exhibits tenderness. She exhibits normal range of motion and no swelling.        Right elbow: She exhibits normal range of motion and no swelling. No tenderness found.        Left elbow: She exhibits normal range of motion and no swelling. No tenderness found.        Right wrist: She exhibits normal range of motion, no tenderness and no swelling.        Left wrist: She exhibits normal range of motion, no tenderness and no swelling.        Right hip: She exhibits normal range of motion, normal strength and no swelling.        Left hip: She exhibits normal range of motion, no tenderness and no swelling.        Right knee: She exhibits normal range of motion and no swelling. Tenderness found.        Left knee: She exhibits normal range of motion and no swelling. Tenderness found.        Right ankle: She exhibits normal range of motion and no swelling. No tenderness.        Left ankle: She exhibits normal range of motion and no swelling. No tenderness.        Right hand: She exhibits tenderness.        Left hand: She exhibits tenderness.        Right foot: There is tenderness.        Left foot: There is tenderness.   2nd DIP b/l bony hypertrophy.   No active synovitis but tenderness at  fingers, shoulders, knees and metatarsalgia.   No nodules of the achilles.    Lymphadenopathy:     She has no cervical adenopathy.     She has no axillary adenopathy.   Neurological: She has normal strength and normal reflexes.   Skin: Skin is dry and intact.   Psychiatric: She has a normal mood and affect.             Assessment:       Encounter Diagnosis   Name Primary?    Primary osteoarthritis involving multiple joints Yes          Plan:        Primary osteoarthritis involving multiple joints    Arthritis  -     celecoxib (CELEBREX) 200 MG capsule; Take 1 capsule (200 mg total) by mouth 2 (two) times daily.  Dispense: 180 capsule; Refill: 2    Other orders  -     diclofenac sodium (VOLTAREN) 1 % Gel; Apply 2 g topically 3 (three) times daily as needed.  Dispense: 100 g; Refill: 5       OA  Celebrex is working.   Now that BP meds increased try Celebrex 200mg BID -BP doing very well.   Going to try to take Celebrex 200mg once daily and Tylenol 500-1,000mg daily for any breakthrough  Ok to use second Celebrex for bad days.   Would like to find the lowest effective dose that allows quality of life for her joint pain.     Follow up in about 6 months (around 11/21/2019).         30min consultation with greater than 50% spent in counseling, chart review and coordination of care. All questions answered.

## 2019-05-23 ENCOUNTER — PATIENT MESSAGE (OUTPATIENT)
Dept: RHEUMATOLOGY | Facility: CLINIC | Age: 69
End: 2019-05-23

## 2019-05-23 DIAGNOSIS — M21.41 PES PLANUS OF BOTH FEET: Primary | ICD-10-CM

## 2019-05-23 DIAGNOSIS — M21.42 PES PLANUS OF BOTH FEET: Primary | ICD-10-CM

## 2019-05-24 ENCOUNTER — PATIENT MESSAGE (OUTPATIENT)
Dept: UROGYNECOLOGY | Facility: CLINIC | Age: 69
End: 2019-05-24

## 2019-06-03 ENCOUNTER — OFFICE VISIT (OUTPATIENT)
Dept: PODIATRY | Facility: CLINIC | Age: 69
End: 2019-06-03
Payer: MEDICARE

## 2019-06-03 ENCOUNTER — HOSPITAL ENCOUNTER (OUTPATIENT)
Dept: RADIOLOGY | Facility: HOSPITAL | Age: 69
Discharge: HOME | End: 2019-06-03
Attending: PODIATRIST
Payer: MEDICARE

## 2019-06-03 VITALS — WEIGHT: 164.25 LBS | HEIGHT: 63 IN | BODY MASS INDEX: 29.1 KG/M2

## 2019-06-03 DIAGNOSIS — M19.079 ARTHRITIS, MIDFOOT: Primary | ICD-10-CM

## 2019-06-03 DIAGNOSIS — M19.079 ARTHRITIS, MIDFOOT: ICD-10-CM

## 2019-06-03 DIAGNOSIS — M19.079 ARTHRITIS OF METATARSOPHALANGEAL (MTP) JOINT OF GREAT TOE: ICD-10-CM

## 2019-06-03 PROCEDURE — 73630 X-RAY EXAM OF FOOT: CPT | Mod: 50,TC,HCNC,PO

## 2019-06-03 PROCEDURE — 73630 XR FOOT COMPLETE 3 VIEW BILATERAL: ICD-10-PCS | Mod: 26,50,HCNC, | Performed by: RADIOLOGY

## 2019-06-03 PROCEDURE — 1101F PT FALLS ASSESS-DOCD LE1/YR: CPT | Mod: HCNC,CPTII,S$GLB, | Performed by: PODIATRIST

## 2019-06-03 PROCEDURE — 99999 PR PBB SHADOW E&M-EST. PATIENT-LVL III: ICD-10-PCS | Mod: PBBFAC,HCNC,, | Performed by: PODIATRIST

## 2019-06-03 PROCEDURE — 99203 OFFICE O/P NEW LOW 30 MIN: CPT | Mod: HCNC,S$GLB,, | Performed by: PODIATRIST

## 2019-06-03 PROCEDURE — 1101F PR PT FALLS ASSESS DOC 0-1 FALLS W/OUT INJ PAST YR: ICD-10-PCS | Mod: HCNC,CPTII,S$GLB, | Performed by: PODIATRIST

## 2019-06-03 PROCEDURE — 73630 X-RAY EXAM OF FOOT: CPT | Mod: 26,50,HCNC, | Performed by: RADIOLOGY

## 2019-06-03 PROCEDURE — 99999 PR PBB SHADOW E&M-EST. PATIENT-LVL III: CPT | Mod: PBBFAC,HCNC,, | Performed by: PODIATRIST

## 2019-06-03 PROCEDURE — 99203 PR OFFICE/OUTPT VISIT, NEW, LEVL III, 30-44 MIN: ICD-10-PCS | Mod: HCNC,S$GLB,, | Performed by: PODIATRIST

## 2019-06-03 NOTE — PROGRESS NOTES
Subjective:      Patient ID: Lyudmila Neri is a 69 y.o. female.    Chief Complaint: Foot Pain (daniel - arthritis) and Other Misc (PCP:  Dr Shultz (ALEX Torres NP) 4/16/19)    Lyudmila is a 69 y.o. female who presents to the podiatry clinic  with complaint of  bilateral foot pain. Onset of the symptoms was several months ago. Precipitating event: none known. Current symptoms include: ability to bear weight, but with some pain and worsening symptoms after a period of activity. Aggravating factors: any weight bearing. Symptoms have gradually worsened. Patient has had prior foot problems. Evaluation to date: none. Treatment to date: celebrex and voltaren gel help. Patients rates pain 4/10 on pain scale.        Review of Systems   Constitution: Negative for chills and fever.   Cardiovascular: Negative for claudication and leg swelling.   Respiratory: Negative for shortness of breath.    Skin: Negative for itching, nail changes and rash.   Musculoskeletal: Positive for arthritis and joint pain. Negative for muscle cramps, muscle weakness and myalgias.   Gastrointestinal: Negative for nausea and vomiting.   Neurological: Negative for focal weakness, loss of balance, numbness and paresthesias.           Objective:      Physical Exam   Constitutional: She is oriented to person, place, and time. She appears well-developed and well-nourished. No distress.   Cardiovascular:   Pulses:       Dorsalis pedis pulses are 2+ on the right side, and 2+ on the left side.        Posterior tibial pulses are 2+ on the right side, and 2+ on the left side.   < 3 sec capillary refill time to toes 1-5 bilateral. Toes and feet are warm to touch proximally with normal distal cooling b/l. There is some hair growth on the feet and toes b/l. There is no edema b/l. No spider veins or varicosities present b/l.      Musculoskeletal:   Bilateral feet there is pain to palpation and motion at the midfoot (lisfranc joint) area. There is some discomfort to  ROM at the bilateral first MTPJ more to the right.    Equinus noted b/l ankles with < 5 deg DF noted. MMT 5/5 in DF/PF/Inv/Ev resistance with no reproduction of pain in any direction. Passive range of motion of ankle and pedal joints is painless b/l.     Neurological: She is alert and oriented to person, place, and time. She has normal strength. She displays no atrophy and no tremor. No sensory deficit. She exhibits normal muscle tone.   Negative tinel sign bilateral.   Skin: Skin is warm, dry and intact. No abrasion, no bruising, no burn, no ecchymosis, no laceration, no lesion, no petechiae and no rash noted. She is not diaphoretic. No cyanosis or erythema. No pallor. Nails show no clubbing.   Skin temperature, texture and turgor within normal limits.   Psychiatric: She has a normal mood and affect. Her behavior is normal.             Assessment:       Encounter Diagnoses   Name Primary?    Arthritis, midfoot Yes    Arthritis of metatarsophalangeal (MTP) joint of great toe          Plan:       Lyudmila was seen today for foot pain and other misc.    Diagnoses and all orders for this visit:    Arthritis, midfoot  -     X-Ray Foot Complete Bilateral; Future    Arthritis of metatarsophalangeal (MTP) joint of great toe      I counseled the patient on her conditions, their implications and medical management.    Patient will obtain over the counter arch supports and wear them in shoes whenever possible.  Athletic shoes intended for walking or running are usually best.    Patient will stretch the tendo achilles complex three times daily as demonstrated in the office.  Literature was dispensed illustrating proper stretching technique.    Can use the voltaren gel as needed    Discussed injections, declined today as she is getting steroid injections into her back next week    Will get x-rays to assess the bilateral foot arthritic condition    Return 6 weeks follow up    Agusto Edwards DPM

## 2019-06-03 NOTE — LETTER
Demetria 3, 2019      [place] [id number] [facility] [address]           [facility]  [address] [facility] [address] [place] [address]  Phone: [phone]          Patient: Lyudmila Neri   MR Number: [id number]   YOB: 1950   Date of Visit: 6/3/2019       Dear [facility]:    Thank you for referring Lyudmila Neri to me for evaluation. Attached you will find relevant portions of my assessment and plan of care.    If you have questions, please do not hesitate to call me. I look forward to following Lyudmila Neri along with you.    Sincerely,    Agusto Edawrds, LYLE    Enclosure  CC:  No Recipients    If you would like to receive this communication electronically, please contact [email or web address] or [phone] to request more information on [employer] Link access.    For providers and/or their staff who would like to refer a patient to [facility], please contact us through our one-stop-shop provider referral line, Sally Hernandez, at [phone].    If you feel you have received this communication in error or would no longer like to receive these types of communications, please e-mail [email or web address]

## 2019-06-03 NOTE — PATIENT INSTRUCTIONS
1. Stretch calf and plantar fascia at least 3x per day for 30 sec and before getting out of bed.    2. Supportive shoes at all times (athletic shoe including blanco, new balance, asics, HOKA or casual shoes like Dansko, Ro, Naot, Vionoic, Fit flop  clog or wedge with extra heel padding and arch support. For house slippers would recommend Fitflop or Spenco found on amazon.com, Never walk barefoot or in flats.    (Varsity sports, Phid[employer]ides, [place] running company, Masseys, Goodfeet, Cantilever, Feet First, Foot Solutions, Therapeutic shoes, SAS, [facility] pro shop) [email or web address]/    3. Orthotic (recommend the following brands: Superfeet, Spenco, Powerstep, Sof Sole Fit Series)

## 2019-06-04 DIAGNOSIS — M51.36 DDD (DEGENERATIVE DISC DISEASE), LUMBAR: Primary | ICD-10-CM

## 2019-06-05 ENCOUNTER — HOSPITAL ENCOUNTER (OUTPATIENT)
Dept: RADIOLOGY | Facility: HOSPITAL | Age: 69
Discharge: HOME | End: 2019-06-05
Attending: ANESTHESIOLOGY | Admitting: ANESTHESIOLOGY
Payer: MEDICARE

## 2019-06-05 ENCOUNTER — HOSPITAL ENCOUNTER (OUTPATIENT)
Facility: HOSPITAL | Age: 69
Discharge: HOME | End: 2019-06-05
Attending: ANESTHESIOLOGY | Admitting: ANESTHESIOLOGY
Payer: MEDICARE

## 2019-06-05 VITALS
SYSTOLIC BLOOD PRESSURE: 110 MMHG | BODY MASS INDEX: 28.35 KG/M2 | TEMPERATURE: 98 F | HEART RATE: 55 BPM | DIASTOLIC BLOOD PRESSURE: 62 MMHG | OXYGEN SATURATION: 95 % | WEIGHT: 160 LBS | RESPIRATION RATE: 16 BRPM | HEIGHT: 63 IN

## 2019-06-05 DIAGNOSIS — M51.36 DDD (DEGENERATIVE DISC DISEASE), LUMBAR: ICD-10-CM

## 2019-06-05 DIAGNOSIS — M54.16 LUMBAR RADICULOPATHY: Primary | ICD-10-CM

## 2019-06-05 PROCEDURE — 25000003 PHARM REV CODE 250: Mod: HCNC,PO | Performed by: ANESTHESIOLOGY

## 2019-06-05 PROCEDURE — 63600175 PHARM REV CODE 636 W HCPCS: Mod: HCNC,PO | Performed by: ANESTHESIOLOGY

## 2019-06-05 PROCEDURE — 64483 PR EPIDURAL INJ, ANES/STEROID, TRANSFORAMINAL, LUMB/SACR, SNGL LEVL: ICD-10-PCS | Mod: 50,HCNC,, | Performed by: ANESTHESIOLOGY

## 2019-06-05 PROCEDURE — 76000 FLUOROSCOPY <1 HR PHYS/QHP: CPT | Mod: TC,HCNC,PO

## 2019-06-05 PROCEDURE — 25500020 PHARM REV CODE 255: Mod: HCNC,PO | Performed by: ANESTHESIOLOGY

## 2019-06-05 PROCEDURE — 64483 NJX AA&/STRD TFRM EPI L/S 1: CPT | Mod: 50,HCNC,PO | Performed by: ANESTHESIOLOGY

## 2019-06-05 PROCEDURE — 64483 NJX AA&/STRD TFRM EPI L/S 1: CPT | Mod: 50,HCNC,, | Performed by: ANESTHESIOLOGY

## 2019-06-05 RX ORDER — METHYLPREDNISOLONE ACETATE 80 MG/ML
INJECTION, SUSPENSION INTRA-ARTICULAR; INTRALESIONAL; INTRAMUSCULAR; SOFT TISSUE
Status: DISCONTINUED | OUTPATIENT
Start: 2019-06-05 | End: 2019-06-05 | Stop reason: HOSPADM

## 2019-06-05 RX ORDER — BUPIVACAINE HYDROCHLORIDE 2.5 MG/ML
INJECTION, SOLUTION EPIDURAL; INFILTRATION; INTRACAUDAL
Status: DISCONTINUED | OUTPATIENT
Start: 2019-06-05 | End: 2019-06-05 | Stop reason: HOSPADM

## 2019-06-05 RX ORDER — ALPRAZOLAM 0.5 MG/1
1 TABLET, ORALLY DISINTEGRATING ORAL ONCE AS NEEDED
Status: COMPLETED | OUTPATIENT
Start: 2019-06-05 | End: 2019-06-05

## 2019-06-05 RX ORDER — LIDOCAINE HYDROCHLORIDE 10 MG/ML
INJECTION, SOLUTION EPIDURAL; INFILTRATION; INTRACAUDAL; PERINEURAL
Status: DISCONTINUED | OUTPATIENT
Start: 2019-06-05 | End: 2019-06-05 | Stop reason: HOSPADM

## 2019-06-05 RX ADMIN — ALPRAZOLAM 1 MG: 0.5 TABLET, ORALLY DISINTEGRATING ORAL at 02:06

## 2019-06-05 NOTE — OP NOTE
PROCEDURE DATE: 6/5/2019    PROCEDURE: Bilateral L4/5 transforaminal epidural steroid injection under fluoroscopy    DIAGNOSIS: Lumbar  Radiculopathy    Post op diagnosis: Same    PHYSICIAN: Pa Pruett MD    MEDICATIONS INJECTED:  Methylprednisolone 40mg (0.5ml) and 1.5ml 0.25% bupivicaine at each nerve root.     LOCAL ANESTHETIC INJECTED:  Lidocaine 1%. 4 ml per site.    SEDATION MEDICATIONS: none    ESTIMATED BLOOD LOSS:  none    COMPLICATIONS:  none    TECHNIQUE:   A time-out was taken to identify patient and procedure side prior to starting the procedure. The patient was placed in a prone position, prepped and draped in the usual sterile fashion using ChloraPrep and sterile towels.  The area to be injected was determined under fluoroscopic guidance in AP and oblique view.  Local anesthetic was given by raising a wheal and going down to the hub of a 25-gauge 1.5 inch needle.  In oblique view, a 5 inch 22-gauge bent-tip spinal needle was introduced towards 6 oclock position of the pedicle of each above named nerve root level.  The needle was walked medially then hinged into the neural foramen and position was confirmed in AP and lateral views.  Omnipaque contrast dye was injected to confirm appropriate placement and that there was no vascular uptake.  After negative aspiration for blood or CSF, the medication was then injected. This was performed at the right and then left L4/5 level(s). The patient tolerated the procedure well.    The patient was monitored after the procedure.  Patient was given post procedure and discharge instructions to follow at home. The patient was discharged in a stable condition.

## 2019-06-05 NOTE — DISCHARGE SUMMARY
[facility]  Discharge Note  Short Stay    Admit Date: 6/5/2019    Discharge Date: 6/5/2019    Attending Physician: Pa Pruett MD     Discharge Provider: Pa Pruett    Diagnoses:  Active Hospital Problems    Diagnosis  POA    *Lumbar radiculopathy [M54.16]  Yes      Resolved Hospital Problems   No resolved problems to display.       Discharged Condition: good    Final Diagnoses: Lumbar radiculopathy [M54.16]    Disposition: Home or Self Care    Hospital Course: no complications, uneventful    Outcome of Hospitalization, Treatment, Procedure, or Surgery:  Patient was admitted for outpatient procedure. The patient underwent procedure without complications and are discharged home    Follow up/Patient Instructions:  Follow up as scheduled in Pain Management clinic in 3-4 weeks/Patient has received instructions and follow up date and time    Medications:  Continue previous medications    Discharge Procedure Orders   Call MD for:  temperature >100.4     Call MD for:  severe uncontrolled pain     Call MD for:  redness, tenderness, or signs of infection (pain, swelling, redness, odor or green/yellow discharge around incision site)     Call MD for:  severe persistent headache     No dressing needed         Discharge Procedure Orders (must include Diet, Follow-up, Activity):   Discharge Procedure Orders (must include Diet, Follow-up, Activity)   Call MD for:  temperature >100.4     Call MD for:  severe uncontrolled pain     Call MD for:  redness, tenderness, or signs of infection (pain, swelling, redness, odor or green/yellow discharge around incision site)     Call MD for:  severe persistent headache     No dressing needed

## 2019-07-01 ENCOUNTER — OFFICE VISIT (OUTPATIENT)
Dept: UROGYNECOLOGY | Facility: CLINIC | Age: 69
End: 2019-07-01
Payer: MEDICARE

## 2019-07-01 VITALS
HEIGHT: 63 IN | WEIGHT: 154.13 LBS | BODY MASS INDEX: 27.31 KG/M2 | SYSTOLIC BLOOD PRESSURE: 134 MMHG | DIASTOLIC BLOOD PRESSURE: 82 MMHG | HEART RATE: 72 BPM

## 2019-07-01 DIAGNOSIS — N39.46 MIXED INCONTINENCE URGE AND STRESS: ICD-10-CM

## 2019-07-01 DIAGNOSIS — R35.0 URINARY FREQUENCY: Primary | ICD-10-CM

## 2019-07-01 DIAGNOSIS — N81.89 PELVIC RELAXATION: ICD-10-CM

## 2019-07-01 LAB
BILIRUB SERPL-MCNC: NORMAL MG/DL
BLOOD URINE, POC: NORMAL
COLOR, POC UA: NORMAL
GLUCOSE UR QL STRIP: NORMAL
KETONES UR QL STRIP: NORMAL
LEUKOCYTE ESTERASE URINE, POC: 6
NITRITE, POC UA: NORMAL
PH, POC UA: 1.01
PROTEIN, POC: NORMAL
SPECIFIC GRAVITY, POC UA: YELLOW
UROBILINOGEN, POC UA: NORMAL

## 2019-07-01 PROCEDURE — 99213 OFFICE O/P EST LOW 20 MIN: CPT | Mod: 25,HCNC,S$GLB, | Performed by: OBSTETRICS & GYNECOLOGY

## 2019-07-01 PROCEDURE — 99999 PR PBB SHADOW E&M-EST. PATIENT-LVL III: ICD-10-PCS | Mod: PBBFAC,HCNC,, | Performed by: OBSTETRICS & GYNECOLOGY

## 2019-07-01 PROCEDURE — 81002 POCT URINE DIPSTICK WITHOUT MICROSCOPE: ICD-10-PCS | Mod: HCNC,S$GLB,, | Performed by: OBSTETRICS & GYNECOLOGY

## 2019-07-01 PROCEDURE — 3079F PR MOST RECENT DIASTOLIC BLOOD PRESSURE 80-89 MM HG: ICD-10-PCS | Mod: HCNC,CPTII,S$GLB, | Performed by: OBSTETRICS & GYNECOLOGY

## 2019-07-01 PROCEDURE — 1101F PR PT FALLS ASSESS DOC 0-1 FALLS W/OUT INJ PAST YR: ICD-10-PCS | Mod: HCNC,CPTII,S$GLB, | Performed by: OBSTETRICS & GYNECOLOGY

## 2019-07-01 PROCEDURE — 99213 PR OFFICE/OUTPT VISIT, EST, LEVL III, 20-29 MIN: ICD-10-PCS | Mod: 25,HCNC,S$GLB, | Performed by: OBSTETRICS & GYNECOLOGY

## 2019-07-01 PROCEDURE — 81002 URINALYSIS NONAUTO W/O SCOPE: CPT | Mod: HCNC,S$GLB,, | Performed by: OBSTETRICS & GYNECOLOGY

## 2019-07-01 PROCEDURE — 99999 PR PBB SHADOW E&M-EST. PATIENT-LVL III: CPT | Mod: PBBFAC,HCNC,, | Performed by: OBSTETRICS & GYNECOLOGY

## 2019-07-01 PROCEDURE — 3075F SYST BP GE 130 - 139MM HG: CPT | Mod: HCNC,CPTII,S$GLB, | Performed by: OBSTETRICS & GYNECOLOGY

## 2019-07-01 PROCEDURE — 1101F PT FALLS ASSESS-DOCD LE1/YR: CPT | Mod: HCNC,CPTII,S$GLB, | Performed by: OBSTETRICS & GYNECOLOGY

## 2019-07-01 PROCEDURE — 3079F DIAST BP 80-89 MM HG: CPT | Mod: HCNC,CPTII,S$GLB, | Performed by: OBSTETRICS & GYNECOLOGY

## 2019-07-01 PROCEDURE — 3075F PR MOST RECENT SYSTOLIC BLOOD PRESS GE 130-139MM HG: ICD-10-PCS | Mod: HCNC,CPTII,S$GLB, | Performed by: OBSTETRICS & GYNECOLOGY

## 2019-07-01 NOTE — PROGRESS NOTES
Subjective:     Chief Complaint: mixed incontinence  History of Present Illness: Lyudmila Neri is a 69 y.o. female who presents for mixed incontinence.  She is now on Myrbetriq 50 mg and said is helping a lot and that she is 60-70% improved.  This is mostly an improvement in her urge and urge incontinence and she still has stress incontinence  She is not in a rush to do anything aggressive at this point.  She mostly today 1 discussed what her options are and what the future holds for her we discussed suburethral slings, bulking injections, laser procedures and O shots.  She is concerned that has she gets older problems may be greater and she may be a poor candidate for treatment.    Review of Systems    Constitutional: No acute distress. No weight gain/loss.  Eyes: No vision changes.  ENT: No headaches.   Respiratory: No SOB.  Cardiovascular: No chest pain. No edema. Hypertension  Gastrointestinal: Gerd  Genitourinary: CKD  Integument/Breast: Negative  Hematologic/Lymphatic: No history of anemia.  Musculoskeletal: OA  Neurological: DDD  Behavioral/Psych: KATHY  Endocrine: No hormonal replacement.  Allergy/Immune: no recent reactions     Objective:   General Exam:  General appearance: WDNF. NAD.   HEENT: Kim. EOM's intact.  Neck: Normal thyroid.   Back: No CVA tenderness.  RESP: No SOB.  Breasts: deferred  Abdomen: Benign without localizing signs.  Extremities: No edema. No varices.  Lymphatic: noncontributory  Skin: No rashes. No lesions.  Neurologic: Intact.   Psych: Oriented.     .  Assessment:   Mixed incontinence with improvement in the urge component with Myrbetriq       Plan:      For the time being she will continue medications and decide when she wants to do something more aggressive

## 2019-07-03 ENCOUNTER — OFFICE VISIT (OUTPATIENT)
Dept: PAIN MEDICINE | Facility: CLINIC | Age: 69
End: 2019-07-03
Payer: MEDICARE

## 2019-07-03 VITALS
SYSTOLIC BLOOD PRESSURE: 132 MMHG | HEART RATE: 56 BPM | BODY MASS INDEX: 29.06 KG/M2 | DIASTOLIC BLOOD PRESSURE: 59 MMHG | TEMPERATURE: 97 F | RESPIRATION RATE: 18 BRPM | OXYGEN SATURATION: 98 % | WEIGHT: 164 LBS

## 2019-07-03 DIAGNOSIS — M51.36 DDD (DEGENERATIVE DISC DISEASE), LUMBAR: ICD-10-CM

## 2019-07-03 DIAGNOSIS — M54.16 LUMBAR RADICULOPATHY: Primary | ICD-10-CM

## 2019-07-03 PROCEDURE — 3078F PR MOST RECENT DIASTOLIC BLOOD PRESSURE < 80 MM HG: ICD-10-PCS | Mod: HCNC,CPTII,S$GLB, | Performed by: PHYSICIAN ASSISTANT

## 2019-07-03 PROCEDURE — 1101F PT FALLS ASSESS-DOCD LE1/YR: CPT | Mod: HCNC,CPTII,S$GLB, | Performed by: PHYSICIAN ASSISTANT

## 2019-07-03 PROCEDURE — 3075F PR MOST RECENT SYSTOLIC BLOOD PRESS GE 130-139MM HG: ICD-10-PCS | Mod: HCNC,CPTII,S$GLB, | Performed by: PHYSICIAN ASSISTANT

## 2019-07-03 PROCEDURE — 3078F DIAST BP <80 MM HG: CPT | Mod: HCNC,CPTII,S$GLB, | Performed by: PHYSICIAN ASSISTANT

## 2019-07-03 PROCEDURE — 99999 PR PBB SHADOW E&M-EST. PATIENT-LVL IV: ICD-10-PCS | Mod: PBBFAC,HCNC,, | Performed by: PHYSICIAN ASSISTANT

## 2019-07-03 PROCEDURE — 1101F PR PT FALLS ASSESS DOC 0-1 FALLS W/OUT INJ PAST YR: ICD-10-PCS | Mod: HCNC,CPTII,S$GLB, | Performed by: PHYSICIAN ASSISTANT

## 2019-07-03 PROCEDURE — 3075F SYST BP GE 130 - 139MM HG: CPT | Mod: HCNC,CPTII,S$GLB, | Performed by: PHYSICIAN ASSISTANT

## 2019-07-03 PROCEDURE — 99212 OFFICE O/P EST SF 10 MIN: CPT | Mod: HCNC,S$GLB,, | Performed by: PHYSICIAN ASSISTANT

## 2019-07-03 PROCEDURE — 99999 PR PBB SHADOW E&M-EST. PATIENT-LVL IV: CPT | Mod: PBBFAC,HCNC,, | Performed by: PHYSICIAN ASSISTANT

## 2019-07-03 PROCEDURE — 99212 PR OFFICE/OUTPT VISIT, EST, LEVL II, 10-19 MIN: ICD-10-PCS | Mod: HCNC,S$GLB,, | Performed by: PHYSICIAN ASSISTANT

## 2019-07-03 NOTE — PROGRESS NOTES
This note was completed with dictation software and grammatical errors may exist.    CC:  Back and hip pain    HPI:  The patient is a 69-year-old woman with a history of GERD, previous gastrointestinal stromal tumor, KATHY, hypertension who presents in referral from Dr. Siva Mcmanus for back pain. She is status post bilateral L4/5 transforaminal epidural steroid injections on 06/05/2019 with 85% relief.  She is pleased with these results and only has some mild low back pain with prolonged sitting or sitting on a hard surface.  Otherwise she has been doing well and denies any radicular pain.  She reports having some heaviness in her legs if she is on her feet for an extended period of time but no specific weakness or numbness.    Pain intervention history:  She does report getting some relief with hot baths and lidocaine patches.  She has had some mild relief with physical therapy, also had been using Celebrex twice daily with fairly good relief but her rheumatologist recommended that she cut back to once daily because of chronic renal insufficiency and she feels that her pain is worse.  She did recently have a steroid Dosepak for a week that actually gave her 95% relief of her back pain during that time but the pain quickly returned. She is status post bilateral L4/5 transforaminal epidural steroid injections on 04/09/2019 with 40% relief. She is status post bilateral L4/5 transforaminal epidural steroid injections on 06/05/2019 with 85% relief.     ROS:  She reports vision change, diarrhea, stomach pain, nausea, flank pain and urgency, joint stiffness, joint swelling and back pain.  Balance of review of systems is negative.    Past Medical History:   Diagnosis Date    Anticoagulant long-term use     Arthritis     osteoarthritis    Blood transfusion     Cancer 2011    stromal tumor, stomach    Depression     Disorder of kidney and ureter     CKD 2    GERD (gastroesophageal reflux disease)     GIST  (gastrointestinal stromal tumor), malignant     H. pylori infection     Hypertension     KATHY (obstructive sleep apnea) 6/24/2013    Psoriasis 6/24/2013    Trouble in sleeping     arthritic pain wakes her up    Urinary incontinence     stress incontinence       Past Surgical History:   Procedure Laterality Date    ANKLE FRACTURE SURGERY      RT ankle    APPENDECTOMY      BLADDER SUSPENSION  1995    CARPAL TUNNEL RELEASE      left    CERVICAL FUSION      CHOLECYSTECTOMY  12/7/2011  Dr. Cai    COLONOSCOPY  7/26/2005  Thomas    Non-erosive esophageal reflux (NERD) disease?.  Post-surgical deformity in the gastric body.   Gastric mucosal abnormality (erythema) in the greater  curve of antrum.  STEVIE Test performed on 02/24/12 was Negative.    COLONOSCOPY  12/12/2008  Thomas    Small internal hemorrhoids.  Slightly redundant left colon.    COLONOSCOPY  04/18/2018    Dr. Guzman; hemorrhoids, otherwise unremarkable, repeat 5 years for surveillance due to family history of colon cancer    COLONOSCOPY N/A 4/18/2018    Performed by [facility] Jr[facility] ENDO    COLONOSCOPY N/A 12/13/2013    Performed by [facility] Jr[facility] END[facility]    CYST REMOVAL Left 2016    left middle finger, Dr. Johnson    EGD (ESOPHAGOGASTRODUODENOSCOPY) N/A 5/14/2019    [facility] by [facility] Jr[facility] ENDO    EGD (ESOPHAGOGASTRODUODENOSCOPY) N/A 2/24/2012    [facility] Jr[facility] END[facility]    ESOPHAGOGASTRODUODENOSCOPY (EGD) N/A 4/18/2018    Performed by David [facility] Jr[facility] END[facility]    EYE SURGERY Bilateral 1995    RK surgery    EYE SURGERY Bilateral 2015    cataract removal    GASTRECTOMY      HYSTERECTOMY      Injection,steroid,epidural,transforaminal approach L4/5 Bilateral 6/5/2019    [facility] Pa [facility]ruett [facility] OR    Injection,steroid,epidural,transforaminal approach L4/5 Bilateral 4/9/2019    [facility] OR     KNEE ARTHROSCOPY W/ DEBRIDEMENT      lt knee    SHOULDER OPEN ROTATOR CUFF REPAIR      left    STOMACH SURGERY  12/7/2011  Dr. Cai    removal of GIST tumor from wall of the stomach, 2.3 cm in size.    UPPER GASTROINTESTINAL ENDOSCOPY  2/24/2012  [facility]    Non-erosive esophageal reflux (NERD) disease?.  Post-surgical deformity in the gastric body.   Gastric mucosal abnormality (erythema) in the greater  curve of antrum.  STEVIE Test performed on 02/24/12 was Negative.    UPPER GASTROINTESTINAL ENDOSCOPY  04/18/2018    Dr. Gzuman, antritis, evidence of prior surgery with healthy mucosa       Social History     Socioeconomic History    Marital status:      Spouse name: Not on file    Number of children: Not on file    Years of education: Not on file    Highest education level: Not on file   Occupational History    Not on file   Social Needs    Financial resource strain: Not on file    Food insecurity:     Worry: Not on file     Inability: Not on file    Transportation needs:     Medical: Not on file     Non-medical: Not on file   Tobacco Use    Smoking status: Never Smoker    Smokeless tobacco: Never Used   Substance and Sexual Activity    Alcohol use: No    Drug use: No    Sexual activity: Yes     Partners: Male   Lifestyle    Physical activity:     Days per week: Not on file     Minutes per session: Not on file    Stress: Not on file   Relationships    Social connections:     Talks on phone: Not on file     Gets together: Not on file     Attends Oriental orthodox service: Not on file     Active member of club or organization: Not on file     Attends meetings of clubs or organizations: Not on file     Relationship status: Not on file   Other Topics Concern    Not on file   Social History Narrative    Not on file         Medications/Allergies: See med card    Vitals:    07/03/19 0948   BP: (!) 132/59   Pulse: (!) 56   Resp: 18   Temp: 97 °F (36.1 °C)   TempSrc: Oral   SpO2: 98%   Weight: 74.4 kg (164  lb 0.4 oz)   PainSc:   4   PainLoc: Back         Physical exam:  Gen: A and O x3, pleasant, well-groomed  Skin: No rashes or obvious lesions  HEENT: PERRLA, no obvious deformities on ears or in canals. Trachea midline.  CVS: Regular rate and rhythm, normal palpable pulses.  Resp:No increased work of breathing, symmetrical chest rise.  Abdomen: Soft, NT/ND.  Musculoskeletal:  Slow to move from sitting to standing, waddling gait.    Neuro:  Lower extremities: 5/5 strength bilaterally  Reflexes: Patellar 3+, Achilles 1+ bilaterally.  Sensory:  Intact and symmetrical to light touch and pinprick in L2-S1 dermatomes bilaterally.    Lumbar spine:  Lumbar spine:  Range of motion is mildly reduced with flexion, moderately reduced with extension without increased pain.  Wade's test causes no increased pain on either side.    Supine straight leg raise causes back pain only.  Internal and external rotation of the hip causes right groin pain.  Myofascial exam: No tenderness to palpation across lumbar paraspinous muscles.    Imagin19 MRI L-spine:  T12-L1: There is mild facet joint arthropathy.  There is no spinal canal or significant foraminal stenosis.  There is no significant change.  L1-2: There is marked disc space narrowing.  This has slightly progressed.  There is stable mild degenerative retrolisthesis of L1 on L2 which is exaggerated by osteophyte formation.  There is a diffuse disc bulge with osteophytic ridging eccentric to the left.  There is mild-to-moderate left greater than right facet joint arthropathy with ligamentum flavum thickening.  There is flattening of the ventral dural sac.  There is no spinal stenosis.  There is moderate-to-marked left and mild right foraminal stenosis with interval progression.  L2-3: There is marked disc space narrowing.  There is stable trace retrolisthesis of L2 on L3.  There is a diffuse disc bulge with osteophytic ridging eccentric to the left.  There is a small  superimposed central disc protrusion with annular fissure.  These findings were present previously and have mildly progressed.  There is flattening of the ventral dural sac.  There is moderate facet joint arthropathy with ligamentum flavum thickening.  There is mild spinal stenosis with mild-to-moderate bilateral foraminal stenosis.  The findings have progressed.  L3-4: There is disc space narrowing.  There is a diffuse disc bulge with superimposed broad central disc protrusion with annular fissure.  There is facet joint arthropathy.  There is flattening of the ventral dural sac but there is no significant spinal stenosis.  There is mild right foraminal stenosis without significant change.  L4-5: There is marked disc space narrowing.  There is moderate-to-marked facet joint arthropathy with ligamentum flavum thickening, right greater than left.  There is a right facet joint effusion.  The facet joint changes have definitely progressed.  There is a diffuse disc bulge with osteophytic ridging and small central/right paracentral disc extrusion with annular fissure.  There is flattening of the ventral dural sac.  There is mild spinal stenosis with mild-to-moderate right lateral recess stenosis.  There is moderate-to-marked right foraminal stenosis.  The left foramina is patent.  The findings have progressed.  L5-S1: There is stable mild anterolisthesis of L5 on S1.  There is moderate facet joint arthropathy with small facet joint effusions.  There is unroofing of a diffuse disc bulge.  This diffuse disc bulge approaches and does contact both S1 roots.  This is without change.  There is borderline to mild spinal stenosis at this level.  There is mild to moderate right foraminal stenosis without significant change.  The left foramina is patent.    2/26/19 Xray C-spine:  A dextrocurvature is noted centered in the upper lumbar spine.  Surgical clips are noted in the right upper quadrant of the abdomen suggestive  cholecystectomy clips.  A retrolisthesis of 4 mm is noted of the L1 on L2 vertebral body and of the L2 on L3 vertebral body.  Anterolisthesis of 5 mm is noted of the L4 on L5 vertebral body.  A 6 mm anterolisthesis is noted of the L5 on S1 vertebral body.  Vertebral body heights appear well preserved.  Facet arthropathy is noted at the L4-L5 and L5-S1 levels.  Intervertebral disc height loss is noted at the L1-L2, L2-L3, L3-L4, L4-L5, and L5-S1 levels.  No change in alignment is noted upon flexion and extension    Assessment:  The patient is a 69-year-old woman with a history of GERD, previous gastrointestinal stromal tumor, KATHY, hypertension who presents in referral from Dr. Siva Mcmanus for back pain.   1. Lumbar radiculopathy     2. DDD (degenerative disc disease), lumbar           Plan:  1.  The patient has sufficient relief following the bilateral L4/5 transforaminal epidural steroid injections.  This can be repeated in the future if necessary.  She will follow-up as needed.

## 2019-07-16 ENCOUNTER — OFFICE VISIT (OUTPATIENT)
Dept: PODIATRY | Facility: CLINIC | Age: 69
End: 2019-07-16
Payer: MEDICARE

## 2019-07-16 ENCOUNTER — OFFICE VISIT (OUTPATIENT)
Dept: GASTROENTEROLOGY | Facility: CLINIC | Age: 69
End: 2019-07-16
Payer: MEDICARE

## 2019-07-16 VITALS
HEIGHT: 63 IN | SYSTOLIC BLOOD PRESSURE: 133 MMHG | DIASTOLIC BLOOD PRESSURE: 81 MMHG | HEART RATE: 76 BPM | WEIGHT: 164 LBS | BODY MASS INDEX: 29.06 KG/M2

## 2019-07-16 VITALS
DIASTOLIC BLOOD PRESSURE: 83 MMHG | SYSTOLIC BLOOD PRESSURE: 139 MMHG | BODY MASS INDEX: 29.13 KG/M2 | WEIGHT: 164.44 LBS | HEART RATE: 66 BPM

## 2019-07-16 DIAGNOSIS — R15.2 DEFECATION URGENCY: ICD-10-CM

## 2019-07-16 DIAGNOSIS — M19.079 ARTHRITIS, MIDFOOT: Primary | ICD-10-CM

## 2019-07-16 DIAGNOSIS — R10.13 DYSPEPSIA: Primary | ICD-10-CM

## 2019-07-16 DIAGNOSIS — K29.70 GASTRITIS WITHOUT BLEEDING, UNSPECIFIED CHRONICITY, UNSPECIFIED GASTRITIS TYPE: ICD-10-CM

## 2019-07-16 DIAGNOSIS — M19.079 ARTHRITIS OF METATARSOPHALANGEAL (MTP) JOINT OF GREAT TOE: ICD-10-CM

## 2019-07-16 DIAGNOSIS — Z85.09 HISTORY OF GASTROINTESTINAL STROMAL TUMOR (GIST): ICD-10-CM

## 2019-07-16 PROCEDURE — 99213 PR OFFICE/OUTPT VISIT, EST, LEVL III, 20-29 MIN: ICD-10-PCS | Mod: HCNC,S$GLB,, | Performed by: INTERNAL MEDICINE

## 2019-07-16 PROCEDURE — 99999 PR PBB SHADOW E&M-EST. PATIENT-LVL III: ICD-10-PCS | Mod: PBBFAC,HCNC,, | Performed by: INTERNAL MEDICINE

## 2019-07-16 PROCEDURE — 3079F DIAST BP 80-89 MM HG: CPT | Mod: HCNC,CPTII,S$GLB, | Performed by: INTERNAL MEDICINE

## 2019-07-16 PROCEDURE — 3075F SYST BP GE 130 - 139MM HG: CPT | Mod: HCNC,CPTII,S$GLB, | Performed by: INTERNAL MEDICINE

## 2019-07-16 PROCEDURE — 1101F PR PT FALLS ASSESS DOC 0-1 FALLS W/OUT INJ PAST YR: ICD-10-PCS | Mod: HCNC,CPTII,S$GLB, | Performed by: PODIATRIST

## 2019-07-16 PROCEDURE — 3079F DIAST BP 80-89 MM HG: CPT | Mod: HCNC,CPTII,S$GLB, | Performed by: PODIATRIST

## 2019-07-16 PROCEDURE — 99999 PR PBB SHADOW E&M-EST. PATIENT-LVL III: CPT | Mod: PBBFAC,HCNC,, | Performed by: PODIATRIST

## 2019-07-16 PROCEDURE — 3075F SYST BP GE 130 - 139MM HG: CPT | Mod: HCNC,CPTII,S$GLB, | Performed by: PODIATRIST

## 2019-07-16 PROCEDURE — 3075F PR MOST RECENT SYSTOLIC BLOOD PRESS GE 130-139MM HG: ICD-10-PCS | Mod: HCNC,CPTII,S$GLB, | Performed by: PODIATRIST

## 2019-07-16 PROCEDURE — 1101F PT FALLS ASSESS-DOCD LE1/YR: CPT | Mod: HCNC,CPTII,S$GLB, | Performed by: PODIATRIST

## 2019-07-16 PROCEDURE — 3075F PR MOST RECENT SYSTOLIC BLOOD PRESS GE 130-139MM HG: ICD-10-PCS | Mod: HCNC,CPTII,S$GLB, | Performed by: INTERNAL MEDICINE

## 2019-07-16 PROCEDURE — 99212 OFFICE O/P EST SF 10 MIN: CPT | Mod: HCNC,S$GLB,, | Performed by: PODIATRIST

## 2019-07-16 PROCEDURE — 1101F PR PT FALLS ASSESS DOC 0-1 FALLS W/OUT INJ PAST YR: ICD-10-PCS | Mod: HCNC,CPTII,S$GLB, | Performed by: INTERNAL MEDICINE

## 2019-07-16 PROCEDURE — 99999 PR PBB SHADOW E&M-EST. PATIENT-LVL III: CPT | Mod: PBBFAC,HCNC,, | Performed by: INTERNAL MEDICINE

## 2019-07-16 PROCEDURE — 3079F PR MOST RECENT DIASTOLIC BLOOD PRESSURE 80-89 MM HG: ICD-10-PCS | Mod: HCNC,CPTII,S$GLB, | Performed by: PODIATRIST

## 2019-07-16 PROCEDURE — 99999 PR PBB SHADOW E&M-EST. PATIENT-LVL III: ICD-10-PCS | Mod: PBBFAC,HCNC,, | Performed by: PODIATRIST

## 2019-07-16 PROCEDURE — 99212 PR OFFICE/OUTPT VISIT, EST, LEVL II, 10-19 MIN: ICD-10-PCS | Mod: HCNC,S$GLB,, | Performed by: PODIATRIST

## 2019-07-16 PROCEDURE — 1101F PT FALLS ASSESS-DOCD LE1/YR: CPT | Mod: HCNC,CPTII,S$GLB, | Performed by: INTERNAL MEDICINE

## 2019-07-16 PROCEDURE — 99213 OFFICE O/P EST LOW 20 MIN: CPT | Mod: HCNC,S$GLB,, | Performed by: INTERNAL MEDICINE

## 2019-07-16 PROCEDURE — 3079F PR MOST RECENT DIASTOLIC BLOOD PRESSURE 80-89 MM HG: ICD-10-PCS | Mod: HCNC,CPTII,S$GLB, | Performed by: INTERNAL MEDICINE

## 2019-07-16 NOTE — PROGRESS NOTES
Subjective:       Patient ID: Lyudmila Neri is a 69 y.o. female.    Chief Complaint: No chief complaint on file.    Ms. Neri returns for f/u, after EGD in May.  She reports she has done well with the addition of HS ranitidine.  Her pain episodes are very infrequent now, and much less intense.  She inquired how long she needs to take both.  She also reports she occasionally experiences extreme urgency to have a BM, usu occurring after supper.  She knows that having the GB out can contribute to this condition.  I recommend she watch her foods, manuel fatty/greasy foods or fried foods.  Also recommended she increase the fiber in her diet, such as Metamucil or Citrucel.      EGD 5/14/2019  Indications:         Epigastric abdominal pain, Heartburn, Abnormal CT of the GI tract (stomach / antrum), Nausea. She is status post gastric surgery for GIST 2011. Last EGD 1 year ago.  Impression:  - Normal oropharynx.                       - Normal esophagus.                       - Z-line regular, 35-36 cm from the incisors.                       - Patulous lower esophageal sphincter.                       - Non-erosive esophageal reflux (NERD) disease(?).                       - Evidence of previous surgery was found in the fundus near the cardia, characterized by healthy appearing mucosa.                       - Minimal Gastritis. Biopsied.                       - Minimal antritis. Biopsied.                       - Normal stomach otherwise.                       - Normal pylorus.                       - Normal examined duodenum. Biopsied.                       - Normal major papilla.  Recommendation:      - Discharge patient to home.                       - Await pathology and CLOtest results.                       - Follow an antireflux regimen.                       - Continue present medications.                       - Use Prilosec (omeprazole) 40 mg PO daily.                       - Add Zantac (ranitidine) 300 mg PO  daily.                       - Call the G.I. clinic in 2 weeks for reports (if you haven't heard from us sooner) [phone].                       - Return to GI office in 6-8 weeks.  David Guzman MD  5/14/2019     SPECIMEN  1) Fundal gastritis.  2) Antrum gastritis. Rule out H. pylori.  3) Random duodenum. Rule out celiac.  FINAL PATHOLOGIC DIAGNOSIS  1. STOMACH, FUNDUS, BIOPSY:  Oxyntic mucosa with mild chronic inactive gastritis and features compatible with proton pump inhibition.  An immunohistochemical stain for Helicobacter pylori is negative.  No intestinal metaplasia or dysplasia.  2. STOMACH, ANTRUM, BIOPSY:  Antral mucosa with chemical/reactive gastropathy and minimal chronic inactive gastritis.  An immunohistochemical stain for Helicobacter pylori is negative.  No intestinal metaplasia or dysplasia.  3. SMALL BOWEL, DUODENUM, BIOPSY:  Small bowel mucosa with focal mildly increased intraepithelial lymphocytes without villous blunting, see note.  No intervillous parasitic microorganisms.  NOTE: The small bowel mucosa exhibits preservation of the villous architecture with an increased number of intraepithelial lymphocytes (also known as lymphocytic duodenosis). Celiac disease has been documented in up to 16% of patients with a morphologic diagnosis of lymphocytic duodenosis. But in the absence of positive serology, this pattern of injury is commonly associated with drugs (e.g. NSAID use), bacterial overgrowth, immune dysregulation and microscopic colitis, among others.  Immunohistochemical stains are interpreted in setting of appropriate positive and negative controls.  Diagnosed by: Lolly Palafox  (Electronically Signed: 2019-05-21 12:12:41)      Review of Systems   Constitutional: Negative for appetite change, fever and unexpected weight change.   HENT: Negative.  Negative for mouth sores, sore throat and trouble swallowing.    Eyes: Negative.    Respiratory: Negative.  Negative for cough and  choking.    Cardiovascular: Negative.  Negative for chest pain and leg swelling.   Gastrointestinal: Negative for abdominal distention, abdominal pain, anal bleeding, blood in stool, constipation, diarrhea, nausea and vomiting.   Genitourinary: Negative.    Musculoskeletal: Negative.    Skin: Negative.  Negative for color change and rash.   Neurological: Negative.    Hematological: Negative for adenopathy.   Psychiatric/Behavioral: Negative.        Objective:      Physical Exam   Constitutional: She is oriented to person, place, and time. She appears well-developed and well-nourished.   HENT:   Mouth/Throat: Oropharynx is clear and moist. No oropharyngeal exudate.   Eyes: No scleral icterus.   Neck: No tracheal deviation present. No thyromegaly present.   Cardiovascular: Normal rate, regular rhythm and normal heart sounds.   Pulmonary/Chest: Effort normal and breath sounds normal.   Abdominal: Soft. Bowel sounds are normal. She exhibits no distension and no mass. There is no tenderness. There is no guarding.   Flat, with normal bowel sounds.  Soft.  Nontender.  No masses, no organomegaly.   Lymphadenopathy:     She has no cervical adenopathy.   Neurological: She is alert and oriented to person, place, and time.   Skin: Skin is warm and dry. No rash noted.   Psychiatric: She has a normal mood and affect.   Nursing note and vitals reviewed.      Assessment:         Dyspepsia    Gastritis without bleeding, unspecified chronicity, unspecified gastritis type    History of gastrointestinal stromal tumor (GIST)    Defecation urgency      Plan:        1. Cont ranitidine for 2 months more, then can d/c.   2. Increase the fiber in the diet, add Metamucil or Citrucel.    3. Limit fatty foods, fried foods.   4. RTC PRN.

## 2019-07-29 NOTE — PROGRESS NOTES
Subjective:      Patient ID: Lyudmila Neri is a 69 y.o. female.    Chief Complaint: Foot Pain (6 week recheck daniel foot pain, PCP-(ОЛЬГА Mary)-04/16/2019)    Lyudmila is a 69 y.o. female who presents to the podiatry clinic  with complaint of  bilateral foot pain. Onset of the symptoms was several months ago. Precipitating event: none known. Current symptoms include: ability to bear weight, but with some pain and worsening symptoms after a period of activity. Aggravating factors: any weight bearing. Symptoms have gradually worsened. Patient has had prior foot problems. Evaluation to date: none. Treatment to date: celebrex and voltaren gel help. Patients rates pain 4/10 on pain scale.        Review of Systems   Constitution: Negative for chills and fever.   Cardiovascular: Negative for claudication and leg swelling.   Respiratory: Negative for shortness of breath.    Skin: Negative for itching, nail changes and rash.   Musculoskeletal: Positive for arthritis and joint pain. Negative for muscle cramps, muscle weakness and myalgias.   Gastrointestinal: Negative for nausea and vomiting.   Neurological: Negative for focal weakness, loss of balance, numbness and paresthesias.           Objective:      Physical Exam   Constitutional: She is oriented to person, place, and time. She appears well-developed and well-nourished. No distress.   Cardiovascular:   Pulses:       Dorsalis pedis pulses are 2+ on the right side, and 2+ on the left side.        Posterior tibial pulses are 2+ on the right side, and 2+ on the left side.   < 3 sec capillary refill time to toes 1-5 bilateral. Toes and feet are warm to touch proximally with normal distal cooling b/l. There is some hair growth on the feet and toes b/l. There is no edema b/l. No spider veins or varicosities present b/l.      Musculoskeletal:   Bilateral feet there is pain to palpation and motion at the midfoot (lisfranc joint) area. There is some discomfort to ROM  at the bilateral first MTPJ more to the right.    Equinus noted b/l ankles with < 5 deg DF noted. MMT 5/5 in DF/PF/Inv/Ev resistance with no reproduction of pain in any direction. Passive range of motion of ankle and pedal joints is painless b/l.     Neurological: She is alert and oriented to person, place, and time. She has normal strength. She displays no atrophy and no tremor. No sensory deficit. She exhibits normal muscle tone.   Negative tinel sign bilateral.   Skin: Skin is warm, dry and intact. No abrasion, no bruising, no burn, no ecchymosis, no laceration, no lesion, no petechiae and no rash noted. She is not diaphoretic. No cyanosis or erythema. No pallor. Nails show no clubbing.   Skin temperature, texture and turgor within normal limits.   Psychiatric: She has a normal mood and affect. Her behavior is normal.             Assessment:       Encounter Diagnoses   Name Primary?   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    Patient will continue to wear the over the counter arch supports and wear them in shoes whenever possible.  Athletic shoes intended for walking or running are usually best.    Patient will continue to stretch the tendo achilles complex three times daily as demonstrated in the office.  Literature was dispensed illustrating proper stretching technique.    Can use the voltaren gel as needed    Discussed injections if needed in the future    Can consider bracing if needed    Return EKATERINAN    Agusto Edwards DPM

## 2019-08-12 ENCOUNTER — TELEPHONE (OUTPATIENT)
Dept: PAIN MEDICINE | Facility: CLINIC | Age: 69
End: 2019-08-12

## 2019-08-12 NOTE — TELEPHONE ENCOUNTER
Patient is reporting mild back pain and she is requesting to schedule a repeat injection. She is wanting to schedule this on Oct 4th as she is going out of the country in mid October. Please advise.

## 2019-08-12 NOTE — TELEPHONE ENCOUNTER
----- Message from Joey Jeffrey sent at 8/12/2019 10:32 AM CDT -----  Contact: Lyudmila [phone]  Type:  Injection Appointment Request    Caller is requesting an injection appointment. Please place Orders and  contact patient to schedule.          Name of Caller: Lyudmila    Preferred appointment date and time? As soon as possible    Appointment Location? Dr. Pruett's office    Type of Injection: back injection     Would the patient rather a call back or a response via [facility]? Call back    Best Call Back Number: [phone]

## 2019-08-21 ENCOUNTER — OFFICE VISIT (OUTPATIENT)
Dept: FAMILY MEDICINE | Facility: CLINIC | Age: 69
End: 2019-08-21
Payer: MEDICARE

## 2019-08-21 VITALS
HEART RATE: 56 BPM | OXYGEN SATURATION: 97 % | SYSTOLIC BLOOD PRESSURE: 120 MMHG | WEIGHT: 164 LBS | DIASTOLIC BLOOD PRESSURE: 78 MMHG | BODY MASS INDEX: 29.06 KG/M2

## 2019-08-21 DIAGNOSIS — R60.0 LOWER LEG EDEMA: Primary | ICD-10-CM

## 2019-08-21 DIAGNOSIS — I10 ESSENTIAL HYPERTENSION: ICD-10-CM

## 2019-08-21 PROCEDURE — 1101F PT FALLS ASSESS-DOCD LE1/YR: CPT | Mod: HCNC,CPTII,S$GLB, | Performed by: PHYSICIAN ASSISTANT

## 2019-08-21 PROCEDURE — 99999 PR PBB SHADOW E&M-EST. PATIENT-LVL III: ICD-10-PCS | Mod: PBBFAC,HCNC,, | Performed by: PHYSICIAN ASSISTANT

## 2019-08-21 PROCEDURE — 1101F PR PT FALLS ASSESS DOC 0-1 FALLS W/OUT INJ PAST YR: ICD-10-PCS | Mod: HCNC,CPTII,S$GLB, | Performed by: PHYSICIAN ASSISTANT

## 2019-08-21 PROCEDURE — 3078F DIAST BP <80 MM HG: CPT | Mod: HCNC,CPTII,S$GLB, | Performed by: PHYSICIAN ASSISTANT

## 2019-08-21 PROCEDURE — 99214 PR OFFICE/OUTPT VISIT, EST, LEVL IV, 30-39 MIN: ICD-10-PCS | Mod: HCNC,S$GLB,, | Performed by: PHYSICIAN ASSISTANT

## 2019-08-21 PROCEDURE — 99999 PR PBB SHADOW E&M-EST. PATIENT-LVL III: CPT | Mod: PBBFAC,HCNC,, | Performed by: PHYSICIAN ASSISTANT

## 2019-08-21 PROCEDURE — 99214 OFFICE O/P EST MOD 30 MIN: CPT | Mod: HCNC,S$GLB,, | Performed by: PHYSICIAN ASSISTANT

## 2019-08-21 PROCEDURE — 3078F PR MOST RECENT DIASTOLIC BLOOD PRESSURE < 80 MM HG: ICD-10-PCS | Mod: HCNC,CPTII,S$GLB, | Performed by: PHYSICIAN ASSISTANT

## 2019-08-21 PROCEDURE — 3074F PR MOST RECENT SYSTOLIC BLOOD PRESSURE < 130 MM HG: ICD-10-PCS | Mod: HCNC,CPTII,S$GLB, | Performed by: PHYSICIAN ASSISTANT

## 2019-08-21 PROCEDURE — 3074F SYST BP LT 130 MM HG: CPT | Mod: HCNC,CPTII,S$GLB, | Performed by: PHYSICIAN ASSISTANT

## 2019-08-21 NOTE — PROGRESS NOTES
Subjective:       Patient ID: Lyudmila Neri is a 69 y.o. female    Chief Complaint: Leg Pain; Leg Swelling; and Sleep Apnea    HPI  The patient is new to me, PCP is Dr. Shultz.  She presents today complaining of lower leg edema that she has noticed for the past year.  She has a history of hypertension that is treated with amlodipine 5 mg once daily and lisinopril hydrochlorothiazide 112.5 once daily.  She reports that her legs started swelling at mid day and they feel heavy, she denies any pain. She also denies any shortness of breath, no chest or back pain, no cough.    Review of Systems   Constitutional: Negative for activity change, chills and fever.   HENT: Negative for congestion and sore throat.    Eyes: Negative for visual disturbance.   Respiratory: Negative for cough and shortness of breath.    Cardiovascular: Positive for leg swelling. Negative for chest pain and palpitations.   Gastrointestinal: Negative for abdominal pain, diarrhea, nausea and vomiting.   Endocrine: Negative for polydipsia and polyuria.   Musculoskeletal: Negative for myalgias.   Skin: Negative for rash.   Neurological: Negative for headaches.   Psychiatric/Behavioral: The patient is not nervous/anxious.         Objective:   Physical Exam   Constitutional: She is oriented to person, place, and time. She appears well-developed and well-nourished. No distress.   HENT:   Head: Normocephalic and atraumatic.   Eyes: Pupils are equal, round, and reactive to light. EOM are normal.   Neck: Normal range of motion. Neck supple.   Cardiovascular: Normal rate, regular rhythm, normal heart sounds and intact distal pulses. Exam reveals no gallop and no friction rub.   No murmur heard.  Pulmonary/Chest: Effort normal and breath sounds normal. No respiratory distress. She has no wheezes. She has no rales. She exhibits no tenderness.   Musculoskeletal: Normal range of motion. She exhibits edema (Right lower extremity shows 1+ pitting edema, no  tenderness to palpation, no calf tenderness bilaterally). She exhibits no tenderness.   Neurological: She is alert and oriented to person, place, and time.   Skin: Skin is warm and dry. No rash noted. She is not diaphoretic.   Psychiatric: She has a normal mood and affect. Her behavior is normal. Judgment and thought content normal.        Assessment:       1. Lower leg edema  US Lower Extremity Veins Bilateral   2. Essential hypertension          Plan:       Lower leg edema  -     US Lower Extremity Veins Bilateral; Future; Expected date: 08/21/2019  - recommended a low salt diet  - stop amlodipine and follow up with me in 4 weeks to recheck blood pressure    Essential hypertension  - stop amlodipine and continue losartan hydrochlorothiazide  - home log  - return to clinic 4 weeks

## 2019-08-22 ENCOUNTER — TELEPHONE (OUTPATIENT)
Dept: PAIN MEDICINE | Facility: CLINIC | Age: 69
End: 2019-08-22

## 2019-08-22 ENCOUNTER — TELEPHONE (OUTPATIENT)
Dept: OBSTETRICS AND GYNECOLOGY | Facility: CLINIC | Age: 69
End: 2019-08-22

## 2019-08-22 DIAGNOSIS — M54.16 LUMBAR RADICULOPATHY: Primary | ICD-10-CM

## 2019-08-22 DIAGNOSIS — N95.1 MENOPAUSAL SYMPTOMS: Primary | ICD-10-CM

## 2019-08-22 RX ORDER — ALPRAZOLAM 0.5 MG/1
1 TABLET, ORALLY DISINTEGRATING ORAL ONCE AS NEEDED
Status: CANCELLED | OUTPATIENT
Start: 2019-10-04 | End: 2031-03-01

## 2019-08-22 NOTE — TELEPHONE ENCOUNTER
Spoke with the patient and injection scheduled for 10/4 with a follow up also scheduled. Patient takes aspirin as a preventative medication and has been instructed to stop 7 days before the injection.

## 2019-08-22 NOTE — TELEPHONE ENCOUNTER
----- Message from Leif Potter sent at 8/22/2019  9:42 AM CDT -----  Contact: Patient  Type:  Patient Returning Call    Who Called:  Patient  Who Left Message for Patient:  unknown  Does the patient know what this is regarding?:  Scheduling injection  Best Call Back Number:  [phone]  Additional Information:  NA

## 2019-08-22 NOTE — TELEPHONE ENCOUNTER
----- Message from Nurys Quintana sent at 8/22/2019  9:52 AM CDT -----  Contact: MIKI HOUSTON [[id number]]  Name of Who is Calling: MIKI HOUSTON [[id number]]    What is the request in detail: Would like to have lab orders put in the system for pellet procedure. Please contact to further discuss and advise      Can the clinic reply by [facility]: no    What Number to Call Back if not in [facility]: [phone]

## 2019-09-04 ENCOUNTER — HOSPITAL ENCOUNTER (OUTPATIENT)
Dept: RADIOLOGY | Facility: HOSPITAL | Age: 69
Discharge: HOME | End: 2019-09-04
Attending: PHYSICIAN ASSISTANT
Payer: MEDICARE

## 2019-09-04 DIAGNOSIS — R60.0 LOWER LEG EDEMA: ICD-10-CM

## 2019-09-04 PROCEDURE — 93970 EXTREMITY STUDY: CPT | Mod: 26,HCNC,, | Performed by: RADIOLOGY

## 2019-09-04 PROCEDURE — 93970 EXTREMITY STUDY: CPT | Mod: TC,HCNC,PO

## 2019-09-04 PROCEDURE — 93970 US LOWER EXTREMITY VEINS BILATERAL: ICD-10-PCS | Mod: 26,HCNC,, | Performed by: RADIOLOGY

## 2019-09-05 DIAGNOSIS — I87.2 VENOUS INSUFFICIENCY OF BOTH LOWER EXTREMITIES: Primary | ICD-10-CM

## 2019-09-07 RX ORDER — OMEPRAZOLE 40 MG/1
CAPSULE, DELAYED RELEASE ORAL
Qty: 90 CAPSULE | Refills: 3 | Status: SHIPPED | OUTPATIENT
Start: 2019-09-07 | End: 2020-07-27 | Stop reason: SDUPTHER

## 2019-09-20 ENCOUNTER — OFFICE VISIT (OUTPATIENT)
Dept: FAMILY MEDICINE | Facility: CLINIC | Age: 69
End: 2019-09-20
Payer: MEDICARE

## 2019-09-20 VITALS
BODY MASS INDEX: 29.61 KG/M2 | OXYGEN SATURATION: 97 % | TEMPERATURE: 98 F | DIASTOLIC BLOOD PRESSURE: 74 MMHG | SYSTOLIC BLOOD PRESSURE: 120 MMHG | HEIGHT: 63 IN | HEART RATE: 80 BPM | WEIGHT: 167.13 LBS

## 2019-09-20 DIAGNOSIS — M71.21 BAKER CYST, RIGHT: ICD-10-CM

## 2019-09-20 DIAGNOSIS — I73.9 PERIPHERAL VASCULAR DISEASE: Primary | ICD-10-CM

## 2019-09-20 DIAGNOSIS — I10 ESSENTIAL HYPERTENSION: ICD-10-CM

## 2019-09-20 DIAGNOSIS — M71.22 BAKER CYST, LEFT: ICD-10-CM

## 2019-09-20 PROCEDURE — 3078F DIAST BP <80 MM HG: CPT | Mod: HCNC,CPTII,S$GLB, | Performed by: PHYSICIAN ASSISTANT

## 2019-09-20 PROCEDURE — 3074F SYST BP LT 130 MM HG: CPT | Mod: HCNC,CPTII,S$GLB, | Performed by: PHYSICIAN ASSISTANT

## 2019-09-20 PROCEDURE — 99999 PR PBB SHADOW E&M-EST. PATIENT-LVL III: CPT | Mod: PBBFAC,HCNC,, | Performed by: PHYSICIAN ASSISTANT

## 2019-09-20 PROCEDURE — 99499 RISK ADDL DX/OHS AUDIT: ICD-10-PCS | Mod: S$GLB,,, | Performed by: PHYSICIAN ASSISTANT

## 2019-09-20 PROCEDURE — 99214 OFFICE O/P EST MOD 30 MIN: CPT | Mod: HCNC,S$GLB,, | Performed by: PHYSICIAN ASSISTANT

## 2019-09-20 PROCEDURE — 99499 UNLISTED E&M SERVICE: CPT | Mod: S$GLB,,, | Performed by: PHYSICIAN ASSISTANT

## 2019-09-20 PROCEDURE — 3078F PR MOST RECENT DIASTOLIC BLOOD PRESSURE < 80 MM HG: ICD-10-PCS | Mod: HCNC,CPTII,S$GLB, | Performed by: PHYSICIAN ASSISTANT

## 2019-09-20 PROCEDURE — 3074F PR MOST RECENT SYSTOLIC BLOOD PRESSURE < 130 MM HG: ICD-10-PCS | Mod: HCNC,CPTII,S$GLB, | Performed by: PHYSICIAN ASSISTANT

## 2019-09-20 PROCEDURE — 1101F PT FALLS ASSESS-DOCD LE1/YR: CPT | Mod: HCNC,CPTII,S$GLB, | Performed by: PHYSICIAN ASSISTANT

## 2019-09-20 PROCEDURE — 99999 PR PBB SHADOW E&M-EST. PATIENT-LVL III: ICD-10-PCS | Mod: PBBFAC,HCNC,, | Performed by: PHYSICIAN ASSISTANT

## 2019-09-20 PROCEDURE — 99214 PR OFFICE/OUTPT VISIT, EST, LEVL IV, 30-39 MIN: ICD-10-PCS | Mod: HCNC,S$GLB,, | Performed by: PHYSICIAN ASSISTANT

## 2019-09-20 PROCEDURE — 1101F PR PT FALLS ASSESS DOC 0-1 FALLS W/OUT INJ PAST YR: ICD-10-PCS | Mod: HCNC,CPTII,S$GLB, | Performed by: PHYSICIAN ASSISTANT

## 2019-09-20 RX ORDER — AMLODIPINE BESYLATE 5 MG/1
5 TABLET ORAL DAILY PRN
COMMUNITY
End: 2019-12-27

## 2019-09-20 NOTE — PROGRESS NOTES
Subjective:       Patient ID: Lyudmila Neri is a 69 y.o. female    Chief Complaint: Follow-up    HPI  The patient presents today for follow-up for her leg swelling. She had an ultrasound done which showed bilateral Baker cysts and bilateral great saphenous vein insufficiency.  I discussed these findings with the patient.  She stopped her amlodipine but she continues to have mild leg edema. She has been taking her blood pressure at home and reports that is usually less than 140/90.    Review of Systems   Constitutional: Negative for activity change, chills and fever.   HENT: Negative for congestion and sore throat.    Eyes: Negative for visual disturbance.   Respiratory: Negative for cough and shortness of breath.    Cardiovascular: Positive for leg swelling. Negative for chest pain and palpitations.   Gastrointestinal: Negative for abdominal pain, diarrhea, nausea and vomiting.   Endocrine: Negative for polydipsia and polyuria.   Musculoskeletal: Negative for myalgias.   Skin: Negative for rash.   Neurological: Negative for headaches.   Psychiatric/Behavioral: The patient is not nervous/anxious.         Objective:   Physical Exam   Constitutional: She is oriented to person, place, and time. She appears well-developed and well-nourished. No distress.   HENT:   Head: Normocephalic and atraumatic.   Eyes: Pupils are equal, round, and reactive to light. EOM are normal.   Neck: Normal range of motion. Neck supple.   Cardiovascular: Normal rate, regular rhythm, normal heart sounds and intact distal pulses. Exam reveals no gallop and no friction rub.   No murmur heard.  Pulmonary/Chest: Effort normal and breath sounds normal. No respiratory distress. She has no wheezes. She has no rales. She exhibits no tenderness.   Musculoskeletal: Normal range of motion.   Neurological: She is alert and oriented to person, place, and time.   Skin: Skin is warm and dry. No rash noted. She is not diaphoretic.   Psychiatric: She has a  normal mood and affect. Her behavior is normal. Judgment and thought content normal.        Assessment:       1. Peripheral vascular disease     2. Baker cyst, left     3. Baker cyst, right     4. Essential hypertension          Plan:       Peripheral vascular disease  - advised patient to wear compression socks or stockings, elevate feet when resting    Baker cyst, left  - discussed with patient    Baker cyst, right  -discussed with patient    Essential hypertension  - blood pressure is stable, continue to stay off amlodipine now.

## 2019-09-26 ENCOUNTER — TELEPHONE (OUTPATIENT)
Dept: PAIN MEDICINE | Facility: CLINIC | Age: 69
End: 2019-09-26

## 2019-09-26 NOTE — TELEPHONE ENCOUNTER
----- Message from Ector Prince sent at 9/26/2019  9:33 AM CDT -----  Type: Needs Medical Advice    Who Called: self   Symptoms (please be specific):    How long has patient had these symptoms:    Pharmacy name and phone #:    Best Call Back Number:   Additional Information: Patient states she is out of the country, pt will rt the call, when she return to the [place].

## 2019-10-01 ENCOUNTER — TELEPHONE (OUTPATIENT)
Dept: PAIN MEDICINE | Facility: CLINIC | Age: 69
End: 2019-10-01

## 2019-10-01 NOTE — TELEPHONE ENCOUNTER
----- Message from Carlotta Kimble sent at 10/1/2019  8:41 AM CDT -----  Type: Needs Medical Advice    Who Called:  self  Symptoms (please be specific):  Cancel 10/04 procedure   How long has patient had these symptoms:     Pharmacy name and phone #:     Best Call Back Number: [phone] (home)     Additional Information: will call to reschedule

## 2019-10-07 ENCOUNTER — LAB VISIT (OUTPATIENT)
Dept: LAB | Facility: HOSPITAL | Age: 69
End: 2019-10-07
Attending: OBSTETRICS & GYNECOLOGY
Payer: MEDICARE

## 2019-10-07 DIAGNOSIS — N95.1 MENOPAUSAL SYMPTOMS: ICD-10-CM

## 2019-10-07 LAB — ESTRADIOL SERPL-MCNC: 288 PG/ML

## 2019-10-07 PROCEDURE — 36415 COLL VENOUS BLD VENIPUNCTURE: CPT | Mod: HCNC,PO

## 2019-10-07 PROCEDURE — 84402 ASSAY OF FREE TESTOSTERONE: CPT | Mod: HCNC

## 2019-10-07 PROCEDURE — 82670 ASSAY OF TOTAL ESTRADIOL: CPT | Mod: HCNC

## 2019-10-09 ENCOUNTER — PATIENT MESSAGE (OUTPATIENT)
Dept: FAMILY MEDICINE | Facility: CLINIC | Age: 69
End: 2019-10-09

## 2019-10-09 RX ORDER — CIPROFLOXACIN 500 MG/1
500 TABLET ORAL 2 TIMES DAILY
Qty: 14 TABLET | Refills: 0 | Status: SHIPPED | OUTPATIENT
Start: 2019-10-09 | End: 2019-10-16

## 2019-10-10 ENCOUNTER — PATIENT MESSAGE (OUTPATIENT)
Dept: OBSTETRICS AND GYNECOLOGY | Facility: CLINIC | Age: 69
End: 2019-10-10

## 2019-10-10 LAB — TESTOST FREE SERPL-MCNC: 0.8 PG/ML

## 2019-10-10 RX ORDER — ONDANSETRON 4 MG/1
4 TABLET, FILM COATED ORAL EVERY 8 HOURS PRN
Qty: 30 TABLET | Refills: 0 | Status: SHIPPED | OUTPATIENT
Start: 2019-10-10 | End: 2019-10-10 | Stop reason: SDUPTHER

## 2019-10-15 RX ORDER — ONDANSETRON 4 MG/1
4 TABLET, FILM COATED ORAL EVERY 8 HOURS PRN
Qty: 30 TABLET | Refills: 0 | Status: SHIPPED | OUTPATIENT
Start: 2019-10-15 | End: 2020-12-23

## 2019-11-21 ENCOUNTER — OFFICE VISIT (OUTPATIENT)
Dept: RHEUMATOLOGY | Facility: CLINIC | Age: 69
End: 2019-11-21
Payer: MEDICARE

## 2019-11-21 ENCOUNTER — TELEPHONE (OUTPATIENT)
Dept: RHEUMATOLOGY | Facility: CLINIC | Age: 69
End: 2019-11-21

## 2019-11-21 VITALS
HEIGHT: 63 IN | BODY MASS INDEX: 29.69 KG/M2 | SYSTOLIC BLOOD PRESSURE: 152 MMHG | DIASTOLIC BLOOD PRESSURE: 94 MMHG | WEIGHT: 167.56 LBS | HEART RATE: 80 BPM

## 2019-11-21 DIAGNOSIS — M19.90 ARTHRITIS: ICD-10-CM

## 2019-11-21 DIAGNOSIS — M15.9 PRIMARY OSTEOARTHRITIS INVOLVING MULTIPLE JOINTS: Primary | ICD-10-CM

## 2019-11-21 PROCEDURE — 99499 UNLISTED E&M SERVICE: CPT | Mod: HCNC,S$GLB,, | Performed by: INTERNAL MEDICINE

## 2019-11-21 PROCEDURE — 99499 RISK ADDL DX/OHS AUDIT: ICD-10-PCS | Mod: HCNC,S$GLB,, | Performed by: INTERNAL MEDICINE

## 2019-11-21 PROCEDURE — 99999 PR PBB SHADOW E&M-EST. PATIENT-LVL III: ICD-10-PCS | Mod: PBBFAC,HCNC,, | Performed by: INTERNAL MEDICINE

## 2019-11-21 PROCEDURE — 1159F PR MEDICATION LIST DOCUMENTED IN MEDICAL RECORD: ICD-10-PCS | Mod: HCNC,S$GLB,, | Performed by: INTERNAL MEDICINE

## 2019-11-21 PROCEDURE — 1125F AMNT PAIN NOTED PAIN PRSNT: CPT | Mod: HCNC,S$GLB,, | Performed by: INTERNAL MEDICINE

## 2019-11-21 PROCEDURE — 1159F MED LIST DOCD IN RCRD: CPT | Mod: HCNC,S$GLB,, | Performed by: INTERNAL MEDICINE

## 2019-11-21 PROCEDURE — 3077F PR MOST RECENT SYSTOLIC BLOOD PRESSURE >= 140 MM HG: ICD-10-PCS | Mod: HCNC,CPTII,S$GLB, | Performed by: INTERNAL MEDICINE

## 2019-11-21 PROCEDURE — 99214 PR OFFICE/OUTPT VISIT, EST, LEVL IV, 30-39 MIN: ICD-10-PCS | Mod: HCNC,S$GLB,, | Performed by: INTERNAL MEDICINE

## 2019-11-21 PROCEDURE — 3288F PR FALLS RISK ASSESSMENT DOCUMENTED: ICD-10-PCS | Mod: HCNC,CPTII,S$GLB, | Performed by: INTERNAL MEDICINE

## 2019-11-21 PROCEDURE — 1100F PR PT FALLS ASSESS DOC 2+ FALLS/FALL W/INJURY/YR: ICD-10-PCS | Mod: HCNC,CPTII,S$GLB, | Performed by: INTERNAL MEDICINE

## 2019-11-21 PROCEDURE — 99214 OFFICE O/P EST MOD 30 MIN: CPT | Mod: HCNC,S$GLB,, | Performed by: INTERNAL MEDICINE

## 2019-11-21 PROCEDURE — 1125F PR PAIN SEVERITY QUANTIFIED, PAIN PRESENT: ICD-10-PCS | Mod: HCNC,S$GLB,, | Performed by: INTERNAL MEDICINE

## 2019-11-21 PROCEDURE — 3080F DIAST BP >= 90 MM HG: CPT | Mod: HCNC,CPTII,S$GLB, | Performed by: INTERNAL MEDICINE

## 2019-11-21 PROCEDURE — 3077F SYST BP >= 140 MM HG: CPT | Mod: HCNC,CPTII,S$GLB, | Performed by: INTERNAL MEDICINE

## 2019-11-21 PROCEDURE — 99999 PR PBB SHADOW E&M-EST. PATIENT-LVL III: CPT | Mod: PBBFAC,HCNC,, | Performed by: INTERNAL MEDICINE

## 2019-11-21 PROCEDURE — 3288F FALL RISK ASSESSMENT DOCD: CPT | Mod: HCNC,CPTII,S$GLB, | Performed by: INTERNAL MEDICINE

## 2019-11-21 PROCEDURE — 3080F PR MOST RECENT DIASTOLIC BLOOD PRESSURE >= 90 MM HG: ICD-10-PCS | Mod: HCNC,CPTII,S$GLB, | Performed by: INTERNAL MEDICINE

## 2019-11-21 PROCEDURE — 1100F PTFALLS ASSESS-DOCD GE2>/YR: CPT | Mod: HCNC,CPTII,S$GLB, | Performed by: INTERNAL MEDICINE

## 2019-11-21 RX ORDER — CELECOXIB 200 MG/1
200 CAPSULE ORAL 2 TIMES DAILY
Qty: 180 CAPSULE | Refills: 2 | Status: SHIPPED | OUTPATIENT
Start: 2019-11-21 | End: 2020-12-23

## 2019-11-21 ASSESSMENT — ROUTINE ASSESSMENT OF PATIENT INDEX DATA (RAPID3)
PATIENT GLOBAL ASSESSMENT SCORE: 2
TOTAL RAPID3 SCORE: 3.78
PSYCHOLOGICAL DISTRESS SCORE: 3.3
PAIN SCORE: 7
MDHAQ FUNCTION SCORE: .7

## 2019-11-21 NOTE — PROGRESS NOTES
Subjective:          Chief Complaint: Lyudmila Neri is a 69 y.o. female who had concerns including Disease Management and Pain (feet).    HPI:    Patient is a 69-year-old female her for annual f/u of OA  we did start Celebrex and having significantly improved overall.    Feet now painful all the time, previously only end of day. Pain first thing in AM, pain at rest. Dorsal mid foot predominant. Not as much in toes  Exacerbated with standing. Trouble in past with orthotics and tolerance. Current sneakers not comfortable.   Notes much better when taking Celebrex 200mg BID>   Not using any voltaren.        Taking Celebrex 200mg twice daily- did try for Celebrex once daily   BP has been good  Patient was seen by Dr. Post and as of 07/16/2019 with recommendation for continued inserts stretching of the Achilles tendon and consideration for injections as needed.         As a history of nonerosive erosive reflux gastritis as well as GIST resected 2011 follows with Dr. Pro.  Recent serologies rheumatoid factor negative, sedimentation rate within normal limits, SINDI negative, C-reactive protein is slightly elevated.  Affected joints: knees, shoulders cervical spine (hx of fusion), hips, hands are stiff in the CMC and MCP and PIP, feet. Right ankle with known fx and ORIF.   Stifffness in AM 45 min with hot shower.   No dactylitis, no known other joint swelling. No IBD. She notes drys eyes, no scleritis, episcleritis  She did use aleve which helps, meloxicam not really. Celebrex does help but cannot use BID due to HTN. Did recently have BP meds adjusted.  Is having relief but not complete. No GI upset.   She has sensitivity to soft tissue pain.  She has hx of Psoriasis that is controlled for at least 10 years-scalp predominant but diffusely.   A new well marginated erosion present at the radial aspect of the base of the right fourth proximal phalanx joint space narrowing in the third fourth MCP and the left third MCP  joint degenerative changes otherwise.    REVIEW OF SYSTEMS:    Review of Systems   Constitutional: Positive for malaise/fatigue. Negative for fever and weight loss.   HENT: Negative for sore throat.    Eyes: Negative for double vision, photophobia and redness.   Respiratory: Negative for cough, shortness of breath and wheezing.    Cardiovascular: Negative for chest pain, palpitations and orthopnea.   Gastrointestinal: Negative for abdominal pain, constipation and diarrhea.   Genitourinary: Negative for dysuria, hematuria and urgency.   Musculoskeletal: Positive for joint pain and myalgias. Negative for back pain.   Skin: Negative for rash.   Neurological: Negative for dizziness, tingling, focal weakness and headaches.   Endo/Heme/Allergies: Does not bruise/bleed easily.   Psychiatric/Behavioral: Negative for depression, hallucinations and suicidal ideas.               Objective:            Past Medical History:   Diagnosis Date    Anticoagulant long-term use     Arthritis     osteoarthritis    Blood transfusion     Cancer 2011    stromal tumor, stomach    Depression     Disorder of kidney and ureter     CKD 2    GERD (gastroesophageal reflux disease)     GIST (gastrointestinal stromal tumor), malignant     H. pylori infection     Hypertension     KATHY (obstructive sleep apnea) 6/24/2013    Psoriasis 6/24/2013    Trouble in sleeping     arthritic pain wakes her up    Urinary incontinence     stress incontinence     Family History   Problem Relation Age of Onset    Heart disease Mother     Arthritis Mother         osteoarthritis    COPD Mother     Hyperlipidemia Mother     Hypertension Mother     Kidney disease Mother     Stroke Mother     Colon cancer Mother 75    Cancer Mother 70        colon cancer    Cancer Father         brain and lung    Arthritis Father     COPD Sister     Depression Daughter     Arthritis Maternal Aunt         rheumatoid arthritis    Heart disease Maternal Uncle      Early death Maternal Uncle         in 50s due to heart disease    Arthritis Paternal Aunt         rheuamtoid arthritis    Heart disease Maternal Grandfather     Arthritis Paternal Grandmother         rheumatoid arthritis    Diabetes Paternal Grandmother     Diabetes Cousin     Heart disease Cousin     Crohn's disease Neg Hx     Stomach cancer Neg Hx     Ulcerative colitis Neg Hx     Esophageal cancer Neg Hx      Social History     Tobacco Use    Smoking status: Never Smoker    Smokeless tobacco: Never Used   Substance Use Topics    Alcohol use: No    Drug use: No         Current Outpatient Medications on File Prior to Visit   Medication Sig Dispense Refill    amLODIPine (NORVASC) 5 MG tablet Take 5 mg by mouth daily as needed.      aspirin (ECOTRIN) 81 MG EC tablet Take 81 mg by mouth once daily.      celecoxib (CELEBREX) 200 MG capsule Take 1 capsule (200 mg total) by mouth 2 (two) times daily. 180 capsule 2    cholecalciferol, vitamin D3, (VITAMIN D3) 1,000 unit capsule Take 1,000 Units by mouth once daily.      clobetasol (TEMOVATE) 0.05 % external solution Apply topically as needed.       diclofenac sodium (VOLTAREN) 1 % Gel Apply 2 g topically 3 (three) times daily as needed. 100 g 5    fish oil-omega-3 fatty acids 300-1,000 mg capsule Take by mouth once daily.      FLUZONE HIGH-DOSE 2019-20, PF, 180 mcg/0.5 mL Syrg       FOLIC ACID/MULTIVIT-MIN/LUTEIN (CENTRUM SILVER ORAL) Take by mouth.      lidocaine 3.75 % Crea Apply 1 application topically 2 (two) times daily as needed (Pain). 60 g 0    losartan-hydrochlorothiazide 100-12.5 mg (HYZAAR) 100-12.5 mg Tab TAKE 1 TABLET BY MOUTH EVERY DAY 90 tablet 3    omeprazole (PRILOSEC) 40 MG capsule TAKE 1 CAPSULE(40 MG) BY MOUTH BEFORE BREAKFAST 90 capsule 3    ondansetron (ZOFRAN) 4 MG tablet Take 1 tablet (4 mg total) by mouth every 8 (eight) hours as needed for Nausea. 30 tablet 0    ranitidine (ZANTAC) 300 MG tablet Take 1 tablet (300  mg total) by mouth every evening. , about 30-60 minutes before bedtime. 60 tablet 5    mirabegron (MYRBETRIQ) 50 mg Tb24 Take 1 tablet (50 mg total) by mouth once daily. (Patient not taking: Reported on 11/21/2019) 90 tablet 3     Current Facility-Administered Medications on File Prior to Visit   Medication Dose Route Frequency Provider Last Rate Last Dose    estradiol 50 mg pellet  100 mg Intramuscular Once Afua Morales MD        estradiol 50 mg pellet  100 mg Intramuscular Once Afua Morales MD           Vitals:    11/21/19 1337   BP: (!) 152/94   Pulse: 80       Physical Exam:    Physical Exam   Constitutional: She appears well-developed and well-nourished.   HENT:   Nose: No septal deviation.   Mouth/Throat: Mucous membranes are normal. No oral lesions.   Eyes: Pupils are equal, round, and reactive to light. Right conjunctiva is not injected. Left conjunctiva is not injected.   Neck: No JVD present. No thyroid mass and no thyromegaly present.   Cardiovascular: Normal rate, regular rhythm and normal pulses.   No edema   Pulmonary/Chest: Effort normal and breath sounds normal.   Abdominal: Soft. Normal appearance. There is no hepatosplenomegaly.   Musculoskeletal:        Right shoulder: She exhibits tenderness. She exhibits normal range of motion and no swelling.        Left shoulder: She exhibits tenderness. She exhibits normal range of motion and no swelling.        Right elbow: She exhibits normal range of motion and no swelling. No tenderness found.        Left elbow: She exhibits normal range of motion and no swelling. No tenderness found.        Right wrist: She exhibits normal range of motion, no tenderness and no swelling.        Left wrist: She exhibits normal range of motion, no tenderness and no swelling.        Right hip: She exhibits normal range of motion, normal strength and no swelling.        Left hip: She exhibits normal range of motion, no tenderness and no swelling.         Right knee: She exhibits normal range of motion and no swelling. Tenderness found.        Left knee: She exhibits normal range of motion and no swelling. Tenderness found.        Right ankle: She exhibits normal range of motion and no swelling. No tenderness.        Left ankle: She exhibits normal range of motion and no swelling. No tenderness.        Right hand: She exhibits tenderness.        Left hand: She exhibits tenderness.        Right foot: There is tenderness.        Left foot: There is tenderness.   2nd DIP b/l bony hypertrophy.   No active synovitis but tenderness at fingers, shoulders, knees and metatarsalgia.   No nodules of the achilles.    Lymphadenopathy:     She has no cervical adenopathy.     She has no axillary adenopathy.   Neurological: She has normal strength and normal reflexes.   Skin: Skin is dry and intact.   Psychiatric: She has a normal mood and affect.             Assessment:       Encounter Diagnoses   Name Primary?    Arthritis     Primary osteoarthritis involving multiple joints Yes          Plan:        Primary osteoarthritis involving multiple joints  -     Basic metabolic panel; Future; Expected date: 11/21/2019    Arthritis  -     celecoxib (CELEBREX) 200 MG capsule; Take 1 capsule (200 mg total) by mouth 2 (two) times daily. Brand only  Dispense: 180 capsule; Refill: 2  -     Basic metabolic panel; Future; Expected date: 11/21/2019       OA  Celebrex is working.   Going to try to take Celebrex 200mg once daily and Tylenol 650mg bid  Voltaren. And see if helps.   Ok to use second Celebrex for bad days.   Would like to find the lowest effective dose that allows quality of life for her joint pain.     Follow up in about 6 months (around 5/21/2020).         30min consultation with greater than 50% spent in counseling, chart review and coordination of care. All questions answered.

## 2019-11-21 NOTE — PATIENT INSTRUCTIONS
Try again     Celebrex 200mg once daily  Tylenol 650mg twice daily  Voltaren topical up to 4 times daily     All of it. And let me know.     KT tape look up mid foot option.   Big toe-hallux rigidus

## 2019-11-21 NOTE — TELEPHONE ENCOUNTER
Pt celebrex denied by insurance/non formulary. Wanting pt to try mobic, ibuprofen and naproxen first. Pt had naproxen 2016, failed therapy, but hasn't tried mobic or ibuprofen. please advise.

## 2019-11-26 NOTE — TELEPHONE ENCOUNTER
She has been on this for years was there a change in formulary.   The reason for Celebrex is for her hx of reflux   Meloxicam is once daily and she has taken in past per my note states did not help her.   Naproxen was some help over the counter.     Is this just something we need to appeal?  Dr. FARIAS

## 2019-12-13 ENCOUNTER — IMMUNIZATION (OUTPATIENT)
Dept: PHARMACY | Facility: CLINIC | Age: 69
End: 2019-12-13
Payer: MEDICARE

## 2019-12-27 RX ORDER — AMLODIPINE BESYLATE 5 MG/1
TABLET ORAL
Qty: 90 TABLET | Refills: 0 | Status: SHIPPED | OUTPATIENT
Start: 2019-12-27 | End: 2020-06-10 | Stop reason: SDUPTHER

## 2020-02-12 ENCOUNTER — IMMUNIZATION (OUTPATIENT)
Dept: PHARMACY | Facility: CLINIC | Age: 70
End: 2020-02-12
Payer: MEDICARE

## 2020-02-21 ENCOUNTER — TELEPHONE (OUTPATIENT)
Dept: FAMILY MEDICINE | Facility: CLINIC | Age: 70
End: 2020-02-21

## 2020-02-21 DIAGNOSIS — Z12.31 ENCOUNTER FOR SCREENING MAMMOGRAM FOR MALIGNANT NEOPLASM OF BREAST: Primary | ICD-10-CM

## 2020-02-21 NOTE — TELEPHONE ENCOUNTER
----- Message from Genia Alberto sent at 2/21/2020  9:15 AM CST -----  Type: Needs Medical Advice    Who Called:  patient  Best Call Back Number: [phone]  Additional Information: requesting orders for mammo.

## 2020-02-24 ENCOUNTER — HOSPITAL ENCOUNTER (OUTPATIENT)
Dept: RADIOLOGY | Facility: HOSPITAL | Age: 70
Discharge: HOME | End: 2020-02-24
Attending: NURSE PRACTITIONER
Payer: MEDICARE

## 2020-02-24 DIAGNOSIS — Z85.09 HISTORY OF GASTROINTESTINAL STROMAL TUMOR (GIST): ICD-10-CM

## 2020-02-24 PROCEDURE — 71046 X-RAY EXAM CHEST 2 VIEWS: CPT | Mod: 26,HCNC,, | Performed by: RADIOLOGY

## 2020-02-24 PROCEDURE — 71046 X-RAY EXAM CHEST 2 VIEWS: CPT | Mod: TC,HCNC,FY,PO

## 2020-02-24 PROCEDURE — 71046 XR CHEST PA AND LATERAL: ICD-10-PCS | Mod: 26,HCNC,, | Performed by: RADIOLOGY

## 2020-03-03 ENCOUNTER — PATIENT MESSAGE (OUTPATIENT)
Dept: OBSTETRICS AND GYNECOLOGY | Facility: CLINIC | Age: 70
End: 2020-03-03

## 2020-03-06 ENCOUNTER — TELEPHONE (OUTPATIENT)
Dept: HEMATOLOGY/ONCOLOGY | Facility: CLINIC | Age: 70
End: 2020-03-06

## 2020-03-06 NOTE — TELEPHONE ENCOUNTER
----- Message from Aury Phillips sent at 3/6/2020  8:25 AM CST -----  Contact: pt [phone]  Appt patient called she has to reschedule her appt she has to work today . Please call back.

## 2020-03-16 ENCOUNTER — TELEPHONE (OUTPATIENT)
Dept: OBSTETRICS AND GYNECOLOGY | Facility: CLINIC | Age: 70
End: 2020-03-16

## 2020-03-16 NOTE — TELEPHONE ENCOUNTER
----- Message from Nurys Quintana sent at 3/16/2020  9:20 AM CDT -----  Contact: MIKI HOUSTON [[id number]]  Name of Who is Calling: MIKI HOUSTON [[id number]]    What is the request in detail: Would like to speak with staff in regards to tomorrow's procedure. Please contact to further discuss and advise      Can the clinic reply by [facility]: no    [facility]: [phone]

## 2020-03-24 ENCOUNTER — TELEPHONE (OUTPATIENT)
Dept: HEMATOLOGY/ONCOLOGY | Facility: CLINIC | Age: 70
End: 2020-03-24

## 2020-03-24 NOTE — TELEPHONE ENCOUNTER
LVM that appointment had been rescheduled to May.  Patient to call if she needs to change dates.    ----- Message from Desiree Leija sent at 3/24/2020  1:37 PM CDT -----  Contact: self  Patient is requesting a call to see if the appt is absolutely necessary.  Call back at [phone] (home).  Thanks

## 2020-04-28 ENCOUNTER — TELEPHONE (OUTPATIENT)
Dept: FAMILY MEDICINE | Facility: CLINIC | Age: 70
End: 2020-04-28

## 2020-04-28 ENCOUNTER — PROCEDURE VISIT (OUTPATIENT)
Dept: OBSTETRICS AND GYNECOLOGY | Facility: CLINIC | Age: 70
End: 2020-04-28
Attending: OBSTETRICS & GYNECOLOGY
Payer: MEDICARE

## 2020-04-28 VITALS
WEIGHT: 166.25 LBS | SYSTOLIC BLOOD PRESSURE: 122 MMHG | HEIGHT: 63 IN | DIASTOLIC BLOOD PRESSURE: 90 MMHG | BODY MASS INDEX: 29.46 KG/M2

## 2020-04-28 DIAGNOSIS — Z78.0 MENOPAUSE: Primary | ICD-10-CM

## 2020-04-28 PROCEDURE — 11980 PR IMPLANT,HORMONE,SUBCUTANEOUS: ICD-10-PCS | Mod: CSM,S$GLB,, | Performed by: OBSTETRICS & GYNECOLOGY

## 2020-04-28 PROCEDURE — 11980 IMPLANT HORMONE PELLET(S): CPT | Mod: CSM,S$GLB,, | Performed by: OBSTETRICS & GYNECOLOGY

## 2020-04-28 NOTE — PATIENT INSTRUCTIONS
Follow the information you have been given by your doctor. Here are some additional instructions and reminders on how to care for yourself once you're home.     Keep pressure on the area for 1 hour. You may have some discomfort. This will go away.   A slight bloody discharge is normal.   A slight yellow discharge of serum is normal.   Pellet can be felt below the skin cut. This is normal.   Breast and vulva tingling and feeling of fullness is normal.   Change your bandage daily for 5 to 7 days.   If small butterfly tape comes off, you do not need to do anything.   You may remove butterfly tape after 5 days.   You may shower the next morning after your procedure. Do NOT take a bath.   Change your bandage if it becomes wet.   If one small white pellet comes out, just wipe off with alcohol and throw away.   If area becomes tender and inflamed, take the antibiotic medication you have been given.   If the suture looks like it has melted, schedule an appointment to have it removed.   Do not do exercise or stretch for 3 days.   Some fluid retention can occur in first weeks or later.   In about 1 month, this area will feel like a lump. This is normal.    If you have any questions, contact the [facility] at [phone].    CHELSEA Morales MD  [facility]  [address], [address]  Phone: [phone]  Fax: [phone]

## 2020-04-28 NOTE — PROCEDURES
Procedures     This 70 yr old female is here for pellet insertion.  She has had a hyst.  Last pellet in 04/19 and she received E/T 100/100.  Her lab in 2/20 showed estradiol 271 and her Testosterone was <0.2.  We discussed and will decrease the estradiol to 50 with today's pellet.  She has a  history of GI stromal ca.      Lyudmila Neri  70 y.o. female is here for her numerous.pellet insertion. She did havea hysterectomy.  She has signed her informed consent outlining the procedure and the implant of the pellets . Pt is also aware of the risks and benefits of estradiol and testosterone and that this mode of delivery is not FDA approved even though the actual drug is FDA approved.      After proper informed consent was obtained, pt laid prone on the examining table. With alcohol prep in place, 10 cc of 1% lidocaine with out epinepherine was placed in a tracking formation for the trochar. This was allowed to set for 15 min. The right buttock was prepped and draped in the usual fashion for a sterile procedure using betadine.  With an 11 blade a one cm incision was made.  The sterile trochar was used to insert 100 mg of Testosterone in pellet form followed by 50 mg of Estradiol in pellet form.  Pressure was applied to the incision site for hemostasis.  A steri strip was placed and a pressure dressing placed to be removed no earlier than 4 hours.      Pt tolerated the procedure well and there were no complications.    Pt was instructed to call for fever or redness of the area.  We will call in Rx if infection occurs.    Pt also was instructed to call with adverse effects or in 6 months and to follow up with labs in 4 months

## 2020-04-28 NOTE — TELEPHONE ENCOUNTER
----- Message from Molina COSME Candis sent at 4/28/2020  1:06 PM CDT -----  Contact: same  Patient called in and stated she got a burn on her left arm when getting a hot pain out of oven.  Patient wanted to see if a Rx could be called in for her?  Patient stated she would not be able to do a Virtual.        [id number] - [place] [address] AT [address] [facility] & [address] [address]  Phone: [phone] Fax: [phone]    Patient call back number is [phone]

## 2020-05-05 ENCOUNTER — TELEPHONE (OUTPATIENT)
Dept: PODIATRY | Facility: CLINIC | Age: 70
End: 2020-05-05

## 2020-05-05 NOTE — TELEPHONE ENCOUNTER
----- Message from Marky Carlin sent at 5/5/2020  2:02 PM CDT -----  Contact: pt  Type:  Patient Returning Call    Who Called:  pt  Who Left Message for Patient:  Tsering  Does the patient know what this is regarding?:    Best Call Back Number:  [phone]  Additional Information:

## 2020-05-05 NOTE — TELEPHONE ENCOUNTER
----- Message from Ashley Rose sent at 5/5/2020 11:10 AM CDT -----  Contact: self  Type: Needs Medical Advice  Who Called:  self  Symptoms (please be specific):    How long has patient had these symptoms:    Pharmacy name and phone #:    Best Call Back Number: [phone] (home)   Additional Information: Patient would like a call back about getting the injections approved so she can have it done at her appointment on 05/11/20. Please call patient to advise. Thanks!

## 2020-05-05 NOTE — TELEPHONE ENCOUNTER
Patient asked if she needed prior approval for injections given in clinic.  Informed her that we have never had to do this.  Patient verbalized understanding.

## 2020-05-06 ENCOUNTER — PATIENT MESSAGE (OUTPATIENT)
Dept: ADMINISTRATIVE | Facility: HOSPITAL | Age: 70
End: 2020-05-06

## 2020-05-06 ENCOUNTER — HOSPITAL ENCOUNTER (OUTPATIENT)
Dept: RADIOLOGY | Facility: HOSPITAL | Age: 70
Discharge: HOME | End: 2020-05-06
Attending: FAMILY MEDICINE
Payer: MEDICARE

## 2020-05-06 DIAGNOSIS — Z12.31 ENCOUNTER FOR SCREENING MAMMOGRAM FOR MALIGNANT NEOPLASM OF BREAST: ICD-10-CM

## 2020-05-06 PROCEDURE — 77063 BREAST TOMOSYNTHESIS BI: CPT | Mod: 26,HCNC,, | Performed by: RADIOLOGY

## 2020-05-06 PROCEDURE — 77067 MAMMO DIGITAL SCREENING BILAT WITH TOMOSYNTHESIS_CAD: ICD-10-PCS | Mod: 26,HCNC,, | Performed by: RADIOLOGY

## 2020-05-06 PROCEDURE — 77063 MAMMO DIGITAL SCREENING BILAT WITH TOMOSYNTHESIS_CAD: ICD-10-PCS | Mod: 26,HCNC,, | Performed by: RADIOLOGY

## 2020-05-06 PROCEDURE — 77067 SCR MAMMO BI INCL CAD: CPT | Mod: TC,HCNC,PO

## 2020-05-06 PROCEDURE — 77067 SCR MAMMO BI INCL CAD: CPT | Mod: 26,HCNC,, | Performed by: RADIOLOGY

## 2020-05-11 ENCOUNTER — PATIENT MESSAGE (OUTPATIENT)
Dept: HEMATOLOGY/ONCOLOGY | Facility: CLINIC | Age: 70
End: 2020-05-11

## 2020-05-11 ENCOUNTER — TELEPHONE (OUTPATIENT)
Dept: HEMATOLOGY/ONCOLOGY | Facility: CLINIC | Age: 70
End: 2020-05-11

## 2020-05-11 ENCOUNTER — OFFICE VISIT (OUTPATIENT)
Dept: PODIATRY | Facility: CLINIC | Age: 70
End: 2020-05-11
Payer: MEDICARE

## 2020-05-11 VITALS
HEIGHT: 63 IN | WEIGHT: 165.13 LBS | HEART RATE: 69 BPM | DIASTOLIC BLOOD PRESSURE: 73 MMHG | SYSTOLIC BLOOD PRESSURE: 121 MMHG | TEMPERATURE: 99 F | BODY MASS INDEX: 29.26 KG/M2

## 2020-05-11 DIAGNOSIS — M19.079 ARTHRITIS, MIDFOOT: Primary | ICD-10-CM

## 2020-05-11 PROCEDURE — 3078F PR MOST RECENT DIASTOLIC BLOOD PRESSURE < 80 MM HG: ICD-10-PCS | Mod: HCNC,CPTII,S$GLB, | Performed by: PODIATRIST

## 2020-05-11 PROCEDURE — 3074F SYST BP LT 130 MM HG: CPT | Mod: HCNC,CPTII,S$GLB, | Performed by: PODIATRIST

## 2020-05-11 PROCEDURE — 1101F PT FALLS ASSESS-DOCD LE1/YR: CPT | Mod: HCNC,CPTII,S$GLB, | Performed by: PODIATRIST

## 2020-05-11 PROCEDURE — 1101F PR PT FALLS ASSESS DOC 0-1 FALLS W/OUT INJ PAST YR: ICD-10-PCS | Mod: HCNC,CPTII,S$GLB, | Performed by: PODIATRIST

## 2020-05-11 PROCEDURE — 3074F PR MOST RECENT SYSTOLIC BLOOD PRESSURE < 130 MM HG: ICD-10-PCS | Mod: HCNC,CPTII,S$GLB, | Performed by: PODIATRIST

## 2020-05-11 PROCEDURE — 1159F PR MEDICATION LIST DOCUMENTED IN MEDICAL RECORD: ICD-10-PCS | Mod: HCNC,S$GLB,, | Performed by: PODIATRIST

## 2020-05-11 PROCEDURE — 20600 DRAIN/INJ JOINT/BURSA W/O US: CPT | Mod: 59,HCNC,RT,S$GLB | Performed by: PODIATRIST

## 2020-05-11 PROCEDURE — 1159F MED LIST DOCD IN RCRD: CPT | Mod: HCNC,S$GLB,, | Performed by: PODIATRIST

## 2020-05-11 PROCEDURE — 99999 PR PBB SHADOW E&M-EST. PATIENT-LVL III: CPT | Mod: PBBFAC,HCNC,, | Performed by: PODIATRIST

## 2020-05-11 PROCEDURE — 1125F AMNT PAIN NOTED PAIN PRSNT: CPT | Mod: HCNC,S$GLB,, | Performed by: PODIATRIST

## 2020-05-11 PROCEDURE — 3078F DIAST BP <80 MM HG: CPT | Mod: HCNC,CPTII,S$GLB, | Performed by: PODIATRIST

## 2020-05-11 PROCEDURE — 99999 PR PBB SHADOW E&M-EST. PATIENT-LVL III: ICD-10-PCS | Mod: PBBFAC,HCNC,, | Performed by: PODIATRIST

## 2020-05-11 PROCEDURE — 99213 OFFICE O/P EST LOW 20 MIN: CPT | Mod: 25,HCNC,S$GLB, | Performed by: PODIATRIST

## 2020-05-11 PROCEDURE — 99213 PR OFFICE/OUTPT VISIT, EST, LEVL III, 20-29 MIN: ICD-10-PCS | Mod: 25,HCNC,S$GLB, | Performed by: PODIATRIST

## 2020-05-11 PROCEDURE — 20600 PR DRAIN/INJECT SMALL JOINT/BURSA: ICD-10-PCS | Mod: 59,HCNC,RT,S$GLB | Performed by: PODIATRIST

## 2020-05-11 PROCEDURE — 1125F PR PAIN SEVERITY QUANTIFIED, PAIN PRESENT: ICD-10-PCS | Mod: HCNC,S$GLB,, | Performed by: PODIATRIST

## 2020-05-11 RX ORDER — METHYLPREDNISOLONE ACETATE 40 MG/ML
40 INJECTION, SUSPENSION INTRA-ARTICULAR; INTRALESIONAL; INTRAMUSCULAR; SOFT TISSUE
Status: COMPLETED | OUTPATIENT
Start: 2020-05-11 | End: 2020-05-11

## 2020-05-11 RX ADMIN — METHYLPREDNISOLONE ACETATE 40 MG: 40 INJECTION, SUSPENSION INTRA-ARTICULAR; INTRALESIONAL; INTRAMUSCULAR; SOFT TISSUE at 10:05

## 2020-05-11 NOTE — TELEPHONE ENCOUNTER
Due to ongoing COVID 19 concerns, per Aleksandr Das, NP - called patient, no answer, left message on vm that we are not currently seeing follow ups in the office as of yet, Aleksandr has reviewed lab and cxr, all good, I know that March 2020 appt was rescheduled to 5/12/20 in anticipation of COVID 19 concerns, but we are not as of today seeing follow up patients in the office. Aleksandr instructed to reschedule out 4 weeks.  Asked patient to respond via phone or via [facility] patient portal..      Message sent via [employer] patient portal emal also

## 2020-05-11 NOTE — PROGRESS NOTES
Subjective:      Patient ID: Lyudmila Neri is a 70 y.o. female.    Chief Complaint: Foot Pain (right worse than left)    Lyudmila is a 70 y.o. female who presents to the podiatry clinic  with complaint of  bilateral foot pain. Onset of the symptoms was several months ago. Precipitating event: none known. Current symptoms include: ability to bear weight, but with some pain and worsening symptoms after a period of activity. Aggravating factors: any weight bearing. Symptoms have gradually worsened. Patient has had prior foot problems. Evaluation to date: none. Treatment to date: celebrex and voltaren gel help. Patients rates pain 4/10 on pain scale.    5/11/20: Patient returns with worsening pain bilateral feet secondary to her midfoot arthritis, wearing good shoes with minimal help.     Review of Systems   Constitution: Negative for chills and fever.   Cardiovascular: Negative for claudication and leg swelling.   Respiratory: Negative for shortness of breath.    Skin: Negative for itching, nail changes and rash.   Musculoskeletal: Positive for arthritis and joint pain. Negative for muscle cramps, muscle weakness and myalgias.   Gastrointestinal: Negative for nausea and vomiting.   Neurological: Negative for focal weakness, loss of balance, numbness and paresthesias.           Objective:      Physical Exam   Constitutional: She is oriented to person, place, and time. She appears well-developed and well-nourished. No distress.   Cardiovascular:   Pulses:       Dorsalis pedis pulses are 2+ on the right side, and 2+ on the left side.        Posterior tibial pulses are 2+ on the right side, and 2+ on the left side.   < 3 sec capillary refill time to toes 1-5 bilateral. Toes and feet are warm to touch proximally with normal distal cooling b/l. There is some hair growth on the feet and toes b/l. There is no edema b/l. No spider veins or varicosities present b/l.      Musculoskeletal:   Bilateral feet there is pain to  palpation and motion at the midfoot (lisfranc joint) area and left NC joint as well. There is some discomfort to ROM at the bilateral first MTPJ more to the right.    Equinus noted b/l ankles with < 5 deg DF noted. MMT 5/5 in DF/PF/Inv/Ev resistance with no reproduction of pain in any direction. Passive range of motion of ankle and pedal joints is painless b/l.     Neurological: She is alert and oriented to person, place, and time. She has normal strength. She displays no atrophy and no tremor. No sensory deficit. She exhibits normal muscle tone.   Negative tinel sign bilateral.   Skin: Skin is warm, dry and intact. No abrasion, no bruising, no burn, no ecchymosis, no laceration, no lesion, no petechiae and no rash noted. She is not diaphoretic. No cyanosis or erythema. No pallor. Nails show no clubbing.   Skin temperature, texture and turgor within normal limits.   Psychiatric: She has a normal mood and affect. Her behavior is normal.             Assessment:       Encounter Diagnosis   Name Primary?    Arthritis, midfoot Yes         Plan:       Lyudmila was seen today for foot pain.    Diagnoses and all orders for this visit:    Arthritis, midfoot  -     methylPREDNISolone acetate injection 40 mg      I counseled the patient on her conditions, their implications and medical management.    Patient will continue to wear the over the counter arch supports and wear them in shoes whenever possible.  Athletic shoes intended for walking or running are usually best.    Patient will continue to stretch the tendo achilles complex three times daily as demonstrated in the office.  Literature was dispensed illustrating proper stretching technique.    Obtained verbal consent from the patient to inject the affected joint bilateral foot. Skin prepped with alcohol and skin anesthesia achieved with ethyl chloride. Injected 20 mg depo medrol with 1 mL 1% plain lidocaine first into the right first tarsometatarsal joint, then the left  NC joint. Patient reported relief with the injection. Patient tolerated the procedure and anesthesia well without any complications.    Discussed PT and custom orthotics    Can consider bracing if needed, discussed surgical medial column fusion if necessary    Return 6 week follow up    Agusto Edwards DPM

## 2020-05-20 ENCOUNTER — TELEPHONE (OUTPATIENT)
Dept: FAMILY MEDICINE | Facility: CLINIC | Age: 70
End: 2020-05-20

## 2020-05-20 NOTE — TELEPHONE ENCOUNTER
Left message asking pt to contact the office at     Reason: returning pt's call concerning rescheduling her appt.

## 2020-05-20 NOTE — TELEPHONE ENCOUNTER
----- Message from Munira Barkley sent at 5/20/2020 10:38 AM CDT -----  Contact: Valentino  Type: Needs Medical Advice  Who Called:  PT  Best Call Back Number: [phone]  Additional Information: Patient is calling to reschedule appt for 5/21/20.Please call back and advise.

## 2020-06-01 ENCOUNTER — TELEPHONE (OUTPATIENT)
Dept: FAMILY MEDICINE | Facility: CLINIC | Age: 70
End: 2020-06-01

## 2020-06-01 DIAGNOSIS — R30.0 DYSURIA: Primary | ICD-10-CM

## 2020-06-01 NOTE — TELEPHONE ENCOUNTER
----- Message from Apolinar Mendiola sent at 6/1/2020 10:13 AM CDT -----  Contact: pt  Type:  Patient Returning Call    Who Called:  pt  Does the patient know what this is regarding?:  Pt would like office to contact her back as soon as possible. Feels she has UTI and would like to drop off specimen at lab today. Please follow up.   Best Call Back Number:    Additional Information:  Thank you

## 2020-06-03 ENCOUNTER — LAB VISIT (OUTPATIENT)
Dept: LAB | Facility: HOSPITAL | Age: 70
End: 2020-06-03
Attending: FAMILY MEDICINE
Payer: MEDICARE

## 2020-06-03 DIAGNOSIS — R30.0 DYSURIA: ICD-10-CM

## 2020-06-03 PROCEDURE — 81003 URINALYSIS AUTO W/O SCOPE: CPT | Mod: HCNC,PO

## 2020-06-09 ENCOUNTER — PATIENT MESSAGE (OUTPATIENT)
Dept: FAMILY MEDICINE | Facility: CLINIC | Age: 70
End: 2020-06-09

## 2020-06-10 ENCOUNTER — TELEPHONE (OUTPATIENT)
Dept: FAMILY MEDICINE | Facility: CLINIC | Age: 70
End: 2020-06-10

## 2020-06-10 RX ORDER — AMLODIPINE BESYLATE 5 MG/1
5 TABLET ORAL DAILY
Qty: 90 TABLET | Refills: 3 | Status: SHIPPED | OUTPATIENT
Start: 2020-06-10 | End: 2020-07-27 | Stop reason: SDUPTHER

## 2020-06-10 RX ORDER — LOSARTAN POTASSIUM AND HYDROCHLOROTHIAZIDE 12.5; 1 MG/1; MG/1
1 TABLET ORAL DAILY
Qty: 90 TABLET | Refills: 3 | Status: SHIPPED | OUTPATIENT
Start: 2020-06-10 | End: 2020-07-27 | Stop reason: SDUPTHER

## 2020-06-10 NOTE — TELEPHONE ENCOUNTER
----- Message from Jessica Olmos sent at 6/10/2020  9:32 AM CDT -----  Contact: Lyudmila monge  Type:  Test Results    Who Called:  Lyudmila  Name of Test (Lab/Mammo/Etc):  Urine  Date of Test:  n/a  Ordering Provider:  [facility]  Where the test was performed:  [facility] Call Back Number:  [phone]  Additional Information:  Pls call pt regarding her results      normal sinus rhythm

## 2020-06-10 NOTE — TELEPHONE ENCOUNTER
lov WITH pcp 2/2019  lov WITH [facility] 9/2020    Both providers are out of office.     Please consider filling in their absence

## 2020-06-12 ENCOUNTER — TELEPHONE (OUTPATIENT)
Dept: HEMATOLOGY/ONCOLOGY | Facility: CLINIC | Age: 70
End: 2020-06-12

## 2020-07-01 ENCOUNTER — TELEPHONE (OUTPATIENT)
Dept: HEMATOLOGY/ONCOLOGY | Facility: CLINIC | Age: 70
End: 2020-07-01

## 2020-07-01 NOTE — TELEPHONE ENCOUNTER
----- Message from Maine Benitez MA sent at 7/1/2020  9:22 AM CDT -----  Regarding: Follow up  Pt is requesting a call back to schedule for a follow up appt.   Best call back # [phone]

## 2020-07-15 ENCOUNTER — OFFICE VISIT (OUTPATIENT)
Dept: PODIATRY | Facility: CLINIC | Age: 70
End: 2020-07-15
Payer: MEDICARE

## 2020-07-15 VITALS — HEIGHT: 63 IN | WEIGHT: 165.69 LBS | BODY MASS INDEX: 29.36 KG/M2

## 2020-07-15 DIAGNOSIS — M19.079 ARTHRITIS, MIDFOOT: Primary | ICD-10-CM

## 2020-07-15 DIAGNOSIS — M19.079 ARTHRITIS OF METATARSOPHALANGEAL (MTP) JOINT OF GREAT TOE: ICD-10-CM

## 2020-07-15 PROCEDURE — 99999 PR PBB SHADOW E&M-EST. PATIENT-LVL III: CPT | Mod: PBBFAC,HCNC,, | Performed by: PODIATRIST

## 2020-07-15 PROCEDURE — 1159F PR MEDICATION LIST DOCUMENTED IN MEDICAL RECORD: ICD-10-PCS | Mod: HCNC,S$GLB,, | Performed by: PODIATRIST

## 2020-07-15 PROCEDURE — 3008F BODY MASS INDEX DOCD: CPT | Mod: HCNC,CPTII,S$GLB, | Performed by: PODIATRIST

## 2020-07-15 PROCEDURE — 1159F MED LIST DOCD IN RCRD: CPT | Mod: HCNC,S$GLB,, | Performed by: PODIATRIST

## 2020-07-15 PROCEDURE — 1101F PT FALLS ASSESS-DOCD LE1/YR: CPT | Mod: HCNC,CPTII,S$GLB, | Performed by: PODIATRIST

## 2020-07-15 PROCEDURE — 3008F PR BODY MASS INDEX (BMI) DOCUMENTED: ICD-10-PCS | Mod: HCNC,CPTII,S$GLB, | Performed by: PODIATRIST

## 2020-07-15 PROCEDURE — 99212 OFFICE O/P EST SF 10 MIN: CPT | Mod: HCNC,S$GLB,, | Performed by: PODIATRIST

## 2020-07-15 PROCEDURE — 1125F PR PAIN SEVERITY QUANTIFIED, PAIN PRESENT: ICD-10-PCS | Mod: HCNC,S$GLB,, | Performed by: PODIATRIST

## 2020-07-15 PROCEDURE — 1101F PR PT FALLS ASSESS DOC 0-1 FALLS W/OUT INJ PAST YR: ICD-10-PCS | Mod: HCNC,CPTII,S$GLB, | Performed by: PODIATRIST

## 2020-07-15 PROCEDURE — 1125F AMNT PAIN NOTED PAIN PRSNT: CPT | Mod: HCNC,S$GLB,, | Performed by: PODIATRIST

## 2020-07-15 PROCEDURE — 99212 PR OFFICE/OUTPT VISIT, EST, LEVL II, 10-19 MIN: ICD-10-PCS | Mod: HCNC,S$GLB,, | Performed by: PODIATRIST

## 2020-07-15 PROCEDURE — 99999 PR PBB SHADOW E&M-EST. PATIENT-LVL III: ICD-10-PCS | Mod: PBBFAC,HCNC,, | Performed by: PODIATRIST

## 2020-07-15 NOTE — PROGRESS NOTES
Subjective:      Patient ID: Lyudmila Neri is a 70 y.o. female.    Chief Complaint: Foot Pain (daniel)    Lyudmila is a 70 y.o. female who presents to the podiatry clinic  with complaint of  bilateral foot pain. Onset of the symptoms was several months ago. Precipitating event: none known. Current symptoms include: ability to bear weight, but with some pain and worsening symptoms after a period of activity. Aggravating factors: any weight bearing. Symptoms have gradually worsened. Patient has had prior foot problems. Evaluation to date: none. Treatment to date: celebrex and voltaren gel help. Patients rates pain 4/10 on pain scale.    5/11/20: Patient returns with worsening pain bilateral feet secondary to her midfoot arthritis, wearing good shoes with minimal help.     7/15/20: Patient reports that the injection helped a lot but wore off the last week or so. Pain again with weight bearing. Wearing good Gutierrez shoes most of the time.    Review of Systems   Constitution: Negative for chills and fever.   Cardiovascular: Negative for claudication and leg swelling.   Respiratory: Negative for shortness of breath.    Skin: Negative for itching, nail changes and rash.   Musculoskeletal: Positive for arthritis and joint pain. Negative for muscle cramps, muscle weakness and myalgias.   Gastrointestinal: Negative for nausea and vomiting.   Neurological: Negative for focal weakness, loss of balance, numbness and paresthesias.           Objective:      Physical Exam  Constitutional:       General: She is not in acute distress.     Appearance: She is well-developed. She is not diaphoretic.   Cardiovascular:      Pulses:           Dorsalis pedis pulses are 2+ on the right side and 2+ on the left side.        Posterior tibial pulses are 2+ on the right side and 2+ on the left side.      Comments: < 3 sec capillary refill time to toes 1-5 bilateral. Toes and feet are warm to touch proximally with normal distal cooling b/l. There is  some hair growth on the feet and toes b/l. There is no edema b/l. No spider veins or varicosities present b/l.     Musculoskeletal:      Comments: Bilateral feet there is pain to palpation and motion at the midfoot (lisfranc joint) area and left NC joint as well. There is some discomfort to ROM at the bilateral first MTPJ more to the right.    Equinus noted b/l ankles with < 5 deg DF noted. MMT 5/5 in DF/PF/Inv/Ev resistance with no reproduction of pain in any direction. Passive range of motion of ankle and pedal joints is painless b/l.     Skin:     General: Skin is warm and dry.      Coloration: Skin is not pale.      Findings: No abrasion, bruising, burn, ecchymosis, erythema, laceration, lesion, petechiae or rash.      Nails: There is no clubbing.        Comments: Skin temperature, texture and turgor within normal limits.   Neurological:      Mental Status: She is alert and oriented to person, place, and time.      Sensory: No sensory deficit.      Motor: No tremor, atrophy or abnormal muscle tone.      Comments: Negative tinel sign bilateral.   Psychiatric:         Behavior: Behavior normal.               Assessment:       Encounter Diagnoses   Name Primary?   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    Patient will continue to wear the over the counter arch supports and wear them in shoes whenever possible.  Athletic shoes intended for walking or running are usually best.    Patient will continue to stretch the tendo achilles complex three times daily as demonstrated in the office.  Literature was dispensed illustrating proper stretching technique.    Discussed custom orthotics and AFO  declined for now    Can consider bracing if needed, discussed surgical medial column fusion if necessary    Return 6 week follow up for another injection too soon today    Agusto Edwards DPM

## 2020-07-27 ENCOUNTER — OFFICE VISIT (OUTPATIENT)
Dept: FAMILY MEDICINE | Facility: CLINIC | Age: 70
End: 2020-07-27
Payer: MEDICARE

## 2020-07-27 VITALS
DIASTOLIC BLOOD PRESSURE: 70 MMHG | OXYGEN SATURATION: 98 % | TEMPERATURE: 98 F | WEIGHT: 168 LBS | BODY MASS INDEX: 29.77 KG/M2 | SYSTOLIC BLOOD PRESSURE: 120 MMHG | HEART RATE: 76 BPM | HEIGHT: 63 IN

## 2020-07-27 DIAGNOSIS — Z78.0 POSTMENOPAUSAL: ICD-10-CM

## 2020-07-27 DIAGNOSIS — I77.9 BILATERAL CAROTID ARTERY DISEASE, UNSPECIFIED TYPE: ICD-10-CM

## 2020-07-27 DIAGNOSIS — I10 ESSENTIAL HYPERTENSION: ICD-10-CM

## 2020-07-27 DIAGNOSIS — Z00.00 ENCOUNTER FOR PREVENTIVE HEALTH EXAMINATION: Primary | ICD-10-CM

## 2020-07-27 PROCEDURE — 99999 PR PBB SHADOW E&M-EST. PATIENT-LVL V: ICD-10-PCS | Mod: PBBFAC,HCNC,, | Performed by: NURSE PRACTITIONER

## 2020-07-27 PROCEDURE — 99499 RISK ADDL DX/OHS AUDIT: ICD-10-PCS | Mod: HCNC,S$GLB,, | Performed by: NURSE PRACTITIONER

## 2020-07-27 PROCEDURE — 3074F SYST BP LT 130 MM HG: CPT | Mod: HCNC,CPTII,S$GLB, | Performed by: NURSE PRACTITIONER

## 2020-07-27 PROCEDURE — 3074F PR MOST RECENT SYSTOLIC BLOOD PRESSURE < 130 MM HG: ICD-10-PCS | Mod: HCNC,CPTII,S$GLB, | Performed by: NURSE PRACTITIONER

## 2020-07-27 PROCEDURE — 99499 UNLISTED E&M SERVICE: CPT | Mod: HCNC,S$GLB,, | Performed by: NURSE PRACTITIONER

## 2020-07-27 PROCEDURE — 3078F DIAST BP <80 MM HG: CPT | Mod: HCNC,CPTII,S$GLB, | Performed by: NURSE PRACTITIONER

## 2020-07-27 PROCEDURE — G0439 PPPS, SUBSEQ VISIT: HCPCS | Mod: HCNC,S$GLB,, | Performed by: NURSE PRACTITIONER

## 2020-07-27 PROCEDURE — 3078F PR MOST RECENT DIASTOLIC BLOOD PRESSURE < 80 MM HG: ICD-10-PCS | Mod: HCNC,CPTII,S$GLB, | Performed by: NURSE PRACTITIONER

## 2020-07-27 PROCEDURE — 99999 PR PBB SHADOW E&M-EST. PATIENT-LVL V: CPT | Mod: PBBFAC,HCNC,, | Performed by: NURSE PRACTITIONER

## 2020-07-27 PROCEDURE — G0439 PR MEDICARE ANNUAL WELLNESS SUBSEQUENT VISIT: ICD-10-PCS | Mod: HCNC,S$GLB,, | Performed by: NURSE PRACTITIONER

## 2020-07-27 RX ORDER — LOSARTAN POTASSIUM AND HYDROCHLOROTHIAZIDE 12.5; 1 MG/1; MG/1
1 TABLET ORAL DAILY
Qty: 90 TABLET | Refills: 3 | Status: SHIPPED | OUTPATIENT
Start: 2020-07-27 | End: 2022-03-29 | Stop reason: SDUPTHER

## 2020-07-27 RX ORDER — AMLODIPINE BESYLATE 5 MG/1
5 TABLET ORAL DAILY
Qty: 90 TABLET | Refills: 3 | Status: SHIPPED | OUTPATIENT
Start: 2020-07-27 | End: 2021-09-27

## 2020-07-27 RX ORDER — OMEPRAZOLE 40 MG/1
CAPSULE, DELAYED RELEASE ORAL
Qty: 90 CAPSULE | Refills: 3 | Status: SHIPPED | OUTPATIENT
Start: 2020-07-27 | End: 2021-02-16 | Stop reason: SDUPTHER

## 2020-07-27 NOTE — PATIENT INSTRUCTIONS
Counseling and Referral of Other Preventative  (Italic type indicates deductible and co-insurance are waived)    Patient Name: Lyudmila Neri  Today's Date: 7/27/2020    Health Maintenance       Date Due Completion Date    High Dose Statin 01/20/1971 ---    DEXA SCAN 11/07/2019 11/7/2016    Influenza Vaccine (1) 09/01/2020 9/17/2019    Mammogram 05/06/2021 5/6/2020    Aspirin/Antiplatelet Therapy 07/27/2021 7/27/2020    Lipid Panel 02/27/2023 2/27/2018    Colorectal Cancer Screening 04/18/2026 4/18/2018    TETANUS VACCINE 04/16/2029 4/16/2019    Override on 9/23/2016: Declined        No orders of the defined types were placed in this encounter.    The following information is provided to all patients.  This information is to help you find resources for any of the problems found today that may be affecting your health:                Living healthy guide: www.[facility].[place].[facility]      Understanding Diabetes: www.diabetes.org      Eating healthy: www.cdc.gov/healthyweight      CDC home safety checklist: www.cdc.gov/steadi/patient.html      Agency on Aging: www.goea.[place].[facility]      Alcoholics anonymous (AA): www.aa.org      Physical Activity: www.eliud.nih.gov/ll5zvsu      Tobacco use: www.quitwithusla.org

## 2020-07-27 NOTE — PROGRESS NOTES
"  Lyudmila Neri presented for a  Medicare AWV and comprehensive Health Risk Assessment today. The following components were reviewed and updated:    · Medical history  · Family History  · Social history  · Allergies and Current Medications  · Health Risk Assessment  · Health Maintenance  · Care Team     ** See Completed Assessments for Annual Wellness Visit within the encounter summary.**     The following assessments were completed:  · Living Situation  · CAGE  · Depression Screening  · Timed Get Up and Go  · Whisper Test  · Cognitive Function Screening      · Nutrition Screening  · ADL Screening  · PAQ Screening    Vitals:    07/27/20 0759   BP: 120/70   BP Location: Left arm   Patient Position: Sitting   BP Method: Medium (Manual)   Pulse: 76   Temp: 98.2 °F (36.8 °C)   TempSrc: Oral   SpO2: 98%   Weight: 76.2 kg (167 lb 15.9 oz)   Height: 5' 3" (1.6 m)     Body mass index is 29.76 kg/m².  Physical Exam  Vitals signs reviewed.   Constitutional:       Appearance: Normal appearance.   Cardiovascular:      Rate and Rhythm: Normal rate and regular rhythm.   Pulmonary:      Effort: Pulmonary effort is normal.      Breath sounds: Normal breath sounds.   Skin:     General: Skin is warm and dry.   Neurological:      Mental Status: She is alert and oriented to person, place, and time.         Diagnoses and health risks identified today and associated recommendations/orders:    1. Encounter for preventive health examination  Reviewed and discussed health maintenance.    - DXA Bone Density Spine And Hip; Future    2. Bilateral carotid artery disease, unspecified type  Stable- continue current treatment and follow up routinely with PCP     3. Essential hypertension  Stable- continue current treatment and follow up routinely with PCP     4. Postmenopausal  - DXA Bone Density Spine And Hip; Future    Provided Lyudmila with a 5-10 year written screening schedule and personal prevention plan. Recommendations were developed using " the USPSTF age appropriate recommendations. Education, counseling, and referrals were provided as needed. After Visit Summary printed and given to patient which includes a list of additional screenings\tests needed.    Kathy Nunez NP

## 2020-08-06 ENCOUNTER — HOSPITAL ENCOUNTER (OUTPATIENT)
Dept: RADIOLOGY | Facility: HOSPITAL | Age: 70
Discharge: HOME | End: 2020-08-06
Attending: NURSE PRACTITIONER
Payer: MEDICARE

## 2020-08-06 DIAGNOSIS — Z78.0 POSTMENOPAUSAL: ICD-10-CM

## 2020-08-06 DIAGNOSIS — Z00.00 ENCOUNTER FOR PREVENTIVE HEALTH EXAMINATION: ICD-10-CM

## 2020-08-06 PROCEDURE — 77080 DXA BONE DENSITY AXIAL: CPT | Mod: 26,HCNC,, | Performed by: RADIOLOGY

## 2020-08-06 PROCEDURE — 77080 DEXA BONE DENSITY SPINE HIP: ICD-10-PCS | Mod: 26,HCNC,, | Performed by: RADIOLOGY

## 2020-08-06 PROCEDURE — 77080 DXA BONE DENSITY AXIAL: CPT | Mod: TC,HCNC,PO

## 2020-08-28 ENCOUNTER — TELEPHONE (OUTPATIENT)
Dept: HEMATOLOGY/ONCOLOGY | Facility: CLINIC | Age: 70
End: 2020-08-28

## 2020-08-28 DIAGNOSIS — Z85.09 HISTORY OF GASTROINTESTINAL STROMAL TUMOR (GIST): Primary | ICD-10-CM

## 2020-08-28 NOTE — TELEPHONE ENCOUNTER
----- Message from Petrona Crawford sent at 8/28/2020  9:13 AM CDT -----  Type:   Appointment Request    Caller is requesting an appointment.      Name of Caller:  Lyudmila Neri  When is the first available appointment? NA  Symptoms:  NA  Best Call Back Number:  [phone]  Additional Information:  Previous appointment was cancelled/needs to reschedule

## 2020-08-28 NOTE — TELEPHONE ENCOUNTER
S/w pt.  Notified that cxr is good, from 2/24/2020.  Informed she'll need to repeat labs.  Appt made for lab and f/u.  Pt verbalized understanding of date and time.

## 2020-09-01 ENCOUNTER — LAB VISIT (OUTPATIENT)
Dept: LAB | Facility: HOSPITAL | Age: 70
End: 2020-09-01
Attending: FAMILY MEDICINE
Payer: MEDICARE

## 2020-09-01 DIAGNOSIS — Z85.09 HISTORY OF GASTROINTESTINAL STROMAL TUMOR (GIST): ICD-10-CM

## 2020-09-01 LAB
ALBUMIN SERPL BCP-MCNC: 3.6 G/DL (ref 3.5–5.2)
ALP SERPL-CCNC: 44 U/L (ref 55–135)
ALT SERPL W/O P-5'-P-CCNC: 20 U/L (ref 10–44)
ANION GAP SERPL CALC-SCNC: 9 MMOL/L (ref 8–16)
AST SERPL-CCNC: 20 U/L (ref 10–40)
BASOPHILS # BLD AUTO: 0.08 K/UL (ref 0–0.2)
BASOPHILS NFR BLD: 0.7 % (ref 0–1.9)
BILIRUB SERPL-MCNC: 0.3 MG/DL (ref 0.1–1)
BUN SERPL-MCNC: 18 MG/DL (ref 8–23)
CALCIUM SERPL-MCNC: 9.1 MG/DL (ref 8.7–10.5)
CHLORIDE SERPL-SCNC: 103 MMOL/L (ref 95–110)
CO2 SERPL-SCNC: 29 MMOL/L (ref 23–29)
CREAT SERPL-MCNC: 1 MG/DL (ref 0.5–1.4)
DIFFERENTIAL METHOD: NORMAL
EOSINOPHIL # BLD AUTO: 0.2 K/UL (ref 0–0.5)
EOSINOPHIL NFR BLD: 1.4 % (ref 0–8)
ERYTHROCYTE [DISTWIDTH] IN BLOOD BY AUTOMATED COUNT: 12.5 % (ref 11.5–14.5)
EST. GFR  (AFRICAN AMERICAN): >60 ML/MIN/1.73 M^2
EST. GFR  (NON AFRICAN AMERICAN): 56 ML/MIN/1.73 M^2
GLUCOSE SERPL-MCNC: 117 MG/DL (ref 70–110)
HCT VFR BLD AUTO: 39 % (ref 37–48.5)
HGB BLD-MCNC: 12.8 G/DL (ref 12–16)
IMM GRANULOCYTES # BLD AUTO: 0.03 K/UL (ref 0–0.04)
IMM GRANULOCYTES NFR BLD AUTO: 0.3 % (ref 0–0.5)
LDH SERPL L TO P-CCNC: 189 U/L (ref 110–260)
LYMPHOCYTES # BLD AUTO: 3.1 K/UL (ref 1–4.8)
LYMPHOCYTES NFR BLD: 28.8 % (ref 18–48)
MCH RBC QN AUTO: 30.5 PG (ref 27–31)
MCHC RBC AUTO-ENTMCNC: 32.8 G/DL (ref 32–36)
MCV RBC AUTO: 93 FL (ref 82–98)
MONOCYTES # BLD AUTO: 0.8 K/UL (ref 0.3–1)
MONOCYTES NFR BLD: 7.4 % (ref 4–15)
NEUTROPHILS # BLD AUTO: 6.6 K/UL (ref 1.8–7.7)
NEUTROPHILS NFR BLD: 61.4 % (ref 38–73)
NRBC BLD-RTO: 0 /100 WBC
PLATELET # BLD AUTO: 246 K/UL (ref 150–350)
PMV BLD AUTO: 11.6 FL (ref 9.2–12.9)
POTASSIUM SERPL-SCNC: 4.3 MMOL/L (ref 3.5–5.1)
PROT SERPL-MCNC: 6.9 G/DL (ref 6–8.4)
RBC # BLD AUTO: 4.2 M/UL (ref 4–5.4)
SODIUM SERPL-SCNC: 141 MMOL/L (ref 136–145)
WBC # BLD AUTO: 10.78 K/UL (ref 3.9–12.7)

## 2020-09-01 PROCEDURE — 36415 COLL VENOUS BLD VENIPUNCTURE: CPT | Mod: HCNC,PO

## 2020-09-01 PROCEDURE — 85025 COMPLETE CBC W/AUTO DIFF WBC: CPT | Mod: HCNC,PO

## 2020-09-01 PROCEDURE — 83615 LACTATE (LD) (LDH) ENZYME: CPT | Mod: HCNC,PO

## 2020-09-01 PROCEDURE — 80053 COMPREHEN METABOLIC PANEL: CPT | Mod: HCNC,PO

## 2020-09-09 ENCOUNTER — OFFICE VISIT (OUTPATIENT)
Dept: HEMATOLOGY/ONCOLOGY | Facility: CLINIC | Age: 70
End: 2020-09-09
Payer: MEDICARE

## 2020-09-09 VITALS
WEIGHT: 168 LBS | HEIGHT: 63 IN | OXYGEN SATURATION: 100 % | TEMPERATURE: 98 F | BODY MASS INDEX: 29.77 KG/M2 | HEART RATE: 71 BPM | SYSTOLIC BLOOD PRESSURE: 137 MMHG | DIASTOLIC BLOOD PRESSURE: 71 MMHG

## 2020-09-09 DIAGNOSIS — Z85.09 HISTORY OF GASTROINTESTINAL STROMAL TUMOR (GIST): Primary | ICD-10-CM

## 2020-09-09 DIAGNOSIS — R11.0 NAUSEA: ICD-10-CM

## 2020-09-09 PROCEDURE — 3078F PR MOST RECENT DIASTOLIC BLOOD PRESSURE < 80 MM HG: ICD-10-PCS | Mod: HCNC,CPTII,S$GLB, | Performed by: NURSE PRACTITIONER

## 2020-09-09 PROCEDURE — 3078F DIAST BP <80 MM HG: CPT | Mod: HCNC,CPTII,S$GLB, | Performed by: NURSE PRACTITIONER

## 2020-09-09 PROCEDURE — 99213 PR OFFICE/OUTPT VISIT, EST, LEVL III, 20-29 MIN: ICD-10-PCS | Mod: HCNC,S$GLB,, | Performed by: NURSE PRACTITIONER

## 2020-09-09 PROCEDURE — 99213 OFFICE O/P EST LOW 20 MIN: CPT | Mod: HCNC,S$GLB,, | Performed by: NURSE PRACTITIONER

## 2020-09-09 PROCEDURE — 1101F PT FALLS ASSESS-DOCD LE1/YR: CPT | Mod: HCNC,CPTII,S$GLB, | Performed by: NURSE PRACTITIONER

## 2020-09-09 PROCEDURE — 3008F PR BODY MASS INDEX (BMI) DOCUMENTED: ICD-10-PCS | Mod: HCNC,CPTII,S$GLB, | Performed by: NURSE PRACTITIONER

## 2020-09-09 PROCEDURE — 3075F PR MOST RECENT SYSTOLIC BLOOD PRESS GE 130-139MM HG: ICD-10-PCS | Mod: HCNC,CPTII,S$GLB, | Performed by: NURSE PRACTITIONER

## 2020-09-09 PROCEDURE — 1126F AMNT PAIN NOTED NONE PRSNT: CPT | Mod: HCNC,S$GLB,, | Performed by: NURSE PRACTITIONER

## 2020-09-09 PROCEDURE — 1159F MED LIST DOCD IN RCRD: CPT | Mod: HCNC,S$GLB,, | Performed by: NURSE PRACTITIONER

## 2020-09-09 PROCEDURE — 1159F PR MEDICATION LIST DOCUMENTED IN MEDICAL RECORD: ICD-10-PCS | Mod: HCNC,S$GLB,, | Performed by: NURSE PRACTITIONER

## 2020-09-09 PROCEDURE — 1101F PR PT FALLS ASSESS DOC 0-1 FALLS W/OUT INJ PAST YR: ICD-10-PCS | Mod: HCNC,CPTII,S$GLB, | Performed by: NURSE PRACTITIONER

## 2020-09-09 PROCEDURE — 99999 PR PBB SHADOW E&M-EST. PATIENT-LVL V: ICD-10-PCS | Mod: PBBFAC,HCNC,, | Performed by: NURSE PRACTITIONER

## 2020-09-09 PROCEDURE — 3075F SYST BP GE 130 - 139MM HG: CPT | Mod: HCNC,CPTII,S$GLB, | Performed by: NURSE PRACTITIONER

## 2020-09-09 PROCEDURE — 1126F PR PAIN SEVERITY QUANTIFIED, NO PAIN PRESENT: ICD-10-PCS | Mod: HCNC,S$GLB,, | Performed by: NURSE PRACTITIONER

## 2020-09-09 PROCEDURE — 3008F BODY MASS INDEX DOCD: CPT | Mod: HCNC,CPTII,S$GLB, | Performed by: NURSE PRACTITIONER

## 2020-09-09 PROCEDURE — 99999 PR PBB SHADOW E&M-EST. PATIENT-LVL V: CPT | Mod: PBBFAC,HCNC,, | Performed by: NURSE PRACTITIONER

## 2020-09-09 NOTE — PROGRESS NOTES
"HISTORY OF PRESENT ILLNESS:  This is a 70-year-old white female known to Dr. Pro for resected low-grade GIST (December 2011).      She presents to the clinic today for annual evaluation.  She reports recent episodes of nausea and unable to tolerate "hot" foods.  Last UGI - 05/2019, Last CT 02/2019.  She denies any difficulties with fevers, chills, vomiting, constipation, diarrhea, painful lymphadenopathy, drenching night sweats, bleeding, etc.  No other new complaints or pertinent findings on a 14-point review of systems.    PHYSICAL EXAMINATION:  GENERAL:  Well-developed, well-nourished white female in no acute distress.  Alert and oriented x3.  VITAL SIGNS:  Weight:  Gain of 3 1/2 pounds in 18 months  Wt Readings from Last 3 Encounters:   09/09/20 76.2 kg (167 lb 15.9 oz)   07/27/20 76.2 kg (167 lb 15.9 oz)   07/15/20 75.1 kg (165 lb 10.8 oz)     Temp Readings from Last 3 Encounters:   09/09/20 97.6 °F (36.4 °C) (Temporal)   07/27/20 98.2 °F (36.8 °C) (Oral)   05/11/20 98.6 °F (37 °C)     BP Readings from Last 3 Encounters:   09/09/20 137/71   07/27/20 120/70   05/11/20 121/73     Pulse Readings from Last 3 Encounters:   09/09/20 71   07/27/20 76   05/11/20 69     HEENT:  Normocephalic, atraumatic.  Oral mucosa pink and moist.  Lips without lesions.  Tongue midline.  Oropharynx clear.  Nonicteric sclerae.   NECK:  Supple.  No adenopathy.                                               HEART:  Regular rate and rhythm without murmur, gallop or rub.               LUNGS:  Clear to auscultation bilaterally.  NL respiratory effort.                            ABDOMEN:  Soft, lipoma to RUQ, tender to epigastric & LUQ, nondistended with positive normoactive bowel sounds.  No hepatosplenomegaly.                                              EXTREMITIES:  No cyanosis, clubbing or edema.  Distal pulses are intact.     AXILLAE AND GROIN:  No palpable pathologic lymphadenopathy is appreciated.    LABORATORY:    Lab Results "   Component Value Date    WBC 10.78 09/01/2020    HGB 12.8 09/01/2020    HCT 39.0 09/01/2020    MCV 93 09/01/2020     09/01/2020     Unremarkable differential    CMP  Sodium   Date Value Ref Range Status   09/01/2020 141 136 - 145 mmol/L Final     Potassium   Date Value Ref Range Status   09/01/2020 4.3 3.5 - 5.1 mmol/L Final     Chloride   Date Value Ref Range Status   09/01/2020 103 95 - 110 mmol/L Final     CO2   Date Value Ref Range Status   09/01/2020 29 23 - 29 mmol/L Final     Glucose   Date Value Ref Range Status   09/01/2020 117 (H) 70 - 110 mg/dL Final     BUN, Bld   Date Value Ref Range Status   09/01/2020 18 8 - 23 mg/dL Final     Creatinine   Date Value Ref Range Status   09/01/2020 1.0 0.5 - 1.4 mg/dL Final     Calcium   Date Value Ref Range Status   09/01/2020 9.1 8.7 - 10.5 mg/dL Final     Total Protein   Date Value Ref Range Status   09/01/2020 6.9 6.0 - 8.4 g/dL Final     Albumin   Date Value Ref Range Status   09/01/2020 3.6 3.5 - 5.2 g/dL Final     Total Bilirubin   Date Value Ref Range Status   09/01/2020 0.3 0.1 - 1.0 mg/dL Final     Comment:     For infants and newborns, interpretation of results should be based  on gestational age, weight and in agreement with clinical  observations.  Premature Infant recommended reference ranges:  Up to 24 hours.............<8.0 mg/dL  Up to 48 hours............<12.0 mg/dL  3-5 days..................<15.0 mg/dL  6-29 days.................<15.0 mg/dL       Alkaline Phosphatase   Date Value Ref Range Status   09/01/2020 44 (L) 55 - 135 U/L Final     AST   Date Value Ref Range Status   09/01/2020 20 10 - 40 U/L Final     ALT   Date Value Ref Range Status   09/01/2020 20 10 - 44 U/L Final     Anion Gap   Date Value Ref Range Status   09/01/2020 9 8 - 16 mmol/L Final     eGFR if    Date Value Ref Range Status   09/01/2020 >60 >60 mL/min/1.73 m^2 Final     eGFR if non    Date Value Ref Range Status   09/01/2020 56 (A) >60  mL/min/1.73 m^2 Final     Comment:     Calculation used to obtain the estimated glomerular filtration  rate (eGFR) is the CKD-EPI equation.            RADIOLOGY:  Chest x-ray dated 02/24/2020:  Impression   No evidence of acute cardiopulmonary disease.    UGI dated 05/2019/Dr. Guzman:  Impression:   - Normal oropharynx.                        - Normal esophagus.                        - Z-line regular, 35-36 cm from the incisors.                        - Patulous lower esophageal sphincter.                        - Non-erosive esophageal reflux (NERD) disease(?).                        - Evidence of previous surgery was found in the                        fundus near the cardia, characterized by healthy                        appearing mucosa.                        - Minimal Gastritis. Biopsied.                        - Minimal antritis. Biopsied.                        - Normal stomach otherwise.                        - Normal pylorus.                        - Normal examined duodenum. Biopsied.                        - Normal major papilla.     CT Abdomen/Pelvis dated 02/20/2019:  Impression:  1. Postsurgical changes involving the stomach with some apparent wall thickening in the region of the antrum that appears smooth and near fusiform.  This could relate to scarring or postsurgical change or be  related to radiation but an infiltrative or inflammatory process is not excluded.  Given the patient's history of GIST tumor correlation with endoscopy would be helpful  2. Bilateral prominent renal pelves or peripelvic cysts without worrisome detrimental changes since the prior  3. Evidence of hysterectomy and cholecystectomy    IMPRESSION:    1.  Completely resected gastrointestinal stromal tumor -- no evidence of disease.  2.  Nausea - present; will obtain CT Abdomen/Pelvis with contrast asap - phone review.    PLAN:   1.  Phone review CT Abdomen/Pelvis  2.  Return in one year with interval CBC, CMP, LDH and  chest x-ray prior.      Assessment/plan reviewed and approved by Dr. Pro.

## 2020-09-10 ENCOUNTER — PATIENT OUTREACH (OUTPATIENT)
Dept: ADMINISTRATIVE | Facility: OTHER | Age: 70
End: 2020-09-10

## 2020-09-10 NOTE — TELEPHONE ENCOUNTER
----- Message from Lisa Mei sent at 9/10/2020 10:32 AM CDT -----  Contact: pt  Pt calling states that she is needing a refill on her mirabegron (MYRBETRIQ) 50 mg Tb24..[phone] (home)             .      [address] [facility] [address]  [address] [place] [address]  Phone: [phone] Fax: [phone]

## 2020-09-14 ENCOUNTER — HOSPITAL ENCOUNTER (OUTPATIENT)
Dept: RADIOLOGY | Facility: HOSPITAL | Age: 70
Discharge: HOME | End: 2020-09-14
Attending: NURSE PRACTITIONER
Payer: MEDICARE

## 2020-09-14 DIAGNOSIS — Z85.09 HISTORY OF GASTROINTESTINAL STROMAL TUMOR (GIST): ICD-10-CM

## 2020-09-14 DIAGNOSIS — R11.0 NAUSEA: ICD-10-CM

## 2020-09-14 PROCEDURE — 74177 CT ABDOMEN PELVIS WITH CONTRAST: ICD-10-PCS | Mod: 26,HCNC,, | Performed by: RADIOLOGY

## 2020-09-14 PROCEDURE — 74177 CT ABD & PELVIS W/CONTRAST: CPT | Mod: TC,HCNC,PO

## 2020-09-14 PROCEDURE — 25500020 PHARM REV CODE 255: Mod: HCNC,PO | Performed by: NURSE PRACTITIONER

## 2020-09-14 PROCEDURE — 74177 CT ABD & PELVIS W/CONTRAST: CPT | Mod: 26,HCNC,, | Performed by: RADIOLOGY

## 2020-09-14 PROCEDURE — A9698 NON-RAD CONTRAST MATERIALNOC: HCPCS | Mod: HCNC,PO | Performed by: NURSE PRACTITIONER

## 2020-09-14 RX ADMIN — IOHEXOL 1000 ML: 9 SOLUTION ORAL at 01:09

## 2020-09-14 RX ADMIN — IOHEXOL 75 ML: 350 INJECTION, SOLUTION INTRAVENOUS at 01:09

## 2020-09-16 ENCOUNTER — OFFICE VISIT (OUTPATIENT)
Dept: PODIATRY | Facility: CLINIC | Age: 70
End: 2020-09-16
Payer: MEDICARE

## 2020-09-16 VITALS — WEIGHT: 167.31 LBS | HEIGHT: 63 IN | BODY MASS INDEX: 29.64 KG/M2

## 2020-09-16 DIAGNOSIS — M19.079 ARTHRITIS, MIDFOOT: Primary | ICD-10-CM

## 2020-09-16 PROCEDURE — 3288F PR FALLS RISK ASSESSMENT DOCUMENTED: ICD-10-PCS | Mod: HCNC,CPTII,S$GLB, | Performed by: PODIATRIST

## 2020-09-16 PROCEDURE — 1125F AMNT PAIN NOTED PAIN PRSNT: CPT | Mod: HCNC,S$GLB,, | Performed by: PODIATRIST

## 2020-09-16 PROCEDURE — 20600 PR DRAIN/INJECT SMALL JOINT/BURSA: ICD-10-PCS | Mod: 50,HCNC,S$GLB, | Performed by: PODIATRIST

## 2020-09-16 PROCEDURE — 1100F PTFALLS ASSESS-DOCD GE2>/YR: CPT | Mod: HCNC,CPTII,S$GLB, | Performed by: PODIATRIST

## 2020-09-16 PROCEDURE — 99499 NO LOS: ICD-10-PCS | Mod: HCNC,S$GLB,, | Performed by: PODIATRIST

## 2020-09-16 PROCEDURE — 3008F BODY MASS INDEX DOCD: CPT | Mod: HCNC,CPTII,S$GLB, | Performed by: PODIATRIST

## 2020-09-16 PROCEDURE — 1125F PR PAIN SEVERITY QUANTIFIED, PAIN PRESENT: ICD-10-PCS | Mod: HCNC,S$GLB,, | Performed by: PODIATRIST

## 2020-09-16 PROCEDURE — 3288F FALL RISK ASSESSMENT DOCD: CPT | Mod: HCNC,CPTII,S$GLB, | Performed by: PODIATRIST

## 2020-09-16 PROCEDURE — 99499 UNLISTED E&M SERVICE: CPT | Mod: HCNC,S$GLB,, | Performed by: PODIATRIST

## 2020-09-16 PROCEDURE — 99999 PR PBB SHADOW E&M-EST. PATIENT-LVL IV: CPT | Mod: PBBFAC,HCNC,, | Performed by: PODIATRIST

## 2020-09-16 PROCEDURE — 3008F PR BODY MASS INDEX (BMI) DOCUMENTED: ICD-10-PCS | Mod: HCNC,CPTII,S$GLB, | Performed by: PODIATRIST

## 2020-09-16 PROCEDURE — 20600 DRAIN/INJ JOINT/BURSA W/O US: CPT | Mod: 50,HCNC,S$GLB, | Performed by: PODIATRIST

## 2020-09-16 PROCEDURE — 1100F PR PT FALLS ASSESS DOC 2+ FALLS/FALL W/INJURY/YR: ICD-10-PCS | Mod: HCNC,CPTII,S$GLB, | Performed by: PODIATRIST

## 2020-09-16 PROCEDURE — 99999 PR PBB SHADOW E&M-EST. PATIENT-LVL IV: ICD-10-PCS | Mod: PBBFAC,HCNC,, | Performed by: PODIATRIST

## 2020-09-16 RX ORDER — METHYLPREDNISOLONE ACETATE 40 MG/ML
40 INJECTION, SUSPENSION INTRA-ARTICULAR; INTRALESIONAL; INTRAMUSCULAR; SOFT TISSUE
Status: COMPLETED | OUTPATIENT
Start: 2020-09-16 | End: 2020-09-16

## 2020-09-16 RX ADMIN — METHYLPREDNISOLONE ACETATE 40 MG: 40 INJECTION, SUSPENSION INTRA-ARTICULAR; INTRALESIONAL; INTRAMUSCULAR; SOFT TISSUE at 12:09

## 2020-09-16 NOTE — PROGRESS NOTES
Subjective:      Patient ID: Lyudmila Neri is a 70 y.o. female.    Chief Complaint: Foot Pain (wants inj - daniel)    Lyudmila is a 70 y.o. female     9/16/20 Patient presents for injections bilateral feet as discussed at last appointment for her midfoot arthritis    Review of Systems   Constitution: Negative for chills and fever.   Cardiovascular: Negative for claudication and leg swelling.   Respiratory: Negative for shortness of breath.    Skin: Negative for itching, nail changes and rash.   Musculoskeletal: Positive for arthritis and joint pain. Negative for muscle cramps, muscle weakness and myalgias.   Gastrointestinal: Negative for nausea and vomiting.   Neurological: Negative for focal weakness, loss of balance, numbness and paresthesias.           Objective:      Physical Exam  Constitutional:       General: She is not in acute distress.     Appearance: She is well-developed. She is not diaphoretic.   Cardiovascular:      Pulses:           Dorsalis pedis pulses are 2+ on the right side and 2+ on the left side.        Posterior tibial pulses are 2+ on the right side and 2+ on the left side.      Comments: < 3 sec capillary refill time to toes 1-5 bilateral. Toes and feet are warm to touch proximally with normal distal cooling b/l. There is some hair growth on the feet and toes b/l. There is no edema b/l. No spider veins or varicosities present b/l.     Musculoskeletal:      Comments: Bilateral feet there is pain to palpation and motion at the midfoot (lisfranc joint) area and left NC joint as well. There is some discomfort to ROM at the bilateral first MTPJ more to the right.    Equinus noted b/l ankles with < 5 deg DF noted. MMT 5/5 in DF/PF/Inv/Ev resistance with no reproduction of pain in any direction. Passive range of motion of ankle and pedal joints is painless b/l.     Skin:     General: Skin is warm and dry.      Coloration: Skin is not pale.      Findings: No abrasion, bruising, burn, ecchymosis,  erythema, laceration, lesion, petechiae or rash.      Nails: There is no clubbing.        Comments: Skin temperature, texture and turgor within normal limits.   Neurological:      Mental Status: She is alert and oriented to person, place, and time.      Sensory: No sensory deficit.      Motor: No tremor, atrophy or abnormal muscle tone.      Comments: Negative tinel sign bilateral.   Psychiatric:         Behavior: Behavior normal.               Assessment:       Encounter Diagnosis   Name Primary?    Arthritis, midfoot Yes         Plan:       Lyudmila was seen today for foot pain.    Diagnoses and all orders for this visit:    Arthritis, midfoot    Other orders  -     methylPREDNISolone acetate injection 40 mg      I counseled the patient on her conditions, their implications and medical management.    Patient will continue to wear the over the counter arch supports and wear them in shoes whenever possible.  Athletic shoes intended for walking or running are usually best.    Patient will continue to stretch the tendo achilles complex three times daily as demonstrated in the office.  Literature was dispensed illustrating proper stretching technique.    Obtained verbal consent from the patient to inject the affected joint bilateral foot. Skin prepped with alcohol and skin anesthesia achieved with ethyl chloride. Injected 20 mg depo medrol with 1 mL 1% plain lidocaine first into the right first tarsometatarsal joint, then the left NC joint. Patient reported relief with the injection. Patient tolerated the procedure and anesthesia well without any complications.    Discussed PT and custom orthotics    Can consider bracing if needed, discussed surgical medial column fusion if necessary    Return PRN    Agusto Edwards DPM

## 2020-09-29 ENCOUNTER — PATIENT MESSAGE (OUTPATIENT)
Dept: OTHER | Facility: OTHER | Age: 70
End: 2020-09-29

## 2020-10-05 ENCOUNTER — LAB VISIT (OUTPATIENT)
Dept: LAB | Facility: HOSPITAL | Age: 70
End: 2020-10-05
Attending: INTERNAL MEDICINE
Payer: MEDICARE

## 2020-10-05 ENCOUNTER — OFFICE VISIT (OUTPATIENT)
Dept: RHEUMATOLOGY | Facility: CLINIC | Age: 70
End: 2020-10-05
Payer: MEDICARE

## 2020-10-05 VITALS
SYSTOLIC BLOOD PRESSURE: 123 MMHG | WEIGHT: 165.25 LBS | HEART RATE: 73 BPM | DIASTOLIC BLOOD PRESSURE: 78 MMHG | HEIGHT: 63 IN | BODY MASS INDEX: 29.28 KG/M2

## 2020-10-05 DIAGNOSIS — L40.9 PSORIASIS: ICD-10-CM

## 2020-10-05 DIAGNOSIS — M15.9 PRIMARY OSTEOARTHRITIS INVOLVING MULTIPLE JOINTS: ICD-10-CM

## 2020-10-05 DIAGNOSIS — M15.9 PRIMARY OSTEOARTHRITIS INVOLVING MULTIPLE JOINTS: Primary | ICD-10-CM

## 2020-10-05 DIAGNOSIS — M19.90 ARTHRITIS: ICD-10-CM

## 2020-10-05 PROCEDURE — 99999 PR PBB SHADOW E&M-EST. PATIENT-LVL IV: ICD-10-PCS | Mod: PBBFAC,HCNC,, | Performed by: INTERNAL MEDICINE

## 2020-10-05 PROCEDURE — 99214 OFFICE O/P EST MOD 30 MIN: CPT | Mod: HCNC,S$GLB,, | Performed by: INTERNAL MEDICINE

## 2020-10-05 PROCEDURE — 80048 BASIC METABOLIC PNL TOTAL CA: CPT | Mod: HCNC

## 2020-10-05 PROCEDURE — 1125F PR PAIN SEVERITY QUANTIFIED, PAIN PRESENT: ICD-10-PCS | Mod: HCNC,S$GLB,, | Performed by: INTERNAL MEDICINE

## 2020-10-05 PROCEDURE — 3074F PR MOST RECENT SYSTOLIC BLOOD PRESSURE < 130 MM HG: ICD-10-PCS | Mod: HCNC,CPTII,S$GLB, | Performed by: INTERNAL MEDICINE

## 2020-10-05 PROCEDURE — 3078F DIAST BP <80 MM HG: CPT | Mod: HCNC,CPTII,S$GLB, | Performed by: INTERNAL MEDICINE

## 2020-10-05 PROCEDURE — 3074F SYST BP LT 130 MM HG: CPT | Mod: HCNC,CPTII,S$GLB, | Performed by: INTERNAL MEDICINE

## 2020-10-05 PROCEDURE — 99499 RISK ADDL DX/OHS AUDIT: ICD-10-PCS | Mod: HCNC,,, | Performed by: INTERNAL MEDICINE

## 2020-10-05 PROCEDURE — 3078F PR MOST RECENT DIASTOLIC BLOOD PRESSURE < 80 MM HG: ICD-10-PCS | Mod: HCNC,CPTII,S$GLB, | Performed by: INTERNAL MEDICINE

## 2020-10-05 PROCEDURE — 3008F BODY MASS INDEX DOCD: CPT | Mod: HCNC,CPTII,S$GLB, | Performed by: INTERNAL MEDICINE

## 2020-10-05 PROCEDURE — 99499 RISK ADDL DX/OHS AUDIT: ICD-10-PCS | Mod: HCNC,S$GLB,, | Performed by: INTERNAL MEDICINE

## 2020-10-05 PROCEDURE — 1159F MED LIST DOCD IN RCRD: CPT | Mod: HCNC,S$GLB,, | Performed by: INTERNAL MEDICINE

## 2020-10-05 PROCEDURE — 99499 UNLISTED E&M SERVICE: CPT | Mod: HCNC,S$GLB,, | Performed by: INTERNAL MEDICINE

## 2020-10-05 PROCEDURE — 36415 COLL VENOUS BLD VENIPUNCTURE: CPT | Mod: HCNC,PO

## 2020-10-05 PROCEDURE — 99499 UNLISTED E&M SERVICE: CPT | Mod: HCNC,,, | Performed by: INTERNAL MEDICINE

## 2020-10-05 PROCEDURE — 1159F PR MEDICATION LIST DOCUMENTED IN MEDICAL RECORD: ICD-10-PCS | Mod: HCNC,S$GLB,, | Performed by: INTERNAL MEDICINE

## 2020-10-05 PROCEDURE — 1101F PR PT FALLS ASSESS DOC 0-1 FALLS W/OUT INJ PAST YR: ICD-10-PCS | Mod: HCNC,CPTII,S$GLB, | Performed by: INTERNAL MEDICINE

## 2020-10-05 PROCEDURE — 99214 PR OFFICE/OUTPT VISIT, EST, LEVL IV, 30-39 MIN: ICD-10-PCS | Mod: HCNC,S$GLB,, | Performed by: INTERNAL MEDICINE

## 2020-10-05 PROCEDURE — 99999 PR PBB SHADOW E&M-EST. PATIENT-LVL IV: CPT | Mod: PBBFAC,HCNC,, | Performed by: INTERNAL MEDICINE

## 2020-10-05 PROCEDURE — 1125F AMNT PAIN NOTED PAIN PRSNT: CPT | Mod: HCNC,S$GLB,, | Performed by: INTERNAL MEDICINE

## 2020-10-05 PROCEDURE — 3008F PR BODY MASS INDEX (BMI) DOCUMENTED: ICD-10-PCS | Mod: HCNC,CPTII,S$GLB, | Performed by: INTERNAL MEDICINE

## 2020-10-05 PROCEDURE — 1101F PT FALLS ASSESS-DOCD LE1/YR: CPT | Mod: HCNC,CPTII,S$GLB, | Performed by: INTERNAL MEDICINE

## 2020-10-05 ASSESSMENT — ROUTINE ASSESSMENT OF PATIENT INDEX DATA (RAPID3)
PAIN SCORE: 5
PSYCHOLOGICAL DISTRESS SCORE: 2.2
TOTAL RAPID3 SCORE: 3.11
MDHAQ FUNCTION SCORE: 0.4
PATIENT GLOBAL ASSESSMENT SCORE: 3

## 2020-10-05 NOTE — PROGRESS NOTES
Subjective:          Chief Complaint: Lyudmila Neri is a 70 y.o. female who had concerns including Disease Management.    HPI:    Patient is a 69-year-old female her for annual f/u of OA    Feet now painful all the time, previously only end of day. Pain first thing in AM, pain at rest. Dorsal mid foot predominant. Not as much in toes- working with Dr. Post.   Exacerbated with standing.   .     Celebrex as of 2020 insurance will not fill  Mobic in past not as effective.   Rare voltaren.   Using Relief factor and it is helping.     BP has been good  Patient was seen by Dr. Post and as of 07/16/2019 with recommendation for continued inserts stretching of the Achilles tendon and consideration for injections as needed.      Rheumatic Hx:    As a history of nonerosive erosive reflux gastritis as well as GIST resected 2011 follows with Dr. Pro.  Recent serologies rheumatoid factor negative, sedimentation rate within normal limits, SINDI negative, C-reactive protein is slightly elevated.  Affected joints: knees, shoulders cervical spine (hx of fusion), hips, hands are stiff in the CMC and MCP and PIP, feet. Right ankle with known fx and ORIF.   Stifffness in AM 45 min with hot shower.   No dactylitis, no known other joint swelling. No IBD. She notes drys eyes, no scleritis, episcleritis  She did use aleve which helps, meloxicam not really. Celebrex does help but cannot use BID due to HTN. Did recently have BP meds adjusted.  Is having relief but not complete. No GI upset.   She has sensitivity to soft tissue pain.  She has hx of Psoriasis that is controlled for at least 10 years-scalp predominant but diffusely.   A new well marginated erosion present at the radial aspect of the base of the right fourth proximal phalanx joint space narrowing in the third fourth MCP and the left third MCP joint degenerative changes otherwise.    REVIEW OF SYSTEMS:    Review of Systems   Constitutional: Positive for  malaise/fatigue. Negative for fever and weight loss.   HENT: Negative for sore throat.    Eyes: Negative for double vision, photophobia and redness.   Respiratory: Negative for cough, shortness of breath and wheezing.    Cardiovascular: Negative for chest pain, palpitations and orthopnea.   Gastrointestinal: Negative for abdominal pain, constipation and diarrhea.   Genitourinary: Negative for dysuria, hematuria and urgency.   Musculoskeletal: Positive for joint pain and myalgias. Negative for back pain.   Skin: Negative for rash.   Neurological: Negative for dizziness, tingling, focal weakness and headaches.   Endo/Heme/Allergies: Does not bruise/bleed easily.   Psychiatric/Behavioral: Negative for depression, hallucinations and suicidal ideas.               Objective:            Past Medical History:   Diagnosis Date    Anticoagulant long-term use     Arthritis     osteoarthritis    Blood transfusion     Cancer 2011    stromal tumor, stomach    Depression     Disorder of kidney and ureter     CKD 2    GERD (gastroesophageal reflux disease)     GIST (gastrointestinal stromal tumor), malignant     H. pylori infection     Hypertension     KATHY (obstructive sleep apnea) 6/24/2013    Psoriasis 6/24/2013    Trouble in sleeping     arthritic pain wakes her up    Urinary incontinence     stress incontinence     Family History   Problem Relation Age of Onset    Heart disease Mother     Arthritis Mother         osteoarthritis    COPD Mother     Hyperlipidemia Mother     Hypertension Mother     Kidney disease Mother     Stroke Mother     Colon cancer Mother 75    Cancer Mother 70        colon cancer    Cancer Father         brain and lung    Arthritis Father     COPD Sister     Depression Daughter     Arthritis Maternal Aunt         rheumatoid arthritis    Heart disease Maternal Uncle     Early death Maternal Uncle         in 50s due to heart disease    Arthritis Paternal Aunt         rheuamtoid  arthritis    Heart disease Maternal Grandfather     Arthritis Paternal Grandmother         rheumatoid arthritis    Diabetes Paternal Grandmother     Diabetes Cousin     Heart disease Cousin     Crohn's disease Neg Hx     Stomach cancer Neg Hx     Ulcerative colitis Neg Hx     Esophageal cancer Neg Hx      Social History     Tobacco Use    Smoking status: Never Smoker    Smokeless tobacco: Never Used   Substance Use Topics    Alcohol use: No    Drug use: No         Current Outpatient Medications on File Prior to Visit   Medication Sig Dispense Refill    amLODIPine (NORVASC) 5 MG tablet Take 1 tablet (5 mg total) by mouth once daily. 90 tablet 3    aspirin (ECOTRIN) 81 MG EC tablet Take 81 mg by mouth once daily.      cholecalciferol, vitamin D3, (VITAMIN D3) 1,000 unit capsule Take 1,000 Units by mouth once daily.      diclofenac sodium (VOLTAREN) 1 % Gel Apply 2 g topically 3 (three) times daily as needed. 100 g 5    fish oil-omega-3 fatty acids 300-1,000 mg capsule Take by mouth once daily.      FOLIC ACID/MULTIVIT-MIN/LUTEIN (CENTRUM SILVER ORAL) Take by mouth.      losartan-hydrochlorothiazide 100-12.5 mg (HYZAAR) 100-12.5 mg Tab Take 1 tablet by mouth once daily. 90 tablet 3    mirabegron (MYRBETRIQ) 50 mg Tb24 Take 1 tablet (50 mg total) by mouth once daily. 90 tablet 3    NON FORMULARY MEDICATION Take by mouth 3 (three) times daily. Relief factor take TID      omeprazole (PRILOSEC) 40 MG capsule TAKE 1 CAPSULE(40 MG) BY MOUTH BEFORE BREAKFAST 90 capsule 3    vit C/E/Zn/coppr/lutein/zeaxan (PRESERVISION AREDS-2 ORAL) Take by mouth 2 (two) times a day.      celecoxib (CELEBREX) 200 MG capsule Take 1 capsule (200 mg total) by mouth 2 (two) times daily. Brand only (Patient not taking: Reported on 5/11/2020) 180 capsule 2    clobetasol (TEMOVATE) 0.05 % external solution Apply topically as needed.       FLUZONE HIGH-DOSE 2019-20, PF, 180 mcg/0.5 mL Syrg       lidocaine 3.75 % Crea Apply  1 application topically 2 (two) times daily as needed (Pain). (Patient not taking: Reported on 10/5/2020) 60 g 0    ondansetron (ZOFRAN) 4 MG tablet Take 1 tablet (4 mg total) by mouth every 8 (eight) hours as needed for Nausea. (Patient not taking: Reported on 10/5/2020) 30 tablet 0    ranitidine (ZANTAC) 300 MG tablet Take 1 tablet (300 mg total) by mouth every evening. , about 30-60 minutes before bedtime. (Patient not taking: Reported on 5/11/2020) 60 tablet 5     Current Facility-Administered Medications on File Prior to Visit   Medication Dose Route Frequency Provider Last Rate Last Dose    estradiol 50 mg pellet  100 mg Intramuscular Once Afua Morales MD        estradiol 50 mg pellet  100 mg Intramuscular Once Afua Morales MD        estradiol 50 mg pellet  50 mg Intramuscular Once Afua Morales MD        testosterone 87.5 mg pellet  87.5 mg Intramuscular Q6 Months Afua Morales MD           Vitals:    10/05/20 1417   BP: 123/78   Pulse: 73       Physical Exam:    Physical Exam  Constitutional:       Appearance: Normal appearance. She is well-developed.   HENT:      Nose: No septal deviation.      Mouth/Throat:      Mouth: No oral lesions.   Eyes:      Conjunctiva/sclera:      Right eye: Right conjunctiva is not injected.      Left eye: Left conjunctiva is not injected.      Pupils: Pupils are equal, round, and reactive to light.   Neck:      Thyroid: No thyroid mass or thyromegaly.      Vascular: No JVD.   Cardiovascular:      Rate and Rhythm: Normal rate and regular rhythm.      Pulses: Normal pulses.      Comments: No edema  Pulmonary:      Effort: Pulmonary effort is normal.      Breath sounds: Normal breath sounds.   Abdominal:      Palpations: Abdomen is soft.   Musculoskeletal:      Right shoulder: She exhibits tenderness. She exhibits normal range of motion and no swelling.      Left shoulder: She exhibits tenderness. She exhibits normal range of motion and no  swelling.      Right elbow: She exhibits normal range of motion and no swelling. No tenderness found.      Left elbow: She exhibits normal range of motion and no swelling. No tenderness found.      Right wrist: She exhibits normal range of motion, no tenderness and no swelling.      Left wrist: She exhibits normal range of motion, no tenderness and no swelling.      Right hip: She exhibits normal range of motion, normal strength and no swelling.      Left hip: She exhibits normal range of motion, no tenderness and no swelling.      Right knee: She exhibits normal range of motion and no swelling. Tenderness found.      Left knee: She exhibits normal range of motion and no swelling. Tenderness found.      Right ankle: She exhibits normal range of motion and no swelling. No tenderness.      Left ankle: She exhibits normal range of motion and no swelling. No tenderness.      Right hand: She exhibits tenderness.      Left hand: She exhibits tenderness.      Right foot: Tenderness present.      Left foot: Tenderness present.      Comments: 2nd DIP b/l bony hypertrophy.   No active synovitis but tenderness at fingers, shoulders, knees and metatarsalgia.   No nodules of the achilles.    Lymphadenopathy:      Cervical: No cervical adenopathy.   Skin:     General: Skin is dry.   Neurological:      Deep Tendon Reflexes: Reflexes are normal and symmetric.               Assessment:       Encounter Diagnoses   Name Primary?    Primary osteoarthritis involving multiple joints Yes    Psoriasis           Plan:        Primary osteoarthritis involving multiple joints    Psoriasis       OA  Stopped Celebrex and doing well with supplement.    d  Voltaren. And see if helps.   Ok to use Celebrex for bad days.   Would like to find the lowest effective dose that allows quality of life for her joint pain.   Doing well can f/u with PCP going forward. Do not hesitate to call     No follow-ups on file.         30min consultation with dean  than 50% spent in counseling, chart review and coordination of care. All questions answered.

## 2020-10-06 LAB
ANION GAP SERPL CALC-SCNC: 10 MMOL/L (ref 8–16)
BUN SERPL-MCNC: 18 MG/DL (ref 8–23)
CALCIUM SERPL-MCNC: 9.5 MG/DL (ref 8.7–10.5)
CHLORIDE SERPL-SCNC: 99 MMOL/L (ref 95–110)
CO2 SERPL-SCNC: 29 MMOL/L (ref 23–29)
CREAT SERPL-MCNC: 0.8 MG/DL (ref 0.5–1.4)
EST. GFR  (AFRICAN AMERICAN): >60 ML/MIN/1.73 M^2
EST. GFR  (NON AFRICAN AMERICAN): >60 ML/MIN/1.73 M^2
GLUCOSE SERPL-MCNC: 93 MG/DL (ref 70–110)
POTASSIUM SERPL-SCNC: 4.4 MMOL/L (ref 3.5–5.1)
SODIUM SERPL-SCNC: 138 MMOL/L (ref 136–145)

## 2020-12-11 ENCOUNTER — PATIENT MESSAGE (OUTPATIENT)
Dept: OTHER | Facility: OTHER | Age: 70
End: 2020-12-11

## 2020-12-23 ENCOUNTER — OFFICE VISIT (OUTPATIENT)
Dept: FAMILY MEDICINE | Facility: CLINIC | Age: 70
End: 2020-12-23
Payer: MEDICARE

## 2020-12-23 VITALS
SYSTOLIC BLOOD PRESSURE: 140 MMHG | BODY MASS INDEX: 29.41 KG/M2 | DIASTOLIC BLOOD PRESSURE: 90 MMHG | WEIGHT: 166 LBS | OXYGEN SATURATION: 97 % | HEART RATE: 75 BPM

## 2020-12-23 DIAGNOSIS — H60.311 ACUTE DIFFUSE OTITIS EXTERNA OF RIGHT EAR: ICD-10-CM

## 2020-12-23 DIAGNOSIS — H65.01 RIGHT ACUTE SEROUS OTITIS MEDIA, RECURRENCE NOT SPECIFIED: Primary | ICD-10-CM

## 2020-12-23 PROCEDURE — 3078F DIAST BP <80 MM HG: CPT | Mod: HCNC,CPTII,S$GLB, | Performed by: PHYSICIAN ASSISTANT

## 2020-12-23 PROCEDURE — 1159F MED LIST DOCD IN RCRD: CPT | Mod: HCNC,S$GLB,, | Performed by: PHYSICIAN ASSISTANT

## 2020-12-23 PROCEDURE — 3008F BODY MASS INDEX DOCD: CPT | Mod: HCNC,CPTII,S$GLB, | Performed by: PHYSICIAN ASSISTANT

## 2020-12-23 PROCEDURE — 3288F FALL RISK ASSESSMENT DOCD: CPT | Mod: HCNC,CPTII,S$GLB, | Performed by: PHYSICIAN ASSISTANT

## 2020-12-23 PROCEDURE — 99999 PR PBB SHADOW E&M-EST. PATIENT-LVL IV: CPT | Mod: PBBFAC,HCNC,, | Performed by: PHYSICIAN ASSISTANT

## 2020-12-23 PROCEDURE — 3288F PR FALLS RISK ASSESSMENT DOCUMENTED: ICD-10-PCS | Mod: HCNC,CPTII,S$GLB, | Performed by: PHYSICIAN ASSISTANT

## 2020-12-23 PROCEDURE — 1100F PR PT FALLS ASSESS DOC 2+ FALLS/FALL W/INJURY/YR: ICD-10-PCS | Mod: HCNC,CPTII,S$GLB, | Performed by: PHYSICIAN ASSISTANT

## 2020-12-23 PROCEDURE — 99213 PR OFFICE/OUTPT VISIT, EST, LEVL III, 20-29 MIN: ICD-10-PCS | Mod: HCNC,S$GLB,, | Performed by: PHYSICIAN ASSISTANT

## 2020-12-23 PROCEDURE — 3077F PR MOST RECENT SYSTOLIC BLOOD PRESSURE >= 140 MM HG: ICD-10-PCS | Mod: HCNC,CPTII,S$GLB, | Performed by: PHYSICIAN ASSISTANT

## 2020-12-23 PROCEDURE — 99213 OFFICE O/P EST LOW 20 MIN: CPT | Mod: HCNC,S$GLB,, | Performed by: PHYSICIAN ASSISTANT

## 2020-12-23 PROCEDURE — 99999 PR PBB SHADOW E&M-EST. PATIENT-LVL IV: ICD-10-PCS | Mod: PBBFAC,HCNC,, | Performed by: PHYSICIAN ASSISTANT

## 2020-12-23 PROCEDURE — 1100F PTFALLS ASSESS-DOCD GE2>/YR: CPT | Mod: HCNC,CPTII,S$GLB, | Performed by: PHYSICIAN ASSISTANT

## 2020-12-23 PROCEDURE — 1159F PR MEDICATION LIST DOCUMENTED IN MEDICAL RECORD: ICD-10-PCS | Mod: HCNC,S$GLB,, | Performed by: PHYSICIAN ASSISTANT

## 2020-12-23 PROCEDURE — 3008F PR BODY MASS INDEX (BMI) DOCUMENTED: ICD-10-PCS | Mod: HCNC,CPTII,S$GLB, | Performed by: PHYSICIAN ASSISTANT

## 2020-12-23 PROCEDURE — 3077F SYST BP >= 140 MM HG: CPT | Mod: HCNC,CPTII,S$GLB, | Performed by: PHYSICIAN ASSISTANT

## 2020-12-23 PROCEDURE — 3078F PR MOST RECENT DIASTOLIC BLOOD PRESSURE < 80 MM HG: ICD-10-PCS | Mod: HCNC,CPTII,S$GLB, | Performed by: PHYSICIAN ASSISTANT

## 2020-12-23 RX ORDER — CIPROFLOXACIN AND DEXAMETHASONE 3; 1 MG/ML; MG/ML
4 SUSPENSION/ DROPS AURICULAR (OTIC) 2 TIMES DAILY
Qty: 7.5 ML | Refills: 1 | Status: SHIPPED | OUTPATIENT
Start: 2020-12-23 | End: 2020-12-30

## 2020-12-23 RX ORDER — AMOXICILLIN AND CLAVULANATE POTASSIUM 875; 125 MG/1; MG/1
1 TABLET, FILM COATED ORAL EVERY 12 HOURS
Qty: 14 TABLET | Refills: 0 | Status: SHIPPED | OUTPATIENT
Start: 2020-12-23 | End: 2020-12-30

## 2020-12-30 ENCOUNTER — PATIENT OUTREACH (OUTPATIENT)
Dept: ADMINISTRATIVE | Facility: OTHER | Age: 70
End: 2020-12-30

## 2020-12-30 NOTE — PROGRESS NOTES
Chart was reviewed for overdue Proactive [facility] Encounters (KATHRYN)  topics  Updates were requested from care everywhere  Health Maintenance has been updated  [place] immunization registry triggered

## 2021-01-04 ENCOUNTER — OFFICE VISIT (OUTPATIENT)
Dept: UROGYNECOLOGY | Facility: CLINIC | Age: 71
End: 2021-01-04
Payer: MEDICARE

## 2021-01-04 VITALS — BODY MASS INDEX: 29.41 KG/M2 | WEIGHT: 166 LBS | HEIGHT: 63 IN

## 2021-01-04 DIAGNOSIS — N81.10 CYSTOCELE WITH RECTOCELE: ICD-10-CM

## 2021-01-04 DIAGNOSIS — N81.6 CYSTOCELE WITH RECTOCELE: ICD-10-CM

## 2021-01-04 DIAGNOSIS — N39.46 MIXED INCONTINENCE URGE AND STRESS: ICD-10-CM

## 2021-01-04 DIAGNOSIS — R35.0 URINARY FREQUENCY: Primary | ICD-10-CM

## 2021-01-04 DIAGNOSIS — N36.41 URETHRAL HYPERMOBILITY: ICD-10-CM

## 2021-01-04 LAB
BILIRUB SERPL-MCNC: NORMAL MG/DL
BLOOD URINE, POC: NORMAL
CLARITY, POC UA: CLEAR
COLOR, POC UA: YELLOW
GLUCOSE UR QL STRIP: NORMAL
KETONES UR QL STRIP: NORMAL
LEUKOCYTE ESTERASE URINE, POC: NORMAL
NITRITE, POC UA: NORMAL
PH, POC UA: 5
PROTEIN, POC: NORMAL
SPECIFIC GRAVITY, POC UA: 1020
UROBILINOGEN, POC UA: NORMAL

## 2021-01-04 PROCEDURE — 99213 OFFICE O/P EST LOW 20 MIN: CPT | Mod: 25,S$GLB,, | Performed by: OBSTETRICS & GYNECOLOGY

## 2021-01-04 PROCEDURE — 1159F MED LIST DOCD IN RCRD: CPT | Mod: S$GLB,,, | Performed by: OBSTETRICS & GYNECOLOGY

## 2021-01-04 PROCEDURE — 99213 PR OFFICE/OUTPT VISIT, EST, LEVL III, 20-29 MIN: ICD-10-PCS | Mod: 25,S$GLB,, | Performed by: OBSTETRICS & GYNECOLOGY

## 2021-01-04 PROCEDURE — 99999 PR PBB SHADOW E&M-EST. PATIENT-LVL III: CPT | Mod: PBBFAC,,, | Performed by: OBSTETRICS & GYNECOLOGY

## 2021-01-04 PROCEDURE — 81002 URINALYSIS NONAUTO W/O SCOPE: CPT | Mod: S$GLB,,, | Performed by: OBSTETRICS & GYNECOLOGY

## 2021-01-04 PROCEDURE — 1159F PR MEDICATION LIST DOCUMENTED IN MEDICAL RECORD: ICD-10-PCS | Mod: S$GLB,,, | Performed by: OBSTETRICS & GYNECOLOGY

## 2021-01-04 PROCEDURE — 99999 PR PBB SHADOW E&M-EST. PATIENT-LVL III: ICD-10-PCS | Mod: PBBFAC,,, | Performed by: OBSTETRICS & GYNECOLOGY

## 2021-01-04 PROCEDURE — 81002 POCT URINE DIPSTICK WITHOUT MICROSCOPE: ICD-10-PCS | Mod: S$GLB,,, | Performed by: OBSTETRICS & GYNECOLOGY

## 2021-01-04 RX ORDER — SOLIFENACIN SUCCINATE 5 MG/1
5 TABLET, FILM COATED ORAL DAILY
Qty: 30 TABLET | Refills: 12 | Status: SHIPPED | OUTPATIENT
Start: 2021-01-04 | End: 2022-05-06 | Stop reason: SDUPTHER

## 2021-02-03 ENCOUNTER — PROCEDURE VISIT (OUTPATIENT)
Dept: UROGYNECOLOGY | Facility: CLINIC | Age: 71
End: 2021-02-03
Payer: MEDICARE

## 2021-02-03 VITALS
BODY MASS INDEX: 29.21 KG/M2 | HEART RATE: 73 BPM | HEIGHT: 63 IN | DIASTOLIC BLOOD PRESSURE: 76 MMHG | SYSTOLIC BLOOD PRESSURE: 120 MMHG | WEIGHT: 164.88 LBS

## 2021-02-03 DIAGNOSIS — N39.46 MIXED INCONTINENCE URGE AND STRESS: ICD-10-CM

## 2021-02-03 DIAGNOSIS — N36.41 URETHRAL HYPERMOBILITY: ICD-10-CM

## 2021-02-03 DIAGNOSIS — N81.6 CYSTOCELE WITH RECTOCELE: ICD-10-CM

## 2021-02-03 DIAGNOSIS — R35.0 URINARY FREQUENCY: Primary | ICD-10-CM

## 2021-02-03 DIAGNOSIS — N81.10 CYSTOCELE WITH RECTOCELE: ICD-10-CM

## 2021-02-03 LAB
BILIRUB SERPL-MCNC: ABNORMAL MG/DL
BLOOD URINE, POC: ABNORMAL
CLARITY, POC UA: CLEAR
COLOR, POC UA: YELLOW
GLUCOSE UR QL STRIP: ABNORMAL
KETONES UR QL STRIP: ABNORMAL
LEUKOCYTE ESTERASE URINE, POC: ABNORMAL
NITRITE, POC UA: ABNORMAL
PH, POC UA: 5
PROTEIN, POC: ABNORMAL
SPECIFIC GRAVITY, POC UA: 1.02
UROBILINOGEN, POC UA: ABNORMAL

## 2021-02-03 PROCEDURE — 99214 PR OFFICE/OUTPT VISIT, EST, LEVL IV, 30-39 MIN: ICD-10-PCS | Mod: 25,S$GLB,, | Performed by: NURSE PRACTITIONER

## 2021-02-03 PROCEDURE — 81002 URINALYSIS NONAUTO W/O SCOPE: CPT | Mod: S$GLB,,, | Performed by: NURSE PRACTITIONER

## 2021-02-03 PROCEDURE — 51725 SIMPLE CYSTOMETROGRAM: CPT | Mod: S$GLB,,, | Performed by: NURSE PRACTITIONER

## 2021-02-03 PROCEDURE — 81002 POCT URINE DIPSTICK WITHOUT MICROSCOPE: ICD-10-PCS | Mod: S$GLB,,, | Performed by: NURSE PRACTITIONER

## 2021-02-03 PROCEDURE — 51725 PR SIMPLE CYSTOMETROGRAM: ICD-10-PCS | Mod: S$GLB,,, | Performed by: NURSE PRACTITIONER

## 2021-02-03 PROCEDURE — 99214 OFFICE O/P EST MOD 30 MIN: CPT | Mod: 25,S$GLB,, | Performed by: NURSE PRACTITIONER

## 2021-02-09 ENCOUNTER — TELEPHONE (OUTPATIENT)
Dept: UROGYNECOLOGY | Facility: CLINIC | Age: 71
End: 2021-02-09

## 2021-02-16 ENCOUNTER — PATIENT MESSAGE (OUTPATIENT)
Dept: UROGYNECOLOGY | Facility: CLINIC | Age: 71
End: 2021-02-16

## 2021-02-19 ENCOUNTER — OFFICE VISIT (OUTPATIENT)
Dept: PODIATRY | Facility: CLINIC | Age: 71
End: 2021-02-19
Payer: MEDICARE

## 2021-02-19 DIAGNOSIS — M19.079 ARTHRITIS, MIDFOOT: Primary | ICD-10-CM

## 2021-02-19 PROCEDURE — 1125F AMNT PAIN NOTED PAIN PRSNT: CPT | Mod: S$GLB,,, | Performed by: PODIATRIST

## 2021-02-19 PROCEDURE — 20600 DRAIN/INJ JOINT/BURSA W/O US: CPT | Mod: 50,S$GLB,, | Performed by: PODIATRIST

## 2021-02-19 PROCEDURE — 3288F FALL RISK ASSESSMENT DOCD: CPT | Mod: S$GLB,,, | Performed by: PODIATRIST

## 2021-02-19 PROCEDURE — 20600 PR DRAIN/INJECT SMALL JOINT/BURSA: ICD-10-PCS | Mod: 50,S$GLB,, | Performed by: PODIATRIST

## 2021-02-19 PROCEDURE — 3288F PR FALLS RISK ASSESSMENT DOCUMENTED: ICD-10-PCS | Mod: S$GLB,,, | Performed by: PODIATRIST

## 2021-02-19 PROCEDURE — 1125F PR PAIN SEVERITY QUANTIFIED, PAIN PRESENT: ICD-10-PCS | Mod: S$GLB,,, | Performed by: PODIATRIST

## 2021-02-19 PROCEDURE — 1100F PR PT FALLS ASSESS DOC 2+ FALLS/FALL W/INJURY/YR: ICD-10-PCS | Mod: S$GLB,,, | Performed by: PODIATRIST

## 2021-02-19 PROCEDURE — 99499 NO LOS: ICD-10-PCS | Mod: S$GLB,,, | Performed by: PODIATRIST

## 2021-02-19 PROCEDURE — 99999 PR PBB SHADOW E&M-EST. PATIENT-LVL III: ICD-10-PCS | Mod: PBBFAC,,, | Performed by: PODIATRIST

## 2021-02-19 PROCEDURE — 1100F PTFALLS ASSESS-DOCD GE2>/YR: CPT | Mod: S$GLB,,, | Performed by: PODIATRIST

## 2021-02-19 PROCEDURE — 99499 UNLISTED E&M SERVICE: CPT | Mod: S$GLB,,, | Performed by: PODIATRIST

## 2021-02-19 PROCEDURE — 99999 PR PBB SHADOW E&M-EST. PATIENT-LVL III: CPT | Mod: PBBFAC,,, | Performed by: PODIATRIST

## 2021-02-19 RX ORDER — METHYLPREDNISOLONE ACETATE 40 MG/ML
40 INJECTION, SUSPENSION INTRA-ARTICULAR; INTRALESIONAL; INTRAMUSCULAR; SOFT TISSUE
Status: COMPLETED | OUTPATIENT
Start: 2021-02-19 | End: 2021-02-19

## 2021-02-19 RX ADMIN — METHYLPREDNISOLONE ACETATE 40 MG: 40 INJECTION, SUSPENSION INTRA-ARTICULAR; INTRALESIONAL; INTRAMUSCULAR; SOFT TISSUE at 09:02

## 2021-03-17 ENCOUNTER — PES CALL (OUTPATIENT)
Dept: ADMINISTRATIVE | Facility: CLINIC | Age: 71
End: 2021-03-17

## 2021-05-07 ENCOUNTER — OFFICE VISIT (OUTPATIENT)
Dept: FAMILY MEDICINE | Facility: CLINIC | Age: 71
End: 2021-05-07
Payer: MEDICARE

## 2021-05-07 VITALS
OXYGEN SATURATION: 98 % | RESPIRATION RATE: 18 BRPM | BODY MASS INDEX: 29.49 KG/M2 | SYSTOLIC BLOOD PRESSURE: 120 MMHG | HEIGHT: 63 IN | TEMPERATURE: 98 F | WEIGHT: 166.44 LBS | DIASTOLIC BLOOD PRESSURE: 82 MMHG | HEART RATE: 64 BPM

## 2021-05-07 DIAGNOSIS — Z92.29 HISTORY OF POSTMENOPAUSAL HRT: Primary | ICD-10-CM

## 2021-05-07 DIAGNOSIS — Z12.31 ENCOUNTER FOR SCREENING MAMMOGRAM FOR MALIGNANT NEOPLASM OF BREAST: ICD-10-CM

## 2021-05-07 DIAGNOSIS — R15.9 INCONTINENCE OF FECES, UNSPECIFIED FECAL INCONTINENCE TYPE: ICD-10-CM

## 2021-05-07 DIAGNOSIS — Z12.39 ENCOUNTER FOR SCREENING FOR MALIGNANT NEOPLASM OF BREAST, UNSPECIFIED SCREENING MODALITY: ICD-10-CM

## 2021-05-07 DIAGNOSIS — Z76.89 ENCOUNTER TO ESTABLISH CARE: ICD-10-CM

## 2021-05-07 DIAGNOSIS — M15.9 PRIMARY OSTEOARTHRITIS INVOLVING MULTIPLE JOINTS: ICD-10-CM

## 2021-05-07 DIAGNOSIS — G47.33 OSA (OBSTRUCTIVE SLEEP APNEA): ICD-10-CM

## 2021-05-07 DIAGNOSIS — I77.9 BILATERAL CAROTID ARTERY DISEASE, UNSPECIFIED TYPE: ICD-10-CM

## 2021-05-07 PROCEDURE — 3008F BODY MASS INDEX DOCD: CPT | Mod: S$GLB,,, | Performed by: INTERNAL MEDICINE

## 2021-05-07 PROCEDURE — 1125F AMNT PAIN NOTED PAIN PRSNT: CPT | Mod: S$GLB,,, | Performed by: INTERNAL MEDICINE

## 2021-05-07 PROCEDURE — 3288F FALL RISK ASSESSMENT DOCD: CPT | Mod: S$GLB,,, | Performed by: INTERNAL MEDICINE

## 2021-05-07 PROCEDURE — 1100F PR PT FALLS ASSESS DOC 2+ FALLS/FALL W/INJURY/YR: ICD-10-PCS | Mod: S$GLB,,, | Performed by: INTERNAL MEDICINE

## 2021-05-07 PROCEDURE — 1125F PR PAIN SEVERITY QUANTIFIED, PAIN PRESENT: ICD-10-PCS | Mod: S$GLB,,, | Performed by: INTERNAL MEDICINE

## 2021-05-07 PROCEDURE — 99214 OFFICE O/P EST MOD 30 MIN: CPT | Mod: S$GLB,,, | Performed by: INTERNAL MEDICINE

## 2021-05-07 PROCEDURE — 99214 PR OFFICE/OUTPT VISIT, EST, LEVL IV, 30-39 MIN: ICD-10-PCS | Mod: S$GLB,,, | Performed by: INTERNAL MEDICINE

## 2021-05-07 PROCEDURE — 1100F PTFALLS ASSESS-DOCD GE2>/YR: CPT | Mod: S$GLB,,, | Performed by: INTERNAL MEDICINE

## 2021-05-07 PROCEDURE — 99999 PR PBB SHADOW E&M-EST. PATIENT-LVL V: CPT | Mod: PBBFAC,,, | Performed by: INTERNAL MEDICINE

## 2021-05-07 PROCEDURE — 3288F PR FALLS RISK ASSESSMENT DOCUMENTED: ICD-10-PCS | Mod: S$GLB,,, | Performed by: INTERNAL MEDICINE

## 2021-05-07 PROCEDURE — 99999 PR PBB SHADOW E&M-EST. PATIENT-LVL V: ICD-10-PCS | Mod: PBBFAC,,, | Performed by: INTERNAL MEDICINE

## 2021-05-07 PROCEDURE — 3008F PR BODY MASS INDEX (BMI) DOCUMENTED: ICD-10-PCS | Mod: S$GLB,,, | Performed by: INTERNAL MEDICINE

## 2021-05-07 PROCEDURE — 1159F MED LIST DOCD IN RCRD: CPT | Mod: S$GLB,,, | Performed by: INTERNAL MEDICINE

## 2021-05-07 PROCEDURE — 1159F PR MEDICATION LIST DOCUMENTED IN MEDICAL RECORD: ICD-10-PCS | Mod: S$GLB,,, | Performed by: INTERNAL MEDICINE

## 2021-05-07 RX ORDER — CETIRIZINE HYDROCHLORIDE 10 MG/1
10 TABLET ORAL DAILY
COMMUNITY

## 2021-05-11 ENCOUNTER — HOSPITAL ENCOUNTER (OUTPATIENT)
Dept: RADIOLOGY | Facility: HOSPITAL | Age: 71
Discharge: HOME | End: 2021-05-11
Attending: INTERNAL MEDICINE
Payer: MEDICARE

## 2021-05-11 DIAGNOSIS — Z12.31 ENCOUNTER FOR SCREENING MAMMOGRAM FOR MALIGNANT NEOPLASM OF BREAST: ICD-10-CM

## 2021-05-11 DIAGNOSIS — Z12.39 ENCOUNTER FOR SCREENING FOR MALIGNANT NEOPLASM OF BREAST, UNSPECIFIED SCREENING MODALITY: ICD-10-CM

## 2021-05-11 PROCEDURE — 77067 SCR MAMMO BI INCL CAD: CPT | Mod: TC,PO

## 2021-05-11 PROCEDURE — 77067 MAMMO DIGITAL SCREENING BILAT WITH TOMO: ICD-10-PCS | Mod: 26,,, | Performed by: RADIOLOGY

## 2021-05-11 PROCEDURE — 77067 SCR MAMMO BI INCL CAD: CPT | Mod: 26,,, | Performed by: RADIOLOGY

## 2021-05-11 PROCEDURE — 77063 BREAST TOMOSYNTHESIS BI: CPT | Mod: 26,,, | Performed by: RADIOLOGY

## 2021-05-11 PROCEDURE — 77063 MAMMO DIGITAL SCREENING BILAT WITH TOMO: ICD-10-PCS | Mod: 26,,, | Performed by: RADIOLOGY

## 2021-05-24 ENCOUNTER — OFFICE VISIT (OUTPATIENT)
Dept: UROGYNECOLOGY | Facility: CLINIC | Age: 71
End: 2021-05-24
Payer: MEDICARE

## 2021-05-24 VITALS
HEIGHT: 63 IN | WEIGHT: 163.56 LBS | HEART RATE: 63 BPM | SYSTOLIC BLOOD PRESSURE: 142 MMHG | DIASTOLIC BLOOD PRESSURE: 81 MMHG | BODY MASS INDEX: 28.98 KG/M2

## 2021-05-24 DIAGNOSIS — N36.41 URETHRAL HYPERMOBILITY: ICD-10-CM

## 2021-05-24 DIAGNOSIS — N39.46 MIXED STRESS AND URGE URINARY INCONTINENCE: ICD-10-CM

## 2021-05-24 DIAGNOSIS — N81.89 PELVIC RELAXATION: ICD-10-CM

## 2021-05-24 DIAGNOSIS — R35.0 URINARY FREQUENCY: Primary | ICD-10-CM

## 2021-05-24 LAB
BILIRUB SERPL-MCNC: NEGATIVE MG/DL
BLOOD URINE, POC: NEGATIVE
CLARITY, POC UA: CLEAR
COLOR, POC UA: YELLOW
GLUCOSE UR QL STRIP: NORMAL
KETONES UR QL STRIP: NEGATIVE
LEUKOCYTE ESTERASE URINE, POC: NEGATIVE
NITRITE, POC UA: NEGATIVE
PH, POC UA: 5
PROTEIN, POC: NEGATIVE
SPECIFIC GRAVITY, POC UA: 1
UROBILINOGEN, POC UA: NORMAL

## 2021-05-24 PROCEDURE — 81002 POCT URINE DIPSTICK WITHOUT MICROSCOPE: ICD-10-PCS | Mod: S$GLB,,, | Performed by: OBSTETRICS & GYNECOLOGY

## 2021-05-24 PROCEDURE — 3288F FALL RISK ASSESSMENT DOCD: CPT | Mod: S$GLB,,, | Performed by: OBSTETRICS & GYNECOLOGY

## 2021-05-24 PROCEDURE — 1126F PR PAIN SEVERITY QUANTIFIED, NO PAIN PRESENT: ICD-10-PCS | Mod: S$GLB,,, | Performed by: OBSTETRICS & GYNECOLOGY

## 2021-05-24 PROCEDURE — 3008F BODY MASS INDEX DOCD: CPT | Mod: S$GLB,,, | Performed by: OBSTETRICS & GYNECOLOGY

## 2021-05-24 PROCEDURE — 1126F AMNT PAIN NOTED NONE PRSNT: CPT | Mod: S$GLB,,, | Performed by: OBSTETRICS & GYNECOLOGY

## 2021-05-24 PROCEDURE — 1101F PT FALLS ASSESS-DOCD LE1/YR: CPT | Mod: S$GLB,,, | Performed by: OBSTETRICS & GYNECOLOGY

## 2021-05-24 PROCEDURE — 99212 OFFICE O/P EST SF 10 MIN: CPT | Mod: 25,S$GLB,, | Performed by: OBSTETRICS & GYNECOLOGY

## 2021-05-24 PROCEDURE — 1101F PR PT FALLS ASSESS DOC 0-1 FALLS W/OUT INJ PAST YR: ICD-10-PCS | Mod: S$GLB,,, | Performed by: OBSTETRICS & GYNECOLOGY

## 2021-05-24 PROCEDURE — 99999 PR PBB SHADOW E&M-EST. PATIENT-LVL III: ICD-10-PCS | Mod: PBBFAC,,, | Performed by: OBSTETRICS & GYNECOLOGY

## 2021-05-24 PROCEDURE — 99999 PR PBB SHADOW E&M-EST. PATIENT-LVL III: CPT | Mod: PBBFAC,,, | Performed by: OBSTETRICS & GYNECOLOGY

## 2021-05-24 PROCEDURE — 1159F MED LIST DOCD IN RCRD: CPT | Mod: S$GLB,,, | Performed by: OBSTETRICS & GYNECOLOGY

## 2021-05-24 PROCEDURE — 99212 PR OFFICE/OUTPT VISIT, EST, LEVL II, 10-19 MIN: ICD-10-PCS | Mod: 25,S$GLB,, | Performed by: OBSTETRICS & GYNECOLOGY

## 2021-05-24 PROCEDURE — 81002 URINALYSIS NONAUTO W/O SCOPE: CPT | Mod: S$GLB,,, | Performed by: OBSTETRICS & GYNECOLOGY

## 2021-05-24 PROCEDURE — 3288F PR FALLS RISK ASSESSMENT DOCUMENTED: ICD-10-PCS | Mod: S$GLB,,, | Performed by: OBSTETRICS & GYNECOLOGY

## 2021-05-24 PROCEDURE — 1159F PR MEDICATION LIST DOCUMENTED IN MEDICAL RECORD: ICD-10-PCS | Mod: S$GLB,,, | Performed by: OBSTETRICS & GYNECOLOGY

## 2021-05-24 PROCEDURE — 3008F PR BODY MASS INDEX (BMI) DOCUMENTED: ICD-10-PCS | Mod: S$GLB,,, | Performed by: OBSTETRICS & GYNECOLOGY

## 2021-07-12 ENCOUNTER — PATIENT MESSAGE (OUTPATIENT)
Dept: FAMILY MEDICINE | Facility: CLINIC | Age: 71
End: 2021-07-12

## 2021-07-27 ENCOUNTER — OFFICE VISIT (OUTPATIENT)
Dept: FAMILY MEDICINE | Facility: CLINIC | Age: 71
End: 2021-07-27
Payer: MEDICARE

## 2021-07-27 VITALS
BODY MASS INDEX: 29.41 KG/M2 | SYSTOLIC BLOOD PRESSURE: 130 MMHG | HEIGHT: 63 IN | TEMPERATURE: 98 F | DIASTOLIC BLOOD PRESSURE: 80 MMHG | WEIGHT: 166 LBS | OXYGEN SATURATION: 98 % | HEART RATE: 64 BPM

## 2021-07-27 DIAGNOSIS — I10 ESSENTIAL HYPERTENSION: Primary | ICD-10-CM

## 2021-07-27 DIAGNOSIS — E73.9 LACTOSE INTOLERANCE: ICD-10-CM

## 2021-07-27 DIAGNOSIS — R41.3 MEMORY PROBLEM: ICD-10-CM

## 2021-07-27 DIAGNOSIS — R73.03 PREDIABETES: ICD-10-CM

## 2021-07-27 PROBLEM — H35.3131 EARLY DRY STAGE NONEXUDATIVE AGE-RELATED MACULAR DEGENERATION OF BOTH EYES: Status: ACTIVE | Noted: 2021-07-27

## 2021-07-27 PROCEDURE — 1101F PR PT FALLS ASSESS DOC 0-1 FALLS W/OUT INJ PAST YR: ICD-10-PCS | Mod: S$GLB,,, | Performed by: INTERNAL MEDICINE

## 2021-07-27 PROCEDURE — 1159F PR MEDICATION LIST DOCUMENTED IN MEDICAL RECORD: ICD-10-PCS | Mod: S$GLB,,, | Performed by: INTERNAL MEDICINE

## 2021-07-27 PROCEDURE — 3079F DIAST BP 80-89 MM HG: CPT | Mod: S$GLB,,, | Performed by: INTERNAL MEDICINE

## 2021-07-27 PROCEDURE — 1160F PR REVIEW ALL MEDS BY PRESCRIBER/CLIN PHARMACIST DOCUMENTED: ICD-10-PCS | Mod: S$GLB,,, | Performed by: INTERNAL MEDICINE

## 2021-07-27 PROCEDURE — 1101F PT FALLS ASSESS-DOCD LE1/YR: CPT | Mod: S$GLB,,, | Performed by: INTERNAL MEDICINE

## 2021-07-27 PROCEDURE — 3079F PR MOST RECENT DIASTOLIC BLOOD PRESSURE 80-89 MM HG: ICD-10-PCS | Mod: S$GLB,,, | Performed by: INTERNAL MEDICINE

## 2021-07-27 PROCEDURE — 1160F RVW MEDS BY RX/DR IN RCRD: CPT | Mod: S$GLB,,, | Performed by: INTERNAL MEDICINE

## 2021-07-27 PROCEDURE — 3288F PR FALLS RISK ASSESSMENT DOCUMENTED: ICD-10-PCS | Mod: S$GLB,,, | Performed by: INTERNAL MEDICINE

## 2021-07-27 PROCEDURE — 1159F MED LIST DOCD IN RCRD: CPT | Mod: S$GLB,,, | Performed by: INTERNAL MEDICINE

## 2021-07-27 PROCEDURE — 99214 PR OFFICE/OUTPT VISIT, EST, LEVL IV, 30-39 MIN: ICD-10-PCS | Mod: S$GLB,,, | Performed by: INTERNAL MEDICINE

## 2021-07-27 PROCEDURE — 99214 OFFICE O/P EST MOD 30 MIN: CPT | Mod: S$GLB,,, | Performed by: INTERNAL MEDICINE

## 2021-07-27 PROCEDURE — 3075F PR MOST RECENT SYSTOLIC BLOOD PRESS GE 130-139MM HG: ICD-10-PCS | Mod: S$GLB,,, | Performed by: INTERNAL MEDICINE

## 2021-07-27 PROCEDURE — 1126F AMNT PAIN NOTED NONE PRSNT: CPT | Mod: S$GLB,,, | Performed by: INTERNAL MEDICINE

## 2021-07-27 PROCEDURE — 3288F FALL RISK ASSESSMENT DOCD: CPT | Mod: S$GLB,,, | Performed by: INTERNAL MEDICINE

## 2021-07-27 PROCEDURE — 3075F SYST BP GE 130 - 139MM HG: CPT | Mod: S$GLB,,, | Performed by: INTERNAL MEDICINE

## 2021-07-27 PROCEDURE — 3008F BODY MASS INDEX DOCD: CPT | Mod: S$GLB,,, | Performed by: INTERNAL MEDICINE

## 2021-07-27 PROCEDURE — 99999 PR PBB SHADOW E&M-EST. PATIENT-LVL IV: CPT | Mod: PBBFAC,,, | Performed by: INTERNAL MEDICINE

## 2021-07-27 PROCEDURE — 99999 PR PBB SHADOW E&M-EST. PATIENT-LVL IV: ICD-10-PCS | Mod: PBBFAC,,, | Performed by: INTERNAL MEDICINE

## 2021-07-27 PROCEDURE — 3008F PR BODY MASS INDEX (BMI) DOCUMENTED: ICD-10-PCS | Mod: S$GLB,,, | Performed by: INTERNAL MEDICINE

## 2021-07-27 PROCEDURE — 1126F PR PAIN SEVERITY QUANTIFIED, NO PAIN PRESENT: ICD-10-PCS | Mod: S$GLB,,, | Performed by: INTERNAL MEDICINE

## 2021-07-29 ENCOUNTER — LAB VISIT (OUTPATIENT)
Dept: LAB | Facility: HOSPITAL | Age: 71
End: 2021-07-29
Attending: INTERNAL MEDICINE
Payer: MEDICARE

## 2021-07-29 DIAGNOSIS — R41.3 MEMORY PROBLEM: ICD-10-CM

## 2021-07-29 DIAGNOSIS — R73.03 PREDIABETES: ICD-10-CM

## 2021-07-29 DIAGNOSIS — I10 ESSENTIAL HYPERTENSION: ICD-10-CM

## 2021-07-29 LAB
ALBUMIN SERPL BCP-MCNC: 3.3 G/DL (ref 3.5–5.2)
ALP SERPL-CCNC: 47 U/L (ref 55–135)
ALT SERPL W/O P-5'-P-CCNC: 13 U/L (ref 10–44)
ANION GAP SERPL CALC-SCNC: 10 MMOL/L (ref 8–16)
AST SERPL-CCNC: 17 U/L (ref 10–40)
BILIRUB SERPL-MCNC: 0.5 MG/DL (ref 0.1–1)
BUN SERPL-MCNC: 11 MG/DL (ref 8–23)
CALCIUM SERPL-MCNC: 9.3 MG/DL (ref 8.7–10.5)
CHLORIDE SERPL-SCNC: 105 MMOL/L (ref 95–110)
CO2 SERPL-SCNC: 26 MMOL/L (ref 23–29)
CREAT SERPL-MCNC: 0.9 MG/DL (ref 0.5–1.4)
EST. GFR  (AFRICAN AMERICAN): >60 ML/MIN/1.73 M^2
EST. GFR  (NON AFRICAN AMERICAN): >60 ML/MIN/1.73 M^2
GLUCOSE SERPL-MCNC: 104 MG/DL (ref 70–110)
POTASSIUM SERPL-SCNC: 4.3 MMOL/L (ref 3.5–5.1)
PROT SERPL-MCNC: 6.8 G/DL (ref 6–8.4)
SODIUM SERPL-SCNC: 141 MMOL/L (ref 136–145)
TSH SERPL DL<=0.005 MIU/L-ACNC: 1 UIU/ML (ref 0.4–4)
VIT B12 SERPL-MCNC: 379 PG/ML (ref 210–950)

## 2021-07-29 PROCEDURE — 36415 COLL VENOUS BLD VENIPUNCTURE: CPT | Mod: PO | Performed by: INTERNAL MEDICINE

## 2021-07-29 PROCEDURE — 82607 VITAMIN B-12: CPT | Performed by: INTERNAL MEDICINE

## 2021-07-29 PROCEDURE — 84443 ASSAY THYROID STIM HORMONE: CPT | Performed by: INTERNAL MEDICINE

## 2021-07-29 PROCEDURE — 83036 HEMOGLOBIN GLYCOSYLATED A1C: CPT | Performed by: INTERNAL MEDICINE

## 2021-07-29 PROCEDURE — 80053 COMPREHEN METABOLIC PANEL: CPT | Performed by: INTERNAL MEDICINE

## 2021-07-30 LAB
ESTIMATED AVG GLUCOSE: 111 MG/DL (ref 68–131)
HBA1C MFR BLD: 5.5 % (ref 4–5.6)

## 2021-08-04 ENCOUNTER — PATIENT MESSAGE (OUTPATIENT)
Dept: HEMATOLOGY/ONCOLOGY | Facility: CLINIC | Age: 71
End: 2021-08-04

## 2021-08-05 ENCOUNTER — PATIENT OUTREACH (OUTPATIENT)
Dept: ADMINISTRATIVE | Facility: OTHER | Age: 71
End: 2021-08-05

## 2021-08-06 ENCOUNTER — OFFICE VISIT (OUTPATIENT)
Dept: OBSTETRICS AND GYNECOLOGY | Facility: CLINIC | Age: 71
End: 2021-08-06
Payer: MEDICARE

## 2021-08-06 DIAGNOSIS — Z92.29 HISTORY OF POSTMENOPAUSAL HRT: ICD-10-CM

## 2021-08-06 PROCEDURE — 1160F RVW MEDS BY RX/DR IN RCRD: CPT | Mod: S$GLB,,, | Performed by: OBSTETRICS & GYNECOLOGY

## 2021-08-06 PROCEDURE — 99213 OFFICE O/P EST LOW 20 MIN: CPT | Mod: S$GLB,,, | Performed by: OBSTETRICS & GYNECOLOGY

## 2021-08-06 PROCEDURE — 1101F PR PT FALLS ASSESS DOC 0-1 FALLS W/OUT INJ PAST YR: ICD-10-PCS | Mod: S$GLB,,, | Performed by: OBSTETRICS & GYNECOLOGY

## 2021-08-06 PROCEDURE — 99999 PR PBB SHADOW E&M-EST. PATIENT-LVL III: ICD-10-PCS | Mod: PBBFAC,,, | Performed by: OBSTETRICS & GYNECOLOGY

## 2021-08-06 PROCEDURE — 1101F PT FALLS ASSESS-DOCD LE1/YR: CPT | Mod: S$GLB,,, | Performed by: OBSTETRICS & GYNECOLOGY

## 2021-08-06 PROCEDURE — 99213 PR OFFICE/OUTPT VISIT, EST, LEVL III, 20-29 MIN: ICD-10-PCS | Mod: S$GLB,,, | Performed by: OBSTETRICS & GYNECOLOGY

## 2021-08-06 PROCEDURE — 1160F PR REVIEW ALL MEDS BY PRESCRIBER/CLIN PHARMACIST DOCUMENTED: ICD-10-PCS | Mod: S$GLB,,, | Performed by: OBSTETRICS & GYNECOLOGY

## 2021-08-06 PROCEDURE — 3288F FALL RISK ASSESSMENT DOCD: CPT | Mod: S$GLB,,, | Performed by: OBSTETRICS & GYNECOLOGY

## 2021-08-06 PROCEDURE — 3044F HG A1C LEVEL LT 7.0%: CPT | Mod: S$GLB,,, | Performed by: OBSTETRICS & GYNECOLOGY

## 2021-08-06 PROCEDURE — 1159F MED LIST DOCD IN RCRD: CPT | Mod: S$GLB,,, | Performed by: OBSTETRICS & GYNECOLOGY

## 2021-08-06 PROCEDURE — 3044F PR MOST RECENT HEMOGLOBIN A1C LEVEL <7.0%: ICD-10-PCS | Mod: S$GLB,,, | Performed by: OBSTETRICS & GYNECOLOGY

## 2021-08-06 PROCEDURE — 3288F PR FALLS RISK ASSESSMENT DOCUMENTED: ICD-10-PCS | Mod: S$GLB,,, | Performed by: OBSTETRICS & GYNECOLOGY

## 2021-08-06 PROCEDURE — 99999 PR PBB SHADOW E&M-EST. PATIENT-LVL III: CPT | Mod: PBBFAC,,, | Performed by: OBSTETRICS & GYNECOLOGY

## 2021-08-06 PROCEDURE — 1159F PR MEDICATION LIST DOCUMENTED IN MEDICAL RECORD: ICD-10-PCS | Mod: S$GLB,,, | Performed by: OBSTETRICS & GYNECOLOGY

## 2021-08-06 RX ORDER — ESTERIFIED ESTROGEN AND METHYLTESTOSTERONE .625; 1.25 MG/1; MG/1
1 TABLET ORAL DAILY
Qty: 30 TABLET | Refills: 2 | Status: SHIPPED | OUTPATIENT
Start: 2021-08-06 | End: 2021-10-05

## 2021-08-17 ENCOUNTER — TELEPHONE (OUTPATIENT)
Dept: PODIATRY | Facility: CLINIC | Age: 71
End: 2021-08-17

## 2021-08-18 ENCOUNTER — OFFICE VISIT (OUTPATIENT)
Dept: PODIATRY | Facility: CLINIC | Age: 71
End: 2021-08-18
Payer: MEDICARE

## 2021-08-18 VITALS — DIASTOLIC BLOOD PRESSURE: 71 MMHG | SYSTOLIC BLOOD PRESSURE: 121 MMHG | HEART RATE: 71 BPM

## 2021-08-18 DIAGNOSIS — M19.079 ARTHRITIS OF METATARSOPHALANGEAL (MTP) JOINT OF GREAT TOE: ICD-10-CM

## 2021-08-18 DIAGNOSIS — M19.079 ARTHRITIS, MIDFOOT: Primary | ICD-10-CM

## 2021-08-18 DIAGNOSIS — G57.30 DEEP PERONEAL NEUROPATHY, UNSPECIFIED LATERALITY: ICD-10-CM

## 2021-08-18 PROCEDURE — 1160F RVW MEDS BY RX/DR IN RCRD: CPT | Mod: S$GLB,,, | Performed by: PODIATRIST

## 2021-08-18 PROCEDURE — 99999 PR PBB SHADOW E&M-EST. PATIENT-LVL III: ICD-10-PCS | Mod: PBBFAC,,, | Performed by: PODIATRIST

## 2021-08-18 PROCEDURE — 3044F HG A1C LEVEL LT 7.0%: CPT | Mod: S$GLB,,, | Performed by: PODIATRIST

## 2021-08-18 PROCEDURE — 3044F PR MOST RECENT HEMOGLOBIN A1C LEVEL <7.0%: ICD-10-PCS | Mod: S$GLB,,, | Performed by: PODIATRIST

## 2021-08-18 PROCEDURE — 3074F SYST BP LT 130 MM HG: CPT | Mod: S$GLB,,, | Performed by: PODIATRIST

## 2021-08-18 PROCEDURE — 1125F AMNT PAIN NOTED PAIN PRSNT: CPT | Mod: S$GLB,,, | Performed by: PODIATRIST

## 2021-08-18 PROCEDURE — 1160F PR REVIEW ALL MEDS BY PRESCRIBER/CLIN PHARMACIST DOCUMENTED: ICD-10-PCS | Mod: S$GLB,,, | Performed by: PODIATRIST

## 2021-08-18 PROCEDURE — 3078F PR MOST RECENT DIASTOLIC BLOOD PRESSURE < 80 MM HG: ICD-10-PCS | Mod: S$GLB,,, | Performed by: PODIATRIST

## 2021-08-18 PROCEDURE — 99213 OFFICE O/P EST LOW 20 MIN: CPT | Mod: S$GLB,,, | Performed by: PODIATRIST

## 2021-08-18 PROCEDURE — 99999 PR PBB SHADOW E&M-EST. PATIENT-LVL III: CPT | Mod: PBBFAC,,, | Performed by: PODIATRIST

## 2021-08-18 PROCEDURE — 1125F PR PAIN SEVERITY QUANTIFIED, PAIN PRESENT: ICD-10-PCS | Mod: S$GLB,,, | Performed by: PODIATRIST

## 2021-08-18 PROCEDURE — 3078F DIAST BP <80 MM HG: CPT | Mod: S$GLB,,, | Performed by: PODIATRIST

## 2021-08-18 PROCEDURE — 1101F PR PT FALLS ASSESS DOC 0-1 FALLS W/OUT INJ PAST YR: ICD-10-PCS | Mod: S$GLB,,, | Performed by: PODIATRIST

## 2021-08-18 PROCEDURE — 3288F PR FALLS RISK ASSESSMENT DOCUMENTED: ICD-10-PCS | Mod: S$GLB,,, | Performed by: PODIATRIST

## 2021-08-18 PROCEDURE — 99213 PR OFFICE/OUTPT VISIT, EST, LEVL III, 20-29 MIN: ICD-10-PCS | Mod: S$GLB,,, | Performed by: PODIATRIST

## 2021-08-18 PROCEDURE — 3074F PR MOST RECENT SYSTOLIC BLOOD PRESSURE < 130 MM HG: ICD-10-PCS | Mod: S$GLB,,, | Performed by: PODIATRIST

## 2021-08-18 PROCEDURE — 1101F PT FALLS ASSESS-DOCD LE1/YR: CPT | Mod: S$GLB,,, | Performed by: PODIATRIST

## 2021-08-18 PROCEDURE — 3288F FALL RISK ASSESSMENT DOCD: CPT | Mod: S$GLB,,, | Performed by: PODIATRIST

## 2021-08-18 PROCEDURE — 1159F PR MEDICATION LIST DOCUMENTED IN MEDICAL RECORD: ICD-10-PCS | Mod: S$GLB,,, | Performed by: PODIATRIST

## 2021-08-18 PROCEDURE — 1159F MED LIST DOCD IN RCRD: CPT | Mod: S$GLB,,, | Performed by: PODIATRIST

## 2021-08-18 RX ORDER — METHYLPREDNISOLONE 4 MG/1
TABLET ORAL
Qty: 1 PACKAGE | Refills: 0 | Status: SHIPPED | OUTPATIENT
Start: 2021-08-18 | End: 2021-09-08

## 2021-08-24 ENCOUNTER — TELEPHONE (OUTPATIENT)
Dept: HEMATOLOGY/ONCOLOGY | Facility: CLINIC | Age: 71
End: 2021-08-24

## 2021-08-24 ENCOUNTER — PATIENT MESSAGE (OUTPATIENT)
Dept: HEMATOLOGY/ONCOLOGY | Facility: CLINIC | Age: 71
End: 2021-08-24

## 2021-08-28 ENCOUNTER — TELEPHONE (OUTPATIENT)
Dept: FAMILY MEDICINE | Facility: CLINIC | Age: 71
End: 2021-08-28

## 2021-08-28 ENCOUNTER — PATIENT OUTREACH (OUTPATIENT)
Dept: FAMILY MEDICINE | Facility: CLINIC | Age: 71
End: 2021-08-28

## 2021-09-08 ENCOUNTER — TELEPHONE (OUTPATIENT)
Dept: HEMATOLOGY/ONCOLOGY | Facility: CLINIC | Age: 71
End: 2021-09-08

## 2021-09-08 ENCOUNTER — HOSPITAL ENCOUNTER (OUTPATIENT)
Dept: RADIOLOGY | Facility: HOSPITAL | Age: 71
Discharge: HOME | End: 2021-09-08
Attending: NURSE PRACTITIONER
Payer: MEDICARE

## 2021-09-08 ENCOUNTER — PATIENT MESSAGE (OUTPATIENT)
Dept: HEMATOLOGY/ONCOLOGY | Facility: CLINIC | Age: 71
End: 2021-09-08

## 2021-09-08 DIAGNOSIS — Z85.09 HISTORY OF GASTROINTESTINAL STROMAL TUMOR (GIST): ICD-10-CM

## 2021-09-08 PROCEDURE — 71046 X-RAY EXAM CHEST 2 VIEWS: CPT | Mod: 26,,, | Performed by: RADIOLOGY

## 2021-09-08 PROCEDURE — 71046 X-RAY EXAM CHEST 2 VIEWS: CPT | Mod: TC,PN

## 2021-09-08 PROCEDURE — 71046 XR CHEST PA AND LATERAL: ICD-10-PCS | Mod: 26,,, | Performed by: RADIOLOGY

## 2021-09-22 ENCOUNTER — PATIENT MESSAGE (OUTPATIENT)
Dept: PODIATRY | Facility: CLINIC | Age: 71
End: 2021-09-22

## 2021-10-05 ENCOUNTER — LAB VISIT (OUTPATIENT)
Dept: LAB | Facility: HOSPITAL | Age: 71
End: 2021-10-05
Attending: NURSE PRACTITIONER
Payer: MEDICARE

## 2021-10-05 ENCOUNTER — OFFICE VISIT (OUTPATIENT)
Dept: HEMATOLOGY/ONCOLOGY | Facility: CLINIC | Age: 71
End: 2021-10-05
Payer: MEDICARE

## 2021-10-05 VITALS
HEART RATE: 80 BPM | SYSTOLIC BLOOD PRESSURE: 122 MMHG | OXYGEN SATURATION: 96 % | DIASTOLIC BLOOD PRESSURE: 80 MMHG | HEIGHT: 63 IN | TEMPERATURE: 98 F | BODY MASS INDEX: 28.49 KG/M2 | WEIGHT: 160.81 LBS

## 2021-10-05 DIAGNOSIS — Z85.09 HISTORY OF GASTROINTESTINAL STROMAL TUMOR (GIST): Primary | ICD-10-CM

## 2021-10-05 DIAGNOSIS — Z85.09 HISTORY OF GASTROINTESTINAL STROMAL TUMOR (GIST): ICD-10-CM

## 2021-10-05 LAB
ALBUMIN SERPL BCP-MCNC: 3.4 G/DL (ref 3.5–5.2)
ALP SERPL-CCNC: 46 U/L (ref 55–135)
ALT SERPL W/O P-5'-P-CCNC: 11 U/L (ref 10–44)
ANION GAP SERPL CALC-SCNC: 7 MMOL/L (ref 8–16)
AST SERPL-CCNC: 14 U/L (ref 10–40)
BASOPHILS # BLD AUTO: 0.08 K/UL (ref 0–0.2)
BASOPHILS NFR BLD: 0.7 % (ref 0–1.9)
BILIRUB SERPL-MCNC: 0.5 MG/DL (ref 0.1–1)
BUN SERPL-MCNC: 11 MG/DL (ref 8–23)
CALCIUM SERPL-MCNC: 9 MG/DL (ref 8.7–10.5)
CHLORIDE SERPL-SCNC: 107 MMOL/L (ref 95–110)
CO2 SERPL-SCNC: 27 MMOL/L (ref 23–29)
CREAT SERPL-MCNC: 1 MG/DL (ref 0.5–1.4)
DIFFERENTIAL METHOD: NORMAL
EOSINOPHIL # BLD AUTO: 0.1 K/UL (ref 0–0.5)
EOSINOPHIL NFR BLD: 1.2 % (ref 0–8)
ERYTHROCYTE [DISTWIDTH] IN BLOOD BY AUTOMATED COUNT: 13 % (ref 11.5–14.5)
EST. GFR  (AFRICAN AMERICAN): >60 ML/MIN/1.73 M^2
EST. GFR  (NON AFRICAN AMERICAN): 56 ML/MIN/1.73 M^2
GLUCOSE SERPL-MCNC: 111 MG/DL (ref 70–110)
HCT VFR BLD AUTO: 41 % (ref 37–48.5)
HGB BLD-MCNC: 13.3 G/DL (ref 12–16)
IMM GRANULOCYTES # BLD AUTO: 0.04 K/UL (ref 0–0.04)
IMM GRANULOCYTES NFR BLD AUTO: 0.4 % (ref 0–0.5)
LDH SERPL L TO P-CCNC: 172 U/L (ref 110–260)
LYMPHOCYTES # BLD AUTO: 2.3 K/UL (ref 1–4.8)
LYMPHOCYTES NFR BLD: 20.8 % (ref 18–48)
MCH RBC QN AUTO: 29.9 PG (ref 27–31)
MCHC RBC AUTO-ENTMCNC: 32.4 G/DL (ref 32–36)
MCV RBC AUTO: 92 FL (ref 82–98)
MONOCYTES # BLD AUTO: 0.7 K/UL (ref 0.3–1)
MONOCYTES NFR BLD: 6.8 % (ref 4–15)
NEUTROPHILS # BLD AUTO: 7.7 K/UL (ref 1.8–7.7)
NEUTROPHILS NFR BLD: 70.1 % (ref 38–73)
NRBC BLD-RTO: 0 /100 WBC
PLATELET # BLD AUTO: 242 K/UL (ref 150–450)
PMV BLD AUTO: 10.8 FL (ref 9.2–12.9)
POTASSIUM SERPL-SCNC: 4 MMOL/L (ref 3.5–5.1)
PROT SERPL-MCNC: 6.8 G/DL (ref 6–8.4)
RBC # BLD AUTO: 4.45 M/UL (ref 4–5.4)
SODIUM SERPL-SCNC: 141 MMOL/L (ref 136–145)
WBC # BLD AUTO: 10.91 K/UL (ref 3.9–12.7)

## 2021-10-05 PROCEDURE — 85025 COMPLETE CBC W/AUTO DIFF WBC: CPT | Mod: PN | Performed by: NURSE PRACTITIONER

## 2021-10-05 PROCEDURE — 1101F PR PT FALLS ASSESS DOC 0-1 FALLS W/OUT INJ PAST YR: ICD-10-PCS | Mod: S$GLB,,, | Performed by: NURSE PRACTITIONER

## 2021-10-05 PROCEDURE — 3074F PR MOST RECENT SYSTOLIC BLOOD PRESSURE < 130 MM HG: ICD-10-PCS | Mod: S$GLB,,, | Performed by: NURSE PRACTITIONER

## 2021-10-05 PROCEDURE — 99999 PR PBB SHADOW E&M-EST. PATIENT-LVL IV: CPT | Mod: PBBFAC,,, | Performed by: NURSE PRACTITIONER

## 2021-10-05 PROCEDURE — 3044F PR MOST RECENT HEMOGLOBIN A1C LEVEL <7.0%: ICD-10-PCS | Mod: S$GLB,,, | Performed by: NURSE PRACTITIONER

## 2021-10-05 PROCEDURE — 3008F PR BODY MASS INDEX (BMI) DOCUMENTED: ICD-10-PCS | Mod: S$GLB,,, | Performed by: NURSE PRACTITIONER

## 2021-10-05 PROCEDURE — 3079F DIAST BP 80-89 MM HG: CPT | Mod: S$GLB,,, | Performed by: NURSE PRACTITIONER

## 2021-10-05 PROCEDURE — 80053 COMPREHEN METABOLIC PANEL: CPT | Mod: PN | Performed by: NURSE PRACTITIONER

## 2021-10-05 PROCEDURE — 3044F HG A1C LEVEL LT 7.0%: CPT | Mod: S$GLB,,, | Performed by: NURSE PRACTITIONER

## 2021-10-05 PROCEDURE — 3288F FALL RISK ASSESSMENT DOCD: CPT | Mod: S$GLB,,, | Performed by: NURSE PRACTITIONER

## 2021-10-05 PROCEDURE — 99213 PR OFFICE/OUTPT VISIT, EST, LEVL III, 20-29 MIN: ICD-10-PCS | Mod: S$GLB,,, | Performed by: NURSE PRACTITIONER

## 2021-10-05 PROCEDURE — 99213 OFFICE O/P EST LOW 20 MIN: CPT | Mod: S$GLB,,, | Performed by: NURSE PRACTITIONER

## 2021-10-05 PROCEDURE — 1160F PR REVIEW ALL MEDS BY PRESCRIBER/CLIN PHARMACIST DOCUMENTED: ICD-10-PCS | Mod: S$GLB,,, | Performed by: NURSE PRACTITIONER

## 2021-10-05 PROCEDURE — 3074F SYST BP LT 130 MM HG: CPT | Mod: S$GLB,,, | Performed by: NURSE PRACTITIONER

## 2021-10-05 PROCEDURE — 99999 PR PBB SHADOW E&M-EST. PATIENT-LVL IV: ICD-10-PCS | Mod: PBBFAC,,, | Performed by: NURSE PRACTITIONER

## 2021-10-05 PROCEDURE — 3008F BODY MASS INDEX DOCD: CPT | Mod: S$GLB,,, | Performed by: NURSE PRACTITIONER

## 2021-10-05 PROCEDURE — 3288F PR FALLS RISK ASSESSMENT DOCUMENTED: ICD-10-PCS | Mod: S$GLB,,, | Performed by: NURSE PRACTITIONER

## 2021-10-05 PROCEDURE — 1159F PR MEDICATION LIST DOCUMENTED IN MEDICAL RECORD: ICD-10-PCS | Mod: S$GLB,,, | Performed by: NURSE PRACTITIONER

## 2021-10-05 PROCEDURE — 36415 COLL VENOUS BLD VENIPUNCTURE: CPT | Mod: PN | Performed by: NURSE PRACTITIONER

## 2021-10-05 PROCEDURE — 83615 LACTATE (LD) (LDH) ENZYME: CPT | Mod: PN | Performed by: NURSE PRACTITIONER

## 2021-10-05 PROCEDURE — 1126F AMNT PAIN NOTED NONE PRSNT: CPT | Mod: S$GLB,,, | Performed by: NURSE PRACTITIONER

## 2021-10-05 PROCEDURE — 1159F MED LIST DOCD IN RCRD: CPT | Mod: S$GLB,,, | Performed by: NURSE PRACTITIONER

## 2021-10-05 PROCEDURE — 3079F PR MOST RECENT DIASTOLIC BLOOD PRESSURE 80-89 MM HG: ICD-10-PCS | Mod: S$GLB,,, | Performed by: NURSE PRACTITIONER

## 2021-10-05 PROCEDURE — 1126F PR PAIN SEVERITY QUANTIFIED, NO PAIN PRESENT: ICD-10-PCS | Mod: S$GLB,,, | Performed by: NURSE PRACTITIONER

## 2021-10-05 PROCEDURE — 1101F PT FALLS ASSESS-DOCD LE1/YR: CPT | Mod: S$GLB,,, | Performed by: NURSE PRACTITIONER

## 2021-10-05 PROCEDURE — 1160F RVW MEDS BY RX/DR IN RCRD: CPT | Mod: S$GLB,,, | Performed by: NURSE PRACTITIONER

## 2021-10-06 ENCOUNTER — PATIENT MESSAGE (OUTPATIENT)
Dept: HEMATOLOGY/ONCOLOGY | Facility: CLINIC | Age: 71
End: 2021-10-06

## 2021-10-26 ENCOUNTER — OFFICE VISIT (OUTPATIENT)
Dept: PODIATRY | Facility: CLINIC | Age: 71
End: 2021-10-26
Payer: MEDICARE

## 2021-10-26 DIAGNOSIS — G57.30 DEEP PERONEAL NEUROPATHY, UNSPECIFIED LATERALITY: ICD-10-CM

## 2021-10-26 DIAGNOSIS — M19.079 ARTHRITIS OF METATARSOPHALANGEAL (MTP) JOINT OF GREAT TOE: ICD-10-CM

## 2021-10-26 DIAGNOSIS — M19.079 ARTHRITIS, MIDFOOT: Primary | ICD-10-CM

## 2021-10-26 PROCEDURE — 99999 PR PBB SHADOW E&M-EST. PATIENT-LVL III: ICD-10-PCS | Mod: PBBFAC,,, | Performed by: PODIATRIST

## 2021-10-26 PROCEDURE — 3288F PR FALLS RISK ASSESSMENT DOCUMENTED: ICD-10-PCS | Mod: S$GLB,,, | Performed by: PODIATRIST

## 2021-10-26 PROCEDURE — 3288F FALL RISK ASSESSMENT DOCD: CPT | Mod: S$GLB,,, | Performed by: PODIATRIST

## 2021-10-26 PROCEDURE — 3044F PR MOST RECENT HEMOGLOBIN A1C LEVEL <7.0%: ICD-10-PCS | Mod: S$GLB,,, | Performed by: PODIATRIST

## 2021-10-26 PROCEDURE — 3044F HG A1C LEVEL LT 7.0%: CPT | Mod: S$GLB,,, | Performed by: PODIATRIST

## 2021-10-26 PROCEDURE — 20600 DRAIN/INJ JOINT/BURSA W/O US: CPT | Mod: LT,S$GLB,, | Performed by: PODIATRIST

## 2021-10-26 PROCEDURE — 99999 PR PBB SHADOW E&M-EST. PATIENT-LVL III: CPT | Mod: PBBFAC,,, | Performed by: PODIATRIST

## 2021-10-26 PROCEDURE — 99499 UNLISTED E&M SERVICE: CPT | Mod: S$GLB,,, | Performed by: PODIATRIST

## 2021-10-26 PROCEDURE — 1160F RVW MEDS BY RX/DR IN RCRD: CPT | Mod: S$GLB,,, | Performed by: PODIATRIST

## 2021-10-26 PROCEDURE — 1125F AMNT PAIN NOTED PAIN PRSNT: CPT | Mod: S$GLB,,, | Performed by: PODIATRIST

## 2021-10-26 PROCEDURE — 1101F PT FALLS ASSESS-DOCD LE1/YR: CPT | Mod: S$GLB,,, | Performed by: PODIATRIST

## 2021-10-26 PROCEDURE — 1125F PR PAIN SEVERITY QUANTIFIED, PAIN PRESENT: ICD-10-PCS | Mod: S$GLB,,, | Performed by: PODIATRIST

## 2021-10-26 PROCEDURE — 1159F MED LIST DOCD IN RCRD: CPT | Mod: S$GLB,,, | Performed by: PODIATRIST

## 2021-10-26 PROCEDURE — 99499 NO LOS: ICD-10-PCS | Mod: S$GLB,,, | Performed by: PODIATRIST

## 2021-10-26 PROCEDURE — 1160F PR REVIEW ALL MEDS BY PRESCRIBER/CLIN PHARMACIST DOCUMENTED: ICD-10-PCS | Mod: S$GLB,,, | Performed by: PODIATRIST

## 2021-10-26 PROCEDURE — 20600 PR DRAIN/INJECT SMALL JOINT/BURSA: ICD-10-PCS | Mod: 59,RT,S$GLB, | Performed by: PODIATRIST

## 2021-10-26 PROCEDURE — 1159F PR MEDICATION LIST DOCUMENTED IN MEDICAL RECORD: ICD-10-PCS | Mod: S$GLB,,, | Performed by: PODIATRIST

## 2021-10-26 PROCEDURE — 1101F PR PT FALLS ASSESS DOC 0-1 FALLS W/OUT INJ PAST YR: ICD-10-PCS | Mod: S$GLB,,, | Performed by: PODIATRIST

## 2021-10-26 RX ORDER — METHYLPREDNISOLONE ACETATE 40 MG/ML
40 INJECTION, SUSPENSION INTRA-ARTICULAR; INTRALESIONAL; INTRAMUSCULAR; SOFT TISSUE
Status: COMPLETED | OUTPATIENT
Start: 2021-10-26 | End: 2021-10-26

## 2021-10-26 RX ORDER — DEXAMETHASONE SODIUM PHOSPHATE 4 MG/ML
4 INJECTION, SOLUTION INTRA-ARTICULAR; INTRALESIONAL; INTRAMUSCULAR; INTRAVENOUS; SOFT TISSUE
Status: COMPLETED | OUTPATIENT
Start: 2021-10-26 | End: 2021-10-26

## 2021-10-26 RX ADMIN — METHYLPREDNISOLONE ACETATE 40 MG: 40 INJECTION, SUSPENSION INTRA-ARTICULAR; INTRALESIONAL; INTRAMUSCULAR; SOFT TISSUE at 10:10

## 2021-10-26 RX ADMIN — DEXAMETHASONE SODIUM PHOSPHATE 4 MG: 4 INJECTION, SOLUTION INTRA-ARTICULAR; INTRALESIONAL; INTRAMUSCULAR; INTRAVENOUS; SOFT TISSUE at 10:10

## 2021-10-28 ENCOUNTER — TELEPHONE (OUTPATIENT)
Dept: FAMILY MEDICINE | Facility: CLINIC | Age: 71
End: 2021-10-28
Payer: MEDICARE

## 2021-10-29 ENCOUNTER — TELEPHONE (OUTPATIENT)
Dept: FAMILY MEDICINE | Facility: CLINIC | Age: 71
End: 2021-10-29
Payer: MEDICARE

## 2021-11-01 ENCOUNTER — TELEPHONE (OUTPATIENT)
Dept: RHEUMATOLOGY | Facility: CLINIC | Age: 71
End: 2021-11-01
Payer: MEDICARE

## 2021-11-08 ENCOUNTER — OFFICE VISIT (OUTPATIENT)
Dept: PAIN MEDICINE | Facility: CLINIC | Age: 71
End: 2021-11-08
Payer: MEDICARE

## 2021-11-08 VITALS
TEMPERATURE: 97 F | HEART RATE: 67 BPM | OXYGEN SATURATION: 99 % | DIASTOLIC BLOOD PRESSURE: 60 MMHG | RESPIRATION RATE: 18 BRPM | BODY MASS INDEX: 28.16 KG/M2 | SYSTOLIC BLOOD PRESSURE: 114 MMHG | WEIGHT: 158.94 LBS

## 2021-11-08 DIAGNOSIS — M51.36 DDD (DEGENERATIVE DISC DISEASE), LUMBAR: Primary | ICD-10-CM

## 2021-11-08 DIAGNOSIS — M19.079 ARTHRITIS OF MIDFOOT: ICD-10-CM

## 2021-11-08 PROCEDURE — 3044F PR MOST RECENT HEMOGLOBIN A1C LEVEL <7.0%: ICD-10-PCS | Mod: S$GLB,,, | Performed by: ANESTHESIOLOGY

## 2021-11-08 PROCEDURE — 99999 PR PBB SHADOW E&M-EST. PATIENT-LVL IV: ICD-10-PCS | Mod: PBBFAC,,, | Performed by: ANESTHESIOLOGY

## 2021-11-08 PROCEDURE — 3008F PR BODY MASS INDEX (BMI) DOCUMENTED: ICD-10-PCS | Mod: S$GLB,,, | Performed by: ANESTHESIOLOGY

## 2021-11-08 PROCEDURE — 3074F SYST BP LT 130 MM HG: CPT | Mod: S$GLB,,, | Performed by: ANESTHESIOLOGY

## 2021-11-08 PROCEDURE — 1159F MED LIST DOCD IN RCRD: CPT | Mod: S$GLB,,, | Performed by: ANESTHESIOLOGY

## 2021-11-08 PROCEDURE — 1101F PT FALLS ASSESS-DOCD LE1/YR: CPT | Mod: S$GLB,,, | Performed by: ANESTHESIOLOGY

## 2021-11-08 PROCEDURE — 3044F HG A1C LEVEL LT 7.0%: CPT | Mod: S$GLB,,, | Performed by: ANESTHESIOLOGY

## 2021-11-08 PROCEDURE — 3078F PR MOST RECENT DIASTOLIC BLOOD PRESSURE < 80 MM HG: ICD-10-PCS | Mod: S$GLB,,, | Performed by: ANESTHESIOLOGY

## 2021-11-08 PROCEDURE — 99999 PR PBB SHADOW E&M-EST. PATIENT-LVL IV: CPT | Mod: PBBFAC,,, | Performed by: ANESTHESIOLOGY

## 2021-11-08 PROCEDURE — 3008F BODY MASS INDEX DOCD: CPT | Mod: S$GLB,,, | Performed by: ANESTHESIOLOGY

## 2021-11-08 PROCEDURE — 1159F PR MEDICATION LIST DOCUMENTED IN MEDICAL RECORD: ICD-10-PCS | Mod: S$GLB,,, | Performed by: ANESTHESIOLOGY

## 2021-11-08 PROCEDURE — 1101F PR PT FALLS ASSESS DOC 0-1 FALLS W/OUT INJ PAST YR: ICD-10-PCS | Mod: S$GLB,,, | Performed by: ANESTHESIOLOGY

## 2021-11-08 PROCEDURE — 1125F PR PAIN SEVERITY QUANTIFIED, PAIN PRESENT: ICD-10-PCS | Mod: S$GLB,,, | Performed by: ANESTHESIOLOGY

## 2021-11-08 PROCEDURE — 99214 PR OFFICE/OUTPT VISIT, EST, LEVL IV, 30-39 MIN: ICD-10-PCS | Mod: S$GLB,,, | Performed by: ANESTHESIOLOGY

## 2021-11-08 PROCEDURE — 99214 OFFICE O/P EST MOD 30 MIN: CPT | Mod: S$GLB,,, | Performed by: ANESTHESIOLOGY

## 2021-11-08 PROCEDURE — 3288F FALL RISK ASSESSMENT DOCD: CPT | Mod: S$GLB,,, | Performed by: ANESTHESIOLOGY

## 2021-11-08 PROCEDURE — 3078F DIAST BP <80 MM HG: CPT | Mod: S$GLB,,, | Performed by: ANESTHESIOLOGY

## 2021-11-08 PROCEDURE — 1125F AMNT PAIN NOTED PAIN PRSNT: CPT | Mod: S$GLB,,, | Performed by: ANESTHESIOLOGY

## 2021-11-08 PROCEDURE — 3074F PR MOST RECENT SYSTOLIC BLOOD PRESSURE < 130 MM HG: ICD-10-PCS | Mod: S$GLB,,, | Performed by: ANESTHESIOLOGY

## 2021-11-08 PROCEDURE — 3288F PR FALLS RISK ASSESSMENT DOCUMENTED: ICD-10-PCS | Mod: S$GLB,,, | Performed by: ANESTHESIOLOGY

## 2021-12-06 ENCOUNTER — PATIENT OUTREACH (OUTPATIENT)
Dept: ADMINISTRATIVE | Facility: OTHER | Age: 71
End: 2021-12-06
Payer: MEDICARE

## 2021-12-30 ENCOUNTER — PATIENT MESSAGE (OUTPATIENT)
Dept: RHEUMATOLOGY | Facility: CLINIC | Age: 71
End: 2021-12-30
Payer: MEDICARE

## 2022-03-08 DIAGNOSIS — M25.561 ACUTE PAIN OF RIGHT KNEE: Primary | ICD-10-CM

## 2022-03-10 ENCOUNTER — PATIENT MESSAGE (OUTPATIENT)
Dept: RHEUMATOLOGY | Facility: CLINIC | Age: 72
End: 2022-03-10
Payer: MEDICARE

## 2022-03-10 ENCOUNTER — HOSPITAL ENCOUNTER (OUTPATIENT)
Dept: RADIOLOGY | Facility: HOSPITAL | Age: 72
Discharge: HOME | End: 2022-03-10
Attending: ORTHOPAEDIC SURGERY
Payer: MEDICARE

## 2022-03-10 ENCOUNTER — OFFICE VISIT (OUTPATIENT)
Dept: ORTHOPEDICS | Facility: CLINIC | Age: 72
End: 2022-03-10
Payer: OTHER MISCELLANEOUS

## 2022-03-10 VITALS — WEIGHT: 158 LBS | BODY MASS INDEX: 28 KG/M2 | HEIGHT: 63 IN

## 2022-03-10 DIAGNOSIS — M25.561 ACUTE PAIN OF RIGHT KNEE: ICD-10-CM

## 2022-03-10 DIAGNOSIS — M17.11 OSTEOARTHRITIS OF RIGHT KNEE, UNSPECIFIED OSTEOARTHRITIS TYPE: ICD-10-CM

## 2022-03-10 DIAGNOSIS — M25.561 ACUTE PAIN OF RIGHT KNEE: Primary | ICD-10-CM

## 2022-03-10 PROCEDURE — 73560 XR KNEE ORTHO RIGHT: ICD-10-PCS | Mod: 26,LT,, | Performed by: RADIOLOGY

## 2022-03-10 PROCEDURE — 73562 X-RAY EXAM OF KNEE 3: CPT | Mod: TC,PO,RT

## 2022-03-10 PROCEDURE — 73560 X-RAY EXAM OF KNEE 1 OR 2: CPT | Mod: 26,LT,, | Performed by: RADIOLOGY

## 2022-03-10 PROCEDURE — 99203 OFFICE O/P NEW LOW 30 MIN: CPT | Mod: 25,S$GLB,, | Performed by: ORTHOPAEDIC SURGERY

## 2022-03-10 PROCEDURE — 73562 X-RAY EXAM OF KNEE 3: CPT | Mod: 26,RT,, | Performed by: RADIOLOGY

## 2022-03-10 PROCEDURE — 73560 X-RAY EXAM OF KNEE 1 OR 2: CPT | Mod: TC,PO,LT

## 2022-03-10 PROCEDURE — 20610 LARGE JOINT ASPIRATION/INJECTION: R KNEE: ICD-10-PCS | Mod: RT,S$GLB,, | Performed by: ORTHOPAEDIC SURGERY

## 2022-03-10 PROCEDURE — 99999 PR PBB SHADOW E&M-EST. PATIENT-LVL III: CPT | Mod: PBBFAC,,, | Performed by: ORTHOPAEDIC SURGERY

## 2022-03-10 PROCEDURE — 73562 XR KNEE ORTHO RIGHT: ICD-10-PCS | Mod: 26,RT,, | Performed by: RADIOLOGY

## 2022-03-10 PROCEDURE — 99203 PR OFFICE/OUTPT VISIT, NEW, LEVL III, 30-44 MIN: ICD-10-PCS | Mod: 25,S$GLB,, | Performed by: ORTHOPAEDIC SURGERY

## 2022-03-10 PROCEDURE — 99999 PR PBB SHADOW E&M-EST. PATIENT-LVL III: ICD-10-PCS | Mod: PBBFAC,,, | Performed by: ORTHOPAEDIC SURGERY

## 2022-03-10 PROCEDURE — 20610 DRAIN/INJ JOINT/BURSA W/O US: CPT | Mod: RT,S$GLB,, | Performed by: ORTHOPAEDIC SURGERY

## 2022-03-10 RX ORDER — TRIAMCINOLONE ACETONIDE 40 MG/ML
40 INJECTION, SUSPENSION INTRA-ARTICULAR; INTRAMUSCULAR
Status: DISCONTINUED | OUTPATIENT
Start: 2022-03-10 | End: 2022-03-10 | Stop reason: HOSPADM

## 2022-03-10 RX ADMIN — TRIAMCINOLONE ACETONIDE 40 MG: 40 INJECTION, SUSPENSION INTRA-ARTICULAR; INTRAMUSCULAR at 01:03

## 2022-03-10 NOTE — PROGRESS NOTES
72 years old has had pain in her right knee for the past couple months fell on January 1, 2022 onto her knees been painful since then difficulty walking    Exam shows tenderness the joint line without signs infection instability    Imaging reveals degenerative changes in the knee including x-ray and MRI findings    Assessment:  Degenerative disease the knee exacerbated by fall    Plan:  Kenalog injection to the right knee, home exercise program, follow-up in several weeks time    Imaging studies ordered and reviewed by me    Further History  Aching pain  Worse with activity  Relieved with rest  No other associated symptoms  No other radiation    Further Exam  Alert and oriented  Pleasant  Contralateral limb has appropriate range of motion for age and condition  Contralateral limb has appropriate strength for age and condition  Contralateral limb has appropriate stability  for age and condition  No adenopathy  Pulses are appropriate for current condition  Skin is intact        Chief Complaint    Chief Complaint   Patient presents with    Right Knee - Pain     DOI 1/1/22, PT fell       HPI  Lyudmila Neri is a 72 y.o.  female who presents with       Past Medical History  Past Medical History:   Diagnosis Date    Anticoagulant long-term use     Arthritis     osteoarthritis    Blood transfusion     Cancer 2011    stromal tumor, stomach    Depression     Disorder of kidney and ureter     CKD 2    GERD (gastroesophageal reflux disease)     GIST (gastrointestinal stromal tumor), malignant     H. pylori infection     Hypertension     KATHY (obstructive sleep apnea) 6/24/2013    Psoriasis 6/24/2013    Trouble in sleeping     arthritic pain wakes her up    Urinary incontinence     stress incontinence       Past Surgical History  Past Surgical History:   Procedure Laterality Date    ANKLE FRACTURE SURGERY      RT ankle    APPENDECTOMY      BLADDER SUSPENSION  1995    CARPAL TUNNEL RELEASE      left     CERVICAL FUSION      CHOLECYSTECTOMY  12/7/2011  Dr. Cai    COLONOSCOPY  7/26/2005  [facility]    Non-erosive esophageal reflux (NERD) disease?.  Post-surgical deformity in the gastric body.   Gastric mucosal abnormality (erythema) in the greater  curve of antrum.  STEVIE Test performed on 02/24/12 was Negative.    COLONOSCOPY  12/12/2008  [facility]    Small internal hemorrhoids.  Slightly redundant left colon.    COLONOSCOPY N/A 4/18/2018    Procedure: COLONOSCOPY;  Surgeon: David Guzman Jr., MD;  Location: [facility] ENDO;  Service: Endoscopy;  Laterality: N/A;    COLONOSCOPY  04/18/2018    Dr. Guzman; hemorrhoids, otherwise unremarkable, repeat 5 years for surveillance due to family history of colon cancer    CYST REMOVAL Left 2016    left middle finger, Dr. Johnson    ESOPHAGOGASTRODUODENOSCOPY N/A 5/14/2019    Procedure: EGD (ESOPHAGOGASTRODUODENOSCOPY);  Surgeon: David Guzman Jr., MD;  Location: [facility] ENDO;  Service: Endoscopy;  Laterality: N/A;    EYE SURGERY Bilateral 1995    RK surgery    EYE SURGERY Bilateral 2015    cataract removal    GASTRECTOMY      HYSTERECTOMY      KNEE ARTHROSCOPY W/ DEBRIDEMENT      lt knee    SHOULDER OPEN ROTATOR CUFF REPAIR      left    STOMACH SURGERY  12/7/2011  Dr. Cai    removal of GIST tumor from wall of the stomach, 2.3 cm in size.    TRANSFORAMINAL EPIDURAL INJECTION OF STEROID Bilateral 4/9/2019    Procedure: Injection,steroid,epidural,transforaminal approach L4/5;  Surgeon: Pa Pruett MD;  Location: [facility] OR;  Service: Pain Management;  Laterality: Bilateral;    TRANSFORAMINAL EPIDURAL INJECTION OF STEROID Bilateral 6/5/2019    Procedure: Injection,steroid,epidural,transforaminal approach L4/5;  Surgeon: Pa Pruett MD;  Location: [facility] OR;  Service: Pain Management;  Laterality: Bilateral;    UPPER GASTROINTESTINAL ENDOSCOPY  2/24/2012  [facility]    Non-erosive esophageal reflux (NERD) disease?.  Post-surgical deformity in the gastric body.    Gastric mucosal abnormality (erythema) in the greater  curve of antrum.  STEVIE Test performed on 02/24/12 was Negative.    UPPER GASTROINTESTINAL ENDOSCOPY  04/18/2018    Dr. Guzman, antritis, evidence of prior surgery with healthy mucosa       Medications  Current Outpatient Medications   Medication Sig    amLODIPine (NORVASC) 5 MG tablet TAKE 1 TABLET(5 MG) BY MOUTH EVERY DAY    aspirin (ECOTRIN) 81 MG EC tablet Take 81 mg by mouth once daily.    cetirizine (ZYRTEC) 10 MG tablet Take 10 mg by mouth once daily.    cholecalciferol, vitamin D3, (VITAMIN D3) 25 mcg (1,000 unit) capsule Take 1,000 Units by mouth once daily.    clobetasol (TEMOVATE) 0.05 % external solution Apply topically as needed.     fish oil-omega-3 fatty acids 300-1,000 mg capsule Take by mouth once daily.    FOLIC ACID/MULTIVIT-MIN/LUTEIN (CENTRUM SILVER ORAL) Take by mouth.    losartan-hydrochlorothiazide 100-12.5 mg (HYZAAR) 100-12.5 mg Tab Take 1 tablet by mouth once daily.    omeprazole (PRILOSEC) 40 MG capsule TAKE 1 CAPSULE(40 MG) BY MOUTH BEFORE BREAKFAST    vit C/E/Zn/coppr/lutein/zeaxan (PRESERVISION AREDS-2 ORAL) Take by mouth 2 (two) times a day.    diclofenac sodium (VOLTAREN) 1 % Gel Apply 2 g topically 3 (three) times daily as needed.    mirabegron (MYRBETRIQ) 50 mg Tb24 Take 1 tablet (50 mg total) by mouth once daily.    solifenacin (VESICARE) 5 MG tablet Take 1 tablet (5 mg total) by mouth once daily.     Current Facility-Administered Medications   Medication    estradiol 50 mg pellet       Allergies  Review of patient's allergies indicates:   Allergen Reactions    Codeine      Other reaction(s): Nausea       Family History  Family History   Problem Relation Age of Onset    Heart disease Mother     Arthritis Mother         osteoarthritis    COPD Mother     Hyperlipidemia Mother     Hypertension Mother     Kidney disease Mother     Stroke Mother     Colon cancer Mother 75    Cancer Mother 70        colon  cancer    Cancer Father         brain and lung    Arthritis Father     COPD Sister     Depression Daughter     Arthritis Maternal Aunt         rheumatoid arthritis    Heart disease Maternal Uncle     Early death Maternal Uncle         in 50s due to heart disease    Arthritis Paternal Aunt         rheuamtoid arthritis    Heart disease Maternal Grandfather     Arthritis Paternal Grandmother         rheumatoid arthritis    Diabetes Paternal Grandmother     Diabetes Cousin     Heart disease Cousin     Crohn's disease Neg Hx     Stomach cancer Neg Hx     Ulcerative colitis Neg Hx     Esophageal cancer Neg Hx        Social History  Social History     Socioeconomic History    Marital status:    Tobacco Use    Smoking status: Never Smoker    Smokeless tobacco: Never Used   Substance and Sexual Activity    Alcohol use: No    Drug use: No    Sexual activity: Yes     Partners: Male               Review of Systems     Constitutional: Negative    HENT: Negative  Eyes: Negative  Respiratory: Negative  Cardiovascular: Negative  Musculoskeletal: HPI  Skin: Negative  Neurological: Negative  Hematological: Negative  Endocrine: Negative                 Physical Exam    There were no vitals filed for this visit.  Body mass index is 27.99 kg/m².  Physical Examination:     General appearance -  well appearing, and in no distress  Mental status - awake  Neck - supple  Chest -  symmetric air entry  Heart - normal rate   Abdomen - soft      Assessment     1. Acute pain of right knee    2. Osteoarthritis of right knee, unspecified osteoarthritis type          Plan

## 2022-03-10 NOTE — PROCEDURES
Large Joint Aspiration/Injection: R knee    Date/Time: 3/10/2022 1:00 PM  Performed by: Boston Garcia MD  Authorized by: Boston Garcia MD     Consent Done?:  Yes (Verbal)  Timeout: prior to procedure the correct patient, procedure, and site was verified    Prep: patient was prepped and draped in usual sterile fashion      Details:  Needle Size:  21 G  Approach:  Anterolateral  Location:  Knee  Site:  R knee  Medications:  40 mg triamcinolone acetonide 40 mg/mL  Patient tolerance:  Patient tolerated the procedure well with no immediate complications

## 2022-03-14 ENCOUNTER — PATIENT MESSAGE (OUTPATIENT)
Dept: ORTHOPEDICS | Facility: CLINIC | Age: 72
End: 2022-03-14
Payer: MEDICARE

## 2022-03-18 ENCOUNTER — TELEPHONE (OUTPATIENT)
Dept: PODIATRY | Facility: CLINIC | Age: 72
End: 2022-03-18
Payer: MEDICARE

## 2022-03-18 NOTE — TELEPHONE ENCOUNTER
----- Message from Patrick Montiel sent at 3/18/2022 11:20 AM CDT -----  Type: Needs Medical Advice  Who Called:  Pt    Best Call Back Number: [phone]     Additional Information: Pt has questions in regards to getting injections in her feet.  Has injections in her Knees.  Please advise -- thank you

## 2022-03-18 NOTE — TELEPHONE ENCOUNTER
Pt states he had an injection in her knees and would like to get the injection that Dr. Post usually gives but doesn't know if there is a period of time she needs to wait before she can get another injection. Please Advise

## 2022-03-29 RX ORDER — LOSARTAN POTASSIUM AND HYDROCHLOROTHIAZIDE 12.5; 1 MG/1; MG/1
1 TABLET ORAL DAILY
Qty: 90 TABLET | Refills: 3 | Status: SHIPPED | OUTPATIENT
Start: 2022-03-29 | End: 2023-11-29

## 2022-03-29 NOTE — TELEPHONE ENCOUNTER
No new care gaps identified.  Powered by [employer]. Reference number: [id number].   3/29/2022 12:26:00 PM CDT

## 2022-04-07 ENCOUNTER — TELEPHONE (OUTPATIENT)
Dept: UROGYNECOLOGY | Facility: CLINIC | Age: 72
End: 2022-04-07
Payer: MEDICARE

## 2022-04-07 NOTE — TELEPHONE ENCOUNTER
Tried to call patient to informed that she needs to schedule an appointment. Vesicare is not covered by her insurance, they cover Oxybutynin and tolterodine . Will try to reach again , but phone was not accepting any calls. [employer] message sent to have her come in  Last seen 5/24/21

## 2022-04-07 NOTE — TELEPHONE ENCOUNTER
----- Message from Arpan Gary sent at 2022 11:43 AM CDT -----  Type:  RX Refill Request    Who Called:  Pt  Refill or New Rx: Refill    RX Name and Strength: solifenacin (VESICARE) 5 MG tablet ()  How is the patient currently taking it? (ex. 1XDay): 1XDay  Is this a 30 day or 90 day RX: 30 tablet     Preferred Pharmacy with phone number: [place] [address] [facility] [place] [address] (Ph: [phone])    Local or Mail Order: Local  Ordering Provider:Patrick Dhillon MD  Would the patient rather a call back or a response via [facility]? Call  Best Call Back Number: [phone]  Additional Information: Please assist, thank you!

## 2022-04-13 ENCOUNTER — OFFICE VISIT (OUTPATIENT)
Dept: PODIATRY | Facility: CLINIC | Age: 72
End: 2022-04-13
Payer: MEDICARE

## 2022-04-13 VITALS — HEIGHT: 63 IN | BODY MASS INDEX: 28 KG/M2 | WEIGHT: 158.06 LBS

## 2022-04-13 DIAGNOSIS — M19.079 ARTHRITIS OF METATARSOPHALANGEAL (MTP) JOINT OF GREAT TOE: ICD-10-CM

## 2022-04-13 DIAGNOSIS — M19.079 ARTHRITIS, MIDFOOT: Primary | ICD-10-CM

## 2022-04-13 PROCEDURE — 3008F PR BODY MASS INDEX (BMI) DOCUMENTED: ICD-10-PCS | Mod: CPTII,S$GLB,, | Performed by: PODIATRIST

## 2022-04-13 PROCEDURE — 1159F MED LIST DOCD IN RCRD: CPT | Mod: CPTII,S$GLB,, | Performed by: PODIATRIST

## 2022-04-13 PROCEDURE — 1159F PR MEDICATION LIST DOCUMENTED IN MEDICAL RECORD: ICD-10-PCS | Mod: CPTII,S$GLB,, | Performed by: PODIATRIST

## 2022-04-13 PROCEDURE — 1125F PR PAIN SEVERITY QUANTIFIED, PAIN PRESENT: ICD-10-PCS | Mod: CPTII,S$GLB,, | Performed by: PODIATRIST

## 2022-04-13 PROCEDURE — 1125F AMNT PAIN NOTED PAIN PRSNT: CPT | Mod: CPTII,S$GLB,, | Performed by: PODIATRIST

## 2022-04-13 PROCEDURE — 99999 PR PBB SHADOW E&M-EST. PATIENT-LVL II: ICD-10-PCS | Mod: PBBFAC,,, | Performed by: PODIATRIST

## 2022-04-13 PROCEDURE — 99499 NO LOS: ICD-10-PCS | Mod: S$GLB,,, | Performed by: PODIATRIST

## 2022-04-13 PROCEDURE — 99999 PR PBB SHADOW E&M-EST. PATIENT-LVL II: CPT | Mod: PBBFAC,,, | Performed by: PODIATRIST

## 2022-04-13 PROCEDURE — 1160F RVW MEDS BY RX/DR IN RCRD: CPT | Mod: CPTII,S$GLB,, | Performed by: PODIATRIST

## 2022-04-13 PROCEDURE — 99499 UNLISTED E&M SERVICE: CPT | Mod: S$GLB,,, | Performed by: PODIATRIST

## 2022-04-13 PROCEDURE — 1101F PR PT FALLS ASSESS DOC 0-1 FALLS W/OUT INJ PAST YR: ICD-10-PCS | Mod: CPTII,S$GLB,, | Performed by: PODIATRIST

## 2022-04-13 PROCEDURE — 1160F PR REVIEW ALL MEDS BY PRESCRIBER/CLIN PHARMACIST DOCUMENTED: ICD-10-PCS | Mod: CPTII,S$GLB,, | Performed by: PODIATRIST

## 2022-04-13 PROCEDURE — 20600 PR DRAIN/INJECT SMALL JOINT/BURSA: ICD-10-PCS | Mod: 50,S$GLB,, | Performed by: PODIATRIST

## 2022-04-13 PROCEDURE — 3008F BODY MASS INDEX DOCD: CPT | Mod: CPTII,S$GLB,, | Performed by: PODIATRIST

## 2022-04-13 PROCEDURE — 1101F PT FALLS ASSESS-DOCD LE1/YR: CPT | Mod: CPTII,S$GLB,, | Performed by: PODIATRIST

## 2022-04-13 PROCEDURE — 3288F FALL RISK ASSESSMENT DOCD: CPT | Mod: CPTII,S$GLB,, | Performed by: PODIATRIST

## 2022-04-13 PROCEDURE — 3288F PR FALLS RISK ASSESSMENT DOCUMENTED: ICD-10-PCS | Mod: CPTII,S$GLB,, | Performed by: PODIATRIST

## 2022-04-13 PROCEDURE — 20600 DRAIN/INJ JOINT/BURSA W/O US: CPT | Mod: 50,S$GLB,, | Performed by: PODIATRIST

## 2022-04-13 RX ORDER — DEXAMETHASONE SODIUM PHOSPHATE 4 MG/ML
4 INJECTION, SOLUTION INTRA-ARTICULAR; INTRALESIONAL; INTRAMUSCULAR; INTRAVENOUS; SOFT TISSUE
Status: COMPLETED | OUTPATIENT
Start: 2022-04-13 | End: 2022-04-13

## 2022-04-13 RX ORDER — METHYLPREDNISOLONE ACETATE 40 MG/ML
40 INJECTION, SUSPENSION INTRA-ARTICULAR; INTRALESIONAL; INTRAMUSCULAR; SOFT TISSUE
Status: COMPLETED | OUTPATIENT
Start: 2022-04-13 | End: 2022-04-13

## 2022-04-13 RX ADMIN — METHYLPREDNISOLONE ACETATE 40 MG: 40 INJECTION, SUSPENSION INTRA-ARTICULAR; INTRALESIONAL; INTRAMUSCULAR; SOFT TISSUE at 05:04

## 2022-04-13 RX ADMIN — DEXAMETHASONE SODIUM PHOSPHATE 4 MG: 4 INJECTION, SOLUTION INTRA-ARTICULAR; INTRALESIONAL; INTRAMUSCULAR; INTRAVENOUS; SOFT TISSUE at 05:04

## 2022-04-13 NOTE — PROGRESS NOTES
Subjective:      Patient ID: Lyudmila Neri is a 72 y.o. female.    Chief Complaint: Foot Pain (B/l midfoot pain top)    Lyudmila is a 72 y.o. female     9/16/20 Patient presents for injections bilateral feet her last injection was 6 months ago, started to wear off over the last 2 months. Right foot worse than left and she is wearing good supports and shoes.    8/18/21: Patient returns for follow up bilateral foot pain with midfoot arthritis but now the pain is sharp and shooting along the top of the feet both at rest and active, can wake her at night,    10/26/21: Patient returns with continued foot pain bilateral here for possible injections that have helped in the past. Is getting appointment to see pain clinic as well has not seen them again yet    4/13/22: Patient returns for injections to bilateral midfoot for arthritic changes and pain, wearing good supportive shoes gets the injections a couple times a year they last several months when she gets them    Review of Systems   Constitutional: Negative for chills and fever.   Cardiovascular: Negative for claudication and leg swelling.   Respiratory: Negative for shortness of breath.    Skin: Negative for itching, nail changes and rash.   Musculoskeletal: Positive for arthritis and joint pain. Negative for muscle cramps, muscle weakness and myalgias.   Gastrointestinal: Negative for nausea and vomiting.   Neurological: Negative for focal weakness, loss of balance, numbness and paresthesias.           Objective:      Physical Exam  Constitutional:       General: She is not in acute distress.     Appearance: She is well-developed. She is not diaphoretic.   Cardiovascular:      Pulses:           Dorsalis pedis pulses are 2+ on the right side and 2+ on the left side.        Posterior tibial pulses are 2+ on the right side and 2+ on the left side.      Comments: < 3 sec capillary refill time to toes 1-5 bilateral. Toes and feet are warm to touch proximally with normal  distal cooling b/l. There is some hair growth on the feet and toes b/l. There is no edema b/l. No spider veins or varicosities present b/l.     Musculoskeletal:      Comments: Bilateral feet there is pain to palpation and motion at the midfoot (lisfranc joint) area and right NC joint as well. There is some discomfort to ROM at the bilateral first MTPJ more to the right.    Pain with palpation and positive paresthesias along the deep peroneal nerve bilateral    Equinus noted b/l ankles with < 5 deg DF noted. MMT 5/5 in DF/PF/Inv/Ev resistance with no reproduction of pain in any direction. Passive range of motion of ankle and pedal joints is painless b/l.     Skin:     General: Skin is warm and dry.      Coloration: Skin is not pale.      Findings: No abrasion, bruising, burn, ecchymosis, erythema, laceration, lesion, petechiae or rash.      Nails: There is no clubbing.      Comments: Skin temperature, texture and turgor within normal limits.   Neurological:      Mental Status: She is alert and oriented to person, place, and time.      Sensory: No sensory deficit.      Motor: No tremor, atrophy or abnormal muscle tone.      Comments: Negative tinel sign bilateral.   Psychiatric:         Behavior: Behavior normal.               Assessment:       Encounter Diagnoses   Name Primary?   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    Patient will continue to wear the over the counter arch supports and wear them in shoes whenever possible.  Athletic shoes intended for walking or running are usually  best.    Patient will continue to stretch the tendo achilles complex three times daily as demonstrated in the office.  Literature was dispensed illustrating proper stretching technique.    Obtained verbal consent from the patient to inject the affected joint bilateral foot. Skin prepped with alcohol and skin anesthesia achieved with ethyl chloride. Injected 20 mg depo medrol, 2 mg dexamethasone and with 1 mL 1% plain lidocaine first into the left first tarsometatarsal joint, then the right NC joint. Patient reported relief with the injection. Patient tolerated the procedure and anesthesia well without any complications.    Can consider bracing if needed, discussed surgical medial column fusion if necessary with deep peroneal nerve release    Return 6 weeks for follow up    Agusto Edwards DPM

## 2022-04-19 ENCOUNTER — PATIENT MESSAGE (OUTPATIENT)
Dept: ORTHOPEDICS | Facility: CLINIC | Age: 72
End: 2022-04-19
Payer: MEDICARE

## 2022-04-26 ENCOUNTER — PATIENT MESSAGE (OUTPATIENT)
Dept: UROGYNECOLOGY | Facility: CLINIC | Age: 72
End: 2022-04-26
Payer: MEDICARE

## 2022-04-26 ENCOUNTER — TELEPHONE (OUTPATIENT)
Dept: FAMILY MEDICINE | Facility: CLINIC | Age: 72
End: 2022-04-26
Payer: MEDICARE

## 2022-04-26 NOTE — TELEPHONE ENCOUNTER
"----- Message from Mary Lou Comer sent at 4/25/2022  1:49 PM CDT -----  Regarding: [facility] advise  "Type:  Patient Call Back    Who Called:MIKI HOUSTON [[id number]]    What is the reqeust in detail:Pt requesting call back pt received an positive covid test at home and would like to receive medication. Please advise    Can the clinic reply by [facility]?no    Best Call Back Number:[phone]      Additional Information:Pt was inform she can take some medication.please advise            "

## 2022-05-06 ENCOUNTER — OFFICE VISIT (OUTPATIENT)
Dept: FAMILY MEDICINE | Facility: CLINIC | Age: 72
End: 2022-05-06
Payer: MEDICARE

## 2022-05-06 VITALS
OXYGEN SATURATION: 97 % | SYSTOLIC BLOOD PRESSURE: 122 MMHG | HEART RATE: 69 BPM | BODY MASS INDEX: 27.97 KG/M2 | WEIGHT: 157.88 LBS | HEIGHT: 63 IN | DIASTOLIC BLOOD PRESSURE: 80 MMHG

## 2022-05-06 DIAGNOSIS — I77.9 BILATERAL CAROTID ARTERY DISEASE, UNSPECIFIED TYPE: ICD-10-CM

## 2022-05-06 DIAGNOSIS — I10 ESSENTIAL HYPERTENSION: ICD-10-CM

## 2022-05-06 DIAGNOSIS — R73.03 PREDIABETES: ICD-10-CM

## 2022-05-06 DIAGNOSIS — Z12.31 ENCOUNTER FOR SCREENING MAMMOGRAM FOR MALIGNANT NEOPLASM OF BREAST: ICD-10-CM

## 2022-05-06 DIAGNOSIS — Z13.6 ENCOUNTER FOR SCREENING FOR CARDIOVASCULAR DISORDERS: ICD-10-CM

## 2022-05-06 DIAGNOSIS — N39.46 MIXED STRESS AND URGE URINARY INCONTINENCE: Primary | ICD-10-CM

## 2022-05-06 DIAGNOSIS — Z12.39 ENCOUNTER FOR SCREENING FOR MALIGNANT NEOPLASM OF BREAST, UNSPECIFIED SCREENING MODALITY: ICD-10-CM

## 2022-05-06 PROCEDURE — 99214 PR OFFICE/OUTPT VISIT, EST, LEVL IV, 30-39 MIN: ICD-10-PCS | Mod: S$GLB,,, | Performed by: INTERNAL MEDICINE

## 2022-05-06 PROCEDURE — 1160F PR REVIEW ALL MEDS BY PRESCRIBER/CLIN PHARMACIST DOCUMENTED: ICD-10-PCS | Mod: CPTII,S$GLB,, | Performed by: INTERNAL MEDICINE

## 2022-05-06 PROCEDURE — 1160F RVW MEDS BY RX/DR IN RCRD: CPT | Mod: CPTII,S$GLB,, | Performed by: INTERNAL MEDICINE

## 2022-05-06 PROCEDURE — 1126F AMNT PAIN NOTED NONE PRSNT: CPT | Mod: CPTII,S$GLB,, | Performed by: INTERNAL MEDICINE

## 2022-05-06 PROCEDURE — 3008F PR BODY MASS INDEX (BMI) DOCUMENTED: ICD-10-PCS | Mod: CPTII,S$GLB,, | Performed by: INTERNAL MEDICINE

## 2022-05-06 PROCEDURE — 99999 PR PBB SHADOW E&M-EST. PATIENT-LVL IV: CPT | Mod: PBBFAC,,, | Performed by: INTERNAL MEDICINE

## 2022-05-06 PROCEDURE — 1159F PR MEDICATION LIST DOCUMENTED IN MEDICAL RECORD: ICD-10-PCS | Mod: CPTII,S$GLB,, | Performed by: INTERNAL MEDICINE

## 2022-05-06 PROCEDURE — 3074F SYST BP LT 130 MM HG: CPT | Mod: CPTII,S$GLB,, | Performed by: INTERNAL MEDICINE

## 2022-05-06 PROCEDURE — 3008F BODY MASS INDEX DOCD: CPT | Mod: CPTII,S$GLB,, | Performed by: INTERNAL MEDICINE

## 2022-05-06 PROCEDURE — 3288F FALL RISK ASSESSMENT DOCD: CPT | Mod: CPTII,S$GLB,, | Performed by: INTERNAL MEDICINE

## 2022-05-06 PROCEDURE — 1159F MED LIST DOCD IN RCRD: CPT | Mod: CPTII,S$GLB,, | Performed by: INTERNAL MEDICINE

## 2022-05-06 PROCEDURE — 99214 OFFICE O/P EST MOD 30 MIN: CPT | Mod: S$GLB,,, | Performed by: INTERNAL MEDICINE

## 2022-05-06 PROCEDURE — 1100F PTFALLS ASSESS-DOCD GE2>/YR: CPT | Mod: CPTII,S$GLB,, | Performed by: INTERNAL MEDICINE

## 2022-05-06 PROCEDURE — 1126F PR PAIN SEVERITY QUANTIFIED, NO PAIN PRESENT: ICD-10-PCS | Mod: CPTII,S$GLB,, | Performed by: INTERNAL MEDICINE

## 2022-05-06 PROCEDURE — 3079F PR MOST RECENT DIASTOLIC BLOOD PRESSURE 80-89 MM HG: ICD-10-PCS | Mod: CPTII,S$GLB,, | Performed by: INTERNAL MEDICINE

## 2022-05-06 PROCEDURE — 3074F PR MOST RECENT SYSTOLIC BLOOD PRESSURE < 130 MM HG: ICD-10-PCS | Mod: CPTII,S$GLB,, | Performed by: INTERNAL MEDICINE

## 2022-05-06 PROCEDURE — 3288F PR FALLS RISK ASSESSMENT DOCUMENTED: ICD-10-PCS | Mod: CPTII,S$GLB,, | Performed by: INTERNAL MEDICINE

## 2022-05-06 PROCEDURE — 1100F PR PT FALLS ASSESS DOC 2+ FALLS/FALL W/INJURY/YR: ICD-10-PCS | Mod: CPTII,S$GLB,, | Performed by: INTERNAL MEDICINE

## 2022-05-06 PROCEDURE — 3079F DIAST BP 80-89 MM HG: CPT | Mod: CPTII,S$GLB,, | Performed by: INTERNAL MEDICINE

## 2022-05-06 PROCEDURE — 99999 PR PBB SHADOW E&M-EST. PATIENT-LVL IV: ICD-10-PCS | Mod: PBBFAC,,, | Performed by: INTERNAL MEDICINE

## 2022-05-06 RX ORDER — SOLIFENACIN SUCCINATE 5 MG/1
5 TABLET, FILM COATED ORAL DAILY
Qty: 90 TABLET | Refills: 3 | Status: SHIPPED | OUTPATIENT
Start: 2022-05-06 | End: 2022-05-09

## 2022-05-06 NOTE — PROGRESS NOTES
Patient ID: Lyudmila Neri is a 72 y.o. female.    Chief Complaint: Annual Exam      Assessment and Plan      Doing well.  Try filling VESIcare at our pharmacy.  Continue amlodipine  Continue losartan-hydrochlorothiazide  Continue omeprazole  See orders    1. Mixed stress and urge urinary incontinence  solifenacin (VESICARE) 5 MG tablet   2. Bilateral carotid artery disease, unspecified type  Lipid Panel   3. Encounter for screening for malignant neoplasm of breast, unspecified screening modality  Mammo Digital Screening Bilat   4. Encounter for screening mammogram for malignant neoplasm of breast   Mammo Digital Screening Bilat   5. Prediabetes  Hemoglobin A1C   6. Essential hypertension     7. Encounter for screening for cardiovascular disorders   Lipid Panel      HPI     Annual exam  Diet- off and on   Exercise- walking  Alcohol- none  Tobacco- none     No new complaints    Hypertension-amlodipine, losartan-hydrochlorothiazide, controlled   Carotid artery disease-aspirin, mild dz  History of gastrointestinal tumor- following with heme/onc   KATHY- no longer using CPAP  GERD-omeprazole  Psoriasis  Osteoarthritis of knees, hips, feet.- will be seeing rheum again soon.          Review of Systems   Constitutional: Negative for fever.   Respiratory: Negative for shortness of breath.    Cardiovascular: Negative for chest pain.   Gastrointestinal: Negative for abdominal pain.   Musculoskeletal:        Knee pain, hip pain, foot pain        Vitals:    05/06/22 1559   BP: 122/80   Pulse: 69     Physical Exam  Cardiovascular:      Rate and Rhythm: Normal rate and regular rhythm.      Heart sounds: No murmur heard.    No gallop.   Pulmonary:      Breath sounds: Normal breath sounds. No wheezing or rhonchi.   Abdominal:      General: Bowel sounds are normal.      Palpations: Abdomen is soft.      Tenderness: There is no abdominal tenderness.   Musculoskeletal:         General: Normal range of motion.      Cervical back: Neck  supple.   Skin:     General: Skin is warm.      Findings: No rash.   Neurological:      Mental Status: She is alert.   Psychiatric:         Behavior: Behavior normal.         I personally reviewed past medical, family and social history.  Medication List with Changes/Refills   Current Medications    AMLODIPINE (NORVASC) 5 MG TABLET    TAKE 1 TABLET(5 MG) BY MOUTH EVERY DAY    ASPIRIN (ECOTRIN) 81 MG EC TABLET    Take 81 mg by mouth once daily.    CETIRIZINE (ZYRTEC) 10 MG TABLET    Take 10 mg by mouth once daily.    CHOLECALCIFEROL, VITAMIN D3, (VITAMIN D3) 25 MCG (1,000 UNIT) CAPSULE    Take 1,000 Units by mouth once daily.    CLOBETASOL (TEMOVATE) 0.05 % EXTERNAL SOLUTION    Apply topically as needed.     DICLOFENAC SODIUM (VOLTAREN) 1 % GEL    Apply 2 g topically 3 (three) times daily as needed.    FISH OIL-OMEGA-3 FATTY ACIDS 300-1,000 MG CAPSULE    Take by mouth once daily.    FOLIC ACID/MULTIVIT-MIN/LUTEIN (CENTRUM SILVER ORAL)    Take by mouth.    LOSARTAN-HYDROCHLOROTHIAZIDE 100-12.5 MG (HYZAAR) 100-12.5 MG TAB    Take 1 tablet by mouth once daily.    MIRABEGRON (MYRBETRIQ) 50 MG TB24    Take 1 tablet (50 mg total) by mouth once daily.    OMEPRAZOLE (PRILOSEC) 40 MG CAPSULE    TAKE 1 CAPSULE(40 MG) BY MOUTH BEFORE BREAKFAST    VIT C/E/ZN/COPPR/LUTEIN/ZEAXAN (PRESERVISION AREDS-2 ORAL)    Take by mouth 2 (two) times a day.   Changed and/or Refilled Medications    Modified Medication Previous Medication    SOLIFENACIN (VESICARE) 5 MG TABLET solifenacin (VESICARE) 5 MG tablet       Take 1 tablet (5 mg total) by mouth once daily.    Take 1 tablet (5 mg total) by mouth once daily.

## 2022-05-09 ENCOUNTER — PATIENT MESSAGE (OUTPATIENT)
Dept: SMOKING CESSATION | Facility: CLINIC | Age: 72
End: 2022-05-09
Payer: MEDICARE

## 2022-05-09 ENCOUNTER — TELEPHONE (OUTPATIENT)
Dept: PHARMACY | Facility: CLINIC | Age: 72
End: 2022-05-09
Payer: MEDICARE

## 2022-05-09 RX ORDER — TOLTERODINE 2 MG/1
2 CAPSULE, EXTENDED RELEASE ORAL DAILY
Qty: 30 CAPSULE | Refills: 2 | Status: SHIPPED | OUTPATIENT
Start: 2022-05-09 | End: 2023-10-10

## 2022-05-09 NOTE — TELEPHONE ENCOUNTER
Will have to try 2 other medications before insurance will pay for VESIcare.  Tolterodine and oxybutynin.  Will try tolterodine 1st at 2 mg once daily.  Stop VESIcare still taking.

## 2022-05-16 ENCOUNTER — OFFICE VISIT (OUTPATIENT)
Dept: RHEUMATOLOGY | Facility: CLINIC | Age: 72
End: 2022-05-16
Payer: MEDICARE

## 2022-05-16 VITALS
HEIGHT: 63 IN | BODY MASS INDEX: 27.77 KG/M2 | WEIGHT: 156.75 LBS | HEART RATE: 75 BPM | SYSTOLIC BLOOD PRESSURE: 164 MMHG | DIASTOLIC BLOOD PRESSURE: 89 MMHG

## 2022-05-16 DIAGNOSIS — M15.8 OTHER OSTEOARTHRITIS INVOLVING MULTIPLE JOINTS: Primary | ICD-10-CM

## 2022-05-16 PROCEDURE — 1160F PR REVIEW ALL MEDS BY PRESCRIBER/CLIN PHARMACIST DOCUMENTED: ICD-10-PCS | Mod: CPTII,S$GLB,, | Performed by: INTERNAL MEDICINE

## 2022-05-16 PROCEDURE — 99999 PR PBB SHADOW E&M-EST. PATIENT-LVL IV: ICD-10-PCS | Mod: PBBFAC,,, | Performed by: INTERNAL MEDICINE

## 2022-05-16 PROCEDURE — 99215 PR OFFICE/OUTPT VISIT, EST, LEVL V, 40-54 MIN: ICD-10-PCS | Mod: S$GLB,,, | Performed by: INTERNAL MEDICINE

## 2022-05-16 PROCEDURE — 1100F PR PT FALLS ASSESS DOC 2+ FALLS/FALL W/INJURY/YR: ICD-10-PCS | Mod: CPTII,S$GLB,, | Performed by: INTERNAL MEDICINE

## 2022-05-16 PROCEDURE — 3008F PR BODY MASS INDEX (BMI) DOCUMENTED: ICD-10-PCS | Mod: CPTII,S$GLB,, | Performed by: INTERNAL MEDICINE

## 2022-05-16 PROCEDURE — 3008F BODY MASS INDEX DOCD: CPT | Mod: CPTII,S$GLB,, | Performed by: INTERNAL MEDICINE

## 2022-05-16 PROCEDURE — 99215 OFFICE O/P EST HI 40 MIN: CPT | Mod: S$GLB,,, | Performed by: INTERNAL MEDICINE

## 2022-05-16 PROCEDURE — 3288F FALL RISK ASSESSMENT DOCD: CPT | Mod: CPTII,S$GLB,, | Performed by: INTERNAL MEDICINE

## 2022-05-16 PROCEDURE — 1160F RVW MEDS BY RX/DR IN RCRD: CPT | Mod: CPTII,S$GLB,, | Performed by: INTERNAL MEDICINE

## 2022-05-16 PROCEDURE — 3077F PR MOST RECENT SYSTOLIC BLOOD PRESSURE >= 140 MM HG: ICD-10-PCS | Mod: CPTII,S$GLB,, | Performed by: INTERNAL MEDICINE

## 2022-05-16 PROCEDURE — 3079F PR MOST RECENT DIASTOLIC BLOOD PRESSURE 80-89 MM HG: ICD-10-PCS | Mod: CPTII,S$GLB,, | Performed by: INTERNAL MEDICINE

## 2022-05-16 PROCEDURE — 1159F MED LIST DOCD IN RCRD: CPT | Mod: CPTII,S$GLB,, | Performed by: INTERNAL MEDICINE

## 2022-05-16 PROCEDURE — 3077F SYST BP >= 140 MM HG: CPT | Mod: CPTII,S$GLB,, | Performed by: INTERNAL MEDICINE

## 2022-05-16 PROCEDURE — 1159F PR MEDICATION LIST DOCUMENTED IN MEDICAL RECORD: ICD-10-PCS | Mod: CPTII,S$GLB,, | Performed by: INTERNAL MEDICINE

## 2022-05-16 PROCEDURE — 99999 PR PBB SHADOW E&M-EST. PATIENT-LVL IV: CPT | Mod: PBBFAC,,, | Performed by: INTERNAL MEDICINE

## 2022-05-16 PROCEDURE — 1100F PTFALLS ASSESS-DOCD GE2>/YR: CPT | Mod: CPTII,S$GLB,, | Performed by: INTERNAL MEDICINE

## 2022-05-16 PROCEDURE — 1125F AMNT PAIN NOTED PAIN PRSNT: CPT | Mod: CPTII,S$GLB,, | Performed by: INTERNAL MEDICINE

## 2022-05-16 PROCEDURE — 3288F PR FALLS RISK ASSESSMENT DOCUMENTED: ICD-10-PCS | Mod: CPTII,S$GLB,, | Performed by: INTERNAL MEDICINE

## 2022-05-16 PROCEDURE — 3079F DIAST BP 80-89 MM HG: CPT | Mod: CPTII,S$GLB,, | Performed by: INTERNAL MEDICINE

## 2022-05-16 PROCEDURE — 1125F PR PAIN SEVERITY QUANTIFIED, PAIN PRESENT: ICD-10-PCS | Mod: CPTII,S$GLB,, | Performed by: INTERNAL MEDICINE

## 2022-05-16 RX ORDER — DULOXETIN HYDROCHLORIDE 30 MG/1
30 CAPSULE, DELAYED RELEASE ORAL DAILY
Qty: 30 CAPSULE | Refills: 5 | Status: SHIPPED | OUTPATIENT
Start: 2022-05-16 | End: 2023-10-12

## 2022-05-16 ASSESSMENT — ROUTINE ASSESSMENT OF PATIENT INDEX DATA (RAPID3)
PATIENT GLOBAL ASSESSMENT SCORE: 4
PSYCHOLOGICAL DISTRESS SCORE: 0
PAIN SCORE: 6
FATIGUE SCORE: 1.1
TOTAL RAPID3 SCORE: 4.33
MDHAQ FUNCTION SCORE: 0.9

## 2022-05-16 NOTE — PROGRESS NOTES
Subjective:          Chief Complaint: Lyudmila Neri is a 72 y.o. female who had concerns including Disease Management.    HPI:    Patient is a 69-year-old female here for f/u of OA various joints to discuss management options:     Feet now painful all the time, previously only end of day. Pain first thing in AM, pain at rest. Dorsal mid foot predominant. Not as much in toes- working with Dr. Post. Injections every 6 months   Exacerbated with standing.     Recent increased knee pain: working with Ortho. MRI right knee with degenrative changes.     Celebrex as of 2020 insurance will not fill  Mobic in past not as effective.   Using Aleve OTC BID with some benefit.         BP has been good  Patient was seen by Dr. Post and as of 07/16/2019 with recommendation for continued inserts stretching of the Achilles tendon and consideration for injections as needed.      Rheumatic Hx:    As a history of nonerosive erosive reflux gastritis as well as GIST resected 2011 follows with Dr. Pro.  Recent serologies rheumatoid factor negative, sedimentation rate within normal limits, SINDI negative, C-reactive protein is slightly elevated.  Affected joints: knees, shoulders cervical spine (hx of fusion), hips, hands are stiff in the CMC and MCP and PIP, feet. Right ankle with known fx and ORIF.   Stifffness in AM 45 min with hot shower.   No dactylitis, no known other joint swelling. No IBD. She notes drys eyes, no scleritis, episcleritis  She did use aleve which helps, meloxicam not really. Celebrex does help but cannot use BID due to HTN. Did recently have BP meds adjusted.  Is having relief but not complete. No GI upset.   She has sensitivity to soft tissue pain.  She has hx of Psoriasis that is controlled for at least 10 years-scalp predominant but diffusely.   A new well marginated erosion present at the radial aspect of the base of the right fourth proximal phalanx joint space narrowing in the third fourth MCP and  the left third MCP joint degenerative changes otherwise.    REVIEW OF SYSTEMS:    Review of Systems   Constitutional: Positive for malaise/fatigue. Negative for fever and weight loss.   HENT: Negative for sore throat.    Eyes: Negative for double vision, photophobia and redness.   Respiratory: Negative for cough, shortness of breath and wheezing.    Cardiovascular: Negative for chest pain, palpitations and orthopnea.   Gastrointestinal: Negative for abdominal pain, constipation and diarrhea.   Genitourinary: Negative for dysuria, hematuria and urgency.   Musculoskeletal: Positive for joint pain and myalgias. Negative for back pain.   Skin: Negative for rash.   Neurological: Negative for dizziness, tingling, focal weakness and headaches.   Endo/Heme/Allergies: Does not bruise/bleed easily.   Psychiatric/Behavioral: Negative for depression, hallucinations and suicidal ideas.               Objective:            Past Medical History:   Diagnosis Date    Anticoagulant long-term use     Arthritis     osteoarthritis    Blood transfusion     Cancer 2011    stromal tumor, stomach    Depression     Disorder of kidney and ureter     CKD 2    GERD (gastroesophageal reflux disease)     GIST (gastrointestinal stromal tumor), malignant     H. pylori infection     Hypertension     KATHY (obstructive sleep apnea) 6/24/2013    Psoriasis 6/24/2013    Trouble in sleeping     arthritic pain wakes her up    Urinary incontinence     stress incontinence     Family History   Problem Relation Age of Onset    Heart disease Mother     Arthritis Mother         osteoarthritis    COPD Mother     Hyperlipidemia Mother     Hypertension Mother     Kidney disease Mother     Stroke Mother     Colon cancer Mother 75    Cancer Mother 70        colon cancer    Cancer Father         brain and lung    Arthritis Father     COPD Sister     Depression Daughter     Arthritis Maternal Aunt         rheumatoid arthritis    Heart disease  Maternal Uncle     Early death Maternal Uncle         in 50s due to heart disease    Arthritis Paternal Aunt         rheuamtoid arthritis    Heart disease Maternal Grandfather     Arthritis Paternal Grandmother         rheumatoid arthritis    Diabetes Paternal Grandmother     Diabetes Cousin     Heart disease Cousin     Crohn's disease Neg Hx     Stomach cancer Neg Hx     Ulcerative colitis Neg Hx     Esophageal cancer Neg Hx      Social History     Tobacco Use    Smoking status: Never Smoker    Smokeless tobacco: Never Used   Substance Use Topics    Alcohol use: No    Drug use: No         Current Outpatient Medications on File Prior to Visit   Medication Sig Dispense Refill    amLODIPine (NORVASC) 5 MG tablet TAKE 1 TABLET(5 MG) BY MOUTH EVERY DAY 90 tablet 3    aspirin (ECOTRIN) 81 MG EC tablet Take 81 mg by mouth once daily.      cephALEXin (KEFLEX) 500 MG capsule Take 1 capsule (500 mg total) by mouth 4 (four) times daily. for 10 days 40 capsule 0    cetirizine (ZYRTEC) 10 MG tablet Take 10 mg by mouth once daily.      cholecalciferol, vitamin D3, (VITAMIN D3) 25 mcg (1,000 unit) capsule Take 1,000 Units by mouth once daily.      clobetasol (TEMOVATE) 0.05 % external solution Apply topically as needed.       diclofenac sodium (VOLTAREN) 1 % Gel Apply 2 g topically 3 (three) times daily as needed. 100 g 5    fish oil-omega-3 fatty acids 300-1,000 mg capsule Take by mouth once daily.      FOLIC ACID/MULTIVIT-MIN/LUTEIN (CENTRUM SILVER ORAL) Take by mouth.      losartan-hydrochlorothiazide 100-12.5 mg (HYZAAR) 100-12.5 mg Tab Take 1 tablet by mouth once daily. 90 tablet 3    omeprazole (PRILOSEC) 40 MG capsule TAKE 1 CAPSULE(40 MG) BY MOUTH BEFORE BREAKFAST 90 capsule 3    tolterodine (DETROL LA) 2 MG Cp24 Take 1 capsule (2 mg total) by mouth once daily. 30 capsule 2    vit C/E/Zn/coppr/lutein/zeaxan (PRESERVISION AREDS-2 ORAL) Take by mouth 2 (two) times a day.       Current  Facility-Administered Medications on File Prior to Visit   Medication Dose Route Frequency Provider Last Rate Last Admin    [DISCONTINUED] estradiol 50 mg pellet  100 mg Intramuscular Once Afua Morales MD           Vitals:    05/16/22 1040   BP: (!) 164/89   Pulse: 75       Physical Exam:    Physical Exam  Constitutional:       Appearance: Normal appearance. She is well-developed.   HENT:      Nose: No septal deviation.      Mouth/Throat:      Mouth: No oral lesions.   Eyes:      Conjunctiva/sclera:      Right eye: Right conjunctiva is not injected.      Left eye: Left conjunctiva is not injected.      Pupils: Pupils are equal, round, and reactive to light.   Neck:      Thyroid: No thyroid mass or thyromegaly.      Vascular: No JVD.   Cardiovascular:      Rate and Rhythm: Normal rate and regular rhythm.      Pulses: Normal pulses.      Comments: No edema  Pulmonary:      Effort: Pulmonary effort is normal.      Breath sounds: Normal breath sounds.   Abdominal:      Palpations: Abdomen is soft.   Musculoskeletal:      Right shoulder: Tenderness present. No swelling. Normal range of motion.      Left shoulder: Tenderness present. No swelling. Normal range of motion.      Right elbow: No swelling. Normal range of motion. No tenderness.      Left elbow: No swelling. Normal range of motion. No tenderness.      Right wrist: No swelling or tenderness. Normal range of motion.      Left wrist: No swelling or tenderness. Normal range of motion.      Right hand: Tenderness present.      Left hand: Tenderness present.      Right hip: Normal range of motion. Normal strength.      Left hip: No tenderness. Normal range of motion.      Right knee: No swelling. Normal range of motion. Tenderness present.      Left knee: No swelling. Normal range of motion. Tenderness present.      Right ankle: No swelling. No tenderness. Normal range of motion.      Left ankle: No swelling. No tenderness. Normal range of motion.      Right  foot: Tenderness present.      Left foot: Tenderness present.      Comments: 2nd DIP b/l bony hypertrophy.   No active synovitis but tenderness at fingers, shoulders, knees and metatarsalgia.   No nodules of the achilles.    Lymphadenopathy:      Cervical: No cervical adenopathy.   Skin:     General: Skin is dry.   Neurological:      Deep Tendon Reflexes: Reflexes are normal and symmetric.               Assessment:       Encounter Diagnosis   Name Primary?    Other osteoarthritis involving multiple joints Yes          Plan:        Other osteoarthritis involving multiple joints    Other orders  -     DULoxetine (CYMBALTA) 30 MG capsule; Take 1 capsule (30 mg total) by mouth once daily.  Dispense: 30 capsule; Refill: 5       OA  Voltaren most effective for feet.   Aleve 220mg BID renal function stable but cannot increase.    Cymbalta 30mg daily  Tumeric   Follow up in about 4 months (around 9/16/2022).         45min consultation with greater than 50% spent in counseling, chart review and coordination of care. All questions answered.

## 2022-05-24 ENCOUNTER — OFFICE VISIT (OUTPATIENT)
Dept: UROGYNECOLOGY | Facility: CLINIC | Age: 72
End: 2022-05-24
Payer: MEDICARE

## 2022-05-24 VITALS — WEIGHT: 156.75 LBS | HEIGHT: 63 IN | BODY MASS INDEX: 27.77 KG/M2

## 2022-05-24 DIAGNOSIS — N10 ACUTE PYELONEPHRITIS: ICD-10-CM

## 2022-05-24 DIAGNOSIS — N32.81 OAB (OVERACTIVE BLADDER): Primary | ICD-10-CM

## 2022-05-24 LAB
BILIRUB SERPL-MCNC: NORMAL MG/DL
BLOOD URINE, POC: NORMAL
CLARITY, POC UA: CLEAR
COLOR, POC UA: YELLOW
GLUCOSE UR QL STRIP: NORMAL
KETONES UR QL STRIP: NORMAL
LEUKOCYTE ESTERASE URINE, POC: NORMAL
NITRITE, POC UA: NORMAL
PH, POC UA: 5
PROTEIN, POC: NORMAL
SPECIFIC GRAVITY, POC UA: 1.02
UROBILINOGEN, POC UA: NORMAL

## 2022-05-24 PROCEDURE — 81002 URINALYSIS NONAUTO W/O SCOPE: CPT | Mod: S$GLB,,, | Performed by: OBSTETRICS & GYNECOLOGY

## 2022-05-24 PROCEDURE — 1101F PR PT FALLS ASSESS DOC 0-1 FALLS W/OUT INJ PAST YR: ICD-10-PCS | Mod: CPTII,S$GLB,, | Performed by: OBSTETRICS & GYNECOLOGY

## 2022-05-24 PROCEDURE — 3288F PR FALLS RISK ASSESSMENT DOCUMENTED: ICD-10-PCS | Mod: CPTII,S$GLB,, | Performed by: OBSTETRICS & GYNECOLOGY

## 2022-05-24 PROCEDURE — 3008F PR BODY MASS INDEX (BMI) DOCUMENTED: ICD-10-PCS | Mod: CPTII,S$GLB,, | Performed by: OBSTETRICS & GYNECOLOGY

## 2022-05-24 PROCEDURE — 99999 PR PBB SHADOW E&M-EST. PATIENT-LVL III: CPT | Mod: PBBFAC,,, | Performed by: OBSTETRICS & GYNECOLOGY

## 2022-05-24 PROCEDURE — 1159F PR MEDICATION LIST DOCUMENTED IN MEDICAL RECORD: ICD-10-PCS | Mod: CPTII,S$GLB,, | Performed by: OBSTETRICS & GYNECOLOGY

## 2022-05-24 PROCEDURE — 1101F PT FALLS ASSESS-DOCD LE1/YR: CPT | Mod: CPTII,S$GLB,, | Performed by: OBSTETRICS & GYNECOLOGY

## 2022-05-24 PROCEDURE — 99213 OFFICE O/P EST LOW 20 MIN: CPT | Mod: S$GLB,,, | Performed by: OBSTETRICS & GYNECOLOGY

## 2022-05-24 PROCEDURE — 1126F PR PAIN SEVERITY QUANTIFIED, NO PAIN PRESENT: ICD-10-PCS | Mod: CPTII,S$GLB,, | Performed by: OBSTETRICS & GYNECOLOGY

## 2022-05-24 PROCEDURE — 1126F AMNT PAIN NOTED NONE PRSNT: CPT | Mod: CPTII,S$GLB,, | Performed by: OBSTETRICS & GYNECOLOGY

## 2022-05-24 PROCEDURE — 99999 PR PBB SHADOW E&M-EST. PATIENT-LVL III: ICD-10-PCS | Mod: PBBFAC,,, | Performed by: OBSTETRICS & GYNECOLOGY

## 2022-05-24 PROCEDURE — 81002 POCT URINE DIPSTICK WITHOUT MICROSCOPE: ICD-10-PCS | Mod: S$GLB,,, | Performed by: OBSTETRICS & GYNECOLOGY

## 2022-05-24 PROCEDURE — 3008F BODY MASS INDEX DOCD: CPT | Mod: CPTII,S$GLB,, | Performed by: OBSTETRICS & GYNECOLOGY

## 2022-05-24 PROCEDURE — 1159F MED LIST DOCD IN RCRD: CPT | Mod: CPTII,S$GLB,, | Performed by: OBSTETRICS & GYNECOLOGY

## 2022-05-24 PROCEDURE — 99213 PR OFFICE/OUTPT VISIT, EST, LEVL III, 20-29 MIN: ICD-10-PCS | Mod: S$GLB,,, | Performed by: OBSTETRICS & GYNECOLOGY

## 2022-05-24 PROCEDURE — 3288F FALL RISK ASSESSMENT DOCD: CPT | Mod: CPTII,S$GLB,, | Performed by: OBSTETRICS & GYNECOLOGY

## 2022-05-24 RX ORDER — FESOTERODINE FUMARATE 8 MG/1
8 TABLET, EXTENDED RELEASE ORAL DAILY
Qty: 30 TABLET | Refills: 12 | Status: SHIPPED | OUTPATIENT
Start: 2022-05-24 | End: 2023-07-11

## 2022-05-24 NOTE — PROGRESS NOTES
Subjective:     Chief Complaint:  Overactive bladder  History of Present Illness: Lyudmila Neri is a 72 y.o. female who presents for mixed incontinence.  She had done well on a combination of VESIcare and Myrbetriq however she had problems with insurance coverage.  She was switched to Detrol but she said it is not as good and she has some side effects including dry eyes and dry mouth.  We discussed other options for coverage including Toviaz and Sanctura which appear to be on her list for  acceptable medications.  She also has not been on oxybutynin which is on her list as well    Review of Systems    Constitutional:  KATYH.  Eyes:  Macular degeneration  ENT: No headaches.   Respiratory: No SOB.  Cardiovascular: Hypertension  Gastrointestinal: No constipation. No diarrhea. No fecal incontinence.   Genitourinary: CKD  Integument/Breast:  Psoriasis  Hematologic/Lymphatic: No history of anemia.  Musculoskeletal: OA  Neurological: DDD  Behavioral/Psych: No history of depression.   Endocrine: No hormonal replacement.  Allergy/Immune: no recent reactions     Objective:   General Exam:  General appearance:  Wearing mask  HEENT: Kim. EOM's intact.  Neck: Normal thyroid.   Back: No CVA tenderness.  RESP: No SOB.  Breasts: deferred  Abdomen: Benign without localizing signs.  Extremities: No edema. No varices.  Lymphatic: noncontributory  Skin: No rashes. No lesions.  Neurologic: Intact.   Psych: Oriented.     Assessment:   Mixed incontinence which did respond better to a combination of VESIcare and Myrbetriq       Plan:    Will try Toviaz.  If not effective or not cost effective, our next option would be Sanctura

## 2022-06-20 ENCOUNTER — IMMUNIZATION (OUTPATIENT)
Dept: PHARMACY | Facility: CLINIC | Age: 72
End: 2022-06-20
Payer: MEDICARE

## 2022-06-20 DIAGNOSIS — Z23 NEED FOR VACCINATION: Primary | ICD-10-CM

## 2022-06-24 NOTE — TELEPHONE ENCOUNTER
Left message for Pt to call clinic back.   Per Dr. Borges:    We can go to 50 bid at this point but I will stop it completely when he comes to clinic next.     Will updated patient.     Larisa Valadez, RN, BSN  Nephrology Care Coordinator  [facility]

## 2022-07-15 ENCOUNTER — OFFICE VISIT (OUTPATIENT)
Dept: FAMILY MEDICINE | Facility: CLINIC | Age: 72
End: 2022-07-15
Payer: MEDICARE

## 2022-07-15 VITALS
DIASTOLIC BLOOD PRESSURE: 80 MMHG | SYSTOLIC BLOOD PRESSURE: 120 MMHG | WEIGHT: 154.75 LBS | BODY MASS INDEX: 27.42 KG/M2 | HEIGHT: 63 IN | OXYGEN SATURATION: 97 % | HEART RATE: 75 BPM
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
## 2022-07-15 PROCEDURE — 1100F PR PT FALLS ASSESS DOC 2+ FALLS/FALL W/INJURY/YR: ICD-10-PCS | Mod: CPTII,S$GLB,, | Performed by: INTERNAL MEDICINE

## 2022-07-15 PROCEDURE — 3079F PR MOST RECENT DIASTOLIC BLOOD PRESSURE 80-89 MM HG: ICD-10-PCS | Mod: CPTII,S$GLB,, | Performed by: INTERNAL MEDICINE

## 2022-07-15 PROCEDURE — 3074F PR MOST RECENT SYSTOLIC BLOOD PRESSURE < 130 MM HG: ICD-10-PCS | Mod: CPTII,S$GLB,, | Performed by: INTERNAL MEDICINE

## 2022-07-15 PROCEDURE — 3008F PR BODY MASS INDEX (BMI) DOCUMENTED: ICD-10-PCS | Mod: CPTII,S$GLB,, | Performed by: INTERNAL MEDICINE

## 2022-07-15 PROCEDURE — 99999 PR PBB SHADOW E&M-EST. PATIENT-LVL III: CPT | Mod: PBBFAC,,, | Performed by: INTERNAL MEDICINE

## 2022-07-15 PROCEDURE — 1126F AMNT PAIN NOTED NONE PRSNT: CPT | Mod: CPTII,S$GLB,, | Performed by: INTERNAL MEDICINE

## 2022-07-15 PROCEDURE — 1159F MED LIST DOCD IN RCRD: CPT | Mod: CPTII,S$GLB,, | Performed by: INTERNAL MEDICINE

## 2022-07-15 PROCEDURE — 99499 UNLISTED E&M SERVICE: CPT | Mod: S$GLB,,, | Performed by: INTERNAL MEDICINE

## 2022-07-15 PROCEDURE — 3288F PR FALLS RISK ASSESSMENT DOCUMENTED: ICD-10-PCS | Mod: CPTII,S$GLB,, | Performed by: INTERNAL MEDICINE

## 2022-07-15 PROCEDURE — 1160F PR REVIEW ALL MEDS BY PRESCRIBER/CLIN PHARMACIST DOCUMENTED: ICD-10-PCS | Mod: CPTII,S$GLB,, | Performed by: INTERNAL MEDICINE

## 2022-07-15 PROCEDURE — 99213 PR OFFICE/OUTPT VISIT, EST, LEVL III, 20-29 MIN: ICD-10-PCS | Mod: S$GLB,,, | Performed by: INTERNAL MEDICINE

## 2022-07-15 PROCEDURE — 1159F PR MEDICATION LIST DOCUMENTED IN MEDICAL RECORD: ICD-10-PCS | Mod: CPTII,S$GLB,, | Performed by: INTERNAL MEDICINE

## 2022-07-15 PROCEDURE — 99999 PR PBB SHADOW E&M-EST. PATIENT-LVL III: ICD-10-PCS | Mod: PBBFAC,,, | Performed by: INTERNAL MEDICINE

## 2022-07-15 PROCEDURE — 1126F PR PAIN SEVERITY QUANTIFIED, NO PAIN PRESENT: ICD-10-PCS | Mod: CPTII,S$GLB,, | Performed by: INTERNAL MEDICINE

## 2022-07-15 PROCEDURE — 3079F DIAST BP 80-89 MM HG: CPT | Mod: CPTII,S$GLB,, | Performed by: INTERNAL MEDICINE

## 2022-07-15 PROCEDURE — 1100F PTFALLS ASSESS-DOCD GE2>/YR: CPT | Mod: CPTII,S$GLB,, | Performed by: INTERNAL MEDICINE

## 2022-07-15 PROCEDURE — 3008F BODY MASS INDEX DOCD: CPT | Mod: CPTII,S$GLB,, | Performed by: INTERNAL MEDICINE

## 2022-07-15 PROCEDURE — 3288F FALL RISK ASSESSMENT DOCD: CPT | Mod: CPTII,S$GLB,, | Performed by: INTERNAL MEDICINE

## 2022-07-15 PROCEDURE — 99213 OFFICE O/P EST LOW 20 MIN: CPT | Mod: S$GLB,,, | Performed by: INTERNAL MEDICINE

## 2022-07-15 PROCEDURE — 99499 RISK ADDL DX/OHS AUDIT: ICD-10-PCS | Mod: S$GLB,,, | Performed by: INTERNAL MEDICINE

## 2022-07-15 PROCEDURE — 1160F RVW MEDS BY RX/DR IN RCRD: CPT | Mod: CPTII,S$GLB,, | Performed by: INTERNAL MEDICINE

## 2022-07-15 PROCEDURE — 3074F SYST BP LT 130 MM HG: CPT | Mod: CPTII,S$GLB,, | Performed by: INTERNAL MEDICINE

## 2022-07-15 NOTE — PROGRESS NOTES
Patient ID: Lyudmila Neri is a 72 y.o. female.    Chief Complaint: Hospital Follow Up      Assessment and Plan      Doing well, fall precautions  I think she had a concussion and having some residual symptoms that are improving.  Neuro exam normal.    1. Fall, subsequent encounter        HPI     Chronic medical- hypertension, carotid artery disease, stage II CKD, history of GIST, KATHY    ER for Suspected pyelonephritis 05/10/2022, discharged on Keflex.  See note for details    ER again on 06/08/2022 after having a trip and fall off of a stool.  She hit the back of her head on cement and did not lose consciousness. She was fuzzy headed and had some trouble remembering where her  was.     CT head  1. Parieto-occipital scalp soft tissue swelling and hematoma.  2. No acute intracranial abnormalities identified.    CT cervical   1. No acute osseous abnormalities demonstrated.  2. Multilevel degenerative changes are present.    Her neck pain has improved, memory back to baseline. Still having having daily headaches and vague nausea. This seems to be improving as well.     She is on cymbalta by Dr. Banerjee.   She is taking tolterodine in the place of vesicare (insurance coverage)      Review of Systems   Constitutional: Negative for fever.   Respiratory: Negative for shortness of breath.    Cardiovascular: Negative for chest pain.   Gastrointestinal: Positive for nausea. Negative for abdominal pain.   Neurological: Positive for headaches.        Vitals:    07/15/22 0939   BP: 120/80   Pulse: 75     Physical Exam  Cardiovascular:      Rate and Rhythm: Normal rate and regular rhythm.      Heart sounds: No murmur heard.    No gallop.   Pulmonary:      Breath sounds: Normal breath sounds. No wheezing or rhonchi.   Abdominal:      General: Bowel sounds are normal.      Palpations: Abdomen is soft.      Tenderness: There is no abdominal tenderness.   Musculoskeletal:         General: Normal range of motion.       Cervical back: Neck supple.   Skin:     General: Skin is warm.      Findings: No rash.   Neurological:      Mental Status: She is alert.   Psychiatric:         Behavior: Behavior normal.         I personally reviewed past medical, family and social history.  Medication List with Changes/Refills   Current Medications    AMLODIPINE (NORVASC) 5 MG TABLET    TAKE 1 TABLET(5 MG) BY MOUTH EVERY DAY    ASPIRIN (ECOTRIN) 81 MG EC TABLET    Take 81 mg by mouth once daily.    CETIRIZINE (ZYRTEC) 10 MG TABLET    Take 10 mg by mouth once daily.    CHOLECALCIFEROL, VITAMIN D3, (VITAMIN D3) 25 MCG (1,000 UNIT) CAPSULE    Take 1,000 Units by mouth once daily.    CLOBETASOL (TEMOVATE) 0.05 % EXTERNAL SOLUTION    Apply topically as needed.     DICLOFENAC SODIUM (VOLTAREN) 1 % GEL    Apply 2 g topically 3 (three) times daily as needed.    DULOXETINE (CYMBALTA) 30 MG CAPSULE    Take 1 capsule (30 mg total) by mouth once daily.    FESOTERODINE (TOVIAZ) 8 MG TB24    Take 8 mg by mouth once daily.    FISH OIL-OMEGA-3 FATTY ACIDS 300-1,000 MG CAPSULE    Take by mouth once daily.    FOLIC ACID/MULTIVIT-MIN/LUTEIN (CENTRUM SILVER ORAL)    Take by mouth.    LOSARTAN-HYDROCHLOROTHIAZIDE 100-12.5 MG (HYZAAR) 100-12.5 MG TAB    Take 1 tablet by mouth once daily.    OMEPRAZOLE (PRILOSEC) 40 MG CAPSULE    TAKE 1 CAPSULE(40 MG) BY MOUTH BEFORE BREAKFAST    TOLTERODINE (DETROL LA) 2 MG CP24    Take 1 capsule (2 mg total) by mouth once daily.    VIT C/E/ZN/COPPR/LUTEIN/ZEAXAN (PRESERVISION AREDS-2 ORAL)    Take by mouth 2 (two) times a day.

## 2022-09-01 ENCOUNTER — TELEPHONE (OUTPATIENT)
Dept: PODIATRY | Facility: CLINIC | Age: 72
End: 2022-09-01
Payer: MEDICARE

## 2022-09-01 NOTE — TELEPHONE ENCOUNTER
----- Message from Ronaldo Chavez sent at 9/1/2022 10:29 AM CDT -----  Contact: pt at [phone]  Type:  Sooner Appointment Request    Caller is requesting a sooner appointment.  Caller declined first available appointment listed below.  Caller will not accept being placed on the waitlist and is requesting a message be sent to doctor.    Name of Caller:  pt  When is the first available appointment?  9/13/22  Symptoms:  pain in feet  Best Call Back Number:  [phone]    Additional Information:  Pt is requesting the afternoon of 9/8/22. Please call back and advise.

## 2022-09-01 NOTE — TELEPHONE ENCOUNTER
----- Message from Clay Rodriguez sent at 9/1/2022 11:06 AM CDT -----  Contact: self  Type: Patient Returning Call        Who Called: Patient   Who Left Message for Patient: Nurse Adrien  Does the patient know what this is regarding?: n/a  Best Call Back Number: [phone]  Additional Information: Pt just missed the call was ret call back to office. Thanks.

## 2022-09-20 ENCOUNTER — TELEPHONE (OUTPATIENT)
Dept: RHEUMATOLOGY | Facility: CLINIC | Age: 72
End: 2022-09-20
Payer: MEDICARE

## 2022-09-20 NOTE — TELEPHONE ENCOUNTER
----- Message from Lizzette Grace sent at 9/20/2022 10:53 AM CDT -----  Contact: [phone]  Type: Needs Medical Advice  Who Called:  Pt     Best Call Back Number: [phone]    Additional Information: Pt is calling to schedule appt. Pt cxled due to death in family. Pls clal back and advise

## 2022-09-20 NOTE — TELEPHONE ENCOUNTER
Scheduled patient follow up for soonest available. Left voicemail and call back number to discuss availability.

## 2022-09-27 ENCOUNTER — OFFICE VISIT (OUTPATIENT)
Dept: PODIATRY | Facility: CLINIC | Age: 72
End: 2022-09-27
Payer: MEDICARE

## 2022-09-27 ENCOUNTER — HOSPITAL ENCOUNTER (OUTPATIENT)
Dept: RADIOLOGY | Facility: HOSPITAL | Age: 72
Discharge: HOME | End: 2022-09-27
Attending: PODIATRIST
Payer: MEDICARE

## 2022-09-27 DIAGNOSIS — M19.079 ARTHRITIS, MIDFOOT: Primary | ICD-10-CM

## 2022-09-27 DIAGNOSIS — M19.079 ARTHRITIS OF METATARSOPHALANGEAL (MTP) JOINT OF GREAT TOE: ICD-10-CM

## 2022-09-27 DIAGNOSIS — M19.079 ARTHRITIS, MIDFOOT: ICD-10-CM

## 2022-09-27 PROCEDURE — 99499 NO LOS: ICD-10-PCS | Mod: S$GLB,,, | Performed by: PODIATRIST

## 2022-09-27 PROCEDURE — 1125F AMNT PAIN NOTED PAIN PRSNT: CPT | Mod: CPTII,S$GLB,, | Performed by: PODIATRIST

## 2022-09-27 PROCEDURE — 1159F PR MEDICATION LIST DOCUMENTED IN MEDICAL RECORD: ICD-10-PCS | Mod: CPTII,S$GLB,, | Performed by: PODIATRIST

## 2022-09-27 PROCEDURE — 1160F PR REVIEW ALL MEDS BY PRESCRIBER/CLIN PHARMACIST DOCUMENTED: ICD-10-PCS | Mod: CPTII,S$GLB,, | Performed by: PODIATRIST

## 2022-09-27 PROCEDURE — 99999 PR PBB SHADOW E&M-EST. PATIENT-LVL II: CPT | Mod: PBBFAC,,, | Performed by: PODIATRIST

## 2022-09-27 PROCEDURE — 20600 PR DRAIN/INJECT SMALL JOINT/BURSA: ICD-10-PCS | Mod: 50,S$GLB,, | Performed by: PODIATRIST

## 2022-09-27 PROCEDURE — 3288F PR FALLS RISK ASSESSMENT DOCUMENTED: ICD-10-PCS | Mod: CPTII,S$GLB,, | Performed by: PODIATRIST

## 2022-09-27 PROCEDURE — 3288F FALL RISK ASSESSMENT DOCD: CPT | Mod: CPTII,S$GLB,, | Performed by: PODIATRIST

## 2022-09-27 PROCEDURE — 1159F MED LIST DOCD IN RCRD: CPT | Mod: CPTII,S$GLB,, | Performed by: PODIATRIST

## 2022-09-27 PROCEDURE — 1101F PR PT FALLS ASSESS DOC 0-1 FALLS W/OUT INJ PAST YR: ICD-10-PCS | Mod: CPTII,S$GLB,, | Performed by: PODIATRIST

## 2022-09-27 PROCEDURE — 99499 UNLISTED E&M SERVICE: CPT | Mod: S$GLB,,, | Performed by: PODIATRIST

## 2022-09-27 PROCEDURE — 73630 XR FOOT COMPLETE 3 VIEW BILATERAL: ICD-10-PCS | Mod: 26,50,, | Performed by: RADIOLOGY

## 2022-09-27 PROCEDURE — 20600 DRAIN/INJ JOINT/BURSA W/O US: CPT | Mod: 50,S$GLB,, | Performed by: PODIATRIST

## 2022-09-27 PROCEDURE — 73630 X-RAY EXAM OF FOOT: CPT | Mod: 26,50,, | Performed by: RADIOLOGY

## 2022-09-27 PROCEDURE — 1125F PR PAIN SEVERITY QUANTIFIED, PAIN PRESENT: ICD-10-PCS | Mod: CPTII,S$GLB,, | Performed by: PODIATRIST

## 2022-09-27 PROCEDURE — 1160F RVW MEDS BY RX/DR IN RCRD: CPT | Mod: CPTII,S$GLB,, | Performed by: PODIATRIST

## 2022-09-27 PROCEDURE — 99999 PR PBB SHADOW E&M-EST. PATIENT-LVL II: ICD-10-PCS | Mod: PBBFAC,,, | Performed by: PODIATRIST

## 2022-09-27 PROCEDURE — 73630 X-RAY EXAM OF FOOT: CPT | Mod: TC,50,PO

## 2022-09-27 PROCEDURE — 1101F PT FALLS ASSESS-DOCD LE1/YR: CPT | Mod: CPTII,S$GLB,, | Performed by: PODIATRIST

## 2022-09-27 RX ORDER — DEXAMETHASONE SODIUM PHOSPHATE 4 MG/ML
4 INJECTION, SOLUTION INTRA-ARTICULAR; INTRALESIONAL; INTRAMUSCULAR; INTRAVENOUS; SOFT TISSUE
Status: COMPLETED | OUTPATIENT
Start: 2022-09-27 | End: 2022-09-27

## 2022-09-27 RX ORDER — METHYLPREDNISOLONE ACETATE 40 MG/ML
40 INJECTION, SUSPENSION INTRA-ARTICULAR; INTRALESIONAL; INTRAMUSCULAR; SOFT TISSUE
Status: COMPLETED | OUTPATIENT
Start: 2022-09-27 | End: 2022-09-27

## 2022-09-27 RX ADMIN — METHYLPREDNISOLONE ACETATE 40 MG: 40 INJECTION, SUSPENSION INTRA-ARTICULAR; INTRALESIONAL; INTRAMUSCULAR; SOFT TISSUE at 03:09

## 2022-09-27 RX ADMIN — DEXAMETHASONE SODIUM PHOSPHATE 4 MG: 4 INJECTION, SOLUTION INTRA-ARTICULAR; INTRALESIONAL; INTRAMUSCULAR; INTRAVENOUS; SOFT TISSUE at 03:09

## 2022-09-27 NOTE — PROGRESS NOTES
Subjective:      Patient ID: Lyudmila Neri is a 72 y.o. female.    Chief Complaint: Foot Pain    Lyudmila is a 72 y.o. female     9/27/22  Patient returns for injections to bilateral midfoot for arthritic changes and pain, wearing good supportive shoes gets the injections a couple times a year they last several months when she gets them    Review of Systems   Constitutional: Negative for chills and fever.   Cardiovascular:  Negative for claudication and leg swelling.   Respiratory:  Negative for shortness of breath.    Skin:  Negative for itching, nail changes and rash.   Musculoskeletal:  Positive for arthritis and joint pain. Negative for muscle cramps, muscle weakness and myalgias.   Gastrointestinal:  Negative for nausea and vomiting.   Neurological:  Negative for focal weakness, loss of balance, numbness and paresthesias.         Objective:      Physical Exam  Constitutional:       General: She is not in acute distress.     Appearance: She is well-developed. She is not diaphoretic.   Cardiovascular:      Pulses:           Dorsalis pedis pulses are 2+ on the right side and 2+ on the left side.        Posterior tibial pulses are 2+ on the right side and 2+ on the left side.      Comments: < 3 sec capillary refill time to toes 1-5 bilateral. Toes and feet are warm to touch proximally with normal distal cooling b/l. There is some hair growth on the feet and toes b/l. There is no edema b/l. No spider veins or varicosities present b/l.     Musculoskeletal:      Comments: Bilateral feet there is pain to palpation and motion at the midfoot (lisfranc joint) area and right NC joint as well. There is some discomfort to ROM at the bilateral first MTPJ more to the right.    Pain with palpation and positive paresthesias along the deep peroneal nerve bilateral    Equinus noted b/l ankles with < 5 deg DF noted. MMT 5/5 in DF/PF/Inv/Ev resistance with no reproduction of pain in any direction. Passive range of motion of ankle  and pedal joints is painless b/l.     Skin:     General: Skin is warm and dry.      Coloration: Skin is not pale.      Findings: No abrasion, bruising, burn, ecchymosis, erythema, laceration, lesion, petechiae or rash.      Nails: There is no clubbing.      Comments: Skin temperature, texture and turgor within normal limits.   Neurological:      Mental Status: She is alert and oriented to person, place, and time.      Sensory: No sensory deficit.      Motor: No tremor, atrophy or abnormal muscle tone.      Comments: Negative tinel sign bilateral.   Psychiatric:         Behavior: Behavior normal.             Assessment:       Encounter Diagnoses   Name Primary?    Arthritis, midfoot Yes    Arthritis of metatarsophalangeal (MTP) joint of great toe          Plan:       Lyudmila was seen today for foot pain.    Diagnoses and all orders for this visit:    Arthritis, midfoot  -     X-Ray Foot Complete Bilateral; Future    Arthritis of metatarsophalangeal (MTP) joint of great toe  -     X-Ray Foot Complete Bilateral; Future    Other orders  -     dexamethasone injection 4 mg  -     methylPREDNISolone acetate injection 40 mg    I counseled the patient on her conditions, their implications and medical management.    Patient will continue to wear the over the counter arch supports and wear them in shoes whenever possible.  Athletic shoes intended for walking or running are usually best.    Patient will continue to stretch the tendo achilles complex three times daily as demonstrated in the office.  Literature was dispensed illustrating proper stretching technique.    Obtained verbal consent from the patient to inject the affected joint bilateral foot. Skin prepped with alcohol and skin anesthesia achieved with ethyl chloride. Injected 20 mg depo medrol, 2 mg dexamethasone and with 1 mL 1% plain lidocaine first into the left first tarsometatarsal joint, then the right NC joint. Patient reported relief with the injection. Patient  tolerated the procedure and anesthesia well without any complications.    Can consider bracing if needed, discussed surgical medial column fusion if necessary with deep peroneal nerve release she is considering doing this next spring will get CT before doing this if she decides to proceed    Return FRANKLYN Edwards DPM

## 2022-09-29 ENCOUNTER — HOSPITAL ENCOUNTER (OUTPATIENT)
Dept: RADIOLOGY | Facility: HOSPITAL | Age: 72
Discharge: HOME | End: 2022-09-29
Attending: INTERNAL MEDICINE
Payer: MEDICARE

## 2022-09-29 ENCOUNTER — HOSPITAL ENCOUNTER (OUTPATIENT)
Dept: RADIOLOGY | Facility: HOSPITAL | Age: 72
Discharge: HOME | End: 2022-09-29
Attending: NURSE PRACTITIONER
Payer: MEDICARE

## 2022-09-29 ENCOUNTER — PES CALL (OUTPATIENT)
Dept: ADMINISTRATIVE | Facility: CLINIC | Age: 72
End: 2022-09-29
Payer: MEDICARE

## 2022-09-29 DIAGNOSIS — Z12.31 ENCOUNTER FOR SCREENING MAMMOGRAM FOR MALIGNANT NEOPLASM OF BREAST: ICD-10-CM

## 2022-09-29 DIAGNOSIS — Z12.39 ENCOUNTER FOR SCREENING FOR MALIGNANT NEOPLASM OF BREAST, UNSPECIFIED SCREENING MODALITY: ICD-10-CM

## 2022-09-29 DIAGNOSIS — Z85.09 HISTORY OF GASTROINTESTINAL STROMAL TUMOR (GIST): ICD-10-CM

## 2022-09-29 PROCEDURE — 71046 X-RAY EXAM CHEST 2 VIEWS: CPT | Mod: 26,,, | Performed by: RADIOLOGY

## 2022-09-29 PROCEDURE — 77063 BREAST TOMOSYNTHESIS BI: CPT | Mod: 26,,, | Performed by: RADIOLOGY

## 2022-09-29 PROCEDURE — 71046 X-RAY EXAM CHEST 2 VIEWS: CPT | Mod: TC,FY,PO

## 2022-09-29 PROCEDURE — 77067 SCR MAMMO BI INCL CAD: CPT | Mod: TC,PO

## 2022-09-29 PROCEDURE — 77067 SCR MAMMO BI INCL CAD: CPT | Mod: 26,,, | Performed by: RADIOLOGY

## 2022-09-29 PROCEDURE — 77063 BREAST TOMOSYNTHESIS BI: CPT | Mod: TC,PO

## 2022-09-29 PROCEDURE — 77063 MAMMO DIGITAL SCREENING BILAT WITH TOMO: ICD-10-PCS | Mod: 26,,, | Performed by: RADIOLOGY

## 2022-09-29 PROCEDURE — 71046 XR CHEST PA AND LATERAL: ICD-10-PCS | Mod: 26,,, | Performed by: RADIOLOGY

## 2022-09-29 PROCEDURE — 77067 MAMMO DIGITAL SCREENING BILAT WITH TOMO: ICD-10-PCS | Mod: 26,,, | Performed by: RADIOLOGY

## 2022-10-04 ENCOUNTER — OFFICE VISIT (OUTPATIENT)
Dept: HEMATOLOGY/ONCOLOGY | Facility: CLINIC | Age: 72
End: 2022-10-04
Payer: MEDICARE

## 2022-10-04 VITALS
SYSTOLIC BLOOD PRESSURE: 128 MMHG | OXYGEN SATURATION: 95 % | WEIGHT: 155.44 LBS | DIASTOLIC BLOOD PRESSURE: 75 MMHG | HEART RATE: 82 BPM | BODY MASS INDEX: 27.54 KG/M2 | HEIGHT: 63 IN

## 2022-10-04 DIAGNOSIS — Z85.09 HISTORY OF GASTROINTESTINAL STROMAL TUMOR (GIST): Primary | ICD-10-CM

## 2022-10-04 PROCEDURE — 1100F PTFALLS ASSESS-DOCD GE2>/YR: CPT | Mod: CPTII,S$GLB,, | Performed by: NURSE PRACTITIONER

## 2022-10-04 PROCEDURE — 3078F DIAST BP <80 MM HG: CPT | Mod: CPTII,S$GLB,, | Performed by: NURSE PRACTITIONER

## 2022-10-04 PROCEDURE — 3288F PR FALLS RISK ASSESSMENT DOCUMENTED: ICD-10-PCS | Mod: CPTII,S$GLB,, | Performed by: NURSE PRACTITIONER

## 2022-10-04 PROCEDURE — 99999 PR PBB SHADOW E&M-EST. PATIENT-LVL IV: CPT | Mod: PBBFAC,,, | Performed by: NURSE PRACTITIONER

## 2022-10-04 PROCEDURE — 3074F SYST BP LT 130 MM HG: CPT | Mod: CPTII,S$GLB,, | Performed by: NURSE PRACTITIONER

## 2022-10-04 PROCEDURE — 3008F BODY MASS INDEX DOCD: CPT | Mod: CPTII,S$GLB,, | Performed by: NURSE PRACTITIONER

## 2022-10-04 PROCEDURE — 1159F PR MEDICATION LIST DOCUMENTED IN MEDICAL RECORD: ICD-10-PCS | Mod: CPTII,S$GLB,, | Performed by: NURSE PRACTITIONER

## 2022-10-04 PROCEDURE — 1159F MED LIST DOCD IN RCRD: CPT | Mod: CPTII,S$GLB,, | Performed by: NURSE PRACTITIONER

## 2022-10-04 PROCEDURE — 1100F PR PT FALLS ASSESS DOC 2+ FALLS/FALL W/INJURY/YR: ICD-10-PCS | Mod: CPTII,S$GLB,, | Performed by: NURSE PRACTITIONER

## 2022-10-04 PROCEDURE — 1160F RVW MEDS BY RX/DR IN RCRD: CPT | Mod: CPTII,S$GLB,, | Performed by: NURSE PRACTITIONER

## 2022-10-04 PROCEDURE — 99213 OFFICE O/P EST LOW 20 MIN: CPT | Mod: S$GLB,,, | Performed by: NURSE PRACTITIONER

## 2022-10-04 PROCEDURE — 99999 PR PBB SHADOW E&M-EST. PATIENT-LVL IV: ICD-10-PCS | Mod: PBBFAC,,, | Performed by: NURSE PRACTITIONER

## 2022-10-04 PROCEDURE — 3044F HG A1C LEVEL LT 7.0%: CPT | Mod: CPTII,S$GLB,, | Performed by: NURSE PRACTITIONER

## 2022-10-04 PROCEDURE — 1126F PR PAIN SEVERITY QUANTIFIED, NO PAIN PRESENT: ICD-10-PCS | Mod: CPTII,S$GLB,, | Performed by: NURSE PRACTITIONER

## 2022-10-04 PROCEDURE — 3044F PR MOST RECENT HEMOGLOBIN A1C LEVEL <7.0%: ICD-10-PCS | Mod: CPTII,S$GLB,, | Performed by: NURSE PRACTITIONER

## 2022-10-04 PROCEDURE — 3288F FALL RISK ASSESSMENT DOCD: CPT | Mod: CPTII,S$GLB,, | Performed by: NURSE PRACTITIONER

## 2022-10-04 PROCEDURE — 3074F PR MOST RECENT SYSTOLIC BLOOD PRESSURE < 130 MM HG: ICD-10-PCS | Mod: CPTII,S$GLB,, | Performed by: NURSE PRACTITIONER

## 2022-10-04 PROCEDURE — 1160F PR REVIEW ALL MEDS BY PRESCRIBER/CLIN PHARMACIST DOCUMENTED: ICD-10-PCS | Mod: CPTII,S$GLB,, | Performed by: NURSE PRACTITIONER

## 2022-10-04 PROCEDURE — 1126F AMNT PAIN NOTED NONE PRSNT: CPT | Mod: CPTII,S$GLB,, | Performed by: NURSE PRACTITIONER

## 2022-10-04 PROCEDURE — 3008F PR BODY MASS INDEX (BMI) DOCUMENTED: ICD-10-PCS | Mod: CPTII,S$GLB,, | Performed by: NURSE PRACTITIONER

## 2022-10-04 PROCEDURE — 3078F PR MOST RECENT DIASTOLIC BLOOD PRESSURE < 80 MM HG: ICD-10-PCS | Mod: CPTII,S$GLB,, | Performed by: NURSE PRACTITIONER

## 2022-10-04 PROCEDURE — 99213 PR OFFICE/OUTPT VISIT, EST, LEVL III, 20-29 MIN: ICD-10-PCS | Mod: S$GLB,,, | Performed by: NURSE PRACTITIONER

## 2022-10-04 NOTE — PROGRESS NOTES
CC:  Annual GIST surveillance    HISTORY OF PRESENT ILLNESS:  This is a 72-year-old white female known to Dr. Pro for resected low-grade GIST (December 2011).    No adjuvant chemotherapy was given in light of low-grade status.    Presentation:  palpable RUQ mass; abdominal discomfort  09/16/22: US Abd:  IMPRESSION: SOLID HYPERECHOIC 3 CM MASS WITHIN THE SUBCUTANEOUS FAT OF   THE ANTERIOR RIGHT ABDOMEN CORRESPONDING TO A PALPABLE LUMP.  THOUGH THE   MASS PROBABLY REPRESENTS A LIPOMA, ULTRASOUND IS RELATIVELY NONSPECIFIC   AND CT OR MRI IS RECOMMENDED.     09/23/22: CT Abdomen/Pelvis-  IMPRESSION:   1.  A SOLID MASS APPEARS TO BE PRESENT THAT APPEARS TO BE EXTENDING FROM,   OR VERY CLOSELY OPPOSED TO THE STOMACH.  THIS MOST LIKELY RELATES TO A   GASTRIC WALL NEOPLASM, POSSIBLY A LEIOMYOMA.  GIVEN THAT ADENOCARCINOMAS   CAN APPEAR SIMILAR, FURTHER EVALUATION IS SUGGESTED.  THIS LESION IS   FAVORED TO BE EXTENDING INTO THE GASTRIC LUMEN AND CAN LIKELY BE ACCESSED   BY ENDOSCOPY.  FURTHER EVALUATION IS SUGGESTED TO EXCLUDE AN AGGRESSIVE   NEOPLASM.     2.  CHOLELITHIASIS.     12/07/11:  Resection of gastric GIST (3.2 x 2.6 cm); cholecystectomy; excision of abdominal wall lipoma per Dr. Cai    TODAY:  10/04/22  She presents to the clinic today for annual (11 years post) evaluation.   She reports continuous abdominal tenderness & bouts of diarrhea/constipation.  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  She denies any difficulties with fevers, chills, vomiting, constipation, diarrhea, painful lymphadenopathy, drenching night sweats, unexplained weight loss, bleeding, etc.    No other new complaints or pertinent findings on a 14-point review of systems.    Past Medical History:   Diagnosis Date    Anticoagulant long-term use     Arthritis     osteoarthritis    Blood transfusion     Cancer 2011    stromal tumor, stomach    Depression     Disorder of kidney and ureter     CKD 2    GERD  (gastroesophageal reflux disease)     GIST (gastrointestinal stromal tumor), malignant     H. pylori infection     Hypertension     KATHY (obstructive sleep apnea) 6/24/2013    Psoriasis 6/24/2013    Trouble in sleeping     arthritic pain wakes her up    Urinary incontinence     stress incontinence     Past Surgical History:   Procedure Laterality Date    ANKLE FRACTURE SURGERY      RT ankle    APPENDECTOMY      BLADDER SUSPENSION  1995    CARPAL TUNNEL RELEASE      left    CERVICAL FUSION      CHOLECYSTECTOMY  12/7/2011  Dr. Cai    COLONOSCOPY  7/26/2005  [facility]    Non-erosive esophageal reflux (NERD) disease?.  Post-surgical deformity in the gastric body.   Gastric mucosal abnormality (erythema) in the greater  curve of antrum.  STEVIE Test performed on 02/24/12 was Negative.    COLONOSCOPY  12/12/2008  [facility]    Small internal hemorrhoids.  Slightly redundant left colon.    COLONOSCOPY N/A 4/18/2018    Procedure: COLONOSCOPY;  Surgeon: David Guzman Jr., MD;  Location: [facility];  Service: Endoscopy;  Laterality: N/A;    COLONOSCOPY  04/18/2018    Dr. Guzman; hemorrhoids, otherwise unremarkable, repeat 5 years for surveillance due to family history of colon cancer    CYST REMOVAL Left 2016    left middle finger, Dr. Johnson    ESOPHAGOGASTRODUODENOSCOPY N/A 5/14/2019    Procedure: EGD (ESOPHAGOGASTRODUODENOSCOPY);  Surgeon: David Guzman Jr., MD;  Location: [facility];  Service: Endoscopy;  Laterality: N/A;    EYE SURGERY Bilateral 1995    RK surgery    EYE SURGERY Bilateral 2015    cataract removal    GASTRECTOMY      HYSTERECTOMY      KNEE ARTHROSCOPY W/ DEBRIDEMENT      lt knee    SHOULDER OPEN ROTATOR CUFF REPAIR      left    STOMACH SURGERY  12/7/2011  Dr. Cai    removal of GIST tumor from wall of the stomach, 2.3 cm in size.    TRANSFORAMINAL EPIDURAL INJECTION OF STEROID Bilateral 4/9/2019    Procedure: Injection,steroid,epidural,transforaminal approach L4/5;  Surgeon: Pa Pruett MD;   Location: [facility] OR;  Service: Pain Management;  Laterality: Bilateral;    TRANSFORAMINAL EPIDURAL INJECTION OF STEROID Bilateral 6/5/2019    Procedure: Injection,steroid,epidural,transforaminal approach L4/5;  Surgeon: Pa Pruett MD;  Location: [facility] OR;  Service: Pain Management;  Laterality: Bilateral;    UPPER GASTROINTESTINAL ENDOSCOPY  2/24/2012  [facility]    Non-erosive esophageal reflux (NERD) disease?.  Post-surgical deformity in the gastric body.   Gastric mucosal abnormality (erythema) in the greater  curve of antrum.  STEVIE Test performed on 02/24/12 was Negative.    UPPER GASTROINTESTINAL ENDOSCOPY  04/18/2018    Dr. Guzman, antritis, evidence of prior surgery with healthy mucosa     Current Outpatient Medications on File Prior to Visit   Medication Sig Dispense Refill    amLODIPine (NORVASC) 5 MG tablet TAKE 1 TABLET(5 MG) BY MOUTH EVERY DAY 90 tablet 3    aspirin (ECOTRIN) 81 MG EC tablet Take 81 mg by mouth once daily.      cetirizine (ZYRTEC) 10 MG tablet Take 10 mg by mouth once daily.      cholecalciferol, vitamin D3, (VITAMIN D3) 25 mcg (1,000 unit) capsule Take 1,000 Units by mouth once daily.      clobetasol (TEMOVATE) 0.05 % external solution Apply topically as needed.       diclofenac sodium (VOLTAREN) 1 % Gel Apply 2 g topically 3 (three) times daily as needed. 100 g 5    DULoxetine (CYMBALTA) 30 MG capsule Take 1 capsule (30 mg total) by mouth once daily. 30 capsule 5    fesoterodine (TOVIAZ) 8 mg Tb24 Take 8 mg by mouth once daily. 30 tablet 12    fish oil-omega-3 fatty acids 300-1,000 mg capsule Take by mouth once daily.      FOLIC ACID/MULTIVIT-MIN/LUTEIN (CENTRUM SILVER ORAL) Take by mouth.      losartan-hydrochlorothiazide 100-12.5 mg (HYZAAR) 100-12.5 mg Tab Take 1 tablet by mouth once daily. 90 tablet 3    omeprazole (PRILOSEC) 40 MG capsule TAKE 1 CAPSULE(40 MG) BY MOUTH BEFORE BREAKFAST 90 capsule 3    tolterodine (DETROL LA) 2 MG Cp24 Take 1 capsule (2 mg total) by mouth once  "daily. 30 capsule 2    vit C/E/Zn/coppr/lutein/zeaxan (PRESERVISION AREDS-2 ORAL) Take by mouth 2 (two) times a day.       No current facility-administered medications on file prior to visit.     Review of Systems   Gastrointestinal:  Positive for constipation and diarrhea.        Abdominal tenderness   All other systems reviewed and are negative.      PHYSICAL EXAMINATION:  GENERAL:  Well-developed, well-nourished white female in no acute distress.  Alert and oriented x3.  VITAL SIGNS:  Weight:  Loss of 5 1/2 pounds in 1 year  Vitals:    10/04/22 1537   BP: 128/75   BP Method: Large (Automatic)   Pulse: 82   SpO2: 95%   Weight: 70.5 kg (155 lb 6.8 oz)   Height: 5' 3" (1.6 m)       HEENT:  Normocephalic, atraumatic.  Oral mucosa pink and moist.  Lips without lesions.  Tongue midline.  Oropharynx clear.  Nonicteric sclerae.   NECK:  Supple.  No adenopathy.                                               HEART:  Regular rate and rhythm without murmur, gallop or rub.               LUNGS:  Clear to auscultation bilaterally.  NL respiratory effort.                            ABDOMEN:  Soft, palpated without tenderness, non-distended with positive normoactive bowel sounds.  No hepatosplenomegaly.                                              EXTREMITIES:  No cyanosis, clubbing or edema.  Distal pulses are intact.     AXILLAE AND GROIN:  No palpable pathologic lymphadenopathy is appreciated.    LABORATORY:    Lab Results   Component Value Date    WBC 12.57 09/29/2022    RBC 4.58 09/29/2022    HGB 13.8 09/29/2022    HCT 41.6 09/29/2022    MCV 91 09/29/2022    MCH 30.1 09/29/2022    MCHC 33.2 09/29/2022    RDW 13.1 09/29/2022     09/29/2022    MPV 11.3 09/29/2022    GRAN 9.2 (H) 09/29/2022    GRAN 73.3 (H) 09/29/2022    LYMPH 2.2 09/29/2022    LYMPH 17.5 (L) 09/29/2022    MONO 1.0 09/29/2022    MONO 7.7 09/29/2022    EOS 0.1 09/29/2022    BASO 0.05 09/29/2022    EOSINOPHIL 0.4 09/29/2022    BASOPHIL 0.4 09/29/2022 "     CMP  Sodium   Date Value Ref Range Status   09/29/2022 141 136 - 145 mmol/L Final     Potassium   Date Value Ref Range Status   09/29/2022 5.0 3.5 - 5.1 mmol/L Final     Chloride   Date Value Ref Range Status   09/29/2022 106 95 - 110 mmol/L Final     CO2   Date Value Ref Range Status   09/29/2022 27 23 - 29 mmol/L Final     Glucose   Date Value Ref Range Status   09/29/2022 92 70 - 110 mg/dL Final     BUN   Date Value Ref Range Status   09/29/2022 25 (H) 8 - 23 mg/dL Final     Creatinine   Date Value Ref Range Status   09/29/2022 1.0 0.5 - 1.4 mg/dL Final     Calcium   Date Value Ref Range Status   09/29/2022 9.4 8.7 - 10.5 mg/dL Final     Total Protein   Date Value Ref Range Status   09/29/2022 7.2 6.0 - 8.4 g/dL Final     Albumin   Date Value Ref Range Status   09/29/2022 3.8 3.5 - 5.2 g/dL Final     Total Bilirubin   Date Value Ref Range Status   09/29/2022 0.3 0.1 - 1.0 mg/dL Final     Comment:     For infants and newborns, interpretation of results should be based  on gestational age, weight and in agreement with clinical  observations.    Premature Infant recommended reference ranges:  Up to 24 hours.............<8.0 mg/dL  Up to 48 hours............<12.0 mg/dL  3-5 days..................<15.0 mg/dL  6-29 days.................<15.0 mg/dL       Alkaline Phosphatase   Date Value Ref Range Status   09/29/2022 49 (L) 55 - 135 U/L Final     AST   Date Value Ref Range Status   09/29/2022 13 10 - 40 U/L Final     ALT   Date Value Ref Range Status   09/29/2022 12 10 - 44 U/L Final     Anion Gap   Date Value Ref Range Status   09/29/2022 8 8 - 16 mmol/L Final     eGFR if    Date Value Ref Range Status   10/05/2021 >60 >60 mL/min/1.73 m^2 Final     eGFR if non    Date Value Ref Range Status   10/05/2021 56 (A) >60 mL/min/1.73 m^2 Final     Comment:     Calculation used to obtain the estimated glomerular filtration  rate (eGFR) is the CKD-EPI equation.            RADIOLOGY:   Chest x-ray dated 09/29/2022:  Impression No radiographic evidence of active chest disease.    09/14/20:  CT A/P:  Impression:   1. No worrisome change since 02/20/2019.  The gastric antrum still demonstrates some apparent thickening with a region of presumed peristalsis but this is nonspecific and unchanged and may be postsurgical or relate to prior treatments.  2. Prominent renal pelvis particularly on the right unchanged since prior examinations that could relate to peripelvic cysts or a prominent renal pelves.  3. Apparent cholecystectomy and hysterectomy.  4. Degenerative changes within the spine  5. Multiple colonic diverticula  6. Stable pulmonary nodule     [facility] dated 05/14/2019/Dr. Guzman:  Impression:   - Normal oropharynx.                        - Normal esophagus.                        - Z-line regular, 35-36 cm from the incisors.                        - Patulous lower esophageal sphincter.                        - Non-erosive esophageal reflux (NERD) disease(?).                        - Evidence of previous surgery was found in the                        fundus near the cardia, characterized by healthy                        appearing mucosa.                        - Minimal Gastritis. Biopsied.                        - Minimal antritis. Biopsied.                        - Normal stomach otherwise.                        - Normal pylorus.                        - Normal examined duodenum. Biopsied.                        - Normal major papilla.     IMPRESSION:     Completely resected gastrointestinal stromal tumor -- no evidence of disease.    PLAN:   CT Abdomen/Pelvis with contrast ASAP - post results  Return in one year with interval CBC, CMP with CT Abd/Pelvis with prior.      Assessment/plan reviewed and approved by Dr. Fortune.    Route Chart for Scheduling    Med Onc Chart Routing      Follow up with physician    Follow up with [facility] 1 year. f/u in 1 year with CBC, CMP, CT Abd/Pelvis with contrast  prior   Infusion scheduling note    Injection scheduling note    Labs    Imaging   CT Abd/Pelvis with contrast within next 2 weeks - post results   Pharmacy appointment    Other referrals

## 2022-10-13 ENCOUNTER — OFFICE VISIT (OUTPATIENT)
Dept: FAMILY MEDICINE | Facility: CLINIC | Age: 72
End: 2022-10-13
Payer: MEDICARE

## 2022-10-13 VITALS
HEART RATE: 72 BPM | DIASTOLIC BLOOD PRESSURE: 72 MMHG | SYSTOLIC BLOOD PRESSURE: 124 MMHG | OXYGEN SATURATION: 96 % | HEIGHT: 63 IN | WEIGHT: 156.5 LBS | BODY MASS INDEX: 27.73 KG/M2

## 2022-10-13 DIAGNOSIS — I10 ESSENTIAL HYPERTENSION: ICD-10-CM

## 2022-10-13 DIAGNOSIS — I77.1 TORTUOUS AORTA: ICD-10-CM

## 2022-10-13 DIAGNOSIS — I77.9 BILATERAL CAROTID ARTERY DISEASE, UNSPECIFIED TYPE: ICD-10-CM

## 2022-10-13 DIAGNOSIS — Z00.00 ENCOUNTER FOR PREVENTIVE HEALTH EXAMINATION: Primary | ICD-10-CM

## 2022-10-13 DIAGNOSIS — N18.2 CHRONIC KIDNEY DISEASE, STAGE II (MILD): ICD-10-CM

## 2022-10-13 DIAGNOSIS — Z85.09 HISTORY OF GASTROINTESTINAL STROMAL TUMOR (GIST): ICD-10-CM

## 2022-10-13 PROCEDURE — 3044F HG A1C LEVEL LT 7.0%: CPT | Mod: CPTII,S$GLB,, | Performed by: NURSE PRACTITIONER

## 2022-10-13 PROCEDURE — 3074F PR MOST RECENT SYSTOLIC BLOOD PRESSURE < 130 MM HG: ICD-10-PCS | Mod: CPTII,S$GLB,, | Performed by: NURSE PRACTITIONER

## 2022-10-13 PROCEDURE — 1170F FXNL STATUS ASSESSED: CPT | Mod: CPTII,S$GLB,, | Performed by: NURSE PRACTITIONER

## 2022-10-13 PROCEDURE — 3074F SYST BP LT 130 MM HG: CPT | Mod: CPTII,S$GLB,, | Performed by: NURSE PRACTITIONER

## 2022-10-13 PROCEDURE — 3078F DIAST BP <80 MM HG: CPT | Mod: CPTII,S$GLB,, | Performed by: NURSE PRACTITIONER

## 2022-10-13 PROCEDURE — 99999 PR PBB SHADOW E&M-EST. PATIENT-LVL IV: ICD-10-PCS | Mod: PBBFAC,,, | Performed by: NURSE PRACTITIONER

## 2022-10-13 PROCEDURE — 99999 PR PBB SHADOW E&M-EST. PATIENT-LVL IV: CPT | Mod: PBBFAC,,, | Performed by: NURSE PRACTITIONER

## 2022-10-13 PROCEDURE — 1160F PR REVIEW ALL MEDS BY PRESCRIBER/CLIN PHARMACIST DOCUMENTED: ICD-10-PCS | Mod: CPTII,S$GLB,, | Performed by: NURSE PRACTITIONER

## 2022-10-13 PROCEDURE — 1159F MED LIST DOCD IN RCRD: CPT | Mod: CPTII,S$GLB,, | Performed by: NURSE PRACTITIONER

## 2022-10-13 PROCEDURE — 1170F PR FUNCTIONAL STATUS ASSESSED: ICD-10-PCS | Mod: CPTII,S$GLB,, | Performed by: NURSE PRACTITIONER

## 2022-10-13 PROCEDURE — G0439 PPPS, SUBSEQ VISIT: HCPCS | Mod: S$GLB,,, | Performed by: NURSE PRACTITIONER

## 2022-10-13 PROCEDURE — 1100F PTFALLS ASSESS-DOCD GE2>/YR: CPT | Mod: CPTII,S$GLB,, | Performed by: NURSE PRACTITIONER

## 2022-10-13 PROCEDURE — 1100F PR PT FALLS ASSESS DOC 2+ FALLS/FALL W/INJURY/YR: ICD-10-PCS | Mod: CPTII,S$GLB,, | Performed by: NURSE PRACTITIONER

## 2022-10-13 PROCEDURE — 3044F PR MOST RECENT HEMOGLOBIN A1C LEVEL <7.0%: ICD-10-PCS | Mod: CPTII,S$GLB,, | Performed by: NURSE PRACTITIONER

## 2022-10-13 PROCEDURE — 90694 FLU VACCINE - QUADRIVALENT - ADJUVANTED: ICD-10-PCS | Mod: S$GLB,,, | Performed by: NURSE PRACTITIONER

## 2022-10-13 PROCEDURE — 1159F PR MEDICATION LIST DOCUMENTED IN MEDICAL RECORD: ICD-10-PCS | Mod: CPTII,S$GLB,, | Performed by: NURSE PRACTITIONER

## 2022-10-13 PROCEDURE — 3288F PR FALLS RISK ASSESSMENT DOCUMENTED: ICD-10-PCS | Mod: CPTII,S$GLB,, | Performed by: NURSE PRACTITIONER

## 2022-10-13 PROCEDURE — G0008 ADMIN INFLUENZA VIRUS VAC: HCPCS | Mod: S$GLB,,, | Performed by: NURSE PRACTITIONER

## 2022-10-13 PROCEDURE — 3288F FALL RISK ASSESSMENT DOCD: CPT | Mod: CPTII,S$GLB,, | Performed by: NURSE PRACTITIONER

## 2022-10-13 PROCEDURE — 99499 UNLISTED E&M SERVICE: CPT | Mod: S$GLB,,, | Performed by: NURSE PRACTITIONER

## 2022-10-13 PROCEDURE — 90694 VACC AIIV4 NO PRSRV 0.5ML IM: CPT | Mod: S$GLB,,, | Performed by: NURSE PRACTITIONER

## 2022-10-13 PROCEDURE — 3078F PR MOST RECENT DIASTOLIC BLOOD PRESSURE < 80 MM HG: ICD-10-PCS | Mod: CPTII,S$GLB,, | Performed by: NURSE PRACTITIONER

## 2022-10-13 PROCEDURE — 1125F PR PAIN SEVERITY QUANTIFIED, PAIN PRESENT: ICD-10-PCS | Mod: CPTII,S$GLB,, | Performed by: NURSE PRACTITIONER

## 2022-10-13 PROCEDURE — 99499 RISK ADDL DX/OHS AUDIT: ICD-10-PCS | Mod: S$GLB,,, | Performed by: NURSE PRACTITIONER

## 2022-10-13 PROCEDURE — G0439 PR MEDICARE ANNUAL WELLNESS SUBSEQUENT VISIT: ICD-10-PCS | Mod: S$GLB,,, | Performed by: NURSE PRACTITIONER

## 2022-10-13 PROCEDURE — G0008 FLU VACCINE - QUADRIVALENT - ADJUVANTED: ICD-10-PCS | Mod: S$GLB,,, | Performed by: NURSE PRACTITIONER

## 2022-10-13 PROCEDURE — 1125F AMNT PAIN NOTED PAIN PRSNT: CPT | Mod: CPTII,S$GLB,, | Performed by: NURSE PRACTITIONER

## 2022-10-13 PROCEDURE — 1160F RVW MEDS BY RX/DR IN RCRD: CPT | Mod: CPTII,S$GLB,, | Performed by: NURSE PRACTITIONER

## 2022-10-13 RX ORDER — MIRABEGRON 50 MG/1
TABLET, FILM COATED, EXTENDED RELEASE ORAL DAILY
COMMUNITY
End: 2023-02-14

## 2022-10-13 NOTE — PROGRESS NOTES
"  Lyudmila Neri presented for a  Medicare AWV and comprehensive Health Risk Assessment today. The following components were reviewed and updated:    Medical history  Family History  Social history  Allergies and Current Medications  Health Risk Assessment  Health Maintenance  Care Team     ** See Completed Assessments for Annual Wellness Visit within the encounter summary.**     The following assessments were completed:  Living Situation  CAGE  Depression Screening  Timed Get Up and Go  Whisper Test  Cognitive Function Screening      Nutrition Screening  ADL Screening  PAQ Screening    Vitals:    10/13/22 0728   BP: 124/72   BP Location: Left arm   Patient Position: Sitting   BP Method: Medium (Manual)   Pulse: 72   SpO2: 96%   Weight: 71 kg (156 lb 8.4 oz)   Height: 5' 3" (1.6 m)     Body mass index is 27.73 kg/m².  Physical Exam  Vitals reviewed.   Constitutional:       Appearance: Normal appearance.   Cardiovascular:      Rate and Rhythm: Normal rate and regular rhythm.      Pulses: Normal pulses.      Heart sounds: Normal heart sounds.   Pulmonary:      Effort: Pulmonary effort is normal. No respiratory distress.      Breath sounds: Normal breath sounds.   Skin:     General: Skin is warm and dry.   Neurological:      Mental Status: She is alert and oriented to person, place, and time.   Psychiatric:         Mood and Affect: Mood normal.         Behavior: Behavior normal.         Judgment: Judgment normal.     Diagnoses and health risks identified today and associated recommendations/orders:    1. Encounter for preventive health examination  Reviewed and discussed health maintenance.      2. Essential hypertension  Stable- continue current treatment and follow up routinely with PCP     3. Bilateral carotid artery disease, unspecified type  Stable- continue current treatment and follow up routinely with PCP     4. Chronic kidney disease, stage II (mild)  Stable- continue current treatment and follow up routinely " with PCP   Avoid NSAIDs and increase fluids    5. Tortuous aorta  Stable- continue current treatment and follow up routinely with PCP     Provided Lyudmila with a 5-10 year written screening schedule and personal prevention plan. Recommendations were developed using the USPSTF age appropriate recommendations. Education, counseling, and referrals were provided as needed. After Visit Summary printed and given to patient which includes a list of additional screenings\tests needed.    I offered to discuss advanced care planning, including how to pick a person who would make decisions for you if you were unable to make them for yourself, called a health care power of , and what kind of decisions you might make such as use of life sustaining treatments such as ventilators and tube feeding when faced with a life limiting illness recorded on a living will that they will need to know. (How you want to be cared for as you near the end of your natural life)     X Patient is interested in learning more about how to make advanced directives.  I provided them paperwork and offered to discuss this with them.    Kathy Nunez, NP

## 2022-10-13 NOTE — PATIENT INSTRUCTIONS
Counseling and Referral of Other Preventative  (Italic type indicates deductible and co-insurance are waived)    Patient Name: Lyudmila Neri  Today's Date: 10/13/2022    Health Maintenance       Date Due Completion Date    COVID-19 Vaccine (5 - Booster for Moderna series) 08/15/2022 6/20/2022    Influenza Vaccine (1) 09/01/2022 11/2/2021    DEXA Scan 08/06/2023 8/6/2020    Mammogram 09/29/2023 9/29/2022    Colorectal Cancer Screening 04/18/2026 4/18/2018    Lipid Panel 09/29/2027 9/29/2022    TETANUS VACCINE 04/16/2029 4/16/2019    Override on 9/23/2016: Declined        No orders of the defined types were placed in this encounter.    The following information is provided to all patients.  This information is to help you find resources for any of the problems found today that may be affecting your health:                Living healthy guide: www.[facility].[place].gov      Understanding Diabetes: www.diabetes.org      Eating healthy: www.cdc.gov/healthyweight      CDC home safety checklist: www.cdc.gov/steadi/patient.html      Agency on Aging: www.goea.[place].[facility]      Alcoholics anonymous (AA): www.aa.org      Physical Activity: www.eliud.nih.gov/ys0qqka      Tobacco use: www.quitwithusla.org

## 2022-10-18 ENCOUNTER — HOSPITAL ENCOUNTER (OUTPATIENT)
Dept: RADIOLOGY | Facility: HOSPITAL | Age: 72
Discharge: HOME | End: 2022-10-18
Attending: NURSE PRACTITIONER
Payer: MEDICARE

## 2022-10-18 DIAGNOSIS — Z85.09 HISTORY OF GASTROINTESTINAL STROMAL TUMOR (GIST): ICD-10-CM

## 2022-10-18 PROCEDURE — 74177 CT ABD & PELVIS W/CONTRAST: CPT | Mod: 26,,, | Performed by: RADIOLOGY

## 2022-10-18 PROCEDURE — 25500020 PHARM REV CODE 255: Mod: PO | Performed by: NURSE PRACTITIONER

## 2022-10-18 PROCEDURE — 74177 CT ABD & PELVIS W/CONTRAST: CPT | Mod: TC,PO

## 2022-10-18 PROCEDURE — 74177 CT ABDOMEN PELVIS WITH CONTRAST: ICD-10-PCS | Mod: 26,,, | Performed by: RADIOLOGY

## 2022-10-18 PROCEDURE — A9698 NON-RAD CONTRAST MATERIALNOC: HCPCS | Mod: PO | Performed by: NURSE PRACTITIONER

## 2022-10-18 RX ADMIN — IOHEXOL 75 ML: 350 INJECTION, SOLUTION INTRAVENOUS at 11:10

## 2022-10-18 RX ADMIN — BARIUM SULFATE 450 ML: 20 SUSPENSION ORAL at 11:10

## 2022-12-15 ENCOUNTER — TELEPHONE (OUTPATIENT)
Dept: PODIATRY | Facility: CLINIC | Age: 72
End: 2022-12-15
Payer: MEDICARE

## 2022-12-15 NOTE — TELEPHONE ENCOUNTER
----- Message from Patrick Montiel sent at 12/15/2022 10:55 AM CST -----  Type: Needs Medical Advice  Who Called:  Pt  Symptoms (please be specific):  feet are in pain  How long has patient had these symptoms:    Pharmacy name and phone #:      [id number] - [place] [address] AT [address] & [address] [address]  Phone: [phone] Fax: [phone]      Best Call Back Number: [phone]     Additional Information: Pt sts she knows its to early for her next injection but shes having painful issues and is about to go on vacation.  Wants to know if she can get a steroid dose pack or something to get her through.  Sts she would like a call back as soon as possible.  Please advise -- Thank you

## 2022-12-19 RX ORDER — METHYLPREDNISOLONE 4 MG/1
TABLET ORAL
Qty: 1 EACH | Refills: 0 | Status: SHIPPED | OUTPATIENT
Start: 2022-12-19 | End: 2023-01-09

## 2023-04-04 ENCOUNTER — TELEPHONE (OUTPATIENT)
Dept: PODIATRY | Facility: CLINIC | Age: 73
End: 2023-04-04
Payer: MEDICARE

## 2023-04-24 ENCOUNTER — OFFICE VISIT (OUTPATIENT)
Dept: PODIATRY | Facility: CLINIC | Age: 73
End: 2023-04-24
Payer: MEDICARE

## 2023-04-24 VITALS — BODY MASS INDEX: 27.73 KG/M2 | WEIGHT: 156.5 LBS | HEIGHT: 63 IN

## 2023-04-24 DIAGNOSIS — M19.079 ARTHRITIS, MIDFOOT: Primary | ICD-10-CM

## 2023-04-24 DIAGNOSIS — M19.079 ARTHRITIS OF METATARSOPHALANGEAL (MTP) JOINT OF GREAT TOE: ICD-10-CM

## 2023-04-24 PROCEDURE — 1125F AMNT PAIN NOTED PAIN PRSNT: CPT | Mod: HCNC,CPTII,S$GLB, | Performed by: PODIATRIST

## 2023-04-24 PROCEDURE — 3288F PR FALLS RISK ASSESSMENT DOCUMENTED: ICD-10-PCS | Mod: HCNC,CPTII,S$GLB, | Performed by: PODIATRIST

## 2023-04-24 PROCEDURE — 1159F MED LIST DOCD IN RCRD: CPT | Mod: HCNC,CPTII,S$GLB, | Performed by: PODIATRIST

## 2023-04-24 PROCEDURE — 1125F PR PAIN SEVERITY QUANTIFIED, PAIN PRESENT: ICD-10-PCS | Mod: HCNC,CPTII,S$GLB, | Performed by: PODIATRIST

## 2023-04-24 PROCEDURE — 20600 DRAIN/INJ JOINT/BURSA W/O US: CPT | Mod: 50,HCNC,S$GLB, | Performed by: PODIATRIST

## 2023-04-24 PROCEDURE — 1100F PTFALLS ASSESS-DOCD GE2>/YR: CPT | Mod: HCNC,CPTII,S$GLB, | Performed by: PODIATRIST

## 2023-04-24 PROCEDURE — 99213 PR OFFICE/OUTPT VISIT, EST, LEVL III, 20-29 MIN: ICD-10-PCS | Mod: 25,HCNC,S$GLB, | Performed by: PODIATRIST

## 2023-04-24 PROCEDURE — 99999 PR PBB SHADOW E&M-EST. PATIENT-LVL III: ICD-10-PCS | Mod: PBBFAC,HCNC,, | Performed by: PODIATRIST

## 2023-04-24 PROCEDURE — 1159F PR MEDICATION LIST DOCUMENTED IN MEDICAL RECORD: ICD-10-PCS | Mod: HCNC,CPTII,S$GLB, | Performed by: PODIATRIST

## 2023-04-24 PROCEDURE — 99213 OFFICE O/P EST LOW 20 MIN: CPT | Mod: 25,HCNC,S$GLB, | Performed by: PODIATRIST

## 2023-04-24 PROCEDURE — 1160F RVW MEDS BY RX/DR IN RCRD: CPT | Mod: HCNC,CPTII,S$GLB, | Performed by: PODIATRIST

## 2023-04-24 PROCEDURE — 1160F PR REVIEW ALL MEDS BY PRESCRIBER/CLIN PHARMACIST DOCUMENTED: ICD-10-PCS | Mod: HCNC,CPTII,S$GLB, | Performed by: PODIATRIST

## 2023-04-24 PROCEDURE — 20600 PR DRAIN/INJECT SMALL JOINT/BURSA: ICD-10-PCS | Mod: 50,HCNC,S$GLB, | Performed by: PODIATRIST

## 2023-04-24 PROCEDURE — 99999 PR PBB SHADOW E&M-EST. PATIENT-LVL III: CPT | Mod: PBBFAC,HCNC,, | Performed by: PODIATRIST

## 2023-04-24 PROCEDURE — 3288F FALL RISK ASSESSMENT DOCD: CPT | Mod: HCNC,CPTII,S$GLB, | Performed by: PODIATRIST

## 2023-04-24 PROCEDURE — 1100F PR PT FALLS ASSESS DOC 2+ FALLS/FALL W/INJURY/YR: ICD-10-PCS | Mod: HCNC,CPTII,S$GLB, | Performed by: PODIATRIST

## 2023-04-24 RX ORDER — METHYLPREDNISOLONE ACETATE 40 MG/ML
40 INJECTION, SUSPENSION INTRA-ARTICULAR; INTRALESIONAL; INTRAMUSCULAR; SOFT TISSUE
Status: COMPLETED | OUTPATIENT
Start: 2023-04-24 | End: 2023-04-24

## 2023-04-24 RX ORDER — DEXAMETHASONE SODIUM PHOSPHATE 4 MG/ML
4 INJECTION, SOLUTION INTRA-ARTICULAR; INTRALESIONAL; INTRAMUSCULAR; INTRAVENOUS; SOFT TISSUE ONCE
Status: COMPLETED | OUTPATIENT
Start: 2023-04-24 | End: 2023-04-24

## 2023-04-24 RX ADMIN — METHYLPREDNISOLONE ACETATE 40 MG: 40 INJECTION, SUSPENSION INTRA-ARTICULAR; INTRALESIONAL; INTRAMUSCULAR; SOFT TISSUE at 10:04

## 2023-04-24 RX ADMIN — DEXAMETHASONE SODIUM PHOSPHATE 4 MG: 4 INJECTION, SOLUTION INTRA-ARTICULAR; INTRALESIONAL; INTRAMUSCULAR; INTRAVENOUS; SOFT TISSUE at 10:04

## 2023-04-24 NOTE — PROGRESS NOTES
Subjective:      Patient ID: Lyudmila Neri is a 73 y.o. female.    Chief Complaint: Injections      Lyudmila is a 73 y.o. female     9/27/22  Patient returns for injections to bilateral midfoot for arthritic changes and pain, wearing good supportive shoes gets the injections a couple times a year they last several months when she gets them    4/24/23: Patient returns for follow up bilateral midfoot arthritis for injections and wants to discuss surgical correction of the right foot.     Review of Systems   Constitutional: Negative for chills and fever.   Cardiovascular:  Negative for claudication and leg swelling.   Respiratory:  Negative for shortness of breath.    Skin:  Negative for itching, nail changes and rash.   Musculoskeletal:  Positive for arthritis and joint pain. Negative for muscle cramps, muscle weakness and myalgias.   Gastrointestinal:  Negative for nausea and vomiting.   Neurological:  Negative for focal weakness, loss of balance, numbness and paresthesias.         Objective:      Physical Exam  Constitutional:       General: She is not in acute distress.     Appearance: She is well-developed. She is not diaphoretic.   Cardiovascular:      Pulses:           Dorsalis pedis pulses are 2+ on the right side and 2+ on the left side.        Posterior tibial pulses are 2+ on the right side and 2+ on the left side.      Comments: < 3 sec capillary refill time to toes 1-5 bilateral. Toes and feet are warm to touch proximally with normal distal cooling b/l. There is some hair growth on the feet and toes b/l. There is no edema b/l. No spider veins or varicosities present b/l.     Musculoskeletal:      Comments: Bilateral feet there is pain to palpation and motion at the midfoot (lisfranc joint) area and right NC joint as well. There is some discomfort to ROM at the bilateral first MTPJ more to the right.    Pain with palpation and positive paresthesias along the deep peroneal nerve bilateral    Equinus noted  b/l ankles with < 5 deg DF noted. MMT 5/5 in DF/PF/Inv/Ev resistance with no reproduction of pain in any direction. Passive range of motion of ankle and pedal joints is painless b/l.     Skin:     General: Skin is warm and dry.      Coloration: Skin is not pale.      Findings: No abrasion, bruising, burn, ecchymosis, erythema, laceration, lesion, petechiae or rash.      Nails: There is no clubbing.      Comments: Skin temperature, texture and turgor within normal limits.   Neurological:      Mental Status: She is alert and oriented to person, place, and time.      Sensory: No sensory deficit.      Motor: No tremor, atrophy or abnormal muscle tone.      Comments: Negative tinel sign bilateral.   Psychiatric:         Behavior: Behavior normal.             Assessment:       Encounter Diagnoses   Name Primary?    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    Patient will continue to wear the over the counter arch supports and wear them in shoes whenever possible.  Athletic shoes intended for walking or running are usually best.    Patient will continue to stretch the tendo achilles complex three times daily as demonstrated in the office.  Literature was dispensed illustrating proper stretching technique.    Obtained verbal consent from the patient to inject the affected joint bilateral foot. Skin prepped with alcohol and skin anesthesia achieved with ethyl chloride. Injected 20 mg depo medrol, 2 mg dexamethasone and with 1 mL 1% plain lidocaine first into the  left first tarsometatarsal joint, then the right NC joint. Patient reported relief with the injection. Patient tolerated the procedure and anesthesia well without any complications.    Can consider bracing if needed, discussed surgical medial column fusion if necessary with deep peroneal nerve release she is considering doing this next spring will get CT before doing this if she decides to proceed    Discussed midfoot fusions, will get CT and have her return to set this up    Return after CT    Agusto Edwards DPM

## 2023-04-25 ENCOUNTER — OFFICE VISIT (OUTPATIENT)
Dept: FAMILY MEDICINE | Facility: CLINIC | Age: 73
End: 2023-04-25
Payer: MEDICARE

## 2023-04-25 VITALS
DIASTOLIC BLOOD PRESSURE: 82 MMHG | HEART RATE: 63 BPM | HEIGHT: 63 IN | OXYGEN SATURATION: 99 % | SYSTOLIC BLOOD PRESSURE: 131 MMHG | WEIGHT: 155.31 LBS | BODY MASS INDEX: 27.52 KG/M2

## 2023-04-25 DIAGNOSIS — Z13.6 ENCOUNTER FOR SCREENING FOR CARDIOVASCULAR DISORDERS: ICD-10-CM

## 2023-04-25 DIAGNOSIS — R11.0 NAUSEA: ICD-10-CM

## 2023-04-25 DIAGNOSIS — R73.03 PREDIABETES: ICD-10-CM

## 2023-04-25 DIAGNOSIS — Z13.220 SCREENING FOR LIPID DISORDERS: ICD-10-CM

## 2023-04-25 DIAGNOSIS — I77.1 TORTUOUS AORTA: ICD-10-CM

## 2023-04-25 DIAGNOSIS — R10.9 RIGHT FLANK PAIN: Primary | ICD-10-CM

## 2023-04-25 DIAGNOSIS — Z85.09 HISTORY OF GASTROINTESTINAL STROMAL TUMOR (GIST): ICD-10-CM

## 2023-04-25 PROCEDURE — 3288F FALL RISK ASSESSMENT DOCD: CPT | Mod: HCNC,CPTII,S$GLB, | Performed by: INTERNAL MEDICINE

## 2023-04-25 PROCEDURE — 1160F PR REVIEW ALL MEDS BY PRESCRIBER/CLIN PHARMACIST DOCUMENTED: ICD-10-PCS | Mod: HCNC,CPTII,S$GLB, | Performed by: INTERNAL MEDICINE

## 2023-04-25 PROCEDURE — 1100F PR PT FALLS ASSESS DOC 2+ FALLS/FALL W/INJURY/YR: ICD-10-PCS | Mod: HCNC,CPTII,S$GLB, | Performed by: INTERNAL MEDICINE

## 2023-04-25 PROCEDURE — 99214 OFFICE O/P EST MOD 30 MIN: CPT | Mod: HCNC,S$GLB,, | Performed by: INTERNAL MEDICINE

## 2023-04-25 PROCEDURE — 1159F MED LIST DOCD IN RCRD: CPT | Mod: HCNC,CPTII,S$GLB, | Performed by: INTERNAL MEDICINE

## 2023-04-25 PROCEDURE — 3075F SYST BP GE 130 - 139MM HG: CPT | Mod: HCNC,CPTII,S$GLB, | Performed by: INTERNAL MEDICINE

## 2023-04-25 PROCEDURE — 1160F RVW MEDS BY RX/DR IN RCRD: CPT | Mod: HCNC,CPTII,S$GLB, | Performed by: INTERNAL MEDICINE

## 2023-04-25 PROCEDURE — 3079F PR MOST RECENT DIASTOLIC BLOOD PRESSURE 80-89 MM HG: ICD-10-PCS | Mod: HCNC,CPTII,S$GLB, | Performed by: INTERNAL MEDICINE

## 2023-04-25 PROCEDURE — 1125F AMNT PAIN NOTED PAIN PRSNT: CPT | Mod: HCNC,CPTII,S$GLB, | Performed by: INTERNAL MEDICINE

## 2023-04-25 PROCEDURE — 1125F PR PAIN SEVERITY QUANTIFIED, PAIN PRESENT: ICD-10-PCS | Mod: HCNC,CPTII,S$GLB, | Performed by: INTERNAL MEDICINE

## 2023-04-25 PROCEDURE — 3075F PR MOST RECENT SYSTOLIC BLOOD PRESS GE 130-139MM HG: ICD-10-PCS | Mod: HCNC,CPTII,S$GLB, | Performed by: INTERNAL MEDICINE

## 2023-04-25 PROCEDURE — 3079F DIAST BP 80-89 MM HG: CPT | Mod: HCNC,CPTII,S$GLB, | Performed by: INTERNAL MEDICINE

## 2023-04-25 PROCEDURE — 99999 PR PBB SHADOW E&M-EST. PATIENT-LVL IV: CPT | Mod: PBBFAC,HCNC,, | Performed by: INTERNAL MEDICINE

## 2023-04-25 PROCEDURE — 99999 PR PBB SHADOW E&M-EST. PATIENT-LVL IV: ICD-10-PCS | Mod: PBBFAC,HCNC,, | Performed by: INTERNAL MEDICINE

## 2023-04-25 PROCEDURE — 1159F PR MEDICATION LIST DOCUMENTED IN MEDICAL RECORD: ICD-10-PCS | Mod: HCNC,CPTII,S$GLB, | Performed by: INTERNAL MEDICINE

## 2023-04-25 PROCEDURE — 3288F PR FALLS RISK ASSESSMENT DOCUMENTED: ICD-10-PCS | Mod: HCNC,CPTII,S$GLB, | Performed by: INTERNAL MEDICINE

## 2023-04-25 PROCEDURE — 1100F PTFALLS ASSESS-DOCD GE2>/YR: CPT | Mod: HCNC,CPTII,S$GLB, | Performed by: INTERNAL MEDICINE

## 2023-04-25 PROCEDURE — 99214 PR OFFICE/OUTPT VISIT, EST, LEVL IV, 30-39 MIN: ICD-10-PCS | Mod: HCNC,S$GLB,, | Performed by: INTERNAL MEDICINE

## 2023-04-25 NOTE — PROGRESS NOTES
Patient ID: Lyudmila Neri is a 73 y.o. female.    Chief Complaint: Flank Pain (Right side , homar about a month or so)        HPI - Assessment - Plan      Right flank pain x 1 month. Constant. No pain with urination or hematuria. No history of kidney stones. Pain is right low back and wraps around hip. Has chronic diarrhea and constipation.   - UA and 1 view xray.     History of GIST status post resection. She is having symptoms similar to when they found it. Followed by Alekasndr Das. She is having vague nausea and burning pain. Requesting GI referral.   - recommend that she contact heme/onc to see if they recommend sooner imaging  - GI referral.     July 13th possible foot surgery with Dr. Edwards.     1. Right flank pain    2. Nausea    3. History of gastrointestinal stromal tumor (GIST)    4. Prediabetes    5. Screening for lipid disorders    6. Tortuous aorta    7. Encounter for screening for cardiovascular disorders          Review of Systems   Constitutional:  Negative for fever.   Respiratory:  Negative for shortness of breath.    Cardiovascular:  Negative for chest pain.   Gastrointestinal:  Positive for constipation, diarrhea and nausea. Negative for abdominal pain.      Objective     Vitals:    04/25/23 0947   BP: 131/82   Pulse: 63     Wt Readings from Last 3 Encounters:   04/25/23 0947 70.5 kg (155 lb 5 oz)   04/24/23 0944 71 kg (156 lb 8.4 oz)   10/13/22 0728 71 kg (156 lb 8.4 oz)      Body mass index is 27.51 kg/m².   Physical Exam     Orders     Lyudmila was seen today for flank pain.    Diagnoses and all orders for this visit:    Right flank pain  -     Urinalysis, Reflex to Urine Culture Urine, Clean Catch; Future  -     X-Ray Abdomen AP 1 View; Future    Nausea  -     Ambulatory referral/consult to Gastroenterology; Future    History of gastrointestinal stromal tumor (GIST)  -     Ambulatory referral/consult to Gastroenterology; Future    Prediabetes  -     Hemoglobin A1C; Future    Screening for  lipid disorders    Tortuous aorta  -     LIPID PANEL; Future    Encounter for screening for cardiovascular disorders  -     LIPID PANEL; Future            Hypertension Medications               amLODIPine (NORVASC) 5 MG tablet TAKE 1 TABLET(5 MG) BY MOUTH EVERY DAY    losartan-hydrochlorothiazide 100-12.5 mg (HYZAAR) 100-12.5 mg Tab Take 1 tablet by mouth once daily.           Hyperlipidemia Medications               fish oil-omega-3 fatty acids 300-1,000 mg capsule Take by mouth once daily.           Medication List with Changes/Refills   Current Medications    AMLODIPINE (NORVASC) 5 MG TABLET    TAKE 1 TABLET(5 MG) BY MOUTH EVERY DAY    ASPIRIN (ECOTRIN) 81 MG EC TABLET    Take 81 mg by mouth once daily.    CETIRIZINE (ZYRTEC) 10 MG TABLET    Take 10 mg by mouth once daily.    CHOLECALCIFEROL, VITAMIN D3, (VITAMIN D3) 25 MCG (1,000 UNIT) CAPSULE    Take 1,000 Units by mouth once daily.    CLOBETASOL (TEMOVATE) 0.05 % EXTERNAL SOLUTION    Apply topically as needed.     DICLOFENAC SODIUM (VOLTAREN) 1 % GEL    Apply 2 g topically 3 (three) times daily as needed.    DULOXETINE (CYMBALTA) 30 MG CAPSULE    Take 1 capsule (30 mg total) by mouth once daily.    FESOTERODINE (TOVIAZ) 8 MG TB24    Take 8 mg by mouth once daily.    FISH OIL-OMEGA-3 FATTY ACIDS 300-1,000 MG CAPSULE    Take by mouth once daily.    FOLIC ACID/MULTIVIT-MIN/LUTEIN (CENTRUM SILVER ORAL)    Take by mouth.    LOSARTAN-HYDROCHLOROTHIAZIDE 100-12.5 MG (HYZAAR) 100-12.5 MG TAB    Take 1 tablet by mouth once daily.    MYRBETRIQ 50 MG TB24    Take 1 tablet by mouth once daily    OMEPRAZOLE (PRILOSEC) 40 MG CAPSULE    TAKE 1 CAPSULE BY MOUTH ONCE DAILY BEFORE BREAKFAST    TOLTERODINE (DETROL LA) 2 MG CP24    Take 1 capsule (2 mg total) by mouth once daily.    VIT C/E/ZN/COPPR/LUTEIN/ZEAXAN (PRESERVISION AREDS-2 ORAL)    Take by mouth 2 (two) times a day.       I personally reviewed past medical, family and social history.    Patient Active Problem List    Diagnosis    Essential hypertension    KATHY (obstructive sleep apnea)    Psoriasis    History of gastrointestinal stromal tumor (GIST)    Family history of colon cancer in mother    Chronic kidney disease, stage II (mild)    Primary osteoarthritis involving multiple joints    Degenerative disc disease, lumbar    Lumbar radiculopathy    Carotid disease, bilateral    Epigastric pain    Lactose intolerance    Early dry stage nonexudative age-related macular degeneration of both eyes    Tortuous aorta

## 2023-05-04 ENCOUNTER — HOSPITAL ENCOUNTER (OUTPATIENT)
Dept: RADIOLOGY | Facility: HOSPITAL | Age: 73
Discharge: HOME | End: 2023-05-04
Attending: INTERNAL MEDICINE
Payer: MEDICARE

## 2023-05-04 ENCOUNTER — HOSPITAL ENCOUNTER (OUTPATIENT)
Dept: RADIOLOGY | Facility: HOSPITAL | Age: 73
Discharge: HOME | End: 2023-05-04
Attending: PODIATRIST
Payer: MEDICARE

## 2023-05-04 DIAGNOSIS — M19.079 ARTHRITIS, MIDFOOT: ICD-10-CM

## 2023-05-04 DIAGNOSIS — R10.9 RIGHT FLANK PAIN: ICD-10-CM

## 2023-05-04 PROCEDURE — 73700 CT LOWER EXTREMITY W/O DYE: CPT | Mod: 26,RT,, | Performed by: RADIOLOGY

## 2023-05-04 PROCEDURE — 74018 RADEX ABDOMEN 1 VIEW: CPT | Mod: TC,FY,PO

## 2023-05-04 PROCEDURE — 73700 CT LOWER EXTREMITY W/O DYE: CPT | Mod: TC,PO,RT

## 2023-05-04 PROCEDURE — 73700 CT FOOT WITHOUT CONTRAST RIGHT: ICD-10-PCS | Mod: 26,RT,, | Performed by: RADIOLOGY

## 2023-05-04 PROCEDURE — 74018 RADEX ABDOMEN 1 VIEW: CPT | Mod: 26,,, | Performed by: RADIOLOGY

## 2023-05-04 PROCEDURE — 74018 XR ABDOMEN AP 1 VIEW: ICD-10-PCS | Mod: 26,,, | Performed by: RADIOLOGY

## 2023-05-05 ENCOUNTER — TELEPHONE (OUTPATIENT)
Dept: FAMILY MEDICINE | Facility: CLINIC | Age: 73
End: 2023-05-05
Payer: MEDICARE

## 2023-05-05 NOTE — TELEPHONE ENCOUNTER
----- Message from [place] Tucker, DO sent at 5/4/2023  9:08 PM CDT -----  X-ray does not show any sign of kidney stone. Are you still having pain?

## 2023-05-10 ENCOUNTER — OFFICE VISIT (OUTPATIENT)
Dept: PODIATRY | Facility: CLINIC | Age: 73
End: 2023-05-10
Payer: MEDICARE

## 2023-05-10 DIAGNOSIS — M19.079 ARTHRITIS, MIDFOOT: Primary | ICD-10-CM

## 2023-05-10 DIAGNOSIS — G57.30 DEEP PERONEAL NEUROPATHY, UNSPECIFIED LATERALITY: ICD-10-CM

## 2023-05-10 DIAGNOSIS — M62.461 GASTROCNEMIUS EQUINUS OF RIGHT LOWER EXTREMITY: ICD-10-CM

## 2023-05-10 DIAGNOSIS — M19.079 ARTHRITIS OF METATARSOPHALANGEAL (MTP) JOINT OF GREAT TOE: ICD-10-CM

## 2023-05-10 PROCEDURE — 1160F PR REVIEW ALL MEDS BY PRESCRIBER/CLIN PHARMACIST DOCUMENTED: ICD-10-PCS | Mod: CPTII,S$GLB,, | Performed by: PODIATRIST

## 2023-05-10 PROCEDURE — 1125F AMNT PAIN NOTED PAIN PRSNT: CPT | Mod: CPTII,S$GLB,, | Performed by: PODIATRIST

## 2023-05-10 PROCEDURE — 99024 POSTOP FOLLOW-UP VISIT: CPT | Mod: S$GLB,,, | Performed by: PODIATRIST

## 2023-05-10 PROCEDURE — 3288F FALL RISK ASSESSMENT DOCD: CPT | Mod: CPTII,S$GLB,, | Performed by: PODIATRIST

## 2023-05-10 PROCEDURE — 99024 PR POST-OP FOLLOW-UP VISIT: ICD-10-PCS | Mod: S$GLB,,, | Performed by: PODIATRIST

## 2023-05-10 PROCEDURE — 1159F MED LIST DOCD IN RCRD: CPT | Mod: CPTII,S$GLB,, | Performed by: PODIATRIST

## 2023-05-10 PROCEDURE — 99999 PR PBB SHADOW E&M-EST. PATIENT-LVL III: ICD-10-PCS | Mod: PBBFAC,,, | Performed by: PODIATRIST

## 2023-05-10 PROCEDURE — 99999 PR PBB SHADOW E&M-EST. PATIENT-LVL III: CPT | Mod: PBBFAC,,, | Performed by: PODIATRIST

## 2023-05-10 PROCEDURE — 1160F RVW MEDS BY RX/DR IN RCRD: CPT | Mod: CPTII,S$GLB,, | Performed by: PODIATRIST

## 2023-05-10 PROCEDURE — 1101F PT FALLS ASSESS-DOCD LE1/YR: CPT | Mod: CPTII,S$GLB,, | Performed by: PODIATRIST

## 2023-05-10 PROCEDURE — 1125F PR PAIN SEVERITY QUANTIFIED, PAIN PRESENT: ICD-10-PCS | Mod: CPTII,S$GLB,, | Performed by: PODIATRIST

## 2023-05-10 PROCEDURE — 1159F PR MEDICATION LIST DOCUMENTED IN MEDICAL RECORD: ICD-10-PCS | Mod: CPTII,S$GLB,, | Performed by: PODIATRIST

## 2023-05-10 PROCEDURE — 3288F PR FALLS RISK ASSESSMENT DOCUMENTED: ICD-10-PCS | Mod: CPTII,S$GLB,, | Performed by: PODIATRIST

## 2023-05-10 PROCEDURE — 1101F PR PT FALLS ASSESS DOC 0-1 FALLS W/OUT INJ PAST YR: ICD-10-PCS | Mod: CPTII,S$GLB,, | Performed by: PODIATRIST

## 2023-05-10 NOTE — PROGRESS NOTES
Subjective:      Patient ID: Lyudmila Neri is a 73 y.o. female.    Chief Complaint: Follow-up (After ct scan)      Lyudmila is a 73 y.o. female     9/27/22  Patient returns for injections to bilateral midfoot for arthritic changes and pain, wearing good supportive shoes gets the injections a couple times a year they last several months when she gets them    4/24/23: Patient returns for follow up bilateral midfoot arthritis for injections and wants to discuss surgical correction of the right foot.     5/10/23: Patient returns for follow up right foot CT to discuss treatment options moving forward    Review of Systems   Constitutional: Negative for chills and fever.   Cardiovascular:  Negative for claudication and leg swelling.   Respiratory:  Negative for shortness of breath.    Skin:  Negative for itching, nail changes and rash.   Musculoskeletal:  Positive for arthritis and joint pain. Negative for muscle cramps, muscle weakness and myalgias.   Gastrointestinal:  Negative for nausea and vomiting.   Neurological:  Negative for focal weakness, loss of balance, numbness and paresthesias.         Objective:      Physical Exam  Constitutional:       General: She is not in acute distress.     Appearance: She is well-developed. She is not diaphoretic.   Cardiovascular:      Pulses:           Dorsalis pedis pulses are 2+ on the right side and 2+ on the left side.        Posterior tibial pulses are 2+ on the right side and 2+ on the left side.      Comments: < 3 sec capillary refill time to toes 1-5 bilateral. Toes and feet are warm to touch proximally with normal distal cooling b/l. There is some hair growth on the feet and toes b/l. There is no edema b/l. No spider veins or varicosities present b/l.     Musculoskeletal:      Comments: Bilateral feet there is pain to palpation and motion at the midfoot (lisfranc joint) area and right NC joint as well. There is some discomfort to ROM at the bilateral first MTPJ more to  the right.    Pain with palpation and positive paresthesias along the deep peroneal nerve bilateral    Equinus noted b/l ankles with < 5 deg DF noted. MMT 5/5 in DF/PF/Inv/Ev resistance with no reproduction of pain in any direction. Passive range of motion of ankle and pedal joints is painless b/l.     Skin:     General: Skin is warm and dry.      Coloration: Skin is not pale.      Findings: No abrasion, bruising, burn, ecchymosis, erythema, laceration, lesion, petechiae or rash.      Nails: There is no clubbing.      Comments: Skin temperature, texture and turgor within normal limits.   Neurological:      Mental Status: She is alert and oriented to person, place, and time.      Sensory: No sensory deficit.      Motor: No tremor, atrophy or abnormal muscle tone.      Comments: Negative tinel sign bilateral.   Psychiatric:         Behavior: Behavior normal.             Assessment:       Encounter Diagnoses   Name Primary?    Arthritis, midfoot Yes    Arthritis of metatarsophalangeal (MTP) joint of great toe     Deep peroneal neuropathy, unspecified laterality     Gastrocnemius equinus of right lower extremity            Plan:       Lyudmila was seen today for follow-up.    Diagnoses and all orders for this visit:    Arthritis, midfoot  -     Case Request Operating Room: FUSION, FOOT, RECESSION, GASTROCNEMIUS, ENDOSCOPIC, CHEILECTOMY    Arthritis of metatarsophalangeal (MTP) joint of great toe  -     Case Request Operating Room: FUSION, FOOT, RECESSION, GASTROCNEMIUS, ENDOSCOPIC, CHEILECTOMY    Deep peroneal neuropathy, unspecified laterality    Gastrocnemius equinus of right lower extremity  -     Case Request Operating Room: FUSION, FOOT, RECESSION, GASTROCNEMIUS, ENDOSCOPIC, CHEILECTOMY        I counseled the patient on her conditions, their implications and medical management.    Patient will continue to wear the over the counter arch supports and wear them in shoes whenever possible.  Athletic shoes intended for  walking or running are usually best.    Patient will continue to stretch the tendo achilles complex three times daily as demonstrated in the office.  Literature was dispensed illustrating proper stretching technique.    Discussed midfoot fusions, CT shows degenerative changes advanced throughout the 1-3 TMTJ and the NC joint will plan on fusing all of these joints with a gastroc recession. Surgery scheduled for 7/13/23    Discussed post op course of 6 weeks non weight bearing, 4 more weeks in a tall boot then transition to normal shoe wear and slowly increase activity after that    Referred to PCP for medical optimization and risk stratification    Return 1 week post op    Agusto Edwards DPM

## 2023-05-18 RX ORDER — AMLODIPINE BESYLATE 5 MG/1
TABLET ORAL
Qty: 90 TABLET | Refills: 3 | Status: SHIPPED | OUTPATIENT
Start: 2023-05-18

## 2023-05-18 NOTE — TELEPHONE ENCOUNTER
No care due was identified.  [facility] Embedded Care Due Messages. Reference number: [id number].   5/18/2023 9:10:11 AM CDT

## 2023-05-18 NOTE — TELEPHONE ENCOUNTER
Refill Routing Note   Medication(s) are not appropriate for processing by [facility] for the following reason(s):      No active prescription written by PCP    [facility] action(s):  Defer None identified          Appointments  past 12m or future 3m with PCP    Date Provider   Last Visit   4/25/2023 North Tucker, DO   Next Visit   6/15/2023 North Tucker,    ED visits in past 90 days: 0        Note composed:10:30 AM 05/18/2023

## 2023-06-12 ENCOUNTER — OFFICE VISIT (OUTPATIENT)
Dept: GASTROENTEROLOGY | Facility: CLINIC | Age: 73
End: 2023-06-12
Payer: MEDICARE

## 2023-06-12 VITALS — BODY MASS INDEX: 27.34 KG/M2 | WEIGHT: 154.31 LBS | HEIGHT: 63 IN

## 2023-06-12 DIAGNOSIS — R10.817 GENERALIZED ABDOMINAL TENDERNESS WITHOUT REBOUND TENDERNESS: ICD-10-CM

## 2023-06-12 DIAGNOSIS — Z90.49 S/P CHOLECYSTECTOMY: ICD-10-CM

## 2023-06-12 DIAGNOSIS — Z87.19 HISTORY OF GASTRITIS: ICD-10-CM

## 2023-06-12 DIAGNOSIS — R19.8 ALTERNATING CONSTIPATION AND DIARRHEA: ICD-10-CM

## 2023-06-12 DIAGNOSIS — R11.0 NAUSEA: ICD-10-CM

## 2023-06-12 DIAGNOSIS — R19.8 IRREGULAR BOWEL HABITS: ICD-10-CM

## 2023-06-12 DIAGNOSIS — Z85.09 HISTORY OF GASTROINTESTINAL STROMAL TUMOR (GIST): ICD-10-CM

## 2023-06-12 DIAGNOSIS — Z12.11 SCREENING FOR COLON CANCER: ICD-10-CM

## 2023-06-12 DIAGNOSIS — R10.13 EPIGASTRIC DISCOMFORT: Primary | ICD-10-CM

## 2023-06-12 PROCEDURE — 3288F FALL RISK ASSESSMENT DOCD: CPT | Mod: CPTII,S$GLB,, | Performed by: NURSE PRACTITIONER

## 2023-06-12 PROCEDURE — 1160F RVW MEDS BY RX/DR IN RCRD: CPT | Mod: CPTII,S$GLB,, | Performed by: NURSE PRACTITIONER

## 2023-06-12 PROCEDURE — 1159F PR MEDICATION LIST DOCUMENTED IN MEDICAL RECORD: ICD-10-PCS | Mod: CPTII,S$GLB,, | Performed by: NURSE PRACTITIONER

## 2023-06-12 PROCEDURE — 99999 PR PBB SHADOW E&M-EST. PATIENT-LVL III: CPT | Mod: PBBFAC,,, | Performed by: NURSE PRACTITIONER

## 2023-06-12 PROCEDURE — 1100F PTFALLS ASSESS-DOCD GE2>/YR: CPT | Mod: CPTII,S$GLB,, | Performed by: NURSE PRACTITIONER

## 2023-06-12 PROCEDURE — 1159F MED LIST DOCD IN RCRD: CPT | Mod: CPTII,S$GLB,, | Performed by: NURSE PRACTITIONER

## 2023-06-12 PROCEDURE — 99214 OFFICE O/P EST MOD 30 MIN: CPT | Mod: S$GLB,,, | Performed by: NURSE PRACTITIONER

## 2023-06-12 PROCEDURE — 1160F PR REVIEW ALL MEDS BY PRESCRIBER/CLIN PHARMACIST DOCUMENTED: ICD-10-PCS | Mod: CPTII,S$GLB,, | Performed by: NURSE PRACTITIONER

## 2023-06-12 PROCEDURE — 3288F PR FALLS RISK ASSESSMENT DOCUMENTED: ICD-10-PCS | Mod: CPTII,S$GLB,, | Performed by: NURSE PRACTITIONER

## 2023-06-12 PROCEDURE — 1100F PR PT FALLS ASSESS DOC 2+ FALLS/FALL W/INJURY/YR: ICD-10-PCS | Mod: CPTII,S$GLB,, | Performed by: NURSE PRACTITIONER

## 2023-06-12 PROCEDURE — 99999 PR PBB SHADOW E&M-EST. PATIENT-LVL III: ICD-10-PCS | Mod: PBBFAC,,, | Performed by: NURSE PRACTITIONER

## 2023-06-12 PROCEDURE — 99214 PR OFFICE/OUTPT VISIT, EST, LEVL IV, 30-39 MIN: ICD-10-PCS | Mod: S$GLB,,, | Performed by: NURSE PRACTITIONER

## 2023-06-12 RX ORDER — LEVOTHYROXINE SODIUM 50 UG/1
50 TABLET ORAL
COMMUNITY
Start: 2023-05-11

## 2023-06-12 RX ORDER — LEVOTHYROXINE, LIOTHYRONINE 19; 4.5 UG/1; UG/1
30 TABLET ORAL
COMMUNITY
Start: 2023-01-11 | End: 2023-07-11

## 2023-06-12 NOTE — PROGRESS NOTES
Subjective:       Patient ID: Lyudmila Neri is a 73 y.o. female, Body mass index is 27.34 kg/m².    Chief Complaint: Abdominal Pain      Patient is new to me. Established patient of Dr. Guzman & Jailene Goss NP.  Referred by Dr. Tucker for nausea and history of GIST.     Abdominal Pain  This is a chronic problem. The current episode started more than 1 year ago. The onset quality is sudden. The problem occurs intermittently. The problem has been unchanged. The pain is located in the epigastric region. Quality: describes as discomfort. The abdominal pain does not radiate. Associated symptoms include constipation (chronic problem; alternates between constipation and diarrhea, diarrhea predominant; describes stool as type 6 on bristol scale; past meds: OTC fiber supplement daily- no improvement), diarrhea and nausea. Pertinent negatives include no anorexia, belching, dysuria, fever, flatus, frequency, hematochezia, melena, vomiting or weight loss. The pain is aggravated by palpation. The pain is relieved by Nothing. She has tried proton pump inhibitors (Currently: Omeprazole 40 mg once daily) for the symptoms. Prior diagnostic workup includes GI consult and CT scan. Her past medical history is significant for abdominal surgery (Hx of gastrectomy for GIST, hysterectomy and appendectomy), gallstones (Hx of cholecystectomy) and GERD (Hx of gastritis; denies heartburn/dyspepsia). There is no history of colon cancer, Crohn's disease, irritable bowel syndrome, pancreatitis, PUD or ulcerative colitis.   Review of Systems   Constitutional:  Negative for appetite change, fever, unexpected weight change and weight loss.   HENT:  Negative for trouble swallowing.    Respiratory:  Negative for cough and shortness of breath.    Cardiovascular:  Negative for chest pain.   Gastrointestinal:  Positive for abdominal pain, constipation (chronic problem; alternates between constipation and diarrhea, diarrhea predominant; describes  stool as type 6 on bristol scale; past meds: OTC fiber supplement daily- no improvement), diarrhea and nausea. Negative for abdominal distention, anal bleeding, anorexia, blood in stool, flatus, hematochezia, melena, rectal pain and vomiting.   Genitourinary:  Negative for difficulty urinating, dysuria and frequency.   Musculoskeletal:  Negative for gait problem.   Skin:  Negative for rash.   Neurological:  Negative for speech difficulty.   Psychiatric/Behavioral:  Negative for confusion.      Past Medical History:   Diagnosis Date    Anticoagulant long-term use     Arthritis     osteoarthritis    Blood transfusion     Cancer 2011    stromal tumor, stomach    Depression     Disorder of kidney and ureter     CKD 2    GERD (gastroesophageal reflux disease)     GIST (gastrointestinal stromal tumor), malignant     H. pylori infection     Hypertension     KATHY (obstructive sleep apnea) 6/24/2013    Psoriasis 6/24/2013    Trouble in sleeping     arthritic pain wakes her up    Urinary incontinence     stress incontinence      Past Surgical History:   Procedure Laterality Date    ANKLE FRACTURE SURGERY      RT ankle    APPENDECTOMY      BLADDER SUSPENSION  1995    CARPAL TUNNEL RELEASE      left    CERVICAL FUSION      CHOLECYSTECTOMY  12/7/2011  Dr. Cai    COLONOSCOPY  7/26/2005  Thomas    Non-erosive esophageal reflux (NERD) disease?.  Post-surgical deformity in the gastric body.   Gastric mucosal abnormality (erythema) in the greater  curve of antrum.  STEVIE Test performed on 02/24/12 was Negative.    COLONOSCOPY  12/12/2008  Thomas    Small internal hemorrhoids.  Slightly redundant left colon.    COLONOSCOPY N/A 4/18/2018    Procedure: COLONOSCOPY;  Surgeon: David Guzman Jr., MD;  Location: [facility];  Service: Endoscopy;  Laterality: N/A;    COLONOSCOPY  04/18/2018    Dr. Guzman; hemorrhoids, otherwise unremarkable, repeat 5 years for surveillance due to family history of colon cancer    CYST REMOVAL Left 2016    left  Dr. Elizabeth gann    ESOPHAGOGASTRODUODENOSCOPY N/A 5/14/2019    Procedure: EGD (ESOPHAGOGASTRODUODENOSCOPY);  Surgeon: David Guzman Jr., MD;  Location: [facility] ENDO;  Service: Endoscopy;  Laterality: N/A;    EYE SURGERY Bilateral 1995    RK surgery    EYE SURGERY Bilateral 2015    cataract removal    GASTRECTOMY      HYSTERECTOMY      KNEE ARTHROSCOPY W/ DEBRIDEMENT      lt knee    SHOULDER OPEN ROTATOR CUFF REPAIR      left    STOMACH SURGERY  12/7/2011  Dr. Cai    removal of GIST tumor from wall of the stomach, 2.3 cm in size.    TRANSFORAMINAL EPIDURAL INJECTION OF STEROID Bilateral 4/9/2019    Procedure: Injection,steroid,epidural,transforaminal approach L4/5;  Surgeon: Pa Pruett MD;  Location: [facility] OR;  Service: Pain Management;  Laterality: Bilateral;    TRANSFORAMINAL EPIDURAL INJECTION OF STEROID Bilateral 6/5/2019    Procedure: Injection,steroid,epidural,transforaminal approach L4/5;  Surgeon: Pa Pruett MD;  Location: [facility] OR;  Service: Pain Management;  Laterality: Bilateral;    UPPER GASTROINTESTINAL ENDOSCOPY  2/24/2012  Thomas    Non-erosive esophageal reflux (NERD) disease?.  Post-surgical deformity in the gastric body.   Gastric mucosal abnormality (erythema) in the greater  curve of antrum.  STEVIE Test performed on 02/24/12 was Negative.    UPPER GASTROINTESTINAL ENDOSCOPY  04/18/2018    Dr. Guzman, antritis, evidence of prior surgery with healthy mucosa      Family History   Problem Relation Age of Onset    Heart disease Mother     Arthritis Mother         osteoarthritis    COPD Mother     Hyperlipidemia Mother     Hypertension Mother     Kidney disease Mother     Stroke Mother     Colon cancer Mother 75    Cancer Mother 70        colon cancer    Cancer Father         brain and lung    Arthritis Father     COPD Sister     Depression Daughter     Arthritis Maternal Aunt         rheumatoid arthritis    Heart disease Maternal Uncle     Early death Maternal Uncle          in 50s due to heart disease    Arthritis Paternal Aunt         rheuamtoid arthritis    Heart disease Maternal Grandfather     Arthritis Paternal Grandmother         rheumatoid arthritis    Diabetes Paternal Grandmother     Diabetes Cousin     Heart disease Cousin     Crohn's disease Neg Hx     Stomach cancer Neg Hx     Ulcerative colitis Neg Hx     Esophageal cancer Neg Hx       Wt Readings from Last 10 Encounters:   06/12/23 70 kg (154 lb 5.2 oz)   04/25/23 70.5 kg (155 lb 5 oz)   04/24/23 71 kg (156 lb 8.4 oz)   10/13/22 71 kg (156 lb 8.4 oz)   10/04/22 70.5 kg (155 lb 6.8 oz)   06/08/22 70.5 kg (155 lb 6.8 oz)   05/24/22 71.1 kg (156 lb 12 oz)   05/16/22 71.1 kg (156 lb 12 oz)   05/10/22 72.8 kg (160 lb 7.9 oz)   05/06/22 71.6 kg (157 lb 13.6 oz)     Lab Results   Component Value Date    WBC 12.57 09/29/2022    HGB 13.8 09/29/2022    HCT 41.6 09/29/2022    MCV 91 09/29/2022     09/29/2022     CMP  Sodium   Date Value Ref Range Status   09/29/2022 141 136 - 145 mmol/L Final     Potassium   Date Value Ref Range Status   09/29/2022 5.0 3.5 - 5.1 mmol/L Final     Chloride   Date Value Ref Range Status   09/29/2022 106 95 - 110 mmol/L Final     CO2   Date Value Ref Range Status   09/29/2022 27 23 - 29 mmol/L Final     Glucose   Date Value Ref Range Status   09/29/2022 92 70 - 110 mg/dL Final     BUN   Date Value Ref Range Status   09/29/2022 25 (H) 8 - 23 mg/dL Final     Creatinine   Date Value Ref Range Status   09/29/2022 1.0 0.5 - 1.4 mg/dL Final     Calcium   Date Value Ref Range Status   09/29/2022 9.4 8.7 - 10.5 mg/dL Final     Total Protein   Date Value Ref Range Status   09/29/2022 7.2 6.0 - 8.4 g/dL Final     Albumin   Date Value Ref Range Status   09/29/2022 3.8 3.5 - 5.2 g/dL Final     Total Bilirubin   Date Value Ref Range Status   09/29/2022 0.3 0.1 - 1.0 mg/dL Final     Comment:     For infants and newborns, interpretation of results should be based  on gestational age, weight and in agreement  with clinical  observations.    Premature Infant recommended reference ranges:  Up to 24 hours.............<8.0 mg/dL  Up to 48 hours............<12.0 mg/dL  3-5 days..................<15.0 mg/dL  6-29 days.................<15.0 mg/dL       Alkaline Phosphatase   Date Value Ref Range Status   09/29/2022 49 (L) 55 - 135 U/L Final     AST   Date Value Ref Range Status   09/29/2022 13 10 - 40 U/L Final     ALT   Date Value Ref Range Status   09/29/2022 12 10 - 44 U/L Final     Anion Gap   Date Value Ref Range Status   09/29/2022 8 8 - 16 mmol/L Final     eGFR if    Date Value Ref Range Status   10/05/2021 >60 >60 mL/min/1.73 m^2 Final     eGFR if non    Date Value Ref Range Status   10/05/2021 56 (A) >60 mL/min/1.73 m^2 Final     Comment:     Calculation used to obtain the estimated glomerular filtration  rate (eGFR) is the CKD-EPI equation.               Reviewed prior medical records including radiology report of CT of abdomen and pelvis 10/18/22 & endoscopy history (see surgical history).     Objective:      Physical Exam  Constitutional:       General: She is not in acute distress.     Appearance: She is well-developed.   HENT:      Head: Normocephalic.      Right Ear: Hearing normal.      Left Ear: Hearing normal.      Nose: Nose normal.      Mouth/Throat:      Mouth: No oral lesions.      Pharynx: Uvula midline.   Eyes:      General: Lids are normal.      Conjunctiva/sclera: Conjunctivae normal.      Pupils: Pupils are equal, round, and reactive to light.   Neck:      Trachea: Trachea normal.   Cardiovascular:      Rate and Rhythm: Normal rate and regular rhythm.      Heart sounds: Normal heart sounds. No murmur heard.  Pulmonary:      Effort: Pulmonary effort is normal. No respiratory distress.      Breath sounds: Normal breath sounds. No stridor. No wheezing.   Abdominal:      General: Bowel sounds are normal. There is no distension.      Palpations: Abdomen is soft. There is no  mass.      Tenderness: There is generalized abdominal tenderness. There is no guarding or rebound.   Musculoskeletal:         General: Normal range of motion.      Cervical back: Normal range of motion.   Skin:     General: Skin is warm and dry.      Findings: No rash.      Comments: Non jaundiced   Neurological:      Mental Status: She is alert and oriented to person, place, and time.   Psychiatric:         Speech: Speech normal.         Behavior: Behavior normal. Behavior is cooperative.         Assessment:       1. Epigastric discomfort    2. Generalized abdominal tenderness without rebound tenderness    3. History of gastritis    4. History of gastrointestinal stromal tumor (GIST)    5. Alternating constipation and diarrhea    6. Irregular bowel habits    7. Screening for colon cancer    8. S/P cholecystectomy           Plan:   All diagnoses and orders for this visit:    Epigastric discomfort, Generalized abdominal tenderness without rebound tenderness, History of gastritis & History of gastrointestinal stromal tumor (GIST)   - Schedule EGD    - Continue Omeprazole 40 mg once daily   - Take PPI in the morning 30 minutes before breakfast  - Recommend to avoid large meals, avoid eating within 3 hours of bedtime, elevate head of bed if nocturnal symptoms are present, smoking cessation (if current smoker), & weight loss (if overweight).   - Recommend minimize/avoid high-fat foods, chocolate, caffeine, citrus, alcohol, & tomato products.  - Advised to avoid/limit use of NSAID's, since they can cause GI upset, bleeding, and/or ulcers. If needed, take with food.      Alternating constipation and diarrhea (diarrhea predominant) & Irregular bowel habits  - Stool Exam-Ova,Cysts,Parasites; Future; Expected date: 06/12/2023  - Giardia / Cryptosporidum, EIA; Future; Expected date: 06/12/2023  - Stool culture; Future; Expected date: 06/12/2023  - Occult blood x 1, stool; Future; Expected date: 06/12/2023  - WBC, Stool;  Future; Expected date: 06/12/2023  - pH, stool; Future; Expected date: 06/12/2023  - Clostridium difficile EIA; Future; Expected date: 06/12/2023  - TISSUE TRANSGLUTAMINASE (TTG), IGA; Future; Expected date: 06/12/2023  - IGA; Future; Expected date: 06/12/2023  - Recommended increase fiber in diet, especially soluble fiber since this can help bulk up the stool consistency and may help to slow down how fast the stool goes through the colon and can prevent diarrhea   - Discussed colonoscopy but patient declined at this time  - Discussed trial of Bentyl but patient would like to hold off at this time    Screening for colon cancer   - Next surveillance colonoscopy due 4/2026    S/P cholecystectomy    If no improvement in symptoms or symptoms worsen, call/follow-up at clinic or go to ER

## 2023-06-15 ENCOUNTER — OFFICE VISIT (OUTPATIENT)
Dept: FAMILY MEDICINE | Facility: CLINIC | Age: 73
End: 2023-06-15
Payer: MEDICARE

## 2023-06-15 VITALS
WEIGHT: 153.88 LBS | DIASTOLIC BLOOD PRESSURE: 72 MMHG | HEIGHT: 63 IN | SYSTOLIC BLOOD PRESSURE: 122 MMHG | TEMPERATURE: 98 F | HEART RATE: 81 BPM | OXYGEN SATURATION: 96 % | BODY MASS INDEX: 27.27 KG/M2

## 2023-06-15 DIAGNOSIS — R73.03 PREDIABETES: ICD-10-CM

## 2023-06-15 DIAGNOSIS — E03.9 HYPOTHYROIDISM, UNSPECIFIED TYPE: ICD-10-CM

## 2023-06-15 DIAGNOSIS — Z01.818 PREOP EXAMINATION: Primary | ICD-10-CM

## 2023-06-15 DIAGNOSIS — I10 ESSENTIAL HYPERTENSION: ICD-10-CM

## 2023-06-15 PROCEDURE — 1160F RVW MEDS BY RX/DR IN RCRD: CPT | Mod: CPTII,S$GLB,, | Performed by: INTERNAL MEDICINE

## 2023-06-15 PROCEDURE — 3074F PR MOST RECENT SYSTOLIC BLOOD PRESSURE < 130 MM HG: ICD-10-PCS | Mod: CPTII,S$GLB,, | Performed by: INTERNAL MEDICINE

## 2023-06-15 PROCEDURE — 3078F DIAST BP <80 MM HG: CPT | Mod: CPTII,S$GLB,, | Performed by: INTERNAL MEDICINE

## 2023-06-15 PROCEDURE — 3074F SYST BP LT 130 MM HG: CPT | Mod: CPTII,S$GLB,, | Performed by: INTERNAL MEDICINE

## 2023-06-15 PROCEDURE — 3288F PR FALLS RISK ASSESSMENT DOCUMENTED: ICD-10-PCS | Mod: CPTII,S$GLB,, | Performed by: INTERNAL MEDICINE

## 2023-06-15 PROCEDURE — 3288F FALL RISK ASSESSMENT DOCD: CPT | Mod: CPTII,S$GLB,, | Performed by: INTERNAL MEDICINE

## 2023-06-15 PROCEDURE — 99999 PR PBB SHADOW E&M-EST. PATIENT-LVL III: CPT | Mod: PBBFAC,,, | Performed by: INTERNAL MEDICINE

## 2023-06-15 PROCEDURE — 1159F MED LIST DOCD IN RCRD: CPT | Mod: CPTII,S$GLB,, | Performed by: INTERNAL MEDICINE

## 2023-06-15 PROCEDURE — 1159F PR MEDICATION LIST DOCUMENTED IN MEDICAL RECORD: ICD-10-PCS | Mod: CPTII,S$GLB,, | Performed by: INTERNAL MEDICINE

## 2023-06-15 PROCEDURE — 3078F PR MOST RECENT DIASTOLIC BLOOD PRESSURE < 80 MM HG: ICD-10-PCS | Mod: CPTII,S$GLB,, | Performed by: INTERNAL MEDICINE

## 2023-06-15 PROCEDURE — 1160F PR REVIEW ALL MEDS BY PRESCRIBER/CLIN PHARMACIST DOCUMENTED: ICD-10-PCS | Mod: CPTII,S$GLB,, | Performed by: INTERNAL MEDICINE

## 2023-06-15 PROCEDURE — 1125F AMNT PAIN NOTED PAIN PRSNT: CPT | Mod: CPTII,S$GLB,, | Performed by: INTERNAL MEDICINE

## 2023-06-15 PROCEDURE — 1100F PR PT FALLS ASSESS DOC 2+ FALLS/FALL W/INJURY/YR: ICD-10-PCS | Mod: CPTII,S$GLB,, | Performed by: INTERNAL MEDICINE

## 2023-06-15 PROCEDURE — 99999 PR PBB SHADOW E&M-EST. PATIENT-LVL III: ICD-10-PCS | Mod: PBBFAC,,, | Performed by: INTERNAL MEDICINE

## 2023-06-15 PROCEDURE — 1125F PR PAIN SEVERITY QUANTIFIED, PAIN PRESENT: ICD-10-PCS | Mod: CPTII,S$GLB,, | Performed by: INTERNAL MEDICINE

## 2023-06-15 PROCEDURE — 99214 PR OFFICE/OUTPT VISIT, EST, LEVL IV, 30-39 MIN: ICD-10-PCS | Mod: S$GLB,,, | Performed by: INTERNAL MEDICINE

## 2023-06-15 PROCEDURE — 1100F PTFALLS ASSESS-DOCD GE2>/YR: CPT | Mod: CPTII,S$GLB,, | Performed by: INTERNAL MEDICINE

## 2023-06-15 PROCEDURE — 99214 OFFICE O/P EST MOD 30 MIN: CPT | Mod: S$GLB,,, | Performed by: INTERNAL MEDICINE

## 2023-06-15 NOTE — PROGRESS NOTES
Patient ID: Lyudmila Neri is a 73 y.o. female.    Chief Complaint: surgery clearance        Assessment and Plan      1. Preop examination    2. Hypothyroidism, unspecified type    3. Essential hypertension    4. Prediabetes    She is low risk for a low risk procedure.  No contraindication to this surgery.      HPI     Preoperative examination    Procedure: Rt foot medial ? Fusion, 1st joint 2-3 joint. Mac and local.   Surgeon: Dr. Edwards.   Date: 7/13/23  EKG: NA  CBC:  ok   CMP:  ok   KATHY: yes, does not wear CPAP.   New murmur: no   NSAIDs: aleve prn, knows to stop   Anticoag: no  >/= 4 METs: yes   (One flight of stairs, Golfing without a cart, mowing with a push mower, swimming, riding a bike, jogging, gardening)  Past surgical problems: none   RCRI: 0   Procedure Risk: low    Risk of procedure Examples   High (>5%) Aortic and major vascular surgery,   Major abdominal surgery (duodenal pancreatic, liver resection, bile duct surgery, perforated bowel, total cystectomy). Esophagectomy. Pneumonectomy. Lung, liver, or pancreas transplant. Adrenal resection. Carotid-symptomatic (Carotid artery stenting).    Intermediate (1-5%) Intraperitoneal (Cholecystectomy, hiatal hernia repair, splenectomy) or intra-thoracic surgery  Carotid endarterectomy  Endovascular aneurysmal repair  Head and back surgery  Orthopedic surgery  Prostate surgery  Peripheral artery angioplasty  Renal transplant   Low (<1%) Ambulatory   breast   Dental  Reconstructive/cosmetic  Orthopedic, minor  Turp, turbt  Thyroid  gynecologic, minor  cataract  Endoscopic  Superficial       Procedure risk  RCRI Testing/ referral    low 0 No testing   low 1 (low risk) No testing   Intermediate 0 No testing   Intermediate 1 (low risk) ECG/consider referral   High risk Regardless of RCRI ECG/consider referral   Regardless of procedure 2 (high risk) ECG/consider referral       Review of Systems   Constitutional:  Negative for fever.   Respiratory:  Negative  for shortness of breath.    Cardiovascular:  Negative for chest pain.   Gastrointestinal:  Negative for abdominal pain.      Objective     Vitals:    06/15/23 0916   BP: 122/72   Pulse: 81   Temp: 98.1 °F (36.7 °C)     Wt Readings from Last 3 Encounters:   06/15/23 0916 69.8 kg (153 lb 14.1 oz)   06/12/23 1415 70 kg (154 lb 5.2 oz)   04/25/23 0947 70.5 kg (155 lb 5 oz)      Body mass index is 27.26 kg/m².   Physical Exam  Cardiovascular:      Rate and Rhythm: Normal rate and regular rhythm.      Heart sounds: No murmur heard.    No gallop.   Pulmonary:      Breath sounds: Normal breath sounds. No wheezing or rhonchi.   Abdominal:      Palpations: Abdomen is soft.      Tenderness: There is no abdominal tenderness.        Navdeep Coreas was seen today for surgery clearance.    Diagnoses and all orders for this visit:    Preop examination    Hypothyroidism, unspecified type  -     TSH; Future    Essential hypertension    Prediabetes            Hypertension Medications               amLODIPine (NORVASC) 5 MG tablet TAKE 1 TABLET(5 MG) BY MOUTH EVERY DAY    losartan-hydrochlorothiazide 100-12.5 mg (HYZAAR) 100-12.5 mg Tab Take 1 tablet by mouth once daily.           Hyperlipidemia Medications               fish oil-omega-3 fatty acids 300-1,000 mg capsule Take by mouth once daily.           Medication List with Changes/Refills   Current Medications    AMLODIPINE (NORVASC) 5 MG TABLET    TAKE 1 TABLET(5 MG) BY MOUTH EVERY DAY    ASPIRIN (ECOTRIN) 81 MG EC TABLET    Take 81 mg by mouth once daily.    CETIRIZINE (ZYRTEC) 10 MG TABLET    Take 10 mg by mouth once daily.    CHOLECALCIFEROL, VITAMIN D3, (VITAMIN D3) 25 MCG (1,000 UNIT) CAPSULE    Take 1,000 Units by mouth once daily.    CLOBETASOL (TEMOVATE) 0.05 % EXTERNAL SOLUTION    Apply topically as needed.     DICLOFENAC SODIUM (VOLTAREN) 1 % GEL    Apply 2 g topically 3 (three) times daily as needed.    DULOXETINE (CYMBALTA) 30 MG CAPSULE    Take 1 capsule (30 mg  total) by mouth once daily.    FESOTERODINE (TOVIAZ) 8 MG TB24    Take 8 mg by mouth once daily.    FISH OIL-OMEGA-3 FATTY ACIDS 300-1,000 MG CAPSULE    Take by mouth once daily.    FOLIC ACID/MULTIVIT-MIN/LUTEIN (CENTRUM SILVER ORAL)    Take by mouth.    LEVOTHYROXINE (SYNTHROID) 50 MCG TABLET    Take 50 mcg by mouth.    LOSARTAN-HYDROCHLOROTHIAZIDE 100-12.5 MG (HYZAAR) 100-12.5 MG TAB    Take 1 tablet by mouth once daily.    MYRBETRIQ 50 MG TB24    Take 1 tablet by mouth once daily    NP THYROID 30 MG TAB    Take 30 mg by mouth.    OMEPRAZOLE (PRILOSEC) 40 MG CAPSULE    TAKE 1 CAPSULE BY MOUTH ONCE DAILY BEFORE BREAKFAST    TOLTERODINE (DETROL LA) 2 MG CP24    Take 1 capsule (2 mg total) by mouth once daily.    VIT C/E/ZN/COPPR/LUTEIN/ZEAXAN (PRESERVISION AREDS-2 ORAL)    Take by mouth 2 (two) times a day.       I personally reviewed past medical, family and social history.    Patient Active Problem List   Diagnosis    Essential hypertension    KATHY (obstructive sleep apnea)    Psoriasis    History of gastrointestinal stromal tumor (GIST)    Family history of colon cancer in mother    Chronic kidney disease, stage II (mild)    Primary osteoarthritis involving multiple joints    Degenerative disc disease, lumbar    Lumbar radiculopathy    Carotid disease, bilateral    Epigastric pain    Lactose intolerance    Early dry stage nonexudative age-related macular degeneration of both eyes    Tortuous aorta

## 2023-06-30 ENCOUNTER — LAB VISIT (OUTPATIENT)
Dept: LAB | Facility: HOSPITAL | Age: 73
End: 2023-06-30
Attending: NURSE PRACTITIONER
Payer: MEDICARE

## 2023-06-30 DIAGNOSIS — R19.8 ALTERNATING CONSTIPATION AND DIARRHEA: ICD-10-CM

## 2023-06-30 PROCEDURE — 82272 OCCULT BLD FECES 1-3 TESTS: CPT | Performed by: NURSE PRACTITIONER

## 2023-06-30 PROCEDURE — 87329 GIARDIA AG IA: CPT | Performed by: NURSE PRACTITIONER

## 2023-06-30 PROCEDURE — 87046 STOOL CULTR AEROBIC BACT EA: CPT | Performed by: NURSE PRACTITIONER

## 2023-06-30 PROCEDURE — 87449 NOS EACH ORGANISM AG IA: CPT | Performed by: NURSE PRACTITIONER

## 2023-06-30 PROCEDURE — 87427 SHIGA-LIKE TOXIN AG IA: CPT | Performed by: NURSE PRACTITIONER

## 2023-06-30 PROCEDURE — 87045 FECES CULTURE AEROBIC BACT: CPT | Performed by: NURSE PRACTITIONER

## 2023-06-30 PROCEDURE — 89055 LEUKOCYTE ASSESSMENT FECAL: CPT | Performed by: NURSE PRACTITIONER

## 2023-06-30 PROCEDURE — 83986 ASSAY PH BODY FLUID NOS: CPT | Performed by: NURSE PRACTITIONER

## 2023-06-30 PROCEDURE — 87449 NOS EACH ORGANISM AG IA: CPT | Mod: 91 | Performed by: NURSE PRACTITIONER

## 2023-07-01 LAB
C DIFF GDH STL QL: NEGATIVE
C DIFF TOX A+B STL QL IA: NEGATIVE
OB PNL STL: NEGATIVE
WBC #/AREA STL HPF: NORMAL /[HPF]

## 2023-07-02 LAB
CRYPTOSP AG STL QL IA: NEGATIVE
G LAMBLIA AG STL QL IA: NEGATIVE
PH STL: 6 [PH] (ref 5–8.5)

## 2023-07-03 LAB
BACTERIA STL CULT: NORMAL
E COLI SXT1 STL QL IA: NEGATIVE
E COLI SXT2 STL QL IA: NEGATIVE

## 2023-07-12 ENCOUNTER — ANESTHESIA EVENT (OUTPATIENT)
Dept: SURGERY | Facility: HOSPITAL | Age: 73
End: 2023-07-12
Payer: MEDICARE

## 2023-07-12 LAB — O+P STL MICRO: NORMAL

## 2023-07-13 ENCOUNTER — ANESTHESIA (OUTPATIENT)
Dept: SURGERY | Facility: HOSPITAL | Age: 73
End: 2023-07-13
Payer: MEDICARE

## 2023-07-13 ENCOUNTER — HOSPITAL ENCOUNTER (OUTPATIENT)
Facility: HOSPITAL | Age: 73
Discharge: HOME | End: 2023-07-13
Attending: PODIATRIST | Admitting: PODIATRIST
Payer: MEDICARE

## 2023-07-13 ENCOUNTER — HOSPITAL ENCOUNTER (OUTPATIENT)
Dept: RADIOLOGY | Facility: HOSPITAL | Age: 73
Discharge: HOME | End: 2023-07-13
Attending: PODIATRIST | Admitting: PODIATRIST
Payer: MEDICARE

## 2023-07-13 DIAGNOSIS — M19.079 ARTHRITIS OF METATARSOPHALANGEAL (MTP) JOINT OF GREAT TOE: ICD-10-CM

## 2023-07-13 DIAGNOSIS — M62.461 GASTROCNEMIUS EQUINUS OF RIGHT LOWER EXTREMITY: ICD-10-CM

## 2023-07-13 DIAGNOSIS — M19.079 ARTHRITIS OF MIDFOOT: Primary | ICD-10-CM

## 2023-07-13 PROCEDURE — 28289 PR REPAIR HALLUX RIGIDUS; W/O IMPLANT: ICD-10-PCS | Mod: 51,T5,, | Performed by: PODIATRIST

## 2023-07-13 PROCEDURE — 28730 FUSION OF FOOT BONES: CPT | Mod: RT,,, | Performed by: PODIATRIST

## 2023-07-13 PROCEDURE — 28730 PR FUSION FOOT BONES,MIDTARSAL,MULTI: ICD-10-PCS | Mod: RT,,, | Performed by: PODIATRIST

## 2023-07-13 PROCEDURE — 27687 REVISION OF CALF TENDON: CPT | Mod: 51,RT,, | Performed by: PODIATRIST

## 2023-07-13 PROCEDURE — 37000009 HC ANESTHESIA EA ADD 15 MINS: Mod: PO | Performed by: PODIATRIST

## 2023-07-13 PROCEDURE — 36000709 HC OR TIME LEV III EA ADD 15 MIN: Mod: PO | Performed by: PODIATRIST

## 2023-07-13 PROCEDURE — 25000003 PHARM REV CODE 250: Mod: PO | Performed by: NURSE ANESTHETIST, CERTIFIED REGISTERED

## 2023-07-13 PROCEDURE — 63600175 PHARM REV CODE 636 W HCPCS: Mod: PO | Performed by: NURSE ANESTHETIST, CERTIFIED REGISTERED

## 2023-07-13 PROCEDURE — 27687 PR GASTROCNEMIUS RECESSION: ICD-10-PCS | Mod: 51,RT,, | Performed by: PODIATRIST

## 2023-07-13 PROCEDURE — 64447 NJX AA&/STRD FEMORAL NRV IMG: CPT | Mod: PO | Performed by: ANESTHESIOLOGY

## 2023-07-13 PROCEDURE — D9220A PRA ANESTHESIA: Mod: ANES,,, | Performed by: ANESTHESIOLOGY

## 2023-07-13 PROCEDURE — C1769 GUIDE WIRE: HCPCS | Mod: PO | Performed by: PODIATRIST

## 2023-07-13 PROCEDURE — 27201423 OPTIME MED/SURG SUP & DEVICES STERILE SUPPLY: Mod: PO | Performed by: PODIATRIST

## 2023-07-13 PROCEDURE — C1713 ANCHOR/SCREW BN/BN,TIS/BN: HCPCS | Mod: PO | Performed by: PODIATRIST

## 2023-07-13 PROCEDURE — D9220A PRA ANESTHESIA: ICD-10-PCS | Mod: CRNA,,, | Performed by: NURSE ANESTHETIST, CERTIFIED REGISTERED

## 2023-07-13 PROCEDURE — 63600175 PHARM REV CODE 636 W HCPCS: Mod: PO | Performed by: PODIATRIST

## 2023-07-13 PROCEDURE — 63600175 PHARM REV CODE 636 W HCPCS: Mod: PO | Performed by: ANESTHESIOLOGY

## 2023-07-13 PROCEDURE — D9220A PRA ANESTHESIA: ICD-10-PCS | Mod: ANES,,, | Performed by: ANESTHESIOLOGY

## 2023-07-13 PROCEDURE — 71000015 HC POSTOP RECOV 1ST HR: Mod: PO | Performed by: PODIATRIST

## 2023-07-13 PROCEDURE — 25000003 PHARM REV CODE 250: Mod: PO | Performed by: PODIATRIST

## 2023-07-13 PROCEDURE — D9220A PRA ANESTHESIA: Mod: CRNA,,, | Performed by: NURSE ANESTHETIST, CERTIFIED REGISTERED

## 2023-07-13 PROCEDURE — 64445 NJX AA&/STRD SCIATIC NRV IMG: CPT | Mod: PO | Performed by: ANESTHESIOLOGY

## 2023-07-13 PROCEDURE — 73630 XR FOOT COMPLETE 3 VIEW RIGHT: ICD-10-PCS | Mod: 26,RT,, | Performed by: RADIOLOGY

## 2023-07-13 PROCEDURE — 36000708 HC OR TIME LEV III 1ST 15 MIN: Mod: PO | Performed by: PODIATRIST

## 2023-07-13 PROCEDURE — 73630 X-RAY EXAM OF FOOT: CPT | Mod: 26,RT,, | Performed by: RADIOLOGY

## 2023-07-13 PROCEDURE — 71000033 HC RECOVERY, INTIAL HOUR: Mod: PO | Performed by: PODIATRIST

## 2023-07-13 PROCEDURE — 37000008 HC ANESTHESIA 1ST 15 MINUTES: Mod: PO | Performed by: PODIATRIST

## 2023-07-13 PROCEDURE — 28289 CORRJ HALUX RIGDUS W/O IMPLT: CPT | Mod: 51,T5,, | Performed by: PODIATRIST

## 2023-07-13 PROCEDURE — 73630 X-RAY EXAM OF FOOT: CPT | Mod: TC,FY,PO,RT

## 2023-07-13 DEVICE — SCREW VARIAX NON LOK 2.4X16MM: Type: IMPLANTABLE DEVICE | Site: FOOT | Status: FUNCTIONAL

## 2023-07-13 DEVICE — SCREW VARIAX LOK FT 2.7X18MM: Type: IMPLANTABLE DEVICE | Site: FOOT | Status: FUNCTIONAL

## 2023-07-13 DEVICE — SCREW BONE LOCK T10 3.5X16MM: Type: IMPLANTABLE DEVICE | Site: FOOT | Status: FUNCTIONAL

## 2023-07-13 DEVICE — SCREW BONE LOCK T8 2.7X20MM: Type: IMPLANTABLE DEVICE | Site: FOOT | Status: FUNCTIONAL

## 2023-07-13 DEVICE — IMPLANTABLE DEVICE: Type: IMPLANTABLE DEVICE | Site: FOOT | Status: FUNCTIONAL

## 2023-07-13 DEVICE — SCREW BONE LOCK T8 2.7X12MM: Type: IMPLANTABLE DEVICE | Site: FOOT | Status: FUNCTIONAL

## 2023-07-13 DEVICE — SCREW BONE LOCK T8 2.7X16MM: Type: IMPLANTABLE DEVICE | Site: FOOT | Status: FUNCTIONAL

## 2023-07-13 DEVICE — SCREW BONE LOCK T10 3.5X18MM: Type: IMPLANTABLE DEVICE | Site: FOOT | Status: FUNCTIONAL

## 2023-07-13 DEVICE — SCREW BONE LOCK T10 3.5X14MM: Type: IMPLANTABLE DEVICE | Site: FOOT | Status: FUNCTIONAL

## 2023-07-13 DEVICE — SCREW BONE LOCKING 3.5X20MM: Type: IMPLANTABLE DEVICE | Site: FOOT | Status: FUNCTIONAL

## 2023-07-13 DEVICE — SCREW VARIAX NON-LOK 2.7X14MM: Type: IMPLANTABLE DEVICE | Site: FOOT | Status: FUNCTIONAL

## 2023-07-13 DEVICE — SCREW BONE NON LOCK 3.5X18MM: Type: IMPLANTABLE DEVICE | Site: FOOT | Status: FUNCTIONAL

## 2023-07-13 RX ORDER — ONDANSETRON 2 MG/ML
4 INJECTION INTRAMUSCULAR; INTRAVENOUS ONCE AS NEEDED
Status: DISCONTINUED | OUTPATIENT
Start: 2023-07-13 | End: 2023-07-13 | Stop reason: HOSPADM

## 2023-07-13 RX ORDER — PROPOFOL 10 MG/ML
VIAL (ML) INTRAVENOUS
Status: DISCONTINUED | OUTPATIENT
Start: 2023-07-13 | End: 2023-07-13

## 2023-07-13 RX ORDER — LIDOCAINE HYDROCHLORIDE 10 MG/ML
1 INJECTION, SOLUTION EPIDURAL; INFILTRATION; INTRACAUDAL; PERINEURAL ONCE
Status: DISCONTINUED | OUTPATIENT
Start: 2023-07-13 | End: 2023-07-13 | Stop reason: HOSPADM

## 2023-07-13 RX ORDER — FENTANYL CITRATE 50 UG/ML
25 INJECTION, SOLUTION INTRAMUSCULAR; INTRAVENOUS EVERY 5 MIN PRN
Status: DISCONTINUED | OUTPATIENT
Start: 2023-07-13 | End: 2023-07-13 | Stop reason: HOSPADM

## 2023-07-13 RX ORDER — PHENYLEPHRINE HYDROCHLORIDE 10 MG/ML
INJECTION INTRAVENOUS
Status: DISCONTINUED | OUTPATIENT
Start: 2023-07-13 | End: 2023-07-13

## 2023-07-13 RX ORDER — FENTANYL CITRATE 50 UG/ML
INJECTION, SOLUTION INTRAMUSCULAR; INTRAVENOUS
Status: DISCONTINUED | OUTPATIENT
Start: 2023-07-13 | End: 2023-07-13

## 2023-07-13 RX ORDER — BUPIVACAINE HYDROCHLORIDE 5 MG/ML
INJECTION, SOLUTION EPIDURAL; INTRACAUDAL
Status: COMPLETED | OUTPATIENT
Start: 2023-07-13 | End: 2023-07-13

## 2023-07-13 RX ORDER — FENTANYL CITRATE 50 UG/ML
25-200 INJECTION, SOLUTION INTRAMUSCULAR; INTRAVENOUS
Status: DISCONTINUED | OUTPATIENT
Start: 2023-07-13 | End: 2023-07-13 | Stop reason: HOSPADM

## 2023-07-13 RX ORDER — OXYCODONE AND ACETAMINOPHEN 5; 325 MG/1; MG/1
1 TABLET ORAL EVERY 4 HOURS PRN
Qty: 20 TABLET | Refills: 0 | Status: SHIPPED | OUTPATIENT
Start: 2023-07-13 | End: 2023-10-05

## 2023-07-13 RX ORDER — MIDAZOLAM HYDROCHLORIDE 1 MG/ML
INJECTION INTRAMUSCULAR; INTRAVENOUS
Status: DISCONTINUED | OUTPATIENT
Start: 2023-07-13 | End: 2023-07-13

## 2023-07-13 RX ORDER — MIDAZOLAM HYDROCHLORIDE 1 MG/ML
.5-4 INJECTION INTRAMUSCULAR; INTRAVENOUS
Status: DISCONTINUED | OUTPATIENT
Start: 2023-07-13 | End: 2023-07-13 | Stop reason: HOSPADM

## 2023-07-13 RX ORDER — ONDANSETRON HYDROCHLORIDE 8 MG/1
8 TABLET, FILM COATED ORAL EVERY 8 HOURS PRN
Qty: 10 TABLET | Refills: 0 | Status: SHIPPED | OUTPATIENT
Start: 2023-07-13 | End: 2023-10-05

## 2023-07-13 RX ORDER — SODIUM CHLORIDE, SODIUM LACTATE, POTASSIUM CHLORIDE, CALCIUM CHLORIDE 600; 310; 30; 20 MG/100ML; MG/100ML; MG/100ML; MG/100ML
125 INJECTION, SOLUTION INTRAVENOUS CONTINUOUS
Status: DISCONTINUED | OUTPATIENT
Start: 2023-07-13 | End: 2023-07-13 | Stop reason: HOSPADM

## 2023-07-13 RX ORDER — CEFAZOLIN SODIUM 2 G/50ML
2 SOLUTION INTRAVENOUS ONCE
Status: COMPLETED | OUTPATIENT
Start: 2023-07-13 | End: 2023-07-13

## 2023-07-13 RX ORDER — EPHEDRINE SULFATE 50 MG/ML
INJECTION, SOLUTION INTRAVENOUS
Status: DISCONTINUED | OUTPATIENT
Start: 2023-07-13 | End: 2023-07-13

## 2023-07-13 RX ORDER — SODIUM CHLORIDE, SODIUM LACTATE, POTASSIUM CHLORIDE, CALCIUM CHLORIDE 600; 310; 30; 20 MG/100ML; MG/100ML; MG/100ML; MG/100ML
INJECTION, SOLUTION INTRAVENOUS CONTINUOUS
Status: DISCONTINUED | OUTPATIENT
Start: 2023-07-13 | End: 2023-07-13 | Stop reason: HOSPADM

## 2023-07-13 RX ORDER — LIDOCAINE HYDROCHLORIDE 20 MG/ML
INJECTION INTRAVENOUS
Status: DISCONTINUED | OUTPATIENT
Start: 2023-07-13 | End: 2023-07-13

## 2023-07-13 RX ORDER — ROCURONIUM BROMIDE 10 MG/ML
INJECTION, SOLUTION INTRAVENOUS
Status: DISCONTINUED | OUTPATIENT
Start: 2023-07-13 | End: 2023-07-13

## 2023-07-13 RX ORDER — SODIUM CHLORIDE 0.9 G/100ML
IRRIGANT IRRIGATION
Status: DISCONTINUED | OUTPATIENT
Start: 2023-07-13 | End: 2023-07-13 | Stop reason: HOSPADM

## 2023-07-13 RX ORDER — ONDANSETRON 2 MG/ML
INJECTION INTRAMUSCULAR; INTRAVENOUS
Status: DISCONTINUED | OUTPATIENT
Start: 2023-07-13 | End: 2023-07-13

## 2023-07-13 RX ADMIN — EPHEDRINE SULFATE 10 MG: 50 INJECTION INTRAVENOUS at 04:07

## 2023-07-13 RX ADMIN — PHENYLEPHRINE HYDROCHLORIDE 100 MCG: 10 INJECTION INTRAVENOUS at 02:07

## 2023-07-13 RX ADMIN — BUPIVACAINE HYDROCHLORIDE 20 ML: 5 INJECTION, SOLUTION EPIDURAL; INTRACAUDAL; PERINEURAL at 01:07

## 2023-07-13 RX ADMIN — SODIUM CHLORIDE, POTASSIUM CHLORIDE, SODIUM LACTATE AND CALCIUM CHLORIDE: 600; 310; 30; 20 INJECTION, SOLUTION INTRAVENOUS at 12:07

## 2023-07-13 RX ADMIN — PROPOFOL 130 MG: 10 INJECTION, EMULSION INTRAVENOUS at 01:07

## 2023-07-13 RX ADMIN — BUPIVACAINE HYDROCHLORIDE 15 ML: 5 INJECTION, SOLUTION EPIDURAL; INTRACAUDAL at 01:07

## 2023-07-13 RX ADMIN — EPHEDRINE SULFATE 10 MG: 50 INJECTION INTRAVENOUS at 03:07

## 2023-07-13 RX ADMIN — FENTANYL CITRATE 50 MCG: 50 INJECTION, SOLUTION INTRAMUSCULAR; INTRAVENOUS at 12:07

## 2023-07-13 RX ADMIN — MIDAZOLAM HYDROCHLORIDE 2 MG: 1 INJECTION, SOLUTION INTRAMUSCULAR; INTRAVENOUS at 01:07

## 2023-07-13 RX ADMIN — CEFAZOLIN SODIUM 2 G: 2 SOLUTION INTRAVENOUS at 01:07

## 2023-07-13 RX ADMIN — EPHEDRINE SULFATE 10 MG: 50 INJECTION INTRAVENOUS at 02:07

## 2023-07-13 RX ADMIN — SODIUM CHLORIDE, POTASSIUM CHLORIDE, SODIUM LACTATE AND CALCIUM CHLORIDE: 600; 310; 30; 20 INJECTION, SOLUTION INTRAVENOUS at 02:07

## 2023-07-13 RX ADMIN — LIDOCAINE HYDROCHLORIDE 75 MG: 20 INJECTION INTRAVENOUS at 01:07

## 2023-07-13 RX ADMIN — ONDANSETRON 4 MG: 2 INJECTION, SOLUTION INTRAMUSCULAR; INTRAVENOUS at 01:07

## 2023-07-13 RX ADMIN — FENTANYL CITRATE 50 MCG: 0.05 INJECTION, SOLUTION INTRAMUSCULAR; INTRAVENOUS at 01:07

## 2023-07-13 RX ADMIN — SUGAMMADEX 200 MG: 100 INJECTION, SOLUTION INTRAVENOUS at 04:07

## 2023-07-13 RX ADMIN — ROCURONIUM BROMIDE 50 MG: 10 INJECTION, SOLUTION INTRAVENOUS at 01:07

## 2023-07-13 RX ADMIN — MIDAZOLAM HYDROCHLORIDE 2 MG: 2 INJECTION, SOLUTION INTRAMUSCULAR; INTRAVENOUS at 12:07

## 2023-07-13 NOTE — ANESTHESIA PROCEDURE NOTES
Adductor ss    Patient location during procedure: pre-op   Block not for primary anesthetic.  Reason for block: at surgeon's request and post-op pain management   Post-op Pain Location: right medial foot   Start time: 7/13/2023 1:06 PM  Timeout: 7/13/2023 12:58 PM   End time: 7/13/2023 1:08 PM    Staffing  Authorizing Provider: Jonathan Johnson MD  Performing Provider: Jonathan Johnson MD    Preanesthetic Checklist  Completed: patient identified, IV checked, site marked, risks and benefits discussed, surgical consent, monitors and equipment checked, pre-op evaluation and timeout performed  Peripheral Block  Patient position: supine  Prep: ChloraPrep  Patient monitoring: heart rate, cardiac monitor, continuous pulse ox, continuous capnometry and frequent blood pressure checks  Block type: adductor canal  Laterality: right  Injection technique: single shot  Needle  Needle type: Stimuplex   Needle gauge: 21 G  Needle length: 4 in  Needle localization: anatomical landmarks and ultrasound guidance   -ultrasound image captured on disc.  Assessment  Injection assessment: negative aspiration, negative parasthesia and local visualized surrounding nerve  Paresthesia pain: none  Heart rate change: no  Slow fractionated injection: yes  Pain Tolerance: comfortable throughout block and no complaints  Medications:    Medications: bupivacaine (pf) (MARCAINE) injection 0.5% - Perineural   15 mL - 7/13/2023 1:08:00 PM    Additional Notes  VSS.  [facility] RN monitoring vitals throughout procedure.  Patient tolerated procedure well.

## 2023-07-13 NOTE — ANESTHESIA POSTPROCEDURE EVALUATION
Anesthesia Post Evaluation    Patient: Lyudmila Neri    Procedure(s) Performed: Procedure(s) (LRB):  FUSION, FOOT (Right)  RECESSION, GASTROCNEMIUS, ENDOSCOPIC (Right)  CHEILECTOMY (Right)    Final Anesthesia Type: general      Patient location during evaluation: PACU  Patient participation: Yes- Able to Participate  Level of consciousness: awake and alert and oriented  Post-procedure vital signs: reviewed and stable  Pain management: adequate  Airway patency: patent  KATHY mitigation strategies: Use of major conduction anesthesia (spinal/epidural) or peripheral nerve block, Multimodal analgesia, Extubation while patient is awake and Verification of full reversal of neuromuscular block  PONV status at discharge: No PONV  Anesthetic complications: no      Cardiovascular status: blood pressure returned to baseline  Respiratory status: unassisted, spontaneous ventilation and room air  Hydration status: euvolemic  Follow-up not needed.          Vitals Value Taken Time   /72 07/13/23 1709   Temp 36.4 07/13/23 1722   Pulse 82 07/13/23 1709   Resp 10 07/13/23 1709   SpO2 91 % 07/13/23 1709         No case tracking events are documented in the log.      Pain/Jacquelin Score: Pain Rating Prior to Med Admin: 8 (7/13/2023 12:58 PM)  Pain Rating Post Med Admin: 0 (7/13/2023  1:20 PM)  Jacquelin Score: 6 (7/13/2023  5:09 PM)

## 2023-07-13 NOTE — PROGRESS NOTES
S: Patient presents for surgical medial column fusion and first and second TMTJ fusion right foot, failed conservative therapy with arch supports and motion control shoes, injections etc. Wants to proceed with surgical intervention today.    O:    Objective      Physical Exam  Constitutional:       General: She is not in acute distress.     Appearance: She is well-developed. She is not diaphoretic.   Cardiovascular:      Pulses:           Dorsalis pedis pulses are 2+ on the right side and 2+ on the left side.        Posterior tibial pulses are 2+ on the right side and 2+ on the left side.      Comments: < 3 sec capillary refill time to toes 1-5 bilateral. Toes and feet are warm to touch proximally with normal distal cooling b/l. There is some hair growth on the feet and toes b/l. There is no edema b/l. No spider veins or varicosities present b/l.      Musculoskeletal:      Comments: Bilateral feet there is pain to palpation and motion at the midfoot (lisfranc joint) area and right NC joint as well. There is some discomfort to ROM at the bilateral first MTPJ more to the right.     Pain with palpation and positive paresthesias along the deep peroneal nerve bilateral     Equinus noted b/l ankles with < 5 deg DF noted. MMT 5/5 in DF/PF/Inv/Ev resistance with no reproduction of pain in any direction. Passive range of motion of ankle and pedal joints is painless b/l.     Skin:     General: Skin is warm and dry.      Coloration: Skin is not pale.      Findings: No abrasion, bruising, burn, ecchymosis, erythema, laceration, lesion, petechiae or rash.      Nails: There is no clubbing.      Comments: Skin temperature, texture and turgor within normal limits.   Neurological:      Mental Status: She is alert and oriented to person, place, and time.      Sensory: No sensory deficit.      Motor: No tremor, atrophy or abnormal muscle tone.      Comments: Negative tinel sign bilateral.   Psychiatric:         Behavior: Behavior  normal.                  Assessment:      Assessment            Encounter Diagnoses   Name Primary?    Arthritis, midfoot Yes    Arthritis of metatarsophalangeal (MTP) joint of great toe      Deep peroneal neuropathy, unspecified laterality      Gastrocnemius equinus of right lower extremity            P:  Plan reviewed for gastroc recession, 1-3 TMTJ and NC joint fusion and first MTPJ cheilectomy    The patient has been examined and the previous podiatry progress note has been reviewed:    I concur with the findings and no changes have occurred since my previous progress note was written.    Surgery risks, benefits and alternative options discussed and understood by patient/family.    Proceed as discussed consent reviewed and proper site confirmed and marked    Agusto Edwards DPM

## 2023-07-13 NOTE — BRIEF OP NOTE
"[facility] - Surgery  Brief Operative Note    Surgery Date: 7/13/2023     Surgeon(s) and Role:     * Agusto Edwards DPM - Primary    Assisting Surgeon: None    Pre-op Diagnosis:  Arthritis, midfoot [M19.079]  Arthritis of metatarsophalangeal (MTP) joint of great toe [M19.079]  Gastrocnemius equinus of right lower extremity [M21.861]    Post-op Diagnosis:  Post-Op Diagnosis Codes:     * Arthritis, midfoot [M19.079]     * Arthritis of metatarsophalangeal (MTP) joint of great toe [M19.079]     * Gastrocnemius equinus of right lower extremity [M21.861]    Procedure(s) (LRB):  FUSION, FOOT (Right)  RECESSION, GASTROCNEMIUS, ENDOSCOPIC (Right)  CHEILECTOMY (Right)    Anesthesia: General/Regional    Operative Findings: gastroc recession noting adequate dorsiflexion post recession, first MTPJ cheilectomy, 2-3 TMTJ and first TMT joint with NC joint fusion.    Estimated Blood Loss: 20 mL         Specimens:   Specimen (24h ago, onward)      None              Discharge Note    OUTCOME: Patient tolerated treatment/procedure well without complication and is now ready for discharge.    DISPOSITION: Home or Self Care    FINAL DIAGNOSIS:  <principal problem not specified>    FOLLOWUP: In clinic    DISCHARGE INSTRUCTIONS:    Discharge Procedure Orders   WALKER FOR HOME USE     Order Specific Question Answer Comments   Type of Walker: Adult (5'4"-6'6")    With wheels? No    Height: 5' 3" (1.6 m)    Weight: 69.4 kg (153 lb)    Length of need (1-99 months): 2    Please check all that apply: Patient's condition impairs ambulation.    Please check all that apply: Patient is unable to safely ambulate without equipment.      Diet general     Keep surgical extremity elevated     Ice to affected area   Order Comments: Ice behind the knee 15 minute intervals as needed for pain and swelling     Call MD for:  temperature >100.4     Call MD for:  persistent nausea and vomiting     Call MD for:  severe uncontrolled pain     Call MD for:  " difficulty breathing, headache or visual disturbances     Call MD for:  redness, tenderness, or signs of infection (pain, swelling, redness, odor or green/yellow discharge around incision site)     Call MD for:  hives     Call MD for:  persistent dizziness or light-headedness     Call MD for:  extreme fatigue     Leave dressing on - Keep it clean, dry, and intact until clinic visit     Weight bearing restrictions (specify)   Order Comments: Non weight bearing right foot     Agusto Edwards DPM

## 2023-07-13 NOTE — ANESTHESIA PROCEDURE NOTES
Intubation    Date/Time: 7/13/2023 1:55 PM  Performed by: Lisa Galvan CRNA  Authorized by: Jonathan Johnson MD     Intubation:     Induction:  Intravenous    Intubated:  Postinduction    Mask Ventilation:  Easy mask    Attempts:  1    Attempted By:  CRNA    Method of Intubation:  Video laryngoscopy    Blade:  [place] 3    Laryngeal View Grade: Grade I - full view of cords      Difficult Airway Encountered?: No      Complications:  None    Airway Device:  Oral endotracheal tube    Airway Device Size:  7.0    Style/Cuff Inflation:  Cuffed    Inflation Amount (mL):  3    Tube secured:  18    Secured at:  The lips    Placement Verified By:  Capnometry    Complicating Factors:  None    Findings Post-Intubation:  BS equal bilateral

## 2023-07-13 NOTE — TRANSFER OF CARE
"Anesthesia Transfer of Care Note    Patient: Lyudmila Neri    Procedure(s) Performed: Procedure(s) (LRB):  FUSION, FOOT (Right)  RECESSION, GASTROCNEMIUS, ENDOSCOPIC (Right)  CHEILECTOMY (Right)    Patient location: PACU    Anesthesia Type: general    Transport from OR: Transported from OR on room air with adequate spontaneous ventilation    Post pain: adequate analgesia    Post assessment: no apparent anesthetic complications and tolerated procedure well    Post vital signs: stable    Level of consciousness: sedated    Nausea/Vomiting: no nausea/vomiting    Complications: none    Transfer of care protocol was followed      Last vitals:   Visit Vitals  BP (!) 148/72   Pulse 82   Temp 36.5 °C (97.7 °F) (Skin)   Resp 10   Ht 5' 3" (1.6 m)   Wt 69.4 kg (153 lb)   SpO2 (!) 91%   Breastfeeding No   BMI 27.10 kg/m²     "

## 2023-07-13 NOTE — DISCHARGE INSTRUCTIONS
FOOT SURGERY  After surgery:    DO:  Keep leg elevated until first post operative visit.  Keep ice pack on foot for 20 minutes each hour while awake for next 24-48 hours.  Keep dressing clean and dry. Do not change the bandages.  Advance diet as tolerated.   Check circulation frequently in toes by pressing down on toenail. Nail should turn white and then pink WITHIN 3 SECONDS when released.  Non-weight bearing.   Use walker at all times.   Resume home medications.    DO NOT:  Do not remove your dressing.  Do not get dressing wet.  Do not drive until released by your doctor.  Do not take additional tylenol/acetaminophen while taking narcotic pain medication that contains tylenol/acetaminophen.     CALL PHYSICIAN FOR:  Burning, or numbness of the toes not relieved by elevation of the leg.  Pale or cold toes.  Blue colored nail beds.  Redness, swelling, or bleeding.  Fever greater than 101,  Drainage (pus) from the operative site.  Pain unrelieved by pain medication.    FOR EMERGENCIES CONTACT YOUR PHYSICIAN'S OFFICE  [phone]

## 2023-07-13 NOTE — ANESTHESIA PROCEDURE NOTES
Popliteal ss    Patient location during procedure: pre-op   Block not for primary anesthetic.  Reason for block: at surgeon's request and post-op pain management   Post-op Pain Location: right foot   Start time: 7/13/2023 1:00 PM  Timeout: 7/13/2023 12:58 PM   End time: 7/13/2023 1:04 PM    Staffing  Authorizing Provider: Jonathan Johnson MD  Performing Provider: Jonathan Johnson MD    Preanesthetic Checklist  Completed: patient identified, IV checked, site marked, risks and benefits discussed, surgical consent, monitors and equipment checked, pre-op evaluation and timeout performed  Peripheral Block  Patient position: supine  Prep: ChloraPrep  Patient monitoring: heart rate, cardiac monitor, continuous pulse ox, continuous capnometry and frequent blood pressure checks  Block type: popliteal  Laterality: right  Injection technique: single shot  Needle  Needle type: Stimuplex   Needle gauge: 21 G  Needle length: 4 in  Needle localization: anatomical landmarks and ultrasound guidance   -ultrasound image captured on disc.  Assessment  Injection assessment: negative aspiration, negative parasthesia and local visualized surrounding nerve  Paresthesia pain: none  Heart rate change: no  Slow fractionated injection: yes  Pain Tolerance: comfortable throughout block and no complaints  Medications:    Medications: bupivacaine (pf) (MARCAINE) injection 0.5% - Perineural   20 mL - 7/13/2023 1:04:00 PM    Additional Notes  VSS.  [facility] RN monitoring vitals throughout procedure.  Patient tolerated procedure well.

## 2023-07-13 NOTE — ANESTHESIA PREPROCEDURE EVALUATION
07/13/2023  Lyudmila Neri is a 73 y.o., female.      Pre-op Assessment    I have reviewed the NPO Status.   I have reviewed the Medications.     Review of Systems  Anesthesia Hx:  No problems with previous Anesthesia    Cardiovascular:   Exercise tolerance: good Hypertension ECG has been reviewed.    Pulmonary:   Sleep Apnea    Education provided regarding risk of obstructive sleep apnea     Renal/:   Chronic Renal Disease, CKD    Hepatic/GI:   GERD, well controlled    Musculoskeletal:   Arthritis     Neurological:   Neuromuscular Disease,    Psych:   Psychiatric History          Physical Exam  General: Well nourished    Airway:  Mallampati: II   Mouth Opening: Normal  TM Distance: Normal  Tongue: Normal  Neck ROM: Normal ROM    Dental:  Intact    Chest/Lungs:  Clear to auscultation, Normal Respiratory Rate        Anesthesia Plan  Type of Anesthesia, risks & benefits discussed:    Anesthesia Type: Gen ETT  Intra-op Monitoring Plan: Standard ASA Monitors  Post Op Pain Control Plan: multimodal analgesia, IV/PO Opioids PRN and peripheral nerve block  Induction:  IV  Airway Plan: Direct, Post-Induction  Informed Consent: Informed consent signed with the Patient and all parties understand the risks and agree with anesthesia plan.  All questions answered.   ASA Score: 3    Ready For Surgery From Anesthesia Perspective.     .

## 2023-07-14 VITALS
HEART RATE: 90 BPM | OXYGEN SATURATION: 96 % | SYSTOLIC BLOOD PRESSURE: 127 MMHG | WEIGHT: 153 LBS | TEMPERATURE: 98 F | HEIGHT: 63 IN | RESPIRATION RATE: 16 BRPM | BODY MASS INDEX: 27.11 KG/M2 | DIASTOLIC BLOOD PRESSURE: 66 MMHG

## 2023-07-14 NOTE — OP NOTE
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 Pre op popliteal and saphenous block had been done prior to entering the OR. The foot and leg was then scrubbed, prepped, and draped in the usual aseptic manner.     PROCEDURE:   Gastroc Recession  Attention was directed to the posterior aspect of the right calf where the junction between the gastrocnemius and achilles tendon was palpated. A small 1cm incision was made on the medial calf with a #15 blade. Blunt dissection was carried out through the subcutaneous and fat tissue down to the level of gastroc muscle. A Byban endoscope was introduced into the incision via trochar and a blade was used to resect the gastroc mm linearly while watching on scope until adequate dorsiflexion of the foot was noted. The area was copiously irrigated with normal saline and Deep capsular and subcutaneous tissue layer was reapproximated with 3-0 vicryl. Skin was reapproximated using 3-0 Prolene in a horizontal mattress  suture style.       Attention was directed over the second and third tarsometatarsal joint where a longitudinal incision was made overlying the third metatarsal and extending proximal to the TMTJ and about midway down the metatarsal shaft. The incision was made through dermis, then deepened via sharp and blunt dissection through subcutaneous tissue ensuring retract any major vessels and nerves. Any bleeding vessels encountered were cauterized. The incision was deepened through the joint capsule exposing the second and third TMTJ. The dorsal osteophytes over the joints were removed with a rongeur and the joints were then visualized and distracted open with a pin distractor. The joints were then removed of all remaining cartilage, noting that there was severe arthritic changes to the joints to begin with. Once the cartilage was removed with a curette and osteotome the joints were then prepped further with subchondral drilling with a 2.0 drill. Bio4 bone graft was then placed into the joints to assist in fusion and the joints, the joints were then compressed and then a Ainsley Locking plate was placed over first the third TMTJ with 2 proximal locking screws followed by a distal compression locking screw non locking noting further compression at the joint, then 2 more distal locking screws, The metatarsal was noted to be in proper position with the hardware in place noted on c-arm, the same hardware was then placed over the second TMTJ as well. The area was flushed and then closed with 3-0 vicryl to the joint capsule, 4-0 vicryl to the superficial subcutaneous tissue and 3-0 nylon to the skin.    Attention was then directed to the dorsal aspect of the 1st metatarsal of the right foot. A #15 blade scalpel was used to perform a longitudinal incision made medial  to the extensor hallucis longus. The incision encompassed the head/neck of the 1st metatarsal proximally and then was extended to the 1st MTPJ. The incision was  deepened through subcutaneous tissues utilizing both sharp and blunt dissection. Care was taken to identify and retract all major neurovascular structures. All bleeders were cauterized as needed.  With further blunt dissection, the 1st metatarsophalangeal joint capsule was identified dorsally. A #15 scalpel was used to perform a dorsal-medial longitudinal capsulotomy. All capsular and periosteal structures were dissected free from their osseous attachments with a #15 scalpel and then reflected medially and laterally for for better exposure of the 1st metatarsal head. It was noted that there was a moderately sized dorso-medial bony eminence present on the head of the 1st metatarsal with moderate cartilage damage noted to the head of the metatarsal. At this time, a sagittal bone saw was used to resect the dorsal eminence which was then removed from the operative field.  All rough edges were smoothed with the sagittal bone saw. The wound was then copiously irrigated with normal sterile saline.      Attention was then directed over the medial column where an incision was extended over the first TMTJ and NC joint. The incision was made through dermis, then deepened via sharp and blunt dissection through subcutaneous tissue ensuring retract any major vessels and nerves. Any bleeding vessels encountered were cauterized or tied off. THe incision was deepened thought the joint capsule exposing both joints. First the NC joint was opened with a pin retractor and cartilage removed, the joint was flushed and then subchondrally drilled with 2.0 drill. The same was then done at the first TMT joint. Both joints were packed with Bio4 graft. The first metatarsal was derotated and the first metatarsal head pushed into proper place and then both joints held with temporary k-wires while a medial column plate by Ainsley was placed over both joints and secured with locking and non locking screws.The position of the first ray and the plate  and screws was noted on c-arm to be in place all hardware was placed following AO technique. The k-wires were removed and the incisions flushed with saline and then closed with 3-0 vicryl to the joint capsule, 4-0 vicryl to the superficial subcutaneous tissue and 3-0 nylon to the skin.     The incisions were dressed with xeroform and covered with a sterile compressive dressing consisting of 4 X 4s and cast padding. An ACE wrap and posterior splint was applied, well padded. The patient tolerated the procedure and anesthesia well. The patient was transported to recovery with vital signs stable and vascular status intact. She is to remain non weight bearing and follow up in clinic in 1 week.     Agusto Edwards DPM

## 2023-07-19 ENCOUNTER — OFFICE VISIT (OUTPATIENT)
Dept: PODIATRY | Facility: CLINIC | Age: 73
End: 2023-07-19
Payer: MEDICARE

## 2023-07-19 ENCOUNTER — TELEPHONE (OUTPATIENT)
Dept: PODIATRY | Facility: CLINIC | Age: 73
End: 2023-07-19

## 2023-07-19 VITALS — WEIGHT: 153 LBS | HEIGHT: 63 IN | BODY MASS INDEX: 27.11 KG/M2

## 2023-07-19 DIAGNOSIS — Z98.890 POST-OPERATIVE STATE: Primary | ICD-10-CM

## 2023-07-19 PROCEDURE — 1159F MED LIST DOCD IN RCRD: CPT | Mod: CPTII,S$GLB,, | Performed by: PODIATRIST

## 2023-07-19 PROCEDURE — 1160F RVW MEDS BY RX/DR IN RCRD: CPT | Mod: CPTII,S$GLB,, | Performed by: PODIATRIST

## 2023-07-19 PROCEDURE — 99999 PR PBB SHADOW E&M-EST. PATIENT-LVL III: CPT | Mod: PBBFAC,,, | Performed by: PODIATRIST

## 2023-07-19 PROCEDURE — 1101F PT FALLS ASSESS-DOCD LE1/YR: CPT | Mod: CPTII,S$GLB,, | Performed by: PODIATRIST

## 2023-07-19 PROCEDURE — 1125F PR PAIN SEVERITY QUANTIFIED, PAIN PRESENT: ICD-10-PCS | Mod: CPTII,S$GLB,, | Performed by: PODIATRIST

## 2023-07-19 PROCEDURE — 99024 PR POST-OP FOLLOW-UP VISIT: ICD-10-PCS | Mod: S$GLB,,, | Performed by: PODIATRIST

## 2023-07-19 PROCEDURE — 99999 PR PBB SHADOW E&M-EST. PATIENT-LVL III: ICD-10-PCS | Mod: PBBFAC,,, | Performed by: PODIATRIST

## 2023-07-19 PROCEDURE — 1101F PR PT FALLS ASSESS DOC 0-1 FALLS W/OUT INJ PAST YR: ICD-10-PCS | Mod: CPTII,S$GLB,, | Performed by: PODIATRIST

## 2023-07-19 PROCEDURE — 99024 POSTOP FOLLOW-UP VISIT: CPT | Mod: S$GLB,,, | Performed by: PODIATRIST

## 2023-07-19 PROCEDURE — 1125F AMNT PAIN NOTED PAIN PRSNT: CPT | Mod: CPTII,S$GLB,, | Performed by: PODIATRIST

## 2023-07-19 PROCEDURE — 29405 APPL SHORT LEG CAST: CPT | Mod: 58,RT,S$GLB, | Performed by: PODIATRIST

## 2023-07-19 PROCEDURE — 3288F FALL RISK ASSESSMENT DOCD: CPT | Mod: CPTII,S$GLB,, | Performed by: PODIATRIST

## 2023-07-19 PROCEDURE — 1159F PR MEDICATION LIST DOCUMENTED IN MEDICAL RECORD: ICD-10-PCS | Mod: CPTII,S$GLB,, | Performed by: PODIATRIST

## 2023-07-19 PROCEDURE — 29405 PR APPLY SHORT LEG CAST: ICD-10-PCS | Mod: 58,RT,S$GLB, | Performed by: PODIATRIST

## 2023-07-19 PROCEDURE — 3288F PR FALLS RISK ASSESSMENT DOCUMENTED: ICD-10-PCS | Mod: CPTII,S$GLB,, | Performed by: PODIATRIST

## 2023-07-19 PROCEDURE — 1160F PR REVIEW ALL MEDS BY PRESCRIBER/CLIN PHARMACIST DOCUMENTED: ICD-10-PCS | Mod: CPTII,S$GLB,, | Performed by: PODIATRIST

## 2023-07-19 RX ORDER — AMOXICILLIN AND CLAVULANATE POTASSIUM 875; 125 MG/1; MG/1
1 TABLET, FILM COATED ORAL 2 TIMES DAILY
Qty: 14 TABLET | Refills: 0 | Status: SHIPPED | OUTPATIENT
Start: 2023-07-19 | End: 2023-07-26

## 2023-07-19 NOTE — PROGRESS NOTES
Subjective:      Patient ID: Lyudmila Neri is a 73 y.o. female.    Chief Complaint: Post-op Evaluation      Lyudmila is a 73 y.o. female     9/27/22  Patient returns for injections to bilateral midfoot for arthritic changes and pain, wearing good supportive shoes gets the injections a couple times a year they last several months when she gets them    4/24/23: Patient returns for follow up bilateral midfoot arthritis for injections and wants to discuss surgical correction of the right foot.     5/10/23: Patient returns for follow up right foot CT to discuss treatment options moving forward    7/19/23: Patient returns s/p 1 week midfoot fusions with gastroc recession, she is in a posterior splint non weight bearing in wheelchair    Review of Systems   Constitutional: Negative for chills and fever.   Cardiovascular:  Negative for claudication and leg swelling.   Respiratory:  Negative for shortness of breath.    Skin:  Negative for itching, nail changes and rash.   Musculoskeletal:  Positive for arthritis and joint pain. Negative for muscle cramps, muscle weakness and myalgias.   Gastrointestinal:  Negative for nausea and vomiting.   Neurological:  Negative for focal weakness, loss of balance, numbness and paresthesias.         Objective:      Physical Exam  Constitutional:       General: She is not in acute distress.     Appearance: She is well-developed. She is not diaphoretic.   Cardiovascular:      Pulses:           Dorsalis pedis pulses are 2+ on the right side and 2+ on the left side.        Posterior tibial pulses are 2+ on the right side and 2+ on the left side.      Comments: < 3 sec capillary refill time to toes 1-5 bilateral. Toes and feet are warm to touch proximally with normal distal cooling b/l. There is some hair growth on the feet and toes b/l. There is no edema b/l. No spider veins or varicosities present b/l.     Musculoskeletal:      Comments: Equinus noted b/l ankles with < 5 deg DF noted. MMT  5/5 in DF/PF/Inv/Ev resistance with no reproduction of pain in any direction. Passive range of motion of ankle and pedal joints is painless b/l.     Skin:     General: Skin is warm and dry.      Coloration: Skin is not pale.      Findings: No abrasion, bruising, burn, ecchymosis, erythema, laceration, lesion, petechiae or rash.      Nails: There is no clubbing.      Comments: Skin temperature, texture and turgor within normal limits.    Incisions dorsal right foot and medal lateral leg are well coapted with sutures intact, there is edema and mild erythema to the right foot but no dehiscence noted, minimal sanguinous drainage.   Neurological:      Mental Status: She is alert and oriented to person, place, and time.      Sensory: No sensory deficit.      Motor: No tremor, atrophy or abnormal muscle tone.      Comments: Negative tinel sign bilateral.   Psychiatric:         Behavior: Behavior normal.             Assessment:       Encounter Diagnosis   Name Primary?    Post-operative state Yes           Plan:       Lyudmila was seen today for post-op evaluation.    Diagnoses and all orders for this visit:    Post-operative state  -     WHEELCHAIR FOR HOME USE        I counseled the patient on her conditions, their implications and medical management.    Incisions dressed with xeroform and gauze    Start on 1 week of augmentin    Well padded below knee cast placed by me with foot and ankle in neutral position using 3 rolls of fiberglass cast material    Remain non weight bearing    Return in 1 week for suture removal and another cast    Agusto Edwards DPM

## 2023-07-19 NOTE — LETTER
July 19, 2023    Lyudmila Neri  [address]  [place] [address]         [facility]  [address] [place] [address]  Phone: [phone] July 19, 2023     Patient: Lyudmila Neri   YOB: 1950   Date of Visit: 7/19/2023       To Whom It May Concern:    It is my medical opinion that Lyudmila Neri may return to work on 7/23/23, she will need to remain non weight bearing on the right foot at all times .    If you have any questions or concerns, please don't hesitate to call.    Sincerely,        Agusto Edwards DPM

## 2023-07-19 NOTE — TELEPHONE ENCOUNTER
----- Message from Charisse Gunn sent at 7/19/2023  2:42 PM CDT -----  Contact: pt  Type: Needs Medical Advice  Who Called:  pt  Best Call Back Number: [phone]    Additional Information: Pt is calling the office regarding a prescription.Please call back and advise.

## 2023-07-20 ENCOUNTER — TELEPHONE (OUTPATIENT)
Dept: PODIATRY | Facility: CLINIC | Age: 73
End: 2023-07-20
Payer: MEDICARE

## 2023-07-20 NOTE — TELEPHONE ENCOUNTER
----- Message from Marine Ghotra sent at 7/19/2023  3:08 PM CDT -----  Regarding: prior auth  Contact: patient  Type: Needs Medical Advice  Who Called:  patient  Symptoms (please be specific):    How long has patient had these symptoms:    Pharmacy name and phone #:    Best Call Back Number: [phone] (home)     Additional Information: Patient states she needs prior auth sent to the DME for her wheel chair.  It also needs to be expatiated or it will take 14 days for the patient to get the wheel chair.  Please call patient to advise.  Thanks!

## 2023-07-21 ENCOUNTER — PATIENT MESSAGE (OUTPATIENT)
Dept: PODIATRY | Facility: CLINIC | Age: 73
End: 2023-07-21
Payer: MEDICARE

## 2023-07-26 ENCOUNTER — OFFICE VISIT (OUTPATIENT)
Dept: PODIATRY | Facility: CLINIC | Age: 73
End: 2023-07-26
Payer: MEDICARE

## 2023-07-26 VITALS — HEIGHT: 63 IN | WEIGHT: 153 LBS | BODY MASS INDEX: 27.11 KG/M2

## 2023-07-26 DIAGNOSIS — Z98.890 POST-OPERATIVE STATE: Primary | ICD-10-CM

## 2023-07-26 PROCEDURE — 1159F MED LIST DOCD IN RCRD: CPT | Mod: CPTII,S$GLB,, | Performed by: PODIATRIST

## 2023-07-26 PROCEDURE — 99024 PR POST-OP FOLLOW-UP VISIT: ICD-10-PCS | Mod: S$GLB,,, | Performed by: PODIATRIST

## 2023-07-26 PROCEDURE — 1125F AMNT PAIN NOTED PAIN PRSNT: CPT | Mod: CPTII,S$GLB,, | Performed by: PODIATRIST

## 2023-07-26 PROCEDURE — 3288F PR FALLS RISK ASSESSMENT DOCUMENTED: ICD-10-PCS | Mod: CPTII,S$GLB,, | Performed by: PODIATRIST

## 2023-07-26 PROCEDURE — 99999 PR PBB SHADOW E&M-EST. PATIENT-LVL III: ICD-10-PCS | Mod: PBBFAC,,, | Performed by: PODIATRIST

## 2023-07-26 PROCEDURE — 1160F PR REVIEW ALL MEDS BY PRESCRIBER/CLIN PHARMACIST DOCUMENTED: ICD-10-PCS | Mod: CPTII,S$GLB,, | Performed by: PODIATRIST

## 2023-07-26 PROCEDURE — 1101F PR PT FALLS ASSESS DOC 0-1 FALLS W/OUT INJ PAST YR: ICD-10-PCS | Mod: CPTII,S$GLB,, | Performed by: PODIATRIST

## 2023-07-26 PROCEDURE — 29405 APPL SHORT LEG CAST: CPT | Mod: 58,RT,S$GLB, | Performed by: PODIATRIST

## 2023-07-26 PROCEDURE — 99999 PR PBB SHADOW E&M-EST. PATIENT-LVL III: CPT | Mod: PBBFAC,,, | Performed by: PODIATRIST

## 2023-07-26 PROCEDURE — 1159F PR MEDICATION LIST DOCUMENTED IN MEDICAL RECORD: ICD-10-PCS | Mod: CPTII,S$GLB,, | Performed by: PODIATRIST

## 2023-07-26 PROCEDURE — 1160F RVW MEDS BY RX/DR IN RCRD: CPT | Mod: CPTII,S$GLB,, | Performed by: PODIATRIST

## 2023-07-26 PROCEDURE — 29405 PR APPLY SHORT LEG CAST: ICD-10-PCS | Mod: 58,RT,S$GLB, | Performed by: PODIATRIST

## 2023-07-26 PROCEDURE — 1101F PT FALLS ASSESS-DOCD LE1/YR: CPT | Mod: CPTII,S$GLB,, | Performed by: PODIATRIST

## 2023-07-26 PROCEDURE — 3288F FALL RISK ASSESSMENT DOCD: CPT | Mod: CPTII,S$GLB,, | Performed by: PODIATRIST

## 2023-07-26 PROCEDURE — 1125F PR PAIN SEVERITY QUANTIFIED, PAIN PRESENT: ICD-10-PCS | Mod: CPTII,S$GLB,, | Performed by: PODIATRIST

## 2023-07-26 PROCEDURE — 99024 POSTOP FOLLOW-UP VISIT: CPT | Mod: S$GLB,,, | Performed by: PODIATRIST

## 2023-07-26 NOTE — PROGRESS NOTES
Subjective:      Patient ID: Lyudmila Neri is a 73 y.o. female.    Chief Complaint: Post-op Evaluation      Lyudmila is a 73 y.o. female     9/27/22  Patient returns for injections to bilateral midfoot for arthritic changes and pain, wearing good supportive shoes gets the injections a couple times a year they last several months when she gets them    4/24/23: Patient returns for follow up bilateral midfoot arthritis for injections and wants to discuss surgical correction of the right foot.     5/10/23: Patient returns for follow up right foot CT to discuss treatment options moving forward    7/19/23: Patient returns s/p 1 week midfoot fusions with gastroc recession, she is in a posterior splint non weight bearing in wheelchair    7/26/23: Patient returns s/p 2 weeks midfoot fusion and gastroc recession right foot, non weight bearing with cast in place    Review of Systems   Constitutional: Negative for chills and fever.   Cardiovascular:  Negative for claudication and leg swelling.   Respiratory:  Negative for shortness of breath.    Skin:  Negative for itching, nail changes and rash.   Musculoskeletal:  Positive for arthritis and joint pain. Negative for muscle cramps, muscle weakness and myalgias.   Gastrointestinal:  Negative for nausea and vomiting.   Neurological:  Negative for focal weakness, loss of balance, numbness and paresthesias.         Objective:      Physical Exam  Constitutional:       General: She is not in acute distress.     Appearance: She is well-developed. She is not diaphoretic.   Cardiovascular:      Pulses:           Dorsalis pedis pulses are 2+ on the right side and 2+ on the left side.        Posterior tibial pulses are 2+ on the right side and 2+ on the left side.      Comments: < 3 sec capillary refill time to toes 1-5 bilateral. Toes and feet are warm to touch proximally with normal distal cooling b/l. There is some hair growth on the feet and toes b/l. There is no edema b/l. No  spider veins or varicosities present b/l.     Musculoskeletal:      Comments: Equinus noted b/l ankles with < 5 deg DF noted. MMT 5/5 in DF/PF/Inv/Ev resistance with no reproduction of pain in any direction. Passive range of motion of ankle and pedal joints is painless b/l.     Skin:     General: Skin is warm and dry.      Coloration: Skin is not pale.      Findings: No abrasion, bruising, burn, ecchymosis, erythema, laceration, lesion, petechiae or rash.      Nails: There is no clubbing.      Comments: Skin temperature, texture and turgor within normal limits.    Incisions dorsal right foot and medal lateral leg are well coapted with sutures intact, there is edema and surrounding bulla formation to the right foot but no dehiscence noted, minimal sanguinous drainage.   Neurological:      Mental Status: She is alert and oriented to person, place, and time.      Sensory: No sensory deficit.      Motor: No tremor, atrophy or abnormal muscle tone.      Comments: Negative tinel sign bilateral.   Psychiatric:         Behavior: Behavior normal.             Assessment:       Encounter Diagnosis   Name Primary?    Post-operative state Yes             Plan:       Lyudmila was seen today for post-op evaluation.    Diagnoses and all orders for this visit:    Post-operative state          I counseled the patient on her conditions, their implications and medical management.    Incisions dressed with tubi  compression to help with edema will remove sutures next week      Well padded below knee cast placed by me with foot and ankle in neutral position using 3 rolls of fiberglass cast material    Remain non weight bearing    Return in 1 week for suture removal and another cast    Agusto Edwards DPM

## 2023-08-02 ENCOUNTER — OFFICE VISIT (OUTPATIENT)
Dept: PODIATRY | Facility: CLINIC | Age: 73
End: 2023-08-02
Payer: MEDICARE

## 2023-08-02 VITALS — BODY MASS INDEX: 27.11 KG/M2 | WEIGHT: 153 LBS | HEIGHT: 63 IN

## 2023-08-02 DIAGNOSIS — Z98.890 POST-OPERATIVE STATE: Primary | ICD-10-CM

## 2023-08-02 PROCEDURE — 99024 PR POST-OP FOLLOW-UP VISIT: ICD-10-PCS | Mod: S$GLB,,, | Performed by: PODIATRIST

## 2023-08-02 PROCEDURE — 1160F RVW MEDS BY RX/DR IN RCRD: CPT | Mod: CPTII,S$GLB,, | Performed by: PODIATRIST

## 2023-08-02 PROCEDURE — 1126F AMNT PAIN NOTED NONE PRSNT: CPT | Mod: CPTII,S$GLB,, | Performed by: PODIATRIST

## 2023-08-02 PROCEDURE — 1126F PR PAIN SEVERITY QUANTIFIED, NO PAIN PRESENT: ICD-10-PCS | Mod: CPTII,S$GLB,, | Performed by: PODIATRIST

## 2023-08-02 PROCEDURE — 1160F PR REVIEW ALL MEDS BY PRESCRIBER/CLIN PHARMACIST DOCUMENTED: ICD-10-PCS | Mod: CPTII,S$GLB,, | Performed by: PODIATRIST

## 2023-08-02 PROCEDURE — 3288F FALL RISK ASSESSMENT DOCD: CPT | Mod: CPTII,S$GLB,, | Performed by: PODIATRIST

## 2023-08-02 PROCEDURE — 1159F PR MEDICATION LIST DOCUMENTED IN MEDICAL RECORD: ICD-10-PCS | Mod: CPTII,S$GLB,, | Performed by: PODIATRIST

## 2023-08-02 PROCEDURE — 99999 PR PBB SHADOW E&M-EST. PATIENT-LVL III: CPT | Mod: PBBFAC,,, | Performed by: PODIATRIST

## 2023-08-02 PROCEDURE — 1101F PT FALLS ASSESS-DOCD LE1/YR: CPT | Mod: CPTII,S$GLB,, | Performed by: PODIATRIST

## 2023-08-02 PROCEDURE — 99999 PR PBB SHADOW E&M-EST. PATIENT-LVL III: ICD-10-PCS | Mod: PBBFAC,,, | Performed by: PODIATRIST

## 2023-08-02 PROCEDURE — 29405 APPL SHORT LEG CAST: CPT | Mod: 58,RT,S$GLB, | Performed by: PODIATRIST

## 2023-08-02 PROCEDURE — 3288F PR FALLS RISK ASSESSMENT DOCUMENTED: ICD-10-PCS | Mod: CPTII,S$GLB,, | Performed by: PODIATRIST

## 2023-08-02 PROCEDURE — 29405 PR APPLY SHORT LEG CAST: ICD-10-PCS | Mod: 58,RT,S$GLB, | Performed by: PODIATRIST

## 2023-08-02 PROCEDURE — 1159F MED LIST DOCD IN RCRD: CPT | Mod: CPTII,S$GLB,, | Performed by: PODIATRIST

## 2023-08-02 PROCEDURE — 1101F PR PT FALLS ASSESS DOC 0-1 FALLS W/OUT INJ PAST YR: ICD-10-PCS | Mod: CPTII,S$GLB,, | Performed by: PODIATRIST

## 2023-08-02 PROCEDURE — 99024 POSTOP FOLLOW-UP VISIT: CPT | Mod: S$GLB,,, | Performed by: PODIATRIST

## 2023-08-02 NOTE — PROGRESS NOTES
Subjective:      Patient ID: Lyudmila Neri is a 73 y.o. female.    Chief Complaint: Post-op Evaluation      Lyudmila is a 73 y.o. female     9/27/22  Patient returns for injections to bilateral midfoot for arthritic changes and pain, wearing good supportive shoes gets the injections a couple times a year they last several months when she gets them    4/24/23: Patient returns for follow up bilateral midfoot arthritis for injections and wants to discuss surgical correction of the right foot.     5/10/23: Patient returns for follow up right foot CT to discuss treatment options moving forward    7/19/23: Patient returns s/p 1 week midfoot fusions with gastroc recession, she is in a posterior splint non weight bearing in wheelchair    7/26/23: Patient returns s/p 2 weeks midfoot fusion and gastroc recession right foot, non weight bearing with cast in place    8/2/23: Patient returns s/p 3 weeks right midfoot fusion and gastroc recession, non weight bearing in below knee cast no new complaints.    Review of Systems   Constitutional: Negative for chills and fever.   Cardiovascular:  Negative for claudication and leg swelling.   Respiratory:  Negative for shortness of breath.    Skin:  Negative for itching, nail changes and rash.   Musculoskeletal:  Positive for arthritis and joint pain. Negative for muscle cramps, muscle weakness and myalgias.   Gastrointestinal:  Negative for nausea and vomiting.   Neurological:  Negative for focal weakness, loss of balance, numbness and paresthesias.           Objective:      Physical Exam  Constitutional:       General: She is not in acute distress.     Appearance: She is well-developed. She is not diaphoretic.   Cardiovascular:      Pulses:           Dorsalis pedis pulses are 2+ on the right side and 2+ on the left side.        Posterior tibial pulses are 2+ on the right side and 2+ on the left side.      Comments: < 3 sec capillary refill time to toes 1-5 bilateral. Toes and feet  are warm to touch proximally with normal distal cooling b/l. There is some hair growth on the feet and toes b/l. There is no edema b/l. No spider veins or varicosities present b/l.     Musculoskeletal:      Comments: Equinus noted b/l ankles with < 5 deg DF noted. MMT 5/5 in DF/PF/Inv/Ev resistance with no reproduction of pain in any direction. Passive range of motion of ankle and pedal joints is painless b/l.     Skin:     General: Skin is warm and dry.      Coloration: Skin is not pale.      Findings: No abrasion, bruising, burn, ecchymosis, erythema, laceration, lesion, petechiae or rash.      Nails: There is no clubbing.      Comments: Skin temperature, texture and turgor within normal limits.    Incisions dorsal right foot and medal lateral leg are well healed with sutures removed, there is edema and surrounding bulla formation that was deroofed with underlying skin intact to the right foot but no dehiscence noted, minimal sanguinous drainage.   Neurological:      Mental Status: She is alert and oriented to person, place, and time.      Sensory: No sensory deficit.      Motor: No tremor, atrophy or abnormal muscle tone.      Comments: Negative tinel sign bilateral.   Psychiatric:         Behavior: Behavior normal.               Assessment:       Encounter Diagnosis   Name Primary?    Post-operative state Yes               Plan:       Lyudmila was seen today for post-op evaluation.    Diagnoses and all orders for this visit:    Post-operative state            I counseled the patient on her conditions, their implications and medical management.    Incisions dressed with tubi  compression to help with edema sutures removed today      Well padded below knee cast placed by me with foot and ankle in neutral position using 3 rolls of fiberglass cast material    Remain non weight bearing    Return in 1 week for cast removal and then into a tall boot but will remain non weight bearing    Agusto Edwards DPM

## 2023-08-09 ENCOUNTER — OFFICE VISIT (OUTPATIENT)
Dept: PODIATRY | Facility: CLINIC | Age: 73
End: 2023-08-09
Payer: MEDICARE

## 2023-08-09 ENCOUNTER — HOSPITAL ENCOUNTER (OUTPATIENT)
Dept: RADIOLOGY | Facility: HOSPITAL | Age: 73
Discharge: HOME | End: 2023-08-09
Attending: PODIATRIST
Payer: MEDICARE

## 2023-08-09 VITALS — BODY MASS INDEX: 27.11 KG/M2 | WEIGHT: 153 LBS | HEIGHT: 63 IN

## 2023-08-09 DIAGNOSIS — Z98.890 POST-OPERATIVE STATE: ICD-10-CM

## 2023-08-09 DIAGNOSIS — Z98.890 POST-OPERATIVE STATE: Primary | ICD-10-CM

## 2023-08-09 PROCEDURE — 1101F PR PT FALLS ASSESS DOC 0-1 FALLS W/OUT INJ PAST YR: ICD-10-PCS | Mod: CPTII,S$GLB,, | Performed by: PODIATRIST

## 2023-08-09 PROCEDURE — 1159F PR MEDICATION LIST DOCUMENTED IN MEDICAL RECORD: ICD-10-PCS | Mod: CPTII,S$GLB,, | Performed by: PODIATRIST

## 2023-08-09 PROCEDURE — 1159F MED LIST DOCD IN RCRD: CPT | Mod: CPTII,S$GLB,, | Performed by: PODIATRIST

## 2023-08-09 PROCEDURE — 1160F RVW MEDS BY RX/DR IN RCRD: CPT | Mod: CPTII,S$GLB,, | Performed by: PODIATRIST

## 2023-08-09 PROCEDURE — 1126F PR PAIN SEVERITY QUANTIFIED, NO PAIN PRESENT: ICD-10-PCS | Mod: CPTII,S$GLB,, | Performed by: PODIATRIST

## 2023-08-09 PROCEDURE — 73630 X-RAY EXAM OF FOOT: CPT | Mod: TC,FY,PO,RT

## 2023-08-09 PROCEDURE — 1101F PT FALLS ASSESS-DOCD LE1/YR: CPT | Mod: CPTII,S$GLB,, | Performed by: PODIATRIST

## 2023-08-09 PROCEDURE — 73630 XR FOOT COMPLETE 3 VIEW RIGHT: ICD-10-PCS | Mod: 26,RT,, | Performed by: RADIOLOGY

## 2023-08-09 PROCEDURE — 99999 PR PBB SHADOW E&M-EST. PATIENT-LVL III: ICD-10-PCS | Mod: PBBFAC,,, | Performed by: PODIATRIST

## 2023-08-09 PROCEDURE — 3288F FALL RISK ASSESSMENT DOCD: CPT | Mod: CPTII,S$GLB,, | Performed by: PODIATRIST

## 2023-08-09 PROCEDURE — 1126F AMNT PAIN NOTED NONE PRSNT: CPT | Mod: CPTII,S$GLB,, | Performed by: PODIATRIST

## 2023-08-09 PROCEDURE — 73630 X-RAY EXAM OF FOOT: CPT | Mod: 26,RT,, | Performed by: RADIOLOGY

## 2023-08-09 PROCEDURE — 99999 PR PBB SHADOW E&M-EST. PATIENT-LVL III: CPT | Mod: PBBFAC,,, | Performed by: PODIATRIST

## 2023-08-09 PROCEDURE — 3288F PR FALLS RISK ASSESSMENT DOCUMENTED: ICD-10-PCS | Mod: CPTII,S$GLB,, | Performed by: PODIATRIST

## 2023-08-09 PROCEDURE — 99024 PR POST-OP FOLLOW-UP VISIT: ICD-10-PCS | Mod: S$GLB,,, | Performed by: PODIATRIST

## 2023-08-09 PROCEDURE — 99024 POSTOP FOLLOW-UP VISIT: CPT | Mod: S$GLB,,, | Performed by: PODIATRIST

## 2023-08-09 PROCEDURE — 1160F PR REVIEW ALL MEDS BY PRESCRIBER/CLIN PHARMACIST DOCUMENTED: ICD-10-PCS | Mod: CPTII,S$GLB,, | Performed by: PODIATRIST

## 2023-08-09 NOTE — PROGRESS NOTES
Subjective:      Patient ID: Lyudmila Neri is a 73 y.o. female.    Chief Complaint: Post-op Evaluation      Lyudmila is a 73 y.o. female     9/27/22  Patient returns for injections to bilateral midfoot for arthritic changes and pain, wearing good supportive shoes gets the injections a couple times a year they last several months when she gets them    4/24/23: Patient returns for follow up bilateral midfoot arthritis for injections and wants to discuss surgical correction of the right foot.     5/10/23: Patient returns for follow up right foot CT to discuss treatment options moving forward    7/19/23: Patient returns s/p 1 week midfoot fusions with gastroc recession, she is in a posterior splint non weight bearing in wheelchair    7/26/23: Patient returns s/p 2 weeks midfoot fusion and gastroc recession right foot, non weight bearing with cast in place    8/2/23: Patient returns s/p 3 weeks right midfoot fusion and gastroc recession, non weight bearing in below knee cast no new complaints.    8/9/23: Patient returns s/p 4 weeks right midfoot fusion and gastroc recession, non weight bearing in below knee cast doing well    Review of Systems   Constitutional: Negative for chills and fever.   Cardiovascular:  Negative for claudication and leg swelling.   Respiratory:  Negative for shortness of breath.    Skin:  Negative for itching, nail changes and rash.   Musculoskeletal:  Positive for arthritis and joint pain. Negative for muscle cramps, muscle weakness and myalgias.   Gastrointestinal:  Negative for nausea and vomiting.   Neurological:  Negative for focal weakness, loss of balance, numbness and paresthesias.           Objective:      Physical Exam  Constitutional:       General: She is not in acute distress.     Appearance: She is well-developed. She is not diaphoretic.   Cardiovascular:      Pulses:           Dorsalis pedis pulses are 2+ on the right side and 2+ on the left side.        Posterior tibial pulses  are 2+ on the right side and 2+ on the left side.      Comments: < 3 sec capillary refill time to toes 1-5 bilateral. Toes and feet are warm to touch proximally with normal distal cooling b/l. There is some hair growth on the feet and toes b/l. There is no edema b/l. No spider veins or varicosities present b/l.     Musculoskeletal:      Comments: Equinus noted b/l ankles with < 5 deg DF noted. MMT 5/5 in DF/PF/Inv/Ev resistance with no reproduction of pain in any direction. Passive range of motion of ankle and pedal joints is painless b/l.     Skin:     General: Skin is warm and dry.      Coloration: Skin is not pale.      Findings: No abrasion, bruising, burn, ecchymosis, erythema, laceration, lesion, petechiae or rash.      Nails: There is no clubbing.      Comments: Skin temperature, texture and turgor within normal limits.    Incisions dorsal right foot and medal lateral leg are well healed with sutures removed, there is edema and surrounding bulla formation that was deroofed with underlying skin intact to the right foot but no dehiscence noted, minimal sanguinous drainage.   Neurological:      Mental Status: She is alert and oriented to person, place, and time.      Sensory: No sensory deficit.      Motor: No tremor, atrophy or abnormal muscle tone.      Comments: Negative tinel sign bilateral.   Psychiatric:         Behavior: Behavior normal.               Assessment:       Encounter Diagnosis   Name Primary?    Post-operative state Yes               Plan:       Lyudmila was seen today for post-op evaluation.    Diagnoses and all orders for this visit:    Post-operative state  -     X-Ray Foot Complete Right; Future  -     X-Ray Foot Complete Right; Future            I counseled the patient on her conditions, their implications and medical management.    Incisions dressed with tubi  compression to help with edema     Dispensed and put her in a tall orthopedic boot, discussed coming out of the boot two times  daily to begin ankle ROM exercises non weight bearing, demonstrated this in office    X-rays today    Can begin walking in the boot in 2 weeks    Return in 4 weeks with x-rays before transitioning to normal shoe wear    Agusto Edwards DPM

## 2023-08-11 ENCOUNTER — TELEPHONE (OUTPATIENT)
Dept: PODIATRY | Facility: CLINIC | Age: 73
End: 2023-08-11
Payer: MEDICARE

## 2023-08-11 NOTE — TELEPHONE ENCOUNTER
----- Message from Velvet Ramirez sent at 8/10/2023  3:32 PM CDT -----  Regarding: Concern  Type:  Needs Medical Advice    Who Called: Pt      Would the patient rather a call back or a response via [facility]? Callback    Best Call Back Number: [phone]    Additional Information: Pt sts she would like to know if she will be able to remove her boot at night to sleep.Please advise  ---------------------- Thank you

## 2023-08-20 NOTE — TELEPHONE ENCOUNTER
Care Due:                  Date            Visit Type   Department     Provider  --------------------------------------------------------------------------------                                EP -                              [facility] FAMILY  Last Visit: 06-      CARE ([facility])   STERLING Tucker                              EP -                              PRIMARY      [facility] FAM[facility]  Next Visit: 10-      CARE ([facility])   STERLING Tucker                                                            Last  Test          Frequency    Reason                     Performed    Due Date  --------------------------------------------------------------------------------    CMP.........  12 months..  losartan-hydrochlorothiaz  09- 09-                             vinod......................    [employer] Embedded Care Due Messages. Reference number: [id number].   8/20/2023 10:32:52 AM CDT

## 2023-08-21 RX ORDER — OMEPRAZOLE 40 MG/1
CAPSULE, DELAYED RELEASE ORAL
Qty: 90 CAPSULE | Refills: 3 | Status: SHIPPED | OUTPATIENT
Start: 2023-08-21

## 2023-08-21 NOTE — TELEPHONE ENCOUNTER
Refill Decision Note      Refill Decision Note   Lyudmila Neri  is requesting a refill authorization.  Brief Assessment and Rationale for Refill:  Approve     Medication Therapy Plan:  FLOS      Comments:     Note composed:4:30 AM 08/21/2023             Appointments     Last Visit   6/15/2023 [place], DO   Next Visit   10/10/2023 [place], DO

## 2023-09-05 ENCOUNTER — TELEPHONE (OUTPATIENT)
Dept: GASTROENTEROLOGY | Facility: CLINIC | Age: 73
End: 2023-09-05
Payer: MEDICARE

## 2023-09-05 NOTE — TELEPHONE ENCOUNTER
----- Message from Rosalinda Dubois sent at 9/5/2023  2:02 PM CDT -----  Type:  Reschedule Appointment Request    Caller is requesting to reschedule appointment.      Name of Caller:  Pt    When is the first available appointment?  Has Procedure 9/11    Would the patient rather a call back or a response via [facility]?  Call back    Best Call Back Number:  [phone]    Additional Information:  Pt needs to reschedule procedure due to having a conflict.   Please call back to advise. Thanks!

## 2023-09-05 NOTE — TELEPHONE ENCOUNTER
Returned call to the patient, patient requested to reschedule procedure due to having to care for a grandchild on that date for an orthopedic procedure, procedure rescheduled with the patient, patient verbalized understanding of this.

## 2023-09-06 ENCOUNTER — HOSPITAL ENCOUNTER (OUTPATIENT)
Dept: RADIOLOGY | Facility: HOSPITAL | Age: 73
Discharge: HOME | End: 2023-09-06
Attending: PODIATRIST
Payer: MEDICARE

## 2023-09-06 ENCOUNTER — OFFICE VISIT (OUTPATIENT)
Dept: PODIATRY | Facility: CLINIC | Age: 73
End: 2023-09-06
Payer: MEDICARE

## 2023-09-06 VITALS — WEIGHT: 153 LBS | BODY MASS INDEX: 27.11 KG/M2 | HEIGHT: 63 IN

## 2023-09-06 DIAGNOSIS — Z98.890 POST-OPERATIVE STATE: Primary | ICD-10-CM

## 2023-09-06 DIAGNOSIS — Z98.890 POST-OPERATIVE STATE: ICD-10-CM

## 2023-09-06 PROCEDURE — 1126F AMNT PAIN NOTED NONE PRSNT: CPT | Mod: CPTII,S$GLB,, | Performed by: PODIATRIST

## 2023-09-06 PROCEDURE — 73630 X-RAY EXAM OF FOOT: CPT | Mod: TC,PO,RT

## 2023-09-06 PROCEDURE — 1160F PR REVIEW ALL MEDS BY PRESCRIBER/CLIN PHARMACIST DOCUMENTED: ICD-10-PCS | Mod: CPTII,S$GLB,, | Performed by: PODIATRIST

## 2023-09-06 PROCEDURE — 99024 PR POST-OP FOLLOW-UP VISIT: ICD-10-PCS | Mod: S$GLB,,, | Performed by: PODIATRIST

## 2023-09-06 PROCEDURE — 1160F RVW MEDS BY RX/DR IN RCRD: CPT | Mod: CPTII,S$GLB,, | Performed by: PODIATRIST

## 2023-09-06 PROCEDURE — 99999 PR PBB SHADOW E&M-EST. PATIENT-LVL III: ICD-10-PCS | Mod: PBBFAC,,, | Performed by: PODIATRIST

## 2023-09-06 PROCEDURE — 73630 X-RAY EXAM OF FOOT: CPT | Mod: 26,RT,, | Performed by: RADIOLOGY

## 2023-09-06 PROCEDURE — 3288F FALL RISK ASSESSMENT DOCD: CPT | Mod: CPTII,S$GLB,, | Performed by: PODIATRIST

## 2023-09-06 PROCEDURE — 99024 POSTOP FOLLOW-UP VISIT: CPT | Mod: S$GLB,,, | Performed by: PODIATRIST

## 2023-09-06 PROCEDURE — 73630 XR FOOT COMPLETE 3 VIEW RIGHT: ICD-10-PCS | Mod: 26,RT,, | Performed by: RADIOLOGY

## 2023-09-06 PROCEDURE — 3288F PR FALLS RISK ASSESSMENT DOCUMENTED: ICD-10-PCS | Mod: CPTII,S$GLB,, | Performed by: PODIATRIST

## 2023-09-06 PROCEDURE — 1101F PR PT FALLS ASSESS DOC 0-1 FALLS W/OUT INJ PAST YR: ICD-10-PCS | Mod: CPTII,S$GLB,, | Performed by: PODIATRIST

## 2023-09-06 PROCEDURE — 1159F MED LIST DOCD IN RCRD: CPT | Mod: CPTII,S$GLB,, | Performed by: PODIATRIST

## 2023-09-06 PROCEDURE — 99999 PR PBB SHADOW E&M-EST. PATIENT-LVL III: CPT | Mod: PBBFAC,,, | Performed by: PODIATRIST

## 2023-09-06 PROCEDURE — 1101F PT FALLS ASSESS-DOCD LE1/YR: CPT | Mod: CPTII,S$GLB,, | Performed by: PODIATRIST

## 2023-09-06 PROCEDURE — 1159F PR MEDICATION LIST DOCUMENTED IN MEDICAL RECORD: ICD-10-PCS | Mod: CPTII,S$GLB,, | Performed by: PODIATRIST

## 2023-09-06 PROCEDURE — 1126F PR PAIN SEVERITY QUANTIFIED, NO PAIN PRESENT: ICD-10-PCS | Mod: CPTII,S$GLB,, | Performed by: PODIATRIST

## 2023-09-06 NOTE — PROGRESS NOTES
Subjective:      Patient ID: Lyudmila Neri is a 73 y.o. female.    Chief Complaint: Post-op Evaluation      Lyudmila is a 73 y.o. female     9/27/22  Patient returns for injections to bilateral midfoot for arthritic changes and pain, wearing good supportive shoes gets the injections a couple times a year they last several months when she gets them    4/24/23: Patient returns for follow up bilateral midfoot arthritis for injections and wants to discuss surgical correction of the right foot.     5/10/23: Patient returns for follow up right foot CT to discuss treatment options moving forward    7/19/23: Patient returns s/p 1 week midfoot fusions with gastroc recession, she is in a posterior splint non weight bearing in wheelchair    7/26/23: Patient returns s/p 2 weeks midfoot fusion and gastroc recession right foot, non weight bearing with cast in place    8/2/23: Patient returns s/p 3 weeks right midfoot fusion and gastroc recession, non weight bearing in below knee cast no new complaints.    8/9/23: Patient returns s/p 4 weeks right midfoot fusion and gastroc recession, non weight bearing in below knee cast doing well    9/6/23: Patient returns s/p 8 weeks right midfoot fusion and gastroc recession, weight bearing in the tall boot now, and relates she walks short distances in her house without the boot.     Review of Systems   Constitutional: Negative for chills and fever.   Cardiovascular:  Negative for claudication and leg swelling.   Respiratory:  Negative for shortness of breath.    Skin:  Negative for itching, nail changes and rash.   Musculoskeletal:  Positive for arthritis and joint pain. Negative for muscle cramps, muscle weakness and myalgias.   Gastrointestinal:  Negative for nausea and vomiting.   Neurological:  Negative for focal weakness, loss of balance, numbness and paresthesias.           Objective:      Physical Exam  Constitutional:       General: She is not in acute distress.     Appearance: She  is well-developed. She is not diaphoretic.   Cardiovascular:      Pulses:           Dorsalis pedis pulses are 2+ on the right side and 2+ on the left side.        Posterior tibial pulses are 2+ on the right side and 2+ on the left side.      Comments: < 3 sec capillary refill time to toes 1-5 bilateral. Toes and feet are warm to touch proximally with normal distal cooling b/l. There is some hair growth on the feet and toes b/l. There is no edema b/l. No spider veins or varicosities present b/l.     Musculoskeletal:      Comments: Equinus noted b/l ankles with < 5 deg DF noted. MMT 5/5 in DF/PF/Inv/Ev resistance with no reproduction of pain in any direction. Passive range of motion of ankle and pedal joints is painless b/l.     Skin:     General: Skin is warm and dry.      Coloration: Skin is not pale.      Findings: No abrasion, bruising, burn, ecchymosis, erythema, laceration, lesion, petechiae or rash.      Nails: There is no clubbing.      Comments: Skin temperature, texture and turgor within normal limits.    Incisions dorsal right foot well healed   Neurological:      Mental Status: She is alert and oriented to person, place, and time.      Sensory: No sensory deficit.      Motor: No tremor, atrophy or abnormal muscle tone.      Comments: Negative tinel sign bilateral.   Psychiatric:         Behavior: Behavior normal.               Assessment:       Encounter Diagnosis   Name Primary?    Post-operative state Yes                 Plan:       Lyudmila was seen today for post-op evaluation.    Diagnoses and all orders for this visit:    Post-operative state              I counseled the patient on her conditions, their implications and medical management.      X-rays today were reviewed noting good consolidation across the fusion sites    Can begin walking transitioning to normal shoe wear to toleration, no high impact running or jumping yet    Return in 4 weeks for final follow up on how she is doing in her normal  shoe wear    NENITA MckeonM

## 2023-09-11 ENCOUNTER — TELEPHONE (OUTPATIENT)
Dept: PODIATRY | Facility: CLINIC | Age: 73
End: 2023-09-11
Payer: MEDICARE

## 2023-09-11 NOTE — TELEPHONE ENCOUNTER
As we discussed in her visit last week she can transition to her shoes now and drive any time now that she is out of the boot. The only thing she cannot do is high impact running or jumping for another 3 weeks

## 2023-09-11 NOTE — TELEPHONE ENCOUNTER
Spoke with patient and she voiced understanding that she should be out of her boot and in shoes but no high impact activities

## 2023-09-20 DIAGNOSIS — Z78.0 MENOPAUSE: ICD-10-CM

## 2023-10-03 ENCOUNTER — TELEPHONE (OUTPATIENT)
Dept: ADMINISTRATIVE | Facility: CLINIC | Age: 73
End: 2023-10-03
Payer: MEDICARE

## 2023-10-03 ENCOUNTER — HOSPITAL ENCOUNTER (OUTPATIENT)
Dept: RADIOLOGY | Facility: HOSPITAL | Age: 73
Discharge: HOME | End: 2023-10-03
Attending: NURSE PRACTITIONER
Payer: MEDICARE

## 2023-10-03 ENCOUNTER — OFFICE VISIT (OUTPATIENT)
Dept: PODIATRY | Facility: CLINIC | Age: 73
End: 2023-10-03
Payer: MEDICARE

## 2023-10-03 VITALS — WEIGHT: 153 LBS | HEIGHT: 63 IN | BODY MASS INDEX: 27.11 KG/M2

## 2023-10-03 DIAGNOSIS — Z85.09 HISTORY OF GASTROINTESTINAL STROMAL TUMOR (GIST): ICD-10-CM

## 2023-10-03 DIAGNOSIS — Z98.890 POST-OPERATIVE STATE: Primary | ICD-10-CM

## 2023-10-03 PROCEDURE — 74177 CT ABDOMEN PELVIS WITH CONTRAST: ICD-10-PCS | Mod: 26,HCNC,, | Performed by: RADIOLOGY

## 2023-10-03 PROCEDURE — 99999 PR PBB SHADOW E&M-EST. PATIENT-LVL III: CPT | Mod: PBBFAC,HCNC,, | Performed by: PODIATRIST

## 2023-10-03 PROCEDURE — 1101F PT FALLS ASSESS-DOCD LE1/YR: CPT | Mod: HCNC,CPTII,S$GLB, | Performed by: PODIATRIST

## 2023-10-03 PROCEDURE — 1101F PR PT FALLS ASSESS DOC 0-1 FALLS W/OUT INJ PAST YR: ICD-10-PCS | Mod: HCNC,CPTII,S$GLB, | Performed by: PODIATRIST

## 2023-10-03 PROCEDURE — 25500020 PHARM REV CODE 255: Mod: HCNC,PO | Performed by: NURSE PRACTITIONER

## 2023-10-03 PROCEDURE — 99024 PR POST-OP FOLLOW-UP VISIT: ICD-10-PCS | Mod: HCNC,S$GLB,, | Performed by: PODIATRIST

## 2023-10-03 PROCEDURE — 74177 CT ABD & PELVIS W/CONTRAST: CPT | Mod: 26,HCNC,, | Performed by: RADIOLOGY

## 2023-10-03 PROCEDURE — 1125F PR PAIN SEVERITY QUANTIFIED, PAIN PRESENT: ICD-10-PCS | Mod: HCNC,CPTII,S$GLB, | Performed by: PODIATRIST

## 2023-10-03 PROCEDURE — 74177 CT ABD & PELVIS W/CONTRAST: CPT | Mod: TC,HCNC,PO

## 2023-10-03 PROCEDURE — 1160F RVW MEDS BY RX/DR IN RCRD: CPT | Mod: HCNC,CPTII,S$GLB, | Performed by: PODIATRIST

## 2023-10-03 PROCEDURE — 1159F PR MEDICATION LIST DOCUMENTED IN MEDICAL RECORD: ICD-10-PCS | Mod: HCNC,CPTII,S$GLB, | Performed by: PODIATRIST

## 2023-10-03 PROCEDURE — 99999 PR PBB SHADOW E&M-EST. PATIENT-LVL III: ICD-10-PCS | Mod: PBBFAC,HCNC,, | Performed by: PODIATRIST

## 2023-10-03 PROCEDURE — A9698 NON-RAD CONTRAST MATERIALNOC: HCPCS | Mod: HCNC,PO | Performed by: NURSE PRACTITIONER

## 2023-10-03 PROCEDURE — 3288F FALL RISK ASSESSMENT DOCD: CPT | Mod: HCNC,CPTII,S$GLB, | Performed by: PODIATRIST

## 2023-10-03 PROCEDURE — 1160F PR REVIEW ALL MEDS BY PRESCRIBER/CLIN PHARMACIST DOCUMENTED: ICD-10-PCS | Mod: HCNC,CPTII,S$GLB, | Performed by: PODIATRIST

## 2023-10-03 PROCEDURE — 1159F MED LIST DOCD IN RCRD: CPT | Mod: HCNC,CPTII,S$GLB, | Performed by: PODIATRIST

## 2023-10-03 PROCEDURE — 3288F PR FALLS RISK ASSESSMENT DOCUMENTED: ICD-10-PCS | Mod: HCNC,CPTII,S$GLB, | Performed by: PODIATRIST

## 2023-10-03 PROCEDURE — 99024 POSTOP FOLLOW-UP VISIT: CPT | Mod: HCNC,S$GLB,, | Performed by: PODIATRIST

## 2023-10-03 PROCEDURE — 1125F AMNT PAIN NOTED PAIN PRSNT: CPT | Mod: HCNC,CPTII,S$GLB, | Performed by: PODIATRIST

## 2023-10-03 RX ADMIN — IOHEXOL 75 ML: 350 INJECTION, SOLUTION INTRAVENOUS at 10:10

## 2023-10-03 RX ADMIN — IOHEXOL 1000 ML: 9 SOLUTION ORAL at 10:10

## 2023-10-03 NOTE — PROGRESS NOTES
Subjective:      Patient ID: Lyudmila Neri is a 73 y.o. female.    Chief Complaint: Post-op Evaluation      Lyudmila is a 73 y.o. female     9/27/22  Patient returns for injections to bilateral midfoot for arthritic changes and pain, wearing good supportive shoes gets the injections a couple times a year they last several months when she gets them    4/24/23: Patient returns for follow up bilateral midfoot arthritis for injections and wants to discuss surgical correction of the right foot.     5/10/23: Patient returns for follow up right foot CT to discuss treatment options moving forward    7/19/23: Patient returns s/p 1 week midfoot fusions with gastroc recession, she is in a posterior splint non weight bearing in wheelchair    7/26/23: Patient returns s/p 2 weeks midfoot fusion and gastroc recession right foot, non weight bearing with cast in place    8/2/23: Patient returns s/p 3 weeks right midfoot fusion and gastroc recession, non weight bearing in below knee cast no new complaints.    8/9/23: Patient returns s/p 4 weeks right midfoot fusion and gastroc recession, non weight bearing in below knee cast doing well    9/6/23: Patient returns s/p 8 weeks right midfoot fusion and gastroc recession, weight bearing in the tall boot now, and relates she walks short distances in her house without the boot.     10/3/23: Patient returns s/p 2.5 months right midfoot fusion and gastroc recession, weight bearing in tennis shoes mild discomfort at times pain 2-4/10 with some edema present the more she is on her feet    Review of Systems   Constitutional: Negative for chills and fever.   Cardiovascular:  Negative for claudication and leg swelling.   Respiratory:  Negative for shortness of breath.    Skin:  Negative for itching, nail changes and rash.   Musculoskeletal:  Positive for arthritis and joint pain. Negative for muscle cramps, muscle weakness and myalgias.   Gastrointestinal:  Negative for nausea and vomiting.    Neurological:  Negative for focal weakness, loss of balance, numbness and paresthesias.           Objective:      Physical Exam  Constitutional:       General: She is not in acute distress.     Appearance: She is well-developed. She is not diaphoretic.   Cardiovascular:      Pulses:           Dorsalis pedis pulses are 2+ on the right side and 2+ on the left side.        Posterior tibial pulses are 2+ on the right side and 2+ on the left side.      Comments: < 3 sec capillary refill time to toes 1-5 bilateral. Toes and feet are warm to touch proximally with normal distal cooling b/l. There is some hair growth on the feet and toes b/l. There is no edema b/l. No spider veins or varicosities present b/l.     Musculoskeletal:      Comments: Equinus noted b/l ankles with < 5 deg DF noted. MMT 5/5 in DF/PF/Inv/Ev resistance with no reproduction of pain in any direction. Passive range of motion of ankle and pedal joints is painless b/l.     Skin:     General: Skin is warm and dry.      Coloration: Skin is not pale.      Findings: No abrasion, bruising, burn, ecchymosis, erythema, laceration, lesion, petechiae or rash.      Nails: There is no clubbing.      Comments: Skin temperature, texture and turgor within normal limits.    Incisions dorsal right foot well healed   Neurological:      Mental Status: She is alert and oriented to person, place, and time.      Sensory: No sensory deficit.      Motor: No tremor, atrophy or abnormal muscle tone.      Comments: Negative tinel sign bilateral.   Psychiatric:         Behavior: Behavior normal.               Assessment:       Encounter Diagnosis   Name Primary?    Post-operative state Yes                   Plan:       Lyudmila was seen today for post-op evaluation.    Diagnoses and all orders for this visit:    Post-operative state                I counseled the patient on her conditions, their implications and medical management.      Can increase activity in her shoes to  toleration    Will begin working with PT to help with ROM and ambulation exercises    Return in 6 weeks for follow up    Agusto Edwards DPM

## 2023-10-03 NOTE — LETTER
October 3, 2023    Lyudmila Neri  [address]  [place] [address]         [facility]  [address] [place] [address]  Phone: [phone] October 3, 2023     Patient: Lyudmila Neri   YOB: 1950   Date of Visit: 10/3/2023       To Whom It May Concern:    It is my medical opinion that Lyudmila Neri may return to full duty immediately with no restrictions.    If you have any questions or concerns, please don't hesitate to call.    Sincerely,          Agusto Edwards, NENITAM

## 2023-10-05 ENCOUNTER — OFFICE VISIT (OUTPATIENT)
Dept: HEMATOLOGY/ONCOLOGY | Facility: CLINIC | Age: 73
End: 2023-10-05
Payer: MEDICARE

## 2023-10-05 ENCOUNTER — OFFICE VISIT (OUTPATIENT)
Dept: FAMILY MEDICINE | Facility: CLINIC | Age: 73
End: 2023-10-05
Payer: MEDICARE

## 2023-10-05 ENCOUNTER — TELEPHONE (OUTPATIENT)
Dept: FAMILY MEDICINE | Facility: CLINIC | Age: 73
End: 2023-10-05

## 2023-10-05 VITALS
SYSTOLIC BLOOD PRESSURE: 130 MMHG | BODY MASS INDEX: 26.29 KG/M2 | DIASTOLIC BLOOD PRESSURE: 72 MMHG | OXYGEN SATURATION: 95 % | HEART RATE: 95 BPM | HEIGHT: 63 IN | WEIGHT: 148.38 LBS

## 2023-10-05 VITALS
OXYGEN SATURATION: 97 % | WEIGHT: 148.38 LBS | BODY MASS INDEX: 26.28 KG/M2 | TEMPERATURE: 98 F | SYSTOLIC BLOOD PRESSURE: 145 MMHG | RESPIRATION RATE: 18 BRPM | HEART RATE: 88 BPM | DIASTOLIC BLOOD PRESSURE: 78 MMHG

## 2023-10-05 DIAGNOSIS — I10 ESSENTIAL HYPERTENSION: ICD-10-CM

## 2023-10-05 DIAGNOSIS — N18.2 CHRONIC KIDNEY DISEASE, STAGE II (MILD): ICD-10-CM

## 2023-10-05 DIAGNOSIS — R05.9 COUGH, UNSPECIFIED TYPE: ICD-10-CM

## 2023-10-05 DIAGNOSIS — Z00.00 ENCOUNTER FOR PREVENTIVE HEALTH EXAMINATION: Primary | ICD-10-CM

## 2023-10-05 DIAGNOSIS — Z12.31 ENCOUNTER FOR SCREENING MAMMOGRAM FOR MALIGNANT NEOPLASM OF BREAST: ICD-10-CM

## 2023-10-05 DIAGNOSIS — I77.9 BILATERAL CAROTID ARTERY DISEASE, UNSPECIFIED TYPE: ICD-10-CM

## 2023-10-05 DIAGNOSIS — Z85.09 HISTORY OF GASTROINTESTINAL STROMAL TUMOR (GIST): Primary | ICD-10-CM

## 2023-10-05 DIAGNOSIS — I77.1 TORTUOUS AORTA: ICD-10-CM

## 2023-10-05 PROCEDURE — 3077F SYST BP >= 140 MM HG: CPT | Mod: HCNC,CPTII,S$GLB, | Performed by: NURSE PRACTITIONER

## 2023-10-05 PROCEDURE — 99999 PR PBB SHADOW E&M-EST. PATIENT-LVL V: ICD-10-PCS | Mod: PBBFAC,HCNC,, | Performed by: NURSE PRACTITIONER

## 2023-10-05 PROCEDURE — 1125F AMNT PAIN NOTED PAIN PRSNT: CPT | Mod: HCNC,CPTII,S$GLB, | Performed by: NURSE PRACTITIONER

## 2023-10-05 PROCEDURE — 3044F PR MOST RECENT HEMOGLOBIN A1C LEVEL <7.0%: ICD-10-PCS | Mod: HCNC,CPTII,S$GLB, | Performed by: NURSE PRACTITIONER

## 2023-10-05 PROCEDURE — 99213 OFFICE O/P EST LOW 20 MIN: CPT | Mod: HCNC,S$GLB,, | Performed by: NURSE PRACTITIONER

## 2023-10-05 PROCEDURE — 1101F PR PT FALLS ASSESS DOC 0-1 FALLS W/OUT INJ PAST YR: ICD-10-PCS | Mod: HCNC,CPTII,S$GLB, | Performed by: NURSE PRACTITIONER

## 2023-10-05 PROCEDURE — 99213 PR OFFICE/OUTPT VISIT, EST, LEVL III, 20-29 MIN: ICD-10-PCS | Mod: HCNC,S$GLB,, | Performed by: NURSE PRACTITIONER

## 2023-10-05 PROCEDURE — 99999 PR PBB SHADOW E&M-EST. PATIENT-LVL IV: ICD-10-PCS | Mod: PBBFAC,HCNC,, | Performed by: NURSE PRACTITIONER

## 2023-10-05 PROCEDURE — 1159F MED LIST DOCD IN RCRD: CPT | Mod: HCNC,CPTII,S$GLB, | Performed by: NURSE PRACTITIONER

## 2023-10-05 PROCEDURE — 1126F AMNT PAIN NOTED NONE PRSNT: CPT | Mod: HCNC,CPTII,S$GLB, | Performed by: NURSE PRACTITIONER

## 2023-10-05 PROCEDURE — 1170F PR FUNCTIONAL STATUS ASSESSED: ICD-10-PCS | Mod: HCNC,CPTII,S$GLB, | Performed by: NURSE PRACTITIONER

## 2023-10-05 PROCEDURE — 99999 PR PBB SHADOW E&M-EST. PATIENT-LVL IV: CPT | Mod: PBBFAC,HCNC,, | Performed by: NURSE PRACTITIONER

## 2023-10-05 PROCEDURE — 3075F SYST BP GE 130 - 139MM HG: CPT | Mod: HCNC,CPTII,S$GLB, | Performed by: NURSE PRACTITIONER

## 2023-10-05 PROCEDURE — 3288F FALL RISK ASSESSMENT DOCD: CPT | Mod: HCNC,CPTII,S$GLB, | Performed by: NURSE PRACTITIONER

## 2023-10-05 PROCEDURE — 1159F PR MEDICATION LIST DOCUMENTED IN MEDICAL RECORD: ICD-10-PCS | Mod: HCNC,CPTII,S$GLB, | Performed by: NURSE PRACTITIONER

## 2023-10-05 PROCEDURE — G0439 PR MEDICARE ANNUAL WELLNESS SUBSEQUENT VISIT: ICD-10-PCS | Mod: HCNC,S$GLB,, | Performed by: NURSE PRACTITIONER

## 2023-10-05 PROCEDURE — 1101F PT FALLS ASSESS-DOCD LE1/YR: CPT | Mod: HCNC,CPTII,S$GLB, | Performed by: NURSE PRACTITIONER

## 2023-10-05 PROCEDURE — 3078F DIAST BP <80 MM HG: CPT | Mod: HCNC,CPTII,S$GLB, | Performed by: NURSE PRACTITIONER

## 2023-10-05 PROCEDURE — 3044F HG A1C LEVEL LT 7.0%: CPT | Mod: HCNC,CPTII,S$GLB, | Performed by: NURSE PRACTITIONER

## 2023-10-05 PROCEDURE — 3288F PR FALLS RISK ASSESSMENT DOCUMENTED: ICD-10-PCS | Mod: HCNC,CPTII,S$GLB, | Performed by: NURSE PRACTITIONER

## 2023-10-05 PROCEDURE — 1170F FXNL STATUS ASSESSED: CPT | Mod: HCNC,CPTII,S$GLB, | Performed by: NURSE PRACTITIONER

## 2023-10-05 PROCEDURE — 1125F PR PAIN SEVERITY QUANTIFIED, PAIN PRESENT: ICD-10-PCS | Mod: HCNC,CPTII,S$GLB, | Performed by: NURSE PRACTITIONER

## 2023-10-05 PROCEDURE — 99999 PR PBB SHADOW E&M-EST. PATIENT-LVL V: CPT | Mod: PBBFAC,HCNC,, | Performed by: NURSE PRACTITIONER

## 2023-10-05 PROCEDURE — 1126F PR PAIN SEVERITY QUANTIFIED, NO PAIN PRESENT: ICD-10-PCS | Mod: HCNC,CPTII,S$GLB, | Performed by: NURSE PRACTITIONER

## 2023-10-05 PROCEDURE — 3078F PR MOST RECENT DIASTOLIC BLOOD PRESSURE < 80 MM HG: ICD-10-PCS | Mod: HCNC,CPTII,S$GLB, | Performed by: NURSE PRACTITIONER

## 2023-10-05 PROCEDURE — 1160F RVW MEDS BY RX/DR IN RCRD: CPT | Mod: HCNC,CPTII,S$GLB, | Performed by: NURSE PRACTITIONER

## 2023-10-05 PROCEDURE — 1160F PR REVIEW ALL MEDS BY PRESCRIBER/CLIN PHARMACIST DOCUMENTED: ICD-10-PCS | Mod: HCNC,CPTII,S$GLB, | Performed by: NURSE PRACTITIONER

## 2023-10-05 PROCEDURE — 3077F PR MOST RECENT SYSTOLIC BLOOD PRESSURE >= 140 MM HG: ICD-10-PCS | Mod: HCNC,CPTII,S$GLB, | Performed by: NURSE PRACTITIONER

## 2023-10-05 PROCEDURE — G0439 PPPS, SUBSEQ VISIT: HCPCS | Mod: HCNC,S$GLB,, | Performed by: NURSE PRACTITIONER

## 2023-10-05 PROCEDURE — 3075F PR MOST RECENT SYSTOLIC BLOOD PRESS GE 130-139MM HG: ICD-10-PCS | Mod: HCNC,CPTII,S$GLB, | Performed by: NURSE PRACTITIONER

## 2023-10-05 RX ORDER — ALBUTEROL SULFATE 90 UG/1
2 AEROSOL, METERED RESPIRATORY (INHALATION) EVERY 6 HOURS PRN
Qty: 18 G | Refills: 0 | Status: SHIPPED | OUTPATIENT
Start: 2023-10-05

## 2023-10-05 NOTE — TELEPHONE ENCOUNTER
CoryDaron would like to be scheduled for annual with . LOV 5/22. Please call her to schedule.   Thank you  Terri

## 2023-10-05 NOTE — PROGRESS NOTES
"  Lyudmila Neri presented for a  Medicare AWV and comprehensive Health Risk Assessment today. The following components were reviewed and updated:    Medical history  Family History  Social history  Allergies and Current Medications  Health Risk Assessment  Health Maintenance  Care Team     ** See Completed Assessments for Annual Wellness Visit within the encounter summary.**     The following assessments were completed:  Living Situation  CAGE  Depression Screening  Timed Get Up and Go  Whisper Test  Cognitive Function Screening      Nutrition Screening  ADL Screening  PAQ Screening    Review for Opioid Screening: Pt does not have Rx for Opioids  Review for Substance Use Disorders: Patient does not use substance      Vitals:    10/05/23 1225   BP: 130/72   BP Location: Left arm   Patient Position: Sitting   BP Method: Medium (Manual)   Pulse: 95   SpO2: 95%   Weight: 67.3 kg (148 lb 5.9 oz)   Height: 5' 3" (1.6 m)     Body mass index is 26.28 kg/m².  Physical Exam  Vitals reviewed.   Constitutional:       General: She is not in acute distress.  Pulmonary:      Effort: Pulmonary effort is normal. No respiratory distress.   Neurological:      Mental Status: She is alert and oriented to person, place, and time.   Psychiatric:         Mood and Affect: Mood normal.         Behavior: Behavior normal.         Thought Content: Thought content normal.         Judgment: Judgment normal.     Diagnoses and health risks identified today and associated recommendations/orders:    1. Encounter for preventive health examination  Reviewed and discussed health maintenance.    - Mammo Digital Screening Bilat w/ Caleb; Future    2. Tortuous aorta  Stable- continue current treatment and follow up routinely with PCP     3. Essential hypertension  Stable- continue current treatment and follow up routinely with PCP     4. Bilateral carotid artery disease, unspecified type  Stable- continue current treatment and follow up routinely with PCP "     5. Chronic kidney disease, stage II (mild)  Stable- continue current treatment and follow up routinely with PCP   Avoid NSAIDs and increase fluids    6. Encounter for screening mammogram for malignant neoplasm of breast  - Mammo Digital Screening Bilat w/ Caleb; Future    7. Cough, unspecified type  Continue and complete Augmentin.   - albuterol (VENTOLIN HFA) 90 mcg/actuation inhaler; Inhale 2 puffs into the lungs every 6 (six) hours as needed for Wheezing. Rescue  Dispense: 18 g; Refill: 0  Follow up if no improvement     Provided Lyudmila with a 5-10 year written screening schedule and personal prevention plan. Recommendations were developed using the USPSTF age appropriate recommendations. Education, counseling, and referrals were provided as needed. After Visit Summary printed and given to patient which includes a list of additional screenings\tests needed.    I offered to discuss advanced care planning, including how to pick a person who would make decisions for you if you were unable to make them for yourself, called a health care power of , and what kind of decisions you might make such as use of life sustaining treatments such as ventilators and tube feeding when faced with a life limiting illness recorded on a living will that they will need to know. (How you want to be cared for as you near the end of your natural life)   X  Patient has advanced directives written and agrees to provide copies to the institution.  Kathy Nunez NP

## 2023-10-05 NOTE — PATIENT INSTRUCTIONS
Counseling and Referral of Other Preventative  (Italic type indicates deductible and co-insurance are waived)    Patient Name: Lyudmila Neri  Today's Date: 10/5/2023    Health Maintenance       Date Due Completion Date    COVID-19 Vaccine (6 - Moderna series) 08/15/2022 6/20/2022    DEXA Scan 08/06/2023 8/6/2020    Influenza Vaccine (1) 09/01/2023 10/13/2022    Mammogram 09/29/2023 9/29/2022    Hemoglobin A1c (Prediabetes) 10/03/2024 10/3/2023    Colorectal Cancer Screening 04/18/2026 4/18/2018    Lipid Panel 10/03/2028 10/3/2023    TETANUS VACCINE 04/16/2029 4/16/2019    Override on 9/23/2016: Declined        No orders of the defined types were placed in this encounter.    The following information is provided to all patients.  This information is to help you find resources for any of the problems found today that may be affecting your health:                Living healthy guide: www.[facility].[place].gov      Understanding Diabetes: www.diabetes.org      Eating healthy: www.cdc.gov/healthyweight      CDC home safety checklist: www.cdc.gov/steadi/patient.html      Agency on Aging: www.goea.[place].gov      Alcoholics anonymous (AA): www.aa.org      Physical Activity: www.eliud.nih.gov/fw8zser      Tobacco use: www.quitwithusla.org

## 2023-10-05 NOTE — PROGRESS NOTES
CC:  Annual GIST surveillance    HISTORY OF PRESENT ILLNESS:  This is a 73-year-old white female known to Dr. Pro for resected low-grade GIST (December 2011).    No adjuvant chemotherapy was given in light of low-grade status.    Presentation:  palpable RUQ mass; abdominal discomfort  09/16/22: US Abd:  IMPRESSION: SOLID HYPERECHOIC 3 CM MASS WITHIN THE SUBCUTANEOUS FAT OF   THE ANTERIOR RIGHT ABDOMEN CORRESPONDING TO A PALPABLE LUMP.  THOUGH THE   MASS PROBABLY REPRESENTS A LIPOMA, ULTRASOUND IS RELATIVELY NONSPECIFIC   AND CT OR MRI IS RECOMMENDED.     09/23/22: CT Abdomen/Pelvis-  IMPRESSION:   1.  A SOLID MASS APPEARS TO BE PRESENT THAT APPEARS TO BE EXTENDING FROM,   OR VERY CLOSELY OPPOSED TO THE STOMACH.  THIS MOST LIKELY RELATES TO A   GASTRIC WALL NEOPLASM, POSSIBLY A LEIOMYOMA.  GIVEN THAT ADENOCARCINOMAS   CAN APPEAR SIMILAR, FURTHER EVALUATION IS SUGGESTED.  THIS LESION IS   FAVORED TO BE EXTENDING INTO THE GASTRIC LUMEN AND CAN LIKELY BE ACCESSED   BY ENDOSCOPY.  FURTHER EVALUATION IS SUGGESTED TO EXCLUDE AN AGGRESSIVE   NEOPLASM.     2.  CHOLELITHIASIS.     12/07/11:  Resection of gastric GIST (3.2 x 2.6 cm); cholecystectomy; excision of abdominal wall lipoma per Dr. Cai    TODAY:  10/05/23  She presents to the clinic today for annual (12 years post) evaluation.   She reports continuous abdominal tenderness.  Recent right foot surgery, 07/13/23 & recovering uneventfully.  Presently with a UTI, on antibiotics.   She denies any difficulties with fevers, chills, vomiting, constipation, diarrhea, painful lymphadenopathy, drenching night sweats, unexplained weight loss, bleeding, etc.    No other new complaints or pertinent findings on a 14-point review of systems.    Past Medical History:   Diagnosis Date    Anticoagulant long-term use     Arthritis     osteoarthritis    Blood transfusion     Cancer 2011    stromal tumor, stomach    Depression     Disorder of kidney and ureter     CKD 2    GERD  (gastroesophageal reflux disease)     GIST (gastrointestinal stromal tumor), malignant     H. pylori infection     Hypertension     KATHY (obstructive sleep apnea) 6/24/2013    Psoriasis 6/24/2013    Trouble in sleeping     arthritic pain wakes her up    Urinary incontinence     stress incontinence     Past Surgical History:   Procedure Laterality Date    ANKLE FRACTURE SURGERY      RT ankle    APPENDECTOMY      BLADDER SUSPENSION  1995    CARPAL TUNNEL RELEASE      left    CERVICAL FUSION      CHEILECTOMY Right 7/13/2023    Procedure: CHEILECTOMY;  Surgeon: Agusto Edwards DPM;  Location: [facility] OR;  Service: Podiatry;  Laterality: Right;    CHOLECYSTECTOMY  12/7/2011  Dr. Cai    COLONOSCOPY  7/26/2005  [facility]    Non-erosive esophageal reflux (NERD) disease?.  Post-surgical deformity in the gastric body.   Gastric mucosal abnormality (erythema) in the greater  curve of antrum.  STEVIE Test performed on 02/24/12 was Negative.    COLONOSCOPY  12/12/2008  [facility]    Small internal hemorrhoids.  Slightly redundant left colon.    COLONOSCOPY N/A 4/18/2018    Procedure: COLONOSCOPY;  Surgeon: David Guzman Jr., MD;  Location: [facility] ENDO;  Service: Endoscopy;  Laterality: N/A;    COLONOSCOPY  04/18/2018    Dr. Guzman; hemorrhoids, otherwise unremarkable, repeat 5 years for surveillance due to family history of colon cancer    CYST REMOVAL Left 2016    left middle finger, Dr. Johnson    ENDOSCOPIC GASTROCNEMIUS RECESSION Right 7/13/2023    Procedure: RECESSION, GASTROCNEMIUS, ENDOSCOPIC;  Surgeon: Agusto Edwards DPM;  Location: [facility] OR;  Service: Podiatry;  Laterality: Right;  popliteal saphenous block,    ESOPHAGOGASTRODUODENOSCOPY N/A 5/14/2019    Procedure: EGD (ESOPHAGOGASTRODUODENOSCOPY);  Surgeon: David Guzman Jr., MD;  Location: [facility] ENDO;  Service: Endoscopy;  Laterality: N/A;    EYE SURGERY Bilateral 1995    RK surgery    EYE SURGERY Bilateral 2015    cataract removal    FOOT ARTHRODESIS Right 7/13/2023     Procedure: FUSION, FOOT;  Surgeon: Agusto Edwards DPM;  Location: [facility] OR;  Service: Podiatry;  Laterality: Right;  popliteal saphenous block, fusion of the naviculocuneiform and tarsometatarsal joints 1-3.    GASTRECTOMY      HYSTERECTOMY      KNEE ARTHROSCOPY W/ DEBRIDEMENT      lt knee    SHOULDER OPEN ROTATOR CUFF REPAIR      left    STOMACH SURGERY  12/7/2011  Dr. Cai    removal of GIST tumor from wall of the stomach, 2.3 cm in size.    TRANSFORAMINAL EPIDURAL INJECTION OF STEROID Bilateral 4/9/2019    Procedure: Injection,steroid,epidural,transforaminal approach L4/5;  Surgeon: Pa Pruett MD;  Location: [facility] OR;  Service: Pain Management;  Laterality: Bilateral;    TRANSFORAMINAL EPIDURAL INJECTION OF STEROID Bilateral 6/5/2019    Procedure: Injection,steroid,epidural,transforaminal approach L4/5;  Surgeon: Pa Pruett MD;  Location: [facility] OR;  Service: Pain Management;  Laterality: Bilateral;    UPPER GASTROINTESTINAL ENDOSCOPY  2/24/2012  [facility]    Non-erosive esophageal reflux (NERD) disease?.  Post-surgical deformity in the gastric body.   Gastric mucosal abnormality (erythema) in the greater  curve of antrum.  STEVIE Test performed on 02/24/12 was Negative.    UPPER GASTROINTESTINAL ENDOSCOPY  04/18/2018    Dr. Guzman, antritis, evidence of prior surgery with healthy mucosa     Current Outpatient Medications on File Prior to Visit   Medication Sig Dispense Refill    amLODIPine (NORVASC) 5 MG tablet TAKE 1 TABLET(5 MG) BY MOUTH EVERY DAY 90 tablet 3    aspirin (ECOTRIN) 81 MG EC tablet Take 81 mg by mouth once daily.      cetirizine (ZYRTEC) 10 MG tablet Take 10 mg by mouth once daily.      cholecalciferol, vitamin D3, (VITAMIN D3) 25 mcg (1,000 unit) capsule Take 1,000 Units by mouth once daily.      clobetasol (TEMOVATE) 0.05 % external solution Apply topically as needed.       DULoxetine (CYMBALTA) 30 MG capsule Take 1 capsule (30 mg total) by mouth once daily. 30 capsule 5    fish  oil-omega-3 fatty acids 300-1,000 mg capsule Take by mouth once daily.      FOLIC ACID/MULTIVIT-MIN/LUTEIN (CENTRUM SILVER ORAL) Take by mouth.      levothyroxine (SYNTHROID) 50 MCG tablet Take 50 mcg by mouth.      losartan-hydrochlorothiazide 100-12.5 mg (HYZAAR) 100-12.5 mg Tab Take 1 tablet by mouth once daily. 90 tablet 3    MYRBETRIQ 50 mg Tb24 Take 1 tablet by mouth once daily 90 tablet 0    omeprazole (PRILOSEC) 40 MG capsule TAKE 1 CAPSULE BY MOUTH ONCE DAILY BEFORE BREAKFAST 90 capsule 3    tolterodine (DETROL LA) 2 MG Cp24 Take 1 capsule (2 mg total) by mouth once daily. (Patient taking differently: Take 2 mg by mouth every evening.) 30 capsule 2    vit C/E/Zn/coppr/lutein/zeaxan (PRESERVISION AREDS-2 ORAL) Take by mouth 2 (two) times a day.      diclofenac sodium (VOLTAREN) 1 % Gel Apply 2 g topically 3 (three) times daily as needed. 100 g 5    [DISCONTINUED] ondansetron (ZOFRAN) 8 MG tablet Take 1 tablet (8 mg total) by mouth every 8 (eight) hours as needed for Nausea. (Patient not taking: Reported on 10/5/2023) 10 tablet 0    [DISCONTINUED] oxyCODONE-acetaminophen (PERCOCET) 5-325 mg per tablet Take 1 tablet by mouth every 4 (four) hours as needed for Pain. (Patient not taking: Reported on 10/5/2023) 20 tablet 0     No current facility-administered medications on file prior to visit.     Review of Systems   Gastrointestinal:         Abdominal tenderness   All other systems reviewed and are negative.        PHYSICAL EXAMINATION:  GENERAL:  Well-developed, well-nourished white female in no acute distress.  Alert and oriented x3.  VITAL SIGNS:  Weight:  Loss of 7 pounds in 1 year  Vitals:    10/05/23 1500   BP: (!) 145/78   BP Location: Left arm   Patient Position: Sitting   BP Method: Medium (Automatic)   Pulse: 88   Resp: 18   Temp: 97.6 °F (36.4 °C)   TempSrc: Temporal   SpO2: 97%   Weight: 67.3 kg (148 lb 5.9 oz)       HEENT:  Normocephalic, atraumatic.  Oral mucosa pink and moist.  Lips without  lesions.  Tongue midline.  Oropharynx clear.  Nonicteric sclerae.   NECK:  Supple.  No adenopathy.                                               HEART:  Regular rate and rhythm without murmur, gallop or rub.               LUNGS:  Clear to auscultation bilaterally.  NL respiratory effort.                            ABDOMEN:  Soft, palpated with tenderness in RUQ, non-distended with positive normoactive bowel sounds.  No hepatosplenomegaly.                                              EXTREMITIES:  No cyanosis, clubbing or edema.  Distal pulses are intact.     AXILLAE AND GROIN:  No palpable pathologic lymphadenopathy is appreciated.    LABORATORY:    Lab Results   Component Value Date    WBC 6.86 10/03/2023    RBC 4.59 10/03/2023    HGB 13.5 10/03/2023    HCT 41.9 10/03/2023    MCV 91 10/03/2023    MCH 29.4 10/03/2023    MCHC 32.2 10/03/2023    RDW 13.9 10/03/2023     10/03/2023    MPV 10.8 10/03/2023    GRAN 4.4 10/03/2023    GRAN 63.6 10/03/2023    LYMPH 1.3 10/03/2023    LYMPH 18.4 10/03/2023    MONO 1.0 10/03/2023    MONO 14.4 10/03/2023    EOS 0.2 10/03/2023    BASO 0.05 10/03/2023    EOSINOPHIL 2.6 10/03/2023    BASOPHIL 0.7 10/03/2023     CMP  Sodium   Date Value Ref Range Status   10/03/2023 141 136 - 145 mmol/L Final     Potassium   Date Value Ref Range Status   10/03/2023 4.0 3.5 - 5.1 mmol/L Final     Chloride   Date Value Ref Range Status   10/03/2023 106 95 - 110 mmol/L Final     CO2   Date Value Ref Range Status   10/03/2023 25 23 - 29 mmol/L Final     Glucose   Date Value Ref Range Status   10/03/2023 103 70 - 110 mg/dL Final     BUN   Date Value Ref Range Status   10/03/2023 11 8 - 23 mg/dL Final     Creatinine   Date Value Ref Range Status   10/03/2023 0.8 0.5 - 1.4 mg/dL Final     Calcium   Date Value Ref Range Status   10/03/2023 9.3 8.7 - 10.5 mg/dL Final     Total Protein   Date Value Ref Range Status   10/03/2023 7.1 6.0 - 8.4 g/dL Final     Albumin   Date Value Ref Range Status    10/03/2023 3.6 3.5 - 5.2 g/dL Final     Total Bilirubin   Date Value Ref Range Status   10/03/2023 0.7 0.1 - 1.0 mg/dL Final     Comment:     For infants and newborns, interpretation of results should be based  on gestational age, weight and in agreement with clinical  observations.    Premature Infant recommended reference ranges:  Up to 24 hours.............<8.0 mg/dL  Up to 48 hours............<12.0 mg/dL  3-5 days..................<15.0 mg/dL  6-29 days.................<15.0 mg/dL       Alkaline Phosphatase   Date Value Ref Range Status   10/03/2023 57 55 - 135 U/L Final     AST   Date Value Ref Range Status   10/03/2023 14 10 - 40 U/L Final     ALT   Date Value Ref Range Status   10/03/2023 9 (L) 10 - 44 U/L Final     Anion Gap   Date Value Ref Range Status   10/03/2023 10 8 - 16 mmol/L Final     eGFR if    Date Value Ref Range Status   10/05/2021 >60 >60 mL/min/1.73 m^2 Final     eGFR if non    Date Value Ref Range Status   10/05/2021 56 (A) >60 mL/min/1.73 m^2 Final     Comment:     Calculation used to obtain the estimated glomerular filtration  rate (eGFR) is the CKD-EPI equation.             CT Abdomen Pelvis With Contrast  Addendum: New pulmonary consolidation within the right middle lobe discussed in the  findings section should also be included in the impression.  These  findings are nonspecific and could represent atelectasis and less there is  clinical concern for early pneumonia or other infectious/inflammatory  process.  Follow-up chest imaging could be considered further  characterization.     Electronically signed by: Allen Arguello   Date:    10/03/2023   Time:    11:20  Narrative: EXAMINATION:  CT ABDOMEN PELVIS WITH CONTRAST    CLINICAL HISTORY:  Gastric cancer, monitor;surveillance for GIST; Personal history of malignant neoplasm of other digestive organs    TECHNIQUE:  Low dose axial images, sagittal and coronal reformations were obtained from the lung bases to  the pubic symphysis following the IV administration of 75 mL of Omnipaque 350 and 1000 ml of Omnipaque 9 oral contrast.    COMPARISON:  Multiple prior examinations most recently CT 10/18/2022.    FINDINGS:  Lower thorax: Visualized portions of the heart are within normal limits.    Lung bases:   Right middle lobe consolidation indeterminate for potential pneumonia, partially imaged and new from the prior examination.  Mild atelectasis otherwise seen at the lung bases. No pleural effusions.    Liver: Liver is normal in size and contour.  No focal hepatic mass.  Scattered calcified granulomas redemonstrated.  Main portal vein appears patent.    Gallbladder: Surgically absent.    Bile Ducts: No evidence of intra or extra hepatic biliary ductal dilation.    Pancreas: No discrete mass or peripancreatic fat stranding.    Stomach: Redemonstrated operative changes along the lesser curvature of the stomach consistent with prior tumor resection.  No CT imaging evidence of any interval adverse change.    Spleen: Normal in size and attenuation.  No focal lesion.    Adrenals: Adrenal glands appear within normal limits.    Kidneys: Kidneys are normal in size and enhance appropriately. No enhancing mass or hydronephrosis. Bilateral extrarenal pelves redemonstrated.    Bladder: No abnormal bladder wall thickening.    Reproductive: The uterus is surgically absent.  No adnexal mass identified.    Bowel/Mesentery:  No evidence of bowel obstruction or inflammation.  No ascites or pneumoperitoneum.  Appendix is not definitively seen.  No evidence of appendicitis.    Lymph nodes: No pathologically enlarged lymph nodes.    Vascular:  No abdominal aortic aneurysm.    Abdominal Wall/Soft Tissues: No significant abnormalities.    Bones: Redemonstrated advanced degenerative changes of the spine and mild broad dextrocurvature.  Grade 1 anterolisthesis of L4 on L5.  Stable presumed bone island within the left acetabulum.  No bony destructive  changes or acute process.  Impression: 1. Stable exam compared to prior CT 10/03/2023 again demonstrating operative changes along the lesser curvature of the stomach consistent with history of tumor resection.  No CT evidence of recurrent or metastatic disease.  2. Stable/incidental findings as above.    Electronically signed by: Allen Arguello  Date:    10/03/2023  Time:    10:59      UGI dated 05/14/2019:  Dr. Guzman:  Impression:                       - Normal oropharynx.                        - Normal esophagus.                        - Z-line regular, 35-36 cm from the incisors.                        - Patulous lower esophageal sphincter.                        - Non-erosive esophageal reflux (NERD) disease(?).                        - Evidence of previous surgery was found in the                        fundus near the cardia, characterized by healthy                        appearing mucosa.                        - Minimal Gastritis. Biopsied.                        - Minimal antritis. Biopsied.                        - Normal stomach otherwise.                        - Normal pylorus.                        - Normal examined duodenum. Biopsied.                        - Normal major papilla.     IMPRESSION:     Completely resected gastrointestinal stromal tumor -- no evidence of disease @ 12 years post.    PLAN:    Return in one year with interval CBC, CMP with CT Abd/Pelvis with prior.   Next UGI due in 04/2026, Dr. Guzman.   Call for any concerns.    Assessment/plan reviewed and approved by Dr. Fortune.   23 minutes were spent in coordination of patient's care, record review and counseling.    Route Chart for Scheduling    Med Onc Chart Routing      Follow up with physician    Follow up with [facility] 1 year. f/u in 1 year with CBC, CMP, CT A/P prior   Infusion scheduling note    Injection scheduling note    Labs    Imaging    Pharmacy appointment    Other referrals

## 2023-10-08 NOTE — H&P
History & Physical - Short Stay  Gastroenterology      SUBJECTIVE:     Procedure: Gastroscopy    Chief Complaint/Indication for Procedure: Surveillance, Hx of GIST of the stomach, 2011.   Chronic/recurrent abdo pain.    History of Present Illness:  See recent Heme/Onc note:  Office Visit   10/5/2023  [facility] 2nd Fl    Aleksandr Das NP  Hematology and Oncology History of gastrointestinal stromal tumor (GIST)  Dx Establish Care   Reason for Visit     Progress Notes    Aleksandr Das NP at 10/5/2023  3:30 PM    Status: Signed   Expand All Collapse All  CC:  Annual GIST surveillance     HISTORY OF PRESENT ILLNESS:  This is a 73-year-old white female known to Dr. Pro for resected low-grade GIST (December 2011).    No adjuvant chemotherapy was given in light of low-grade status.     Presentation:  palpable RUQ mass; abdominal discomfort  09/16/2011: US Abd:  IMPRESSION: SOLID HYPERECHOIC 3 CM MASS WITHIN THE SUBCUTANEOUS FAT OF   THE ANTERIOR RIGHT ABDOMEN CORRESPONDING TO A PALPABLE LUMP.  THOUGH THE   MASS PROBABLY REPRESENTS A LIPOMA, ULTRASOUND IS RELATIVELY NONSPECIFIC   AND CT OR MRI IS RECOMMENDED.     09/23/2011: CT Abdomen/Pelvis-  IMPRESSION:   1.  A SOLID MASS APPEARS TO BE PRESENT THAT APPEARS TO BE EXTENDING FROM,   OR VERY CLOSELY OPPOSED TO THE STOMACH.  THIS MOST LIKELY RELATES TO A   GASTRIC WALL NEOPLASM, POSSIBLY A LEIOMYOMA.  GIVEN THAT ADENOCARCINOMAS   CAN APPEAR SIMILAR, FURTHER EVALUATION IS SUGGESTED.  THIS LESION IS   FAVORED TO BE EXTENDING INTO THE GASTRIC LUMEN AND CAN LIKELY BE ACCESSED   BY ENDOSCOPY.  FURTHER EVALUATION IS SUGGESTED TO EXCLUDE AN AGGRESSIVE   NEOPLASM.     2.  CHOLELITHIASIS.      12/07/11:  Resection of gastric GIST (3.2 x 2.6 cm); cholecystectomy; excision of abdominal wall lipoma per Dr. Cai     TODAY:  10/05/23  She presents to the clinic today for annual (12 years post) evaluation.   She reports continuous abdominal tenderness.   Recent right foot surgery, 07/13/23 & recovering uneventfully.  Presently with a UTI, on antibiotics.   She denies any difficulties with fevers, chills, vomiting, constipation, diarrhea, painful lymphadenopathy, drenching night sweats, unexplained weight loss, bleeding, etc.    No other new complaints or pertinent findings on a 14-point review of systems.     CT Abdomen Pelvis With Contrast  Addendum: New pulmonary consolidation within the right middle lobe discussed in the  findings section should also be included in the impression.  These  findings are nonspecific and could represent atelectasis and less there is  clinical concern for early pneumonia or other infectious/inflammatory  process.  Follow-up chest imaging could be considered further  characterization.      Electronically signed by:    Allen Arguello   Date:                                   10/03/2023   Time:                                  11:20  Narrative: EXAMINATION:  CT ABDOMEN PELVIS WITH CONTRAST     CLINICAL HISTORY:  Gastric cancer, monitor;surveillance for GIST; Personal history of malignant neoplasm of other digestive organs     TECHNIQUE:  Low dose axial images, sagittal and coronal reformations were obtained from the lung bases to the pubic symphysis following the IV administration of 75 mL of Omnipaque 350 and 1000 ml of Omnipaque 9 oral contrast.     COMPARISON:  Multiple prior examinations most recently CT 10/18/2022.     FINDINGS:  Lower thorax: Visualized portions of the heart are within normal limits.     Lung bases:   Right middle lobe consolidation indeterminate for potential pneumonia, partially imaged and new from the prior examination.  Mild atelectasis otherwise seen at the lung bases. No pleural effusions.     Liver: Liver is normal in size and contour.  No focal hepatic mass.  Scattered calcified granulomas redemonstrated.  Main portal vein appears patent.     Gallbladder: Surgically absent.     Bile Ducts: No evidence of intra  or extra hepatic biliary ductal dilation.     Pancreas: No discrete mass or peripancreatic fat stranding.     Stomach: Redemonstrated operative changes along the lesser curvature of the stomach consistent with prior tumor resection.  No CT imaging evidence of any interval adverse change.     Spleen: Normal in size and attenuation.  No focal lesion.     Adrenals: Adrenal glands appear within normal limits.     Kidneys: Kidneys are normal in size and enhance appropriately. No enhancing mass or hydronephrosis. Bilateral extrarenal pelves redemonstrated.     Bladder: No abnormal bladder wall thickening.     Reproductive: The uterus is surgically absent.  No adnexal mass identified.     Bowel/Mesentery:  No evidence of bowel obstruction or inflammation.  No ascites or pneumoperitoneum.  Appendix is not definitively seen.  No evidence of appendicitis.     Lymph nodes: No pathologically enlarged lymph nodes.     Vascular:  No abdominal aortic aneurysm.     Abdominal Wall/Soft Tissues: No significant abnormalities.     Bones: Redemonstrated advanced degenerative changes of the spine and mild broad dextrocurvature.  Grade 1 anterolisthesis of L4 on L5.  Stable presumed bone island within the left acetabulum.  No bony destructive changes or acute process.  Impression: 1. Stable exam compared to prior CT 10/03/2023 again demonstrating operative changes along the lesser curvature of the stomach consistent with history of tumor resection.  No CT evidence of recurrent or metastatic disease.  2. Stable/incidental findings as above.     Electronically signed by:     Allen Arguello  Date:                                    10/03/2023  Time:                                   10:59        UGI dated 05/14/2019:  Dr. Guzman:  Impression:                       - Normal oropharynx.                        - Normal esophagus.                        - Z-line regular, 35-36 cm from the incisors.                        - Patulous lower esophageal  sphincter.                        - Non-erosive esophageal reflux (NERD) disease(?).                        - Evidence of previous surgery was found in the                        fundus near the cardia, characterized by healthy                        appearing mucosa.                        - Minimal Gastritis. Biopsied.                        - Minimal antritis. Biopsied.                        - Normal stomach otherwise.                        - Normal pylorus.                        - Normal examined duodenum. Biopsied.                        - Normal major papilla.   Recommendation:      - Discharge patient to home.                        - Await pathology and CLOtest results.                        - Follow an antireflux regimen.                        - Continue present medications.                        - Use Prilosec (omeprazole) 40 mg PO daily.                        - Add Zantac (ranitidine) 300 mg PO daily.                        - Call the G.I. clinic in 2 weeks for reports (if                        you haven't heard from us sooner) [phone].                        - Return to GI office in 6-8 weeks.   David Guzman MD   5/14/2019        IMPRESSION:     Completely resected gastrointestinal stromal tumor -- no evidence of disease @ 12 years post.     PLAN:    Return in one year with interval CBC, CMP with CT Abd/Pelvis with prior.   Next UGI due in 04/2026, Dr. Guzman.   Call for any concerns.     Assessment/plan reviewed and approved by Dr. Fortune.   23 minutes were spent in coordination of patient's care, record review and counseling.                 And see recent GI OV note:  Office Visit    6/12/2023  [place] - Gastroenterology    Tanya Booth, NP  Gastroenterology Epigastric discomfort +8 more  Dx Abdominal Pain; Referred by Self, Aaareferral  Reason for Visit     Progress Notes    Tanya Booth NP at 6/12/2023  2:00 PM    Status: Signed   Expand All Collapse All  Subjective:          Subjective   Patient ID: Lyudmila Neri is a 73 y.o. female, Body mass index is 27.34 kg/m².     Chief Complaint: Abdominal Pain        Patient is new to me. Established patient of Dr. Guzman & Jailene Goss NP.  Referred by Dr. Tucker for nausea and history of GIST.      Abdominal Pain  This is a chronic problem. The current episode started more than 1 year ago. The onset quality is sudden. The problem occurs intermittently. The problem has been unchanged. The pain is located in the epigastric region. Quality: describes as discomfort. The abdominal pain does not radiate. Associated symptoms include constipation (chronic problem; alternates between constipation and diarrhea, diarrhea predominant; describes stool as type 6 on bristol scale; past meds: OTC fiber supplement daily- no improvement), diarrhea and nausea. Pertinent negatives include no anorexia, belching, dysuria, fever, flatus, frequency, hematochezia, melena, vomiting or weight loss. The pain is aggravated by palpation. The pain is relieved by Nothing. She has tried proton pump inhibitors (Currently: Omeprazole 40 mg once daily) for the symptoms. Prior diagnostic workup includes GI consult and CT scan. Her past medical history is significant for abdominal surgery (Hx of gastrectomy for GIST, hysterectomy and appendectomy), gallstones (Hx of cholecystectomy) and GERD (Hx of gastritis; denies heartburn/dyspepsia). There is no history of colon cancer, Crohn's disease, irritable bowel syndrome, pancreatitis, PUD or ulcerative colitis.     Review of Systems   Constitutional:  Negative for appetite change, fever, unexpected weight change and weight loss.   HENT:  Negative for trouble swallowing.    Respiratory:  Negative for cough and shortness of breath.    Cardiovascular:  Negative for chest pain.   Gastrointestinal:  Positive for abdominal pain, constipation (chronic problem; alternates between constipation and diarrhea, diarrhea predominant;  describes stool as type 6 on bristol scale; past meds: OTC fiber supplement daily- no improvement), diarrhea and nausea. Negative for abdominal distention, anal bleeding, anorexia, blood in stool, flatus, hematochezia, melena, rectal pain and vomiting.     Assessment:      Assessment   1. Epigastric discomfort    2. Generalized abdominal tenderness without rebound tenderness    3. History of gastritis    4. History of gastrointestinal stromal tumor (GIST)    5. Alternating constipation and diarrhea    6. Irregular bowel habits    7. Screening for colon cancer    8. S/P cholecystectomy             Plan:   All diagnoses and orders for this visit:     Epigastric discomfort, Generalized abdominal tenderness without rebound tenderness, History of gastritis & History of gastrointestinal stromal tumor (GIST)              - Schedule EGD               - Continue Omeprazole 40 mg once daily              - Take PPI in the morning 30 minutes before breakfast  - Recommend to avoid large meals, avoid eating within 3 hours of bedtime, elevate head of bed if nocturnal symptoms are present, smoking cessation (if current smoker), & weight loss (if overweight).   - Recommend minimize/avoid high-fat foods, chocolate, caffeine, citrus, alcohol, & tomato products.  - Advised to avoid/limit use of NSAID's, since they can cause GI upset, bleeding, and/or ulcers. If needed, take with food.       Alternating constipation and diarrhea (diarrhea predominant) & Irregular bowel habits  - Stool Exam-Ova,Cysts,Parasites; Future; Expected date: 06/12/2023  - Giardia / Cryptosporidum, EIA; Future; Expected date: 06/12/2023  - Stool culture; Future; Expected date: 06/12/2023  - Occult blood x 1, stool; Future; Expected date: 06/12/2023  - WBC, Stool; Future; Expected date: 06/12/2023  - pH, stool; Future; Expected date: 06/12/2023  - Clostridium difficile EIA; Future; Expected date: 06/12/2023  - TISSUE TRANSGLUTAMINASE (TTG), IGA; Future; Expected  date: 06/12/2023  - IGA; Future; Expected date: 06/12/2023  - Recommended increase fiber in diet, especially soluble fiber since this can help bulk up the stool consistency and may help to slow down how fast the stool goes through the colon and can prevent diarrhea   - Discussed colonoscopy but patient declined at this time  - Discussed trial of Bentyl but patient would like to hold off at this time     Screening for colon cancer              - Next surveillance colonoscopy due 4/2026     S/P cholecystectomy                 PTA Medications   Medication Sig    albuterol (VENTOLIN HFA) 90 mcg/actuation inhaler Inhale 2 puffs into the lungs every 6 (six) hours as needed for Wheezing. Rescue    amLODIPine (NORVASC) 5 MG tablet TAKE 1 TABLET(5 MG) BY MOUTH EVERY DAY    aspirin (ECOTRIN) 81 MG EC tablet Take 81 mg by mouth once daily.    cetirizine (ZYRTEC) 10 MG tablet Take 10 mg by mouth once daily.    cholecalciferol, vitamin D3, (VITAMIN D3) 25 mcg (1,000 unit) capsule Take 1,000 Units by mouth once daily.    fish oil-omega-3 fatty acids 300-1,000 mg capsule Take by mouth once daily.    FOLIC ACID/MULTIVIT-MIN/LUTEIN (CENTRUM SILVER ORAL) Take by mouth.    levothyroxine (SYNTHROID) 50 MCG tablet Take 50 mcg by mouth.    losartan-hydrochlorothiazide 100-12.5 mg (HYZAAR) 100-12.5 mg Tab Take 1 tablet by mouth once daily.    MYRBETRIQ 50 mg Tb24 Take 1 tablet by mouth once daily    omeprazole (PRILOSEC) 40 MG capsule TAKE 1 CAPSULE BY MOUTH ONCE DAILY BEFORE BREAKFAST    vit C/E/Zn/coppr/lutein/zeaxan (PRESERVISION AREDS-2 ORAL) Take by mouth 2 (two) times a day.    clobetasol (TEMOVATE) 0.05 % external solution Apply topically as needed.     diclofenac sodium (VOLTAREN) 1 % Gel Apply 2 g topically 3 (three) times daily as needed.    DULoxetine (CYMBALTA) 30 MG capsule Take 1 capsule (30 mg total) by mouth once daily.    tolterodine (DETROL LA) 2 MG Cp24 Take 1 capsule (2 mg total) by mouth once daily. (Patient taking  differently: Take 2 mg by mouth every evening.)       Review of patient's allergies indicates:   Allergen Reactions    Codeine      Other reaction(s): Nausea        Past Medical History:   Diagnosis Date    Anticoagulant long-term use     Arthritis     osteoarthritis    Blood transfusion     Cancer 2011    stromal tumor, stomach    Depression     Disorder of kidney and ureter     CKD 2    GERD (gastroesophageal reflux disease)     GIST (gastrointestinal stromal tumor), malignant     H. pylori infection     Hypertension     KATHY (obstructive sleep apnea) 6/24/2013    Psoriasis 6/24/2013    Trouble in sleeping     arthritic pain wakes her up    Urinary incontinence     stress incontinence     Past Surgical History:   Procedure Laterality Date    ANKLE FRACTURE SURGERY      RT ankle    APPENDECTOMY      BLADDER SUSPENSION  1995    CARPAL TUNNEL RELEASE      left    CERVICAL FUSION      CHEILECTOMY Right 7/13/2023    Procedure: CHEILECTOMY;  Surgeon: Agusto Edwards DPM;  Location: [facility] OR;  Service: Podiatry;  Laterality: Right;    CHOLECYSTECTOMY  12/7/2011  Dr. Cai    COLONOSCOPY  7/26/2005  [facility]    Non-erosive esophageal reflux (NERD) disease?.  Post-surgical deformity in the gastric body.   Gastric mucosal abnormality (erythema) in the greater  curve of antrum.  STEVIE Test performed on 02/24/12 was Negative.    COLONOSCOPY  12/12/2008  [facility]    Small internal hemorrhoids.  Slightly redundant left colon.    COLONOSCOPY N/A 4/18/2018    Procedure: COLONOSCOPY;  Surgeon: David Guzman Jr., MD;  Location: [facility];  Service: Endoscopy;  Laterality: N/A;    COLONOSCOPY  04/18/2018    Dr. Guzman; hemorrhoids, otherwise unremarkable, repeat 5 years for surveillance due to family history of colon cancer    CYST REMOVAL Left 2016    left middle finger, Dr. Johnson    ENDOSCOPIC GASTROCNEMIUS RECESSION Right 7/13/2023    Procedure: RECESSION, GASTROCNEMIUS, ENDOSCOPIC;  Surgeon: Augsto Edwards DPM;  Location:  [facility] OR;  Service: Podiatry;  Laterality: Right;  popliteal saphenous block,    ESOPHAGOGASTRODUODENOSCOPY N/A 5/14/2019    Procedure: EGD (ESOPHAGOGASTRODUODENOSCOPY);  Surgeon: David Guzman Jr., MD;  Location: [facility];  Service: Endoscopy;  Laterality: N/A;    EYE SURGERY Bilateral 1995    RK surgery    EYE SURGERY Bilateral 2015    cataract removal    FOOT ARTHRODESIS Right 7/13/2023    Procedure: FUSION, FOOT;  Surgeon: Agusto Edwards DPM;  Location: [facility] OR;  Service: Podiatry;  Laterality: Right;  popliteal saphenous block, fusion of the naviculocuneiform and tarsometatarsal joints 1-3.    GASTRECTOMY      HYSTERECTOMY      KNEE ARTHROSCOPY W/ DEBRIDEMENT      lt knee    SHOULDER OPEN ROTATOR CUFF REPAIR      left    STOMACH SURGERY  12/7/2011  Dr. Cai    removal of GIST tumor from wall of the stomach, 2.3 cm in size.    TRANSFORAMINAL EPIDURAL INJECTION OF STEROID Bilateral 4/9/2019    Procedure: Injection,steroid,epidural,transforaminal approach L4/5;  Surgeon: Pa Pruett MD;  Location: [facility] OR;  Service: Pain Management;  Laterality: Bilateral;    TRANSFORAMINAL EPIDURAL INJECTION OF STEROID Bilateral 6/5/2019    Procedure: Injection,steroid,epidural,transforaminal approach L4/5;  Surgeon: Pa Pruett MD;  Location: [facility] OR;  Service: Pain Management;  Laterality: Bilateral;    UPPER GASTROINTESTINAL ENDOSCOPY  2/24/2012  [facility]    Non-erosive esophageal reflux (NERD) disease?.  Post-surgical deformity in the gastric body.   Gastric mucosal abnormality (erythema) in the greater  curve of antrum.  STEVIE Test performed on 02/24/12 was Negative.    UPPER GASTROINTESTINAL ENDOSCOPY  04/18/2018    Dr. Guzman, antritis, evidence of prior surgery with healthy mucosa     Family History   Problem Relation Age of Onset    Heart disease Mother     Arthritis Mother         osteoarthritis    COPD Mother     Hyperlipidemia Mother     Hypertension Mother     Kidney disease Mother     Stroke  Mother     Colon cancer Mother 75    Cancer Mother 70        colon cancer    Cancer Father         brain and lung    Arthritis Father     COPD Sister     Depression Daughter     Arthritis Maternal Aunt         rheumatoid arthritis    Heart disease Maternal Uncle     Early death Maternal Uncle         in 50s due to heart disease    Arthritis Paternal Aunt         rheuamtoid arthritis    Heart disease Maternal Grandfather     Arthritis Paternal Grandmother         rheumatoid arthritis    Diabetes Paternal Grandmother     Diabetes Cousin     Heart disease Cousin     Crohn's disease Neg Hx     Stomach cancer Neg Hx     Ulcerative colitis Neg Hx     Esophageal cancer Neg Hx      Social History     Tobacco Use    Smoking status: Never    Smokeless tobacco: Never   Substance Use Topics    Alcohol use: No    Drug use: No         OBJECTIVE:     Vital Signs (Most Recent)  Temp: 97.2 °F (36.2 °C) (10/09/23 0851)  Pulse: 60 (10/09/23 0851)  Resp: 12 (10/09/23 0851)  BP: (!) 162/76 (10/09/23 0851)  SpO2: 99 % (10/09/23 0851)    Physical Exam:  : Wt 67.3 kg (148 lb 5.9 oz)       BMI 26.28 kg/m² .                                                       GENERAL:  Comfortable, in no acute distress.                                 HEENT EXAM:  Nonicteric.  No adenopathy.  Oropharynx is clear.               NECK:  Supple.                                                               LUNGS:  Clear.                                                               CARDIAC:  Regular rate and rhythm.  S1, S2.  No murmur.                      ABDOMEN:  Soft, positive bowel sounds, nontender.  No hepatosplenomegaly or masses.  No rebound or guarding.                                             EXTREMITIES:  No edema.     MENTAL STATUS:  Alert and oriented.    ASSESSMENT/PLAN:     Assessment: Surveillance, Hx of GIST of the stomach, 2011.   Chronic/recurrent abdo pain.    Plan: Gastroscopy    Anesthesia Plan:   MAC / General Anaesthesia    ASA  Grade: ASA 2 - Patient with mild systemic disease with no functional limitations    MALLAMPATI SCORE: I (soft palate, uvula, fauces, and tonsillar pillars visible)

## 2023-10-09 ENCOUNTER — ANESTHESIA EVENT (OUTPATIENT)
Dept: ENDOSCOPY | Facility: HOSPITAL | Age: 73
End: 2023-10-09
Payer: MEDICARE

## 2023-10-09 ENCOUNTER — ANESTHESIA (OUTPATIENT)
Dept: ENDOSCOPY | Facility: HOSPITAL | Age: 73
End: 2023-10-09
Payer: MEDICARE

## 2023-10-09 ENCOUNTER — HOSPITAL ENCOUNTER (OUTPATIENT)
Facility: HOSPITAL | Age: 73
Discharge: HOME | End: 2023-10-09
Attending: INTERNAL MEDICINE | Admitting: INTERNAL MEDICINE
Payer: MEDICARE

## 2023-10-09 VITALS
HEART RATE: 73 BPM | DIASTOLIC BLOOD PRESSURE: 62 MMHG | OXYGEN SATURATION: 99 % | RESPIRATION RATE: 16 BRPM | SYSTOLIC BLOOD PRESSURE: 112 MMHG | TEMPERATURE: 98 F

## 2023-10-09 DIAGNOSIS — Z86.012 HISTORY OF BENIGN CARCINOID NEOPLASM OF GASTROINTESTINAL TRACT: ICD-10-CM

## 2023-10-09 PROCEDURE — D9220A PRA ANESTHESIA: ICD-10-PCS | Mod: CRNA,,, | Performed by: NURSE ANESTHETIST, CERTIFIED REGISTERED

## 2023-10-09 PROCEDURE — 37000009 HC ANESTHESIA EA ADD 15 MINS: Mod: HCNC,PO | Performed by: INTERNAL MEDICINE

## 2023-10-09 PROCEDURE — 88305 TISSUE EXAM BY PATHOLOGIST: ICD-10-PCS | Mod: 26,HCNC,, | Performed by: STUDENT IN AN ORGANIZED HEALTH CARE EDUCATION/TRAINING PROGRAM

## 2023-10-09 PROCEDURE — 27201012 HC FORCEPS, HOT/COLD, DISP: Mod: HCNC,PO | Performed by: INTERNAL MEDICINE

## 2023-10-09 PROCEDURE — 43239 EGD BIOPSY SINGLE/MULTIPLE: CPT | Mod: HCNC,,, | Performed by: INTERNAL MEDICINE

## 2023-10-09 PROCEDURE — 88312 PR  SPECIAL STAINS,GROUP I: ICD-10-PCS | Mod: 26,HCNC,, | Performed by: STUDENT IN AN ORGANIZED HEALTH CARE EDUCATION/TRAINING PROGRAM

## 2023-10-09 PROCEDURE — D9220A PRA ANESTHESIA: ICD-10-PCS | Mod: ANES,,, | Performed by: ANESTHESIOLOGY

## 2023-10-09 PROCEDURE — 88342 IMHCHEM/IMCYTCHM 1ST ANTB: CPT | Mod: HCNC,PO | Performed by: STUDENT IN AN ORGANIZED HEALTH CARE EDUCATION/TRAINING PROGRAM

## 2023-10-09 PROCEDURE — 88305 TISSUE EXAM BY PATHOLOGIST: CPT | Mod: 26,HCNC,, | Performed by: STUDENT IN AN ORGANIZED HEALTH CARE EDUCATION/TRAINING PROGRAM

## 2023-10-09 PROCEDURE — 43239 PR EGD, FLEX, W/BIOPSY, SGL/MULTI: ICD-10-PCS | Mod: HCNC,,, | Performed by: INTERNAL MEDICINE

## 2023-10-09 PROCEDURE — 88305 TISSUE EXAM BY PATHOLOGIST: CPT | Mod: HCNC,PO | Performed by: STUDENT IN AN ORGANIZED HEALTH CARE EDUCATION/TRAINING PROGRAM

## 2023-10-09 PROCEDURE — 63600175 PHARM REV CODE 636 W HCPCS: Mod: HCNC,PO | Performed by: NURSE ANESTHETIST, CERTIFIED REGISTERED

## 2023-10-09 PROCEDURE — 25000003 PHARM REV CODE 250: Mod: HCNC,PO | Performed by: NURSE ANESTHETIST, CERTIFIED REGISTERED

## 2023-10-09 PROCEDURE — 37000008 HC ANESTHESIA 1ST 15 MINUTES: Mod: HCNC,PO | Performed by: INTERNAL MEDICINE

## 2023-10-09 PROCEDURE — 88342 CHG IMMUNOCYTOCHEMISTRY: ICD-10-PCS | Mod: 26,HCNC,, | Performed by: STUDENT IN AN ORGANIZED HEALTH CARE EDUCATION/TRAINING PROGRAM

## 2023-10-09 PROCEDURE — 88312 SPECIAL STAINS GROUP 1: CPT | Mod: HCNC,PO | Performed by: STUDENT IN AN ORGANIZED HEALTH CARE EDUCATION/TRAINING PROGRAM

## 2023-10-09 PROCEDURE — 88342 IMHCHEM/IMCYTCHM 1ST ANTB: CPT | Mod: 26,HCNC,, | Performed by: STUDENT IN AN ORGANIZED HEALTH CARE EDUCATION/TRAINING PROGRAM

## 2023-10-09 PROCEDURE — 63600175 PHARM REV CODE 636 W HCPCS: Mod: HCNC,PO | Performed by: INTERNAL MEDICINE

## 2023-10-09 PROCEDURE — 43239 EGD BIOPSY SINGLE/MULTIPLE: CPT | Mod: HCNC,PO | Performed by: INTERNAL MEDICINE

## 2023-10-09 PROCEDURE — D9220A PRA ANESTHESIA: Mod: ANES,,, | Performed by: ANESTHESIOLOGY

## 2023-10-09 PROCEDURE — D9220A PRA ANESTHESIA: Mod: CRNA,,, | Performed by: NURSE ANESTHETIST, CERTIFIED REGISTERED

## 2023-10-09 PROCEDURE — 88312 SPECIAL STAINS GROUP 1: CPT | Mod: 26,HCNC,, | Performed by: STUDENT IN AN ORGANIZED HEALTH CARE EDUCATION/TRAINING PROGRAM

## 2023-10-09 RX ORDER — SODIUM CHLORIDE, SODIUM LACTATE, POTASSIUM CHLORIDE, CALCIUM CHLORIDE 600; 310; 30; 20 MG/100ML; MG/100ML; MG/100ML; MG/100ML
INJECTION, SOLUTION INTRAVENOUS CONTINUOUS
Status: DISCONTINUED | OUTPATIENT
Start: 2023-10-09 | End: 2023-10-09 | Stop reason: HOSPADM

## 2023-10-09 RX ORDER — PROPOFOL 10 MG/ML
VIAL (ML) INTRAVENOUS
Status: DISCONTINUED | OUTPATIENT
Start: 2023-10-09 | End: 2023-10-09

## 2023-10-09 RX ORDER — SODIUM CHLORIDE 0.9 % (FLUSH) 0.9 %
10 SYRINGE (ML) INJECTION
Status: DISCONTINUED | OUTPATIENT
Start: 2023-10-09 | End: 2023-10-09 | Stop reason: HOSPADM

## 2023-10-09 RX ORDER — LIDOCAINE HYDROCHLORIDE 20 MG/ML
INJECTION INTRAVENOUS
Status: DISCONTINUED | OUTPATIENT
Start: 2023-10-09 | End: 2023-10-09

## 2023-10-09 RX ORDER — FAMOTIDINE 40 MG/1
40 TABLET, FILM COATED ORAL NIGHTLY
Qty: 60 TABLET | Refills: 5 | Status: SHIPPED | OUTPATIENT
Start: 2023-10-09 | End: 2024-10-08

## 2023-10-09 RX ADMIN — PROPOFOL 100 MG: 10 INJECTION, EMULSION INTRAVENOUS at 09:10

## 2023-10-09 RX ADMIN — PROPOFOL 50 MG: 10 INJECTION, EMULSION INTRAVENOUS at 09:10

## 2023-10-09 RX ADMIN — SODIUM CHLORIDE, POTASSIUM CHLORIDE, SODIUM LACTATE AND CALCIUM CHLORIDE: 600; 310; 30; 20 INJECTION, SOLUTION INTRAVENOUS at 08:10

## 2023-10-09 RX ADMIN — LIDOCAINE HYDROCHLORIDE 100 MG: 20 INJECTION INTRAVENOUS at 09:10

## 2023-10-09 NOTE — [employer] PATIENT INSTRUCTIONS
Discharge Summary/Instructions after an Endoscopic Procedure  Patient Name: Lyudmila Neri  Patient MRN: [id number]  Patient YOB: 1950 Monday, October 9, 2023  David Guzman MD  Dear patient,  As a result of recent federal legislation (The Federal Cures Act), you may   receive lab or pathology results from your procedure in your [facility]   account before your physician is able to contact you. Your physician or   their representative will relay the results to you with their   recommendations at their soonest availability.  Thank you,  RESTRICTIONS:  During your procedure today, you received medications for sedation.  These   medications may affect your judgment, balance and coordination.  Therefore,   for 24 hours, you have the following restrictions:   - DO NOT drive a car, operate machinery, make legal/financial decisions,   sign important papers or drink alcohol.    ACTIVITY:  Today: no heavy lifting, straining or running due to procedural   sedation/anesthesia.  The following day: return to full activity including work.  DIET:  Eat and drink normally unless instructed otherwise.     TREATMENT FOR COMMON SIDE EFFECTS:  - Mild abdominal pain, nausea, belching, bloating or excessive gas:  rest,   eat lightly and use a heating pad.  - Sore Throat: treat with throat lozenges and/or gargle with warm salt   water.  - Because air was used during the procedure, expelling large amounts of air   from your rectum or belching is normal.  - If a bowel prep was taken, you may not have a bowel movement for 1-3 days.    This is normal.  SYMPTOMS TO WATCH FOR AND REPORT TO YOUR PHYSICIAN:  1. Abdominal pain or bloating, other than gas cramps.  2. Chest pain.  3. Back pain.  4. Signs of infection such as: chills or fever occurring within 24 hours   after the procedure.  5. Rectal bleeding, which would show as bright red, maroon, or black stools.   (A tablespoon of blood from the rectum is not serious, especially  if   hemorrhoids are present.)  6. Vomiting.  7. Weakness or dizziness.  GO DIRECTLY TO THE NEAREST EMERGENCY ROOM IF YOU HAVE ANY OF THE FOLLOWING:      Difficulty breathing              Chills and/or fever over 101 F   Persistent vomiting and/or vomiting blood   Severe abdominal pain   Severe chest pain   Black, tarry stools   Bleeding- more than one tablespoon   Any other symptom or condition that you feel may need urgent attention  Your doctor recommends these additional instructions:  If any biopsies were taken, your doctors clinic will contact you in 1 to 2   weeks with any results.  Follow an antireflux regimen.  This includes:       - Do not lie down for at least 3 to 4 hours after meals.        - Raise the head of the bed 4 to 6 inches.        - Decrease excess weight.        - Avoid citrus juices and other acidic foods, alcohol, chocolate, mints,   coffee and other caffeinated beverages, carbonated beverages, fatty and   fried foods.        - Avoid tight-fitting clothing.        - Avoid cigarettes and other tobacco products.   Continue your present medications.   Take Prilosec (omeprazole) 40 mg by mouth once a day.   Add night time Pepcid (famotidine) 40 mg by mouth about an hour before   bedtime.   Return to GI clinic in 4-6 weeks.  For questions, problems or results please call your physician - David Guzman MD at Work:  [phone].  EMERGENCY PHONE NUMBER: [phone], LAB RESULTS: [phone]  IF A COMPLICATION OR EMERGENCY SITUATION ARISES AND YOU ARE UNABLE TO REACH   YOUR PHYSICIAN - GO DIRECTLY TO THE EMERGENCY ROOM.  ___________________________________________  Nurse Signature  ___________________________________________  Patient/Designated Responsible Party Signature  David Guzman MD  10/9/2023 10:21:07 AM  This report has been verified and signed electronically.  Dear patient,  As a result of recent federal legislation (The Federal Cures Act), you may   receive lab or  pathology results from your procedure in your [facility]   account before your physician is able to contact you. Your physician or   their representative will relay the results to you with their   recommendations at their soonest availability.  Thank you.  [employer]ATION

## 2023-10-09 NOTE — PROGRESS NOTES
Patient states she is on augmentin for UTI, day 5 of 10 day treatment. Patient also has productive dry cough. Dr Busch made aware.

## 2023-10-09 NOTE — DISCHARGE INSTRUCTIONS
Recovery After Procedural Sedation (Adult)   You have been given medicine by vein to make you sleep during your surgery. This may have included both a pain medicine and sleeping medicine. Most of the effects have worn off. But you may still have some drowsiness for the next 6 to 8 hours.  Home care  Follow these guidelines when you get home:  For the next 8 hours, you should be watched by a responsible adult. This person should make sure your condition is not getting worse.  Don't drink any alcohol for the next 24 hours.  Don't drive, operate dangerous machinery, or make important business or personal decisions during the next 24 hours.  To prevent injury or falls, use caution when standing and walking for at least 24 hours after your procedure.  Note: Your healthcare provider may tell you not to take any medicine by mouth for pain or sleep in the next 4 hours. These medicines may react with the medicines you were given in the hospital. This could cause a much stronger response than usual.  Follow-up care  Follow up with your healthcare provider if you are not alert and back to your usual level of activity within 12 hours.  When to seek medical advice  Call your healthcare provider right away if any of these occur:  Drowsiness gets worse  Weakness or dizziness gets worse  Repeated vomiting  You can't be awakened  Fever  New rash  [employer] last reviewed this educational content on 9/1/2019  © 9034-9873 [employer]. [address] [place] [address], [place] [address]. All rights reserved. This information is not intended as a substitute for professional medical care. Always follow your healthcare professional's instructions       Tips to Control Acid Reflux    To control acid reflux, youll need to make some basic diet and lifestyle changes. The simple steps outlined below may be all youll need to ease discomfort.  Watch what you eat  Avoid fatty foods and spicy foods.  Eat fewer acidic foods, such as citrus and  tomato-based foods. These can increase symptoms.  Limit drinking alcohol, caffeine, and fizzy beverages. All increase acid reflux.  Try limiting chocolate, peppermint, and spearmint. These can worsen acid reflux in some people.  Watch when you eat  Avoid lying down for 3 hours after eating.  Do not snack before going to bed.  Raise your head  Raising your head and upper body by 4 to 6 inches helps limit reflux when youre lying down. Put blocks under the head of your bed frame to raise it.  Other changes  Lose weight, if you need to  Dont exercise near bedtime  Avoid tight-fitting clothes  Limit aspirin and ibuprofen  Stop smoking   Date Last Reviewed: 7/1/2016  © 0778-1807 [employer]. [address] [place] [address], [place] [address]. All rights reserved. This information is not intended as a substitute for professional medical care. Always follow your healthcare professional's instructions.         High-Fiber Diet  Fiber is in fruits, vegetables, cereals, and grains. Fiber passes through your body undigested. A high-fiber diet helps food move through your intestinal tract. The added bulk is helpful in preventing constipation. In people with diverticulosis, fiber helps clean out the pouches along the colon wall. It also prevents new pouches from forming. A high-fiber diet reduces the risk of colon cancer. It also lowers blood cholesterol and prevents high blood sugar in people with diabetes.    The fiber-rich foods listed below should be part of your diet. If you are not used to high-fiber foods, start with 1 or 2 foods from this list. Every 3 to 4 days add a new one to your diet. Do this until you are eating 4 high-fiber foods per day. This should give you 20 to 35 grams of fiber a day. It is also important to drink a lot of water when you are on this diet. You should have 6 to 8 glasses of water a day. Water makes the fiber swell and increases the benefit.  Foods high in dietary fiber  The following foods  are high in dietary fiber:  Breads. Breads made with 100% whole-wheat flour; avi, wheat, or rye crackers; whole-grain tortillas, bran muffins.  Cereals. Whole-grain and bran cereals with bran (shredded wheat, wheat flakes, raisin bran, corn bran); oatmeal, rolled oats, granola, and brown rice.  Fruits. Fresh fruits and their edible skins (pears, prunes, raisins, berries, apples, and apricots); bananas, citrus fruit, mangoes, pineapple; and prune juice.  Nuts. Any nuts and seeds.  Vegetables. Best served raw or lightly cooked. All types, especially: green peas, celery, eggplant, potatoes, spinach, broccoli, Bow sprouts, winter squash, carrots, cauliflower, soybeans, lentils, and fresh and dried beans of all kinds.  Other. Popcorn, any spices.  Date Last Reviewed: 8/1/2016  © 9639-3448 [employer]. [address] [place] [address]. All rights reserved. This information is not intended as a substitute for professional medical care. Always follow your healthcare professional's instructions.

## 2023-10-09 NOTE — TRANSFER OF CARE
Anesthesia Transfer of Care Note    Patient: Lyudmila Neri    Procedure(s) Performed: Procedure(s) (LRB):  EGD (ESOPHAGOGASTRODUODENOSCOPY) (N/A)    Patient location: PACU    Anesthesia Type: general    Transport from OR: Transported from OR on room air with adequate spontaneous ventilation    Post pain: adequate analgesia    Post assessment: no apparent anesthetic complications and tolerated procedure well    Post vital signs: stable    Level of consciousness: sedated    Nausea/Vomiting: no nausea/vomiting    Complications: none    Transfer of care protocol was followed      Last vitals:   Visit Vitals  /60   Pulse 60   Temp 36.2 °C (97.2 °F)   Resp 12   SpO2 99%   Breastfeeding No

## 2023-10-09 NOTE — ANESTHESIA POSTPROCEDURE EVALUATION
Anesthesia Post Evaluation    Patient: Lyudmila Neri    Procedure(s) Performed: Procedure(s) (LRB):  EGD (ESOPHAGOGASTRODUODENOSCOPY) (N/A)    Final Anesthesia Type: general      Patient location during evaluation: PACU  Patient participation: Yes- Able to Participate  Level of consciousness: sedated and awake  Post-procedure vital signs: reviewed and stable  Pain management: adequate  Airway patency: patent    PONV status at discharge: No PONV  Anesthetic complications: no      Cardiovascular status: blood pressure returned to baseline  Respiratory status: spontaneous ventilation  Hydration status: euvolemic  Follow-up not needed.          Vitals Value Taken Time   /62 10/09/23 1016   Temp 36.5 °C (97.7 °F) 10/09/23 1004   Pulse 73 10/09/23 1016   Resp 16 10/09/23 1016   SpO2 99 % 10/09/23 1016         Event Time   Out of Recovery 10:48:11         Pain/Jacquelin Score: Pain Rating Post Med Admin: 0 (10/9/2023 10:36 AM)  Jacquelin Score: 10 (10/9/2023 10:36 AM)

## 2023-10-09 NOTE — BRIEF OP NOTE
Discharge Note  Short Stay      SUMMARY     Admit Date: 10/9/2023    Attending Physician: David Guzman Jr., MD     Discharge Physician: David Guzman Jr., MD    Discharge Date: 10/9/2023 10:23 AM    Final Diagnosis: Epigastric discomfort [R10.13]  Nausea [R11.0]  History of gastrointestinal stromal tumor (GIST) [Z85.09]    Impression:            - Normal oropharynx.                          - Normal larynx.                          - Normal cricopharyngeus.                          - Normal esophagus.                          - Z-line regular, 36-37 cm from the incisors.                          - Patulous lower esophageal sphincter.                          - Normal gastric fundus.                          - Gastritis. Biopsied.                          - Antritis. Biopsied.                          - Normal stomach otherwise.                          - Normal pylorus.                          - Normal examined duodenum.   Recommendation:        - Discharge patient to home.                          - Await pathology results.                          - Follow an antireflux regimen.                          - Continue present medications.                          - Use Prilosec (omeprazole) 40 mg PO daily.                          - Add Pepcid (famotidine) 40 mg PO nightly.                          - Call the G.I. clinic in 2 weeks for reports (if                          you haven't heard from us sooner) [phone].                          - Return to GI clinic in 4-6 weeks.                          - If no improve, consider add Carafate.   David Guzman MD   10/9/2023      Disposition: HOME OR SELF CARE    Patient Instructions:   Current Discharge Medication List        START taking these medications    Details   famotidine (PEPCID) 40 MG tablet Take 1 tablet (40 mg total) by mouth every evening.  Qty: 60 tablet, Refills: 5           CONTINUE these medications which have NOT CHANGED    Details    albuterol (VENTOLIN HFA) 90 mcg/actuation inhaler Inhale 2 puffs into the lungs every 6 (six) hours as needed for Wheezing. Rescue  Qty: 18 g, Refills: 0    Associated Diagnoses: Cough, unspecified type      amLODIPine (NORVASC) 5 MG tablet TAKE 1 TABLET(5 MG) BY MOUTH EVERY DAY  Qty: 90 tablet, Refills: 3      aspirin (ECOTRIN) 81 MG EC tablet Take 81 mg by mouth once daily.      cetirizine (ZYRTEC) 10 MG tablet Take 10 mg by mouth once daily.      cholecalciferol, vitamin D3, (VITAMIN D3) 25 mcg (1,000 unit) capsule Take 1,000 Units by mouth once daily.      fish oil-omega-3 fatty acids 300-1,000 mg capsule Take by mouth once daily.      FOLIC ACID/MULTIVIT-MIN/LUTEIN (CENTRUM SILVER ORAL) Take by mouth.      levothyroxine (SYNTHROID) 50 MCG tablet Take 50 mcg by mouth.      losartan-hydrochlorothiazide 100-12.5 mg (HYZAAR) 100-12.5 mg Tab Take 1 tablet by mouth once daily.  Qty: 90 tablet, Refills: 3    Comments: .      MYRBETRIQ 50 mg Tb24 Take 1 tablet by mouth once daily  Qty: 90 tablet, Refills: 0      omeprazole (PRILOSEC) 40 MG capsule TAKE 1 CAPSULE BY MOUTH ONCE DAILY BEFORE BREAKFAST  Qty: 90 capsule, Refills: 3      vit C/E/Zn/coppr/lutein/zeaxan (PRESERVISION AREDS-2 ORAL) Take by mouth 2 (two) times a day.      clobetasol (TEMOVATE) 0.05 % external solution Apply topically as needed.     Associated Diagnoses: GIST (gastrointestinal stromal tumor), malignant      diclofenac sodium (VOLTAREN) 1 % Gel Apply 2 g topically 3 (three) times daily as needed.  Qty: 100 g, Refills: 5      DULoxetine (CYMBALTA) 30 MG capsule Take 1 capsule (30 mg total) by mouth once daily.  Qty: 30 capsule, Refills: 5      tolterodine (DETROL LA) 2 MG Cp24 Take 1 capsule (2 mg total) by mouth once daily.  Qty: 30 capsule, Refills: 2             Discharge Procedure Orders (must include Diet, Follow-up, Activity)    Follow Up:  Follow up with PCP as per your routine.  Please follow an anti reflux diet and a high fiber  diet.  Activity as tolerated.    No driving day of procedure.

## 2023-10-09 NOTE — ANESTHESIA PREPROCEDURE EVALUATION
10/09/2023  Lyudmila Neri is a 73 y.o., female.    Pre-op Assessment    I have reviewed the Patient Summary Reports.    I have reviewed the Nursing Notes. I have reviewed the NPO Status.   I have reviewed the Medications.     Review of Systems  Anesthesia Hx:  No problems with previous Anesthesia    Hematology/Oncology:         -- Cancer in past history: Other (see Oncology comments) surgery    Cardiovascular:   Hypertension, well controlled    Pulmonary:   Sleep Apnea    Renal/:   Chronic Renal Disease, CKD Recent UTI on ab's    Hepatic/GI:   GERD H/o GIST    Musculoskeletal:   Arthritis     Neurological:  Neurology Normal    Endocrine:  Endocrine Normal    Psych:   Psychiatric History depression          Physical Exam  General:  Well nourished      Airway/Jaw/Neck:  Airway Findings: Mouth Opening: Normal   Tongue: Normal   General Airway Assessment: Adult Mallampati: I  TM Distance: Normal, at least 6 cm         Eyes/Ears/Nose:  EYES/EARS/NOSE FINDINGS: Normal   Dental:  Dental Findings:Upper veneers    Chest/Lungs:  Chest/Lungs Findings: Clear to auscultation, Normal Respiratory Rate      Heart/Vascular:  Heart Findings: Rate: Normal  Rhythm: Regular Rhythm  Sounds: Normal        Mental Status:  Mental Status Findings:  Cooperative, Alert and Oriented         Anesthesia Plan  Type of Anesthesia, risks & benefits discussed:  Anesthesia Type:  general    Patient's Preference:   Plan Factors:          Intra-op Monitoring Plan: standard ASA monitors  Intra-op Monitoring Plan Comments:   Post Op Pain Control Plan:   Post Op Pain Control Plan Comments:     Induction:   IV  Beta Blocker:  Patient is not currently on a Beta-Blocker (No further documentation required).       Informed Consent: Informed consent signed with the Patient and all parties understand the risks and agree with anesthesia plan.  All  questions answered.  Anesthesia consent signed with patient.  ASA Score: 3     Day of Surgery Review of History & Physical: I have interviewed and examined the patient. I have reviewed the patient's H&P dated:  There are no significant changes.            Ready For Surgery From Anesthesia Perspective.           Physical Exam  General: Well nourished    Airway:  Mallampati: I   Mouth Opening: Normal  TM Distance: Normal, at least 6 cm  Tongue: Normal    Chest/Lungs:  Clear to auscultation, Normal Respiratory Rate    Heart:  Rate: Normal  Rhythm: Regular Rhythm  Sounds: Normal          Anesthesia Plan  Type of Anesthesia, risks & benefits discussed:    Anesthesia Type: general  Intra-op Monitoring Plan: standard ASA monitors  Induction:  IV  Informed Consent: Informed consent signed with the Patient and all parties understand the risks and agree with anesthesia plan.  All questions answered.   ASA Score: 3  Day of Surgery Review of History & Physical: I have interviewed and examined the patient. I have reviewed the patient's H&P dated:     Ready For Surgery From Anesthesia Perspective.       .

## 2023-10-10 ENCOUNTER — OFFICE VISIT (OUTPATIENT)
Dept: FAMILY MEDICINE | Facility: CLINIC | Age: 73
End: 2023-10-10
Payer: MEDICARE

## 2023-10-10 VITALS
BODY MASS INDEX: 26.57 KG/M2 | DIASTOLIC BLOOD PRESSURE: 76 MMHG | WEIGHT: 149.94 LBS | HEIGHT: 63 IN | SYSTOLIC BLOOD PRESSURE: 120 MMHG | OXYGEN SATURATION: 95 % | HEART RATE: 65 BPM

## 2023-10-10 DIAGNOSIS — I10 ESSENTIAL HYPERTENSION: Primary | ICD-10-CM

## 2023-10-10 DIAGNOSIS — N39.41 URGE INCONTINENCE OF URINE: ICD-10-CM

## 2023-10-10 DIAGNOSIS — I77.9 BILATERAL CAROTID ARTERY DISEASE, UNSPECIFIED TYPE: ICD-10-CM

## 2023-10-10 DIAGNOSIS — M15.9 PRIMARY OSTEOARTHRITIS INVOLVING MULTIPLE JOINTS: ICD-10-CM

## 2023-10-10 PROCEDURE — 99214 PR OFFICE/OUTPT VISIT, EST, LEVL IV, 30-39 MIN: ICD-10-PCS | Mod: HCNC,S$GLB,, | Performed by: INTERNAL MEDICINE

## 2023-10-10 PROCEDURE — 99999 PR PBB SHADOW E&M-EST. PATIENT-LVL IV: ICD-10-PCS | Mod: PBBFAC,HCNC,, | Performed by: INTERNAL MEDICINE

## 2023-10-10 PROCEDURE — 1100F PTFALLS ASSESS-DOCD GE2>/YR: CPT | Mod: HCNC,CPTII,S$GLB, | Performed by: INTERNAL MEDICINE

## 2023-10-10 PROCEDURE — 3074F SYST BP LT 130 MM HG: CPT | Mod: HCNC,CPTII,S$GLB, | Performed by: INTERNAL MEDICINE

## 2023-10-10 PROCEDURE — 1159F PR MEDICATION LIST DOCUMENTED IN MEDICAL RECORD: ICD-10-PCS | Mod: HCNC,CPTII,S$GLB, | Performed by: INTERNAL MEDICINE

## 2023-10-10 PROCEDURE — 1160F PR REVIEW ALL MEDS BY PRESCRIBER/CLIN PHARMACIST DOCUMENTED: ICD-10-PCS | Mod: HCNC,CPTII,S$GLB, | Performed by: INTERNAL MEDICINE

## 2023-10-10 PROCEDURE — 1159F MED LIST DOCD IN RCRD: CPT | Mod: HCNC,CPTII,S$GLB, | Performed by: INTERNAL MEDICINE

## 2023-10-10 PROCEDURE — 3074F PR MOST RECENT SYSTOLIC BLOOD PRESSURE < 130 MM HG: ICD-10-PCS | Mod: HCNC,CPTII,S$GLB, | Performed by: INTERNAL MEDICINE

## 2023-10-10 PROCEDURE — 3044F PR MOST RECENT HEMOGLOBIN A1C LEVEL <7.0%: ICD-10-PCS | Mod: HCNC,CPTII,S$GLB, | Performed by: INTERNAL MEDICINE

## 2023-10-10 PROCEDURE — 1100F PR PT FALLS ASSESS DOC 2+ FALLS/FALL W/INJURY/YR: ICD-10-PCS | Mod: HCNC,CPTII,S$GLB, | Performed by: INTERNAL MEDICINE

## 2023-10-10 PROCEDURE — 1126F PR PAIN SEVERITY QUANTIFIED, NO PAIN PRESENT: ICD-10-PCS | Mod: HCNC,CPTII,S$GLB, | Performed by: INTERNAL MEDICINE

## 2023-10-10 PROCEDURE — 3044F HG A1C LEVEL LT 7.0%: CPT | Mod: HCNC,CPTII,S$GLB, | Performed by: INTERNAL MEDICINE

## 2023-10-10 PROCEDURE — 99999 PR PBB SHADOW E&M-EST. PATIENT-LVL IV: CPT | Mod: PBBFAC,HCNC,, | Performed by: INTERNAL MEDICINE

## 2023-10-10 PROCEDURE — 3078F PR MOST RECENT DIASTOLIC BLOOD PRESSURE < 80 MM HG: ICD-10-PCS | Mod: HCNC,CPTII,S$GLB, | Performed by: INTERNAL MEDICINE

## 2023-10-10 PROCEDURE — 3288F FALL RISK ASSESSMENT DOCD: CPT | Mod: HCNC,CPTII,S$GLB, | Performed by: INTERNAL MEDICINE

## 2023-10-10 PROCEDURE — 99214 OFFICE O/P EST MOD 30 MIN: CPT | Mod: HCNC,S$GLB,, | Performed by: INTERNAL MEDICINE

## 2023-10-10 PROCEDURE — 3288F PR FALLS RISK ASSESSMENT DOCUMENTED: ICD-10-PCS | Mod: HCNC,CPTII,S$GLB, | Performed by: INTERNAL MEDICINE

## 2023-10-10 PROCEDURE — 1160F RVW MEDS BY RX/DR IN RCRD: CPT | Mod: HCNC,CPTII,S$GLB, | Performed by: INTERNAL MEDICINE

## 2023-10-10 PROCEDURE — 3078F DIAST BP <80 MM HG: CPT | Mod: HCNC,CPTII,S$GLB, | Performed by: INTERNAL MEDICINE

## 2023-10-10 PROCEDURE — 1126F AMNT PAIN NOTED NONE PRSNT: CPT | Mod: HCNC,CPTII,S$GLB, | Performed by: INTERNAL MEDICINE

## 2023-10-10 NOTE — PROGRESS NOTES
Patient ID: Lyudmila Neri is a 73 y.o. female.    Chief Complaint: Annual Exam (No flu vaccine )        Assessment and Plan      1. Essential hypertension    2. Bilateral carotid artery disease, unspecified type    3. Primary osteoarthritis involving multiple joints    4. Urge incontinence of urine     Doing well overall.  Follow-up with specialist as planned.  Continue amlodipine and losartan-hydrochlorothiazide  Continue omeprazole and Pepcid     HPI     Annual     Med review- yes   Labs review- yes   Diet review- good   Exercise- walking at work   Alcohol- rarely   Tobacco- none   HM review- yes     Foot surgery went well.  Hypertension is controlled on amlodipine and losartan-hydrochlorothiazide.  Recently diagnosed with a UTI by urgent care.  On 10 day course of Augmentin.  Had recent EGD that showed gastritis and antritis.  GI added Pepcid.  For osteoarthritis pain she would like to get back on Celebrex but insurance did not cover.  She tried Cymbalta which did not help much.  She is following with Dr. Banerjee.  Hypothyroidism is controlled on levothyroxine 50 mcg.  She is seeing Dr. Dhillon for urge incontinence.  She is currently on myrbetriq and tolterodine.    Review of Systems   Constitutional:  Negative for fever.   Respiratory:  Negative for shortness of breath.    Cardiovascular:  Negative for chest pain.   Gastrointestinal:  Negative for abdominal pain.        Objective     Vitals:    10/10/23 0945   BP: 120/76   Pulse: 65     Wt Readings from Last 3 Encounters:   10/10/23 0945 68 kg (149 lb 14.6 oz)   10/05/23 1500 67.3 kg (148 lb 5.9 oz)   10/05/23 1225 67.3 kg (148 lb 5.9 oz)      Body mass index is 26.56 kg/m².   Physical Exam  Cardiovascular:      Rate and Rhythm: Normal rate and regular rhythm.      Heart sounds: No murmur heard.     No gallop.   Pulmonary:      Breath sounds: Normal breath sounds. No wheezing or rhonchi.   Abdominal:      Palpations: Abdomen is soft.      Tenderness: There  is no abdominal tenderness.          Medication List with Changes/Refills   Current Medications    ALBUTEROL (VENTOLIN HFA) 90 MCG/ACTUATION INHALER    Inhale 2 puffs into the lungs every 6 (six) hours as needed for Wheezing. Rescue    AMLODIPINE (NORVASC) 5 MG TABLET    TAKE 1 TABLET(5 MG) BY MOUTH EVERY DAY    ASPIRIN (ECOTRIN) 81 MG EC TABLET    Take 81 mg by mouth once daily.    CETIRIZINE (ZYRTEC) 10 MG TABLET    Take 10 mg by mouth once daily.    CHOLECALCIFEROL, VITAMIN D3, (VITAMIN D3) 25 MCG (1,000 UNIT) CAPSULE    Take 1,000 Units by mouth once daily.    CLOBETASOL (TEMOVATE) 0.05 % EXTERNAL SOLUTION    Apply topically as needed.     DICLOFENAC SODIUM (VOLTAREN) 1 % GEL    Apply 2 g topically 3 (three) times daily as needed.    DULOXETINE (CYMBALTA) 30 MG CAPSULE    Take 1 capsule (30 mg total) by mouth once daily.    FAMOTIDINE (PEPCID) 40 MG TABLET    Take 1 tablet (40 mg total) by mouth every evening.    FISH OIL-OMEGA-3 FATTY ACIDS 300-1,000 MG CAPSULE    Take by mouth once daily.    FOLIC ACID/MULTIVIT-MIN/LUTEIN (CENTRUM SILVER ORAL)    Take by mouth.    LEVOTHYROXINE (SYNTHROID) 50 MCG TABLET    Take 50 mcg by mouth.    LOSARTAN-HYDROCHLOROTHIAZIDE 100-12.5 MG (HYZAAR) 100-12.5 MG TAB    Take 1 tablet by mouth once daily.    MYRBETRIQ 50 MG TB24    Take 1 tablet by mouth once daily    OMEPRAZOLE (PRILOSEC) 40 MG CAPSULE    TAKE 1 CAPSULE BY MOUTH ONCE DAILY BEFORE BREAKFAST    TOLTERODINE (DETROL LA) 2 MG CP24    Take 1 capsule (2 mg total) by mouth once daily.    VIT C/E/ZN/COPPR/LUTEIN/ZEAXAN (PRESERVISION AREDS-2 ORAL)    Take by mouth 2 (two) times a day.       I personally reviewed past medical, family and social history.    Patient Active Problem List   Diagnosis    GERD (gastroesophageal reflux disease)    Essential hypertension    KATHY (obstructive sleep apnea)    Psoriasis    History of gastrointestinal stromal tumor (GIST)    Family history of colon cancer in mother    Chronic kidney  disease, stage II (mild)    Primary osteoarthritis involving multiple joints    Degenerative disc disease, lumbar    Lumbar radiculopathy    Carotid disease, bilateral    Epigastric pain    Lactose intolerance    Early dry stage nonexudative age-related macular degeneration of both eyes    Tortuous aorta    Arthritis of midfoot    Arthritis of metatarsophalangeal (MTP) joint of great toe    Gastrocnemius equinus of right lower extremity    Urge incontinence of urine

## 2023-10-12 ENCOUNTER — OFFICE VISIT (OUTPATIENT)
Dept: RHEUMATOLOGY | Facility: CLINIC | Age: 73
End: 2023-10-12
Payer: MEDICARE

## 2023-10-12 VITALS
HEART RATE: 71 BPM | BODY MASS INDEX: 26.58 KG/M2 | WEIGHT: 150 LBS | SYSTOLIC BLOOD PRESSURE: 152 MMHG | HEIGHT: 63 IN | DIASTOLIC BLOOD PRESSURE: 83 MMHG

## 2023-10-12 DIAGNOSIS — N18.2 CHRONIC KIDNEY DISEASE, STAGE II (MILD): ICD-10-CM

## 2023-10-12 DIAGNOSIS — Z79.1 LONG TERM (CURRENT) USE OF NON-STEROIDAL ANTI-INFLAMMATORIES (NSAID): ICD-10-CM

## 2023-10-12 DIAGNOSIS — M54.16 LUMBAR RADICULOPATHY: ICD-10-CM

## 2023-10-12 DIAGNOSIS — M15.8 OTHER OSTEOARTHRITIS INVOLVING MULTIPLE JOINTS: Primary | ICD-10-CM

## 2023-10-12 PROCEDURE — 1159F PR MEDICATION LIST DOCUMENTED IN MEDICAL RECORD: ICD-10-PCS | Mod: HCNC,CPTII,S$GLB, | Performed by: INTERNAL MEDICINE

## 2023-10-12 PROCEDURE — 3077F PR MOST RECENT SYSTOLIC BLOOD PRESSURE >= 140 MM HG: ICD-10-PCS | Mod: HCNC,CPTII,S$GLB, | Performed by: INTERNAL MEDICINE

## 2023-10-12 PROCEDURE — 3079F PR MOST RECENT DIASTOLIC BLOOD PRESSURE 80-89 MM HG: ICD-10-PCS | Mod: HCNC,CPTII,S$GLB, | Performed by: INTERNAL MEDICINE

## 2023-10-12 PROCEDURE — 1160F PR REVIEW ALL MEDS BY PRESCRIBER/CLIN PHARMACIST DOCUMENTED: ICD-10-PCS | Mod: HCNC,CPTII,S$GLB, | Performed by: INTERNAL MEDICINE

## 2023-10-12 PROCEDURE — 1125F PR PAIN SEVERITY QUANTIFIED, PAIN PRESENT: ICD-10-PCS | Mod: HCNC,CPTII,S$GLB, | Performed by: INTERNAL MEDICINE

## 2023-10-12 PROCEDURE — 3044F PR MOST RECENT HEMOGLOBIN A1C LEVEL <7.0%: ICD-10-PCS | Mod: HCNC,CPTII,S$GLB, | Performed by: INTERNAL MEDICINE

## 2023-10-12 PROCEDURE — 3077F SYST BP >= 140 MM HG: CPT | Mod: HCNC,CPTII,S$GLB, | Performed by: INTERNAL MEDICINE

## 2023-10-12 PROCEDURE — 1125F AMNT PAIN NOTED PAIN PRSNT: CPT | Mod: HCNC,CPTII,S$GLB, | Performed by: INTERNAL MEDICINE

## 2023-10-12 PROCEDURE — 3288F FALL RISK ASSESSMENT DOCD: CPT | Mod: HCNC,CPTII,S$GLB, | Performed by: INTERNAL MEDICINE

## 2023-10-12 PROCEDURE — 99999 PR PBB SHADOW E&M-EST. PATIENT-LVL IV: ICD-10-PCS | Mod: PBBFAC,HCNC,, | Performed by: INTERNAL MEDICINE

## 2023-10-12 PROCEDURE — 99215 PR OFFICE/OUTPT VISIT, EST, LEVL V, 40-54 MIN: ICD-10-PCS | Mod: HCNC,S$GLB,, | Performed by: INTERNAL MEDICINE

## 2023-10-12 PROCEDURE — 99999 PR PBB SHADOW E&M-EST. PATIENT-LVL IV: CPT | Mod: PBBFAC,HCNC,, | Performed by: INTERNAL MEDICINE

## 2023-10-12 PROCEDURE — 1159F MED LIST DOCD IN RCRD: CPT | Mod: HCNC,CPTII,S$GLB, | Performed by: INTERNAL MEDICINE

## 2023-10-12 PROCEDURE — 99215 OFFICE O/P EST HI 40 MIN: CPT | Mod: HCNC,S$GLB,, | Performed by: INTERNAL MEDICINE

## 2023-10-12 PROCEDURE — 1100F PR PT FALLS ASSESS DOC 2+ FALLS/FALL W/INJURY/YR: ICD-10-PCS | Mod: HCNC,CPTII,S$GLB, | Performed by: INTERNAL MEDICINE

## 2023-10-12 PROCEDURE — 3288F PR FALLS RISK ASSESSMENT DOCUMENTED: ICD-10-PCS | Mod: HCNC,CPTII,S$GLB, | Performed by: INTERNAL MEDICINE

## 2023-10-12 PROCEDURE — 1160F RVW MEDS BY RX/DR IN RCRD: CPT | Mod: HCNC,CPTII,S$GLB, | Performed by: INTERNAL MEDICINE

## 2023-10-12 PROCEDURE — 3044F HG A1C LEVEL LT 7.0%: CPT | Mod: HCNC,CPTII,S$GLB, | Performed by: INTERNAL MEDICINE

## 2023-10-12 PROCEDURE — 1100F PTFALLS ASSESS-DOCD GE2>/YR: CPT | Mod: HCNC,CPTII,S$GLB, | Performed by: INTERNAL MEDICINE

## 2023-10-12 PROCEDURE — 3079F DIAST BP 80-89 MM HG: CPT | Mod: HCNC,CPTII,S$GLB, | Performed by: INTERNAL MEDICINE

## 2023-10-12 RX ORDER — DULOXETIN HYDROCHLORIDE 60 MG/1
60 CAPSULE, DELAYED RELEASE ORAL DAILY
Qty: 30 CAPSULE | Refills: 4 | Status: SHIPPED | OUTPATIENT
Start: 2023-10-12 | End: 2024-02-08

## 2023-10-12 ASSESSMENT — ROUTINE ASSESSMENT OF PATIENT INDEX DATA (RAPID3)
PAIN SCORE: 6.5
MDHAQ FUNCTION SCORE: 0.8
FATIGUE SCORE: 0
PATIENT GLOBAL ASSESSMENT SCORE: 5.5
PSYCHOLOGICAL DISTRESS SCORE: 0
TOTAL RAPID3 SCORE: 4.89

## 2023-10-12 NOTE — PROGRESS NOTES
Subjective:          Chief Complaint: Lyudmila Neri is a 73 y.o. female who had concerns including Disease Management.    HPI:    Patient is a 73-year-old female here for f/u of OA various joints to discuss management options:     Feet now painful all the time, previously only end of day. Pain first thing in AM, pain at rest. Dorsal mid foot predominant. Not as much in toes- working with Dr. Post. Was receiving injections ultimately did have right foot surgery for fusion with marked improvement of her pain, pending the left foot. Recent increased knee pain: working with Ortho. MRI right knee with degenrative changes.     New anterior thigh pain in same distribution of her L4-5 lesion. No pain in groin, sitting crossed legged in visit today w/o pain. No GTB pain. Mild and manageable at this time describes as an ache.     Wondering if Cymbalta is still helping, she is noting progression of global stiffness over the years rate pain 5/10 wandering OA pattern in various joints: knees low back and cervical spine.   Hand are doing well     Celebrex as of 2020 insurance will not fill  Mobic in past not as effective.     Using Aleve OTC BID doing better than other Rx NSAIDs.   Cymbalta 30mg daily   BP has been good    Rheumatic Hx:    As a history of nonerosive erosive reflux gastritis as well as GIST resected 2011 follows with Dr. Pro.  Recent serologies rheumatoid factor negative, sedimentation rate within normal limits, SINDI negative, C-reactive protein is slightly elevated.  Affected joints: knees, shoulders cervical spine (hx of fusion), hips, hands are stiff in the CMC and MCP and PIP, feet. Right ankle with known fx and ORIF.   Stifffness in AM 45 min with hot shower.   No dactylitis, no known other joint swelling. No IBD. She notes drys eyes, no scleritis, episcleritis  She did use aleve which helps, meloxicam not really. Celebrex does help but cannot use BID due to HTN. Did recently have BP meds adjusted.   Is having relief but not complete. No GI upset.   She has sensitivity to soft tissue pain.  She has hx of Psoriasis that is controlled for at least 10 years-scalp predominant but diffusely.   A new well marginated erosion present at the radial aspect of the base of the right fourth proximal phalanx joint space narrowing in the third fourth MCP and the left third MCP joint degenerative changes otherwise.    REVIEW OF SYSTEMS:    Review of Systems   Constitutional:  Positive for malaise/fatigue. Negative for fever and weight loss.   HENT:  Negative for sore throat.    Eyes:  Negative for double vision, photophobia and redness.   Respiratory:  Negative for cough, shortness of breath and wheezing.    Cardiovascular:  Negative for chest pain, palpitations and orthopnea.   Gastrointestinal:  Negative for abdominal pain, constipation and diarrhea.   Genitourinary:  Negative for dysuria, hematuria and urgency.   Musculoskeletal:  Positive for joint pain and myalgias. Negative for back pain.   Skin:  Negative for rash.   Neurological:  Negative for dizziness, tingling, focal weakness and headaches.   Endo/Heme/Allergies:  Does not bruise/bleed easily.   Psychiatric/Behavioral:  Negative for depression, hallucinations and suicidal ideas.                Objective:            Past Medical History:   Diagnosis Date    Anticoagulant long-term use     Arthritis     osteoarthritis    Blood transfusion     Cancer 2011    stromal tumor, stomach    Depression     Disorder of kidney and ureter     CKD 2    GERD (gastroesophageal reflux disease)     GIST (gastrointestinal stromal tumor), malignant     H. pylori infection     Hypertension     KATHY (obstructive sleep apnea) 6/24/2013    Psoriasis 6/24/2013    Trouble in sleeping     arthritic pain wakes her up    Urinary incontinence     stress incontinence     Family History   Problem Relation Age of Onset    Heart disease Mother     Arthritis Mother         osteoarthritis    COPD Mother      Hyperlipidemia Mother     Hypertension Mother     Kidney disease Mother     Stroke Mother     Colon cancer Mother 75    Cancer Mother 70        colon cancer    Cancer Father         brain and lung    Arthritis Father     COPD Sister     Depression Daughter     Arthritis Maternal Aunt         rheumatoid arthritis    Heart disease Maternal Uncle     Early death Maternal Uncle         in 50s due to heart disease    Arthritis Paternal Aunt         rheuamtoid arthritis    Heart disease Maternal Grandfather     Arthritis Paternal Grandmother         rheumatoid arthritis    Diabetes Paternal Grandmother     Diabetes Cousin     Heart disease Cousin     Crohn's disease Neg Hx     Stomach cancer Neg Hx     Ulcerative colitis Neg Hx     Esophageal cancer Neg Hx      Social History     Tobacco Use    Smoking status: Never    Smokeless tobacco: Never   Substance Use Topics    Alcohol use: No    Drug use: No         Current Outpatient Medications on File Prior to Visit   Medication Sig Dispense Refill    albuterol (VENTOLIN HFA) 90 mcg/actuation inhaler Inhale 2 puffs into the lungs every 6 (six) hours as needed for Wheezing. Rescue 18 g 0    amLODIPine (NORVASC) 5 MG tablet TAKE 1 TABLET(5 MG) BY MOUTH EVERY DAY 90 tablet 3    aspirin (ECOTRIN) 81 MG EC tablet Take 81 mg by mouth once daily.      cetirizine (ZYRTEC) 10 MG tablet Take 10 mg by mouth once daily.      cholecalciferol, vitamin D3, (VITAMIN D3) 25 mcg (1,000 unit) capsule Take 1,000 Units by mouth once daily.      clobetasol (TEMOVATE) 0.05 % external solution Apply topically as needed.       famotidine (PEPCID) 40 MG tablet Take 1 tablet (40 mg total) by mouth every evening. 60 tablet 5    fish oil-omega-3 fatty acids 300-1,000 mg capsule Take by mouth once daily.      FOLIC ACID/MULTIVIT-MIN/LUTEIN (CENTRUM SILVER ORAL) Take by mouth.      levothyroxine (SYNTHROID) 50 MCG tablet Take 50 mcg by mouth.      losartan-hydrochlorothiazide 100-12.5 mg (HYZAAR)  100-12.5 mg Tab Take 1 tablet by mouth once daily. 90 tablet 3    MYRBETRIQ 50 mg Tb24 Take 1 tablet by mouth once daily 90 tablet 0    omeprazole (PRILOSEC) 40 MG capsule TAKE 1 CAPSULE BY MOUTH ONCE DAILY BEFORE BREAKFAST 90 capsule 3    vit C/E/Zn/coppr/lutein/zeaxan (PRESERVISION AREDS-2 ORAL) Take by mouth 2 (two) times a day.      diclofenac sodium (VOLTAREN) 1 % Gel Apply 2 g topically 3 (three) times daily as needed. 100 g 5    DULoxetine (CYMBALTA) 30 MG capsule Take 1 capsule (30 mg total) by mouth once daily. 30 capsule 5    tolterodine (DETROL LA) 2 MG Cp24 Take 1 capsule (2 mg total) by mouth once daily. (Patient taking differently: Take 2 mg by mouth every evening.) 30 capsule 2     No current facility-administered medications on file prior to visit.       Vitals:    10/12/23 1554   BP: (!) 152/83   Pulse: 71       Physical Exam:    Physical Exam  Constitutional:       Appearance: Normal appearance. She is well-developed.   HENT:      Nose: No septal deviation.      Mouth/Throat:      Mouth: No oral lesions.   Eyes:      Conjunctiva/sclera:      Right eye: Right conjunctiva is not injected.      Left eye: Left conjunctiva is not injected.      Pupils: Pupils are equal, round, and reactive to light.   Neck:      Thyroid: No thyroid mass or thyromegaly.      Vascular: No JVD.   Cardiovascular:      Rate and Rhythm: Normal rate and regular rhythm.      Pulses: Normal pulses.      Comments: No edema  Pulmonary:      Effort: Pulmonary effort is normal.      Breath sounds: Normal breath sounds.   Abdominal:      Palpations: Abdomen is soft.   Musculoskeletal:      Right shoulder: Tenderness present. No swelling. Normal range of motion.      Left shoulder: Tenderness present. No swelling. Normal range of motion.      Right elbow: No swelling. Normal range of motion. No tenderness.      Left elbow: No swelling. Normal range of motion. No tenderness.      Right wrist: No swelling or tenderness. Normal range of  motion.      Left wrist: No swelling or tenderness. Normal range of motion.      Right hand: Tenderness present.      Left hand: Tenderness present.      Right hip: Normal range of motion. Normal strength.      Left hip: No tenderness. Normal range of motion.      Right knee: No swelling. Normal range of motion. Tenderness present.      Left knee: No swelling. Normal range of motion. Tenderness present.      Right ankle: No swelling. No tenderness. Normal range of motion.      Left ankle: No swelling. No tenderness. Normal range of motion.      Right foot: Tenderness present.      Left foot: Tenderness present.      Comments: 2nd DIP b/l bony hypertrophy.   No active synovitis but tenderness at fingers, shoulders, knees and metatarsalgia.   No nodules of the achilles.     No pain with ROM hips, negative SLR  No pain with resisted knee extension, hip ab and ad duction.   Pain in the spine and buttock with hip resisted hip flexion.    Lymphadenopathy:      Cervical: No cervical adenopathy.   Skin:     General: Skin is dry.   Neurological:      Deep Tendon Reflexes: Reflexes are normal and symmetric.               Assessment:       Encounter Diagnoses   Name Primary?    Other osteoarthritis involving multiple joints Yes    Long term (current) use of non-steroidal anti-inflammatories (nsaid)     Chronic kidney disease, stage II (mild)     Lumbar radiculopathy             Plan:        Other osteoarthritis involving multiple joints  -     Renal Function Panel; Future; Expected date: 10/12/2023    Long term (current) use of non-steroidal anti-inflammatories (nsaid)  -     Renal Function Panel; Future; Expected date: 10/12/2023    Chronic kidney disease, stage II (mild)    Lumbar radiculopathy    Other orders  -     DULoxetine (CYMBALTA) 60 MG capsule; Take 1 capsule (60 mg total) by mouth once daily.  Dispense: 30 capsule; Refill: 4         OA in the setting of CKDII    Voltaren most effective for feet.   Aleve 220mg using  rarely, renal function is good. But we have to limit as hx of some fluctuations with GFR.   Ok for Aleve 220mg every day and then only use twice daily for really bad days.   Increase Cymbalta to 60mg daily, this may reduce the need for aleve so I will reassess in a few months.   Renal panel prior to f/u in 3 months.     Regarding thighs : I do feel this is in the distribution of her L4-L5 spinal disease, no evidence for hip pathology on exam.     We spent some time discussion the progression of OA and procedures (feet and interventional pain) that can help mitigate pain  No follow-ups on file.         40min consultation with greater than 50% of that time included Preparing to see the patient (review records, tests), Obtaining and/or reviewing separately obtained historical data, Performing a medically appropriate examination and/or evaluation , Ordering medications, tests, and/or procedures, Referring and communicating with other healthcare professionals , Documenting clinical information in the electronic or other health record and Independently interpreting results  (as warranted) & communicating results to the patient/family/caregiver. All questions answered.

## 2023-10-12 NOTE — PATIENT INSTRUCTIONS
Try - Aleve liquid gels one every day and ok for twice daily every now and then as needed.     Increasing Cymbalta from 30mg daily to 60mg daily.

## 2023-10-23 ENCOUNTER — TELEPHONE (OUTPATIENT)
Dept: UROGYNECOLOGY | Facility: CLINIC | Age: 73
End: 2023-10-23
Payer: MEDICARE

## 2023-10-23 NOTE — TELEPHONE ENCOUNTER
----- Message from Winnie Collier sent at 10/23/2023 12:57 PM CDT -----  Contact: Patient  Type:     Apoointment Request      Name of Caller:Patient     When is the first available appointment?NA    Symptoms:Annual    Would the patient rather a call back or a response via [facility]? Call    Best Call Back Number:[phone] (home)     Additional Information: Please call to advise

## 2023-10-25 LAB
FINAL PATHOLOGIC DIAGNOSIS: NORMAL
GROSS: NORMAL
Lab: NORMAL
SUPPLEMENTAL DIAGNOSIS: NORMAL

## 2023-11-02 ENCOUNTER — HOSPITAL ENCOUNTER (OUTPATIENT)
Dept: RADIOLOGY | Facility: HOSPITAL | Age: 73
Discharge: HOME | End: 2023-11-02
Attending: NURSE PRACTITIONER
Payer: MEDICARE

## 2023-11-02 ENCOUNTER — HOSPITAL ENCOUNTER (OUTPATIENT)
Dept: RADIOLOGY | Facility: HOSPITAL | Age: 73
Discharge: HOME | End: 2023-11-02
Attending: INTERNAL MEDICINE
Payer: MEDICARE

## 2023-11-02 DIAGNOSIS — Z12.31 ENCOUNTER FOR SCREENING MAMMOGRAM FOR MALIGNANT NEOPLASM OF BREAST: ICD-10-CM

## 2023-11-02 DIAGNOSIS — Z00.00 ENCOUNTER FOR PREVENTIVE HEALTH EXAMINATION: ICD-10-CM

## 2023-11-02 DIAGNOSIS — Z78.0 MENOPAUSE: ICD-10-CM

## 2023-11-02 PROCEDURE — 77067 SCR MAMMO BI INCL CAD: CPT | Mod: 26,HCNC,, | Performed by: RADIOLOGY

## 2023-11-02 PROCEDURE — 77080 DXA BONE DENSITY AXIAL: CPT | Mod: 26,HCNC,, | Performed by: RADIOLOGY

## 2023-11-02 PROCEDURE — 77063 BREAST TOMOSYNTHESIS BI: CPT | Mod: 26,HCNC,, | Performed by: RADIOLOGY

## 2023-11-02 PROCEDURE — 77080 DXA BONE DENSITY AXIAL: CPT | Mod: TC,HCNC,PO

## 2023-11-02 PROCEDURE — 77067 MAMMO DIGITAL SCREENING BILAT WITH TOMO: ICD-10-PCS | Mod: 26,HCNC,, | Performed by: RADIOLOGY

## 2023-11-02 PROCEDURE — 77080 DXA BONE DENSITY AXIAL SKELETON 1 OR MORE SITES: ICD-10-PCS | Mod: 26,HCNC,, | Performed by: RADIOLOGY

## 2023-11-02 PROCEDURE — 77063 MAMMO DIGITAL SCREENING BILAT WITH TOMO: ICD-10-PCS | Mod: 26,HCNC,, | Performed by: RADIOLOGY

## 2023-11-02 PROCEDURE — 77067 SCR MAMMO BI INCL CAD: CPT | Mod: TC,HCNC,PO

## 2023-11-14 ENCOUNTER — OFFICE VISIT (OUTPATIENT)
Dept: PODIATRY | Facility: CLINIC | Age: 73
End: 2023-11-14
Payer: MEDICARE

## 2023-11-14 DIAGNOSIS — Z98.890 POST-OPERATIVE STATE: Primary | ICD-10-CM

## 2023-11-14 PROCEDURE — 1160F RVW MEDS BY RX/DR IN RCRD: CPT | Mod: HCNC,CPTII,S$GLB, | Performed by: PODIATRIST

## 2023-11-14 PROCEDURE — 1101F PR PT FALLS ASSESS DOC 0-1 FALLS W/OUT INJ PAST YR: ICD-10-PCS | Mod: HCNC,CPTII,S$GLB, | Performed by: PODIATRIST

## 2023-11-14 PROCEDURE — 3288F PR FALLS RISK ASSESSMENT DOCUMENTED: ICD-10-PCS | Mod: HCNC,CPTII,S$GLB, | Performed by: PODIATRIST

## 2023-11-14 PROCEDURE — 1159F PR MEDICATION LIST DOCUMENTED IN MEDICAL RECORD: ICD-10-PCS | Mod: HCNC,CPTII,S$GLB, | Performed by: PODIATRIST

## 2023-11-14 PROCEDURE — 1126F PR PAIN SEVERITY QUANTIFIED, NO PAIN PRESENT: ICD-10-PCS | Mod: HCNC,CPTII,S$GLB, | Performed by: PODIATRIST

## 2023-11-14 PROCEDURE — 3044F PR MOST RECENT HEMOGLOBIN A1C LEVEL <7.0%: ICD-10-PCS | Mod: HCNC,CPTII,S$GLB, | Performed by: PODIATRIST

## 2023-11-14 PROCEDURE — 99024 POSTOP FOLLOW-UP VISIT: CPT | Mod: HCNC,S$GLB,, | Performed by: PODIATRIST

## 2023-11-14 PROCEDURE — 99024 PR POST-OP FOLLOW-UP VISIT: ICD-10-PCS | Mod: HCNC,S$GLB,, | Performed by: PODIATRIST

## 2023-11-14 PROCEDURE — 99999 PR PBB SHADOW E&M-EST. PATIENT-LVL III: CPT | Mod: PBBFAC,HCNC,, | Performed by: PODIATRIST

## 2023-11-14 PROCEDURE — 99999 PR PBB SHADOW E&M-EST. PATIENT-LVL III: ICD-10-PCS | Mod: PBBFAC,HCNC,, | Performed by: PODIATRIST

## 2023-11-14 PROCEDURE — 3044F HG A1C LEVEL LT 7.0%: CPT | Mod: HCNC,CPTII,S$GLB, | Performed by: PODIATRIST

## 2023-11-14 PROCEDURE — 1126F AMNT PAIN NOTED NONE PRSNT: CPT | Mod: HCNC,CPTII,S$GLB, | Performed by: PODIATRIST

## 2023-11-14 PROCEDURE — 1160F PR REVIEW ALL MEDS BY PRESCRIBER/CLIN PHARMACIST DOCUMENTED: ICD-10-PCS | Mod: HCNC,CPTII,S$GLB, | Performed by: PODIATRIST

## 2023-11-14 PROCEDURE — 1101F PT FALLS ASSESS-DOCD LE1/YR: CPT | Mod: HCNC,CPTII,S$GLB, | Performed by: PODIATRIST

## 2023-11-14 PROCEDURE — 1159F MED LIST DOCD IN RCRD: CPT | Mod: HCNC,CPTII,S$GLB, | Performed by: PODIATRIST

## 2023-11-14 PROCEDURE — 3288F FALL RISK ASSESSMENT DOCD: CPT | Mod: HCNC,CPTII,S$GLB, | Performed by: PODIATRIST

## 2023-11-14 NOTE — PROGRESS NOTES
Subjective:      Patient ID: Lyudmila Neri is a 73 y.o. female.    Chief Complaint: post op       Lyudmila is a 73 y.o. female     9/27/22  Patient returns for injections to bilateral midfoot for arthritic changes and pain, wearing good supportive shoes gets the injections a couple times a year they last several months when she gets them    4/24/23: Patient returns for follow up bilateral midfoot arthritis for injections and wants to discuss surgical correction of the right foot.     5/10/23: Patient returns for follow up right foot CT to discuss treatment options moving forward    7/19/23: Patient returns s/p 1 week midfoot fusions with gastroc recession, she is in a posterior splint non weight bearing in wheelchair    7/26/23: Patient returns s/p 2 weeks midfoot fusion and gastroc recession right foot, non weight bearing with cast in place    8/2/23: Patient returns s/p 3 weeks right midfoot fusion and gastroc recession, non weight bearing in below knee cast no new complaints.    8/9/23: Patient returns s/p 4 weeks right midfoot fusion and gastroc recession, non weight bearing in below knee cast doing well    9/6/23: Patient returns s/p 8 weeks right midfoot fusion and gastroc recession, weight bearing in the tall boot now, and relates she walks short distances in her house without the boot.     10/3/23: Patient returns s/p 2.5 months right midfoot fusion and gastroc recession, weight bearing in tennis shoes mild discomfort at times pain 2-4/10 with some edema present the more she is on her feet    11/14/23: Patient returns s/p 4 months right midfoot fusion and gastroc recession, weight bearing in her tennis shoes she relates overall much better with only some discomfort at times in the ball of the foot and some minor numbness. Doing well overall although was unable to start her PT yet due to time constraints    Review of Systems   Constitutional: Negative for chills and fever.   Cardiovascular:  Negative for  claudication and leg swelling.   Respiratory:  Negative for shortness of breath.    Skin:  Negative for itching, nail changes and rash.   Musculoskeletal:  Positive for arthritis and joint pain. Negative for muscle cramps, muscle weakness and myalgias.   Gastrointestinal:  Negative for nausea and vomiting.   Neurological:  Negative for focal weakness, loss of balance, numbness and paresthesias.           Objective:      Physical Exam  Constitutional:       General: She is not in acute distress.     Appearance: She is well-developed. She is not diaphoretic.   Cardiovascular:      Pulses:           Dorsalis pedis pulses are 2+ on the right side and 2+ on the left side.        Posterior tibial pulses are 2+ on the right side and 2+ on the left side.      Comments: < 3 sec capillary refill time to toes 1-5 bilateral. Toes and feet are warm to touch proximally with normal distal cooling b/l. There is some hair growth on the feet and toes b/l. There is no edema b/l. No spider veins or varicosities present b/l.     Musculoskeletal:      Comments: Equinus noted b/l ankles with < 5 deg DF noted. MMT 5/5 in DF/PF/Inv/Ev resistance with no reproduction of pain in any direction. Passive range of motion of ankle and pedal joints is painless b/l.     Skin:     General: Skin is warm and dry.      Coloration: Skin is not pale.      Findings: No abrasion, bruising, burn, ecchymosis, erythema, laceration, lesion, petechiae or rash.      Nails: There is no clubbing.      Comments: Skin temperature, texture and turgor within normal limits.    Incisions dorsal right foot well healed   Neurological:      Mental Status: She is alert and oriented to person, place, and time.      Sensory: No sensory deficit.      Motor: No tremor, atrophy or abnormal muscle tone.      Comments: Negative tinel sign bilateral.   Psychiatric:         Behavior: Behavior normal.               Assessment:       Encounter Diagnosis   Name Primary?     Post-operative state Yes                     Plan:       Lyudmila was seen today for post op .    Diagnoses and all orders for this visit:    Post-operative state        I counseled the patient on her conditions, their implications and medical management.      Can continue activity in her shoes to toleration    Will begin working with PT to help with ROM and ambulation exercises to offload the metatarsal heads will ambulate in supportive shoes only    Return in 2 months for follow up    Agusto Edwards DPM

## 2023-11-29 RX ORDER — LOSARTAN POTASSIUM AND HYDROCHLOROTHIAZIDE 12.5; 1 MG/1; MG/1
1 TABLET ORAL DAILY
Qty: 90 TABLET | Refills: 3 | Status: SHIPPED | OUTPATIENT
Start: 2023-11-29

## 2023-11-29 NOTE — TELEPHONE ENCOUNTER
No care due was identified.  [facility] Embedded Care Due Messages. Reference number: [id number].   11/29/2023 9:02:13 AM CST

## 2023-11-29 NOTE — TELEPHONE ENCOUNTER
Refill Routing Note   Medication(s) are not appropriate for processing by [facility] for the following reason(s):        Required vitals abnormal    [facility] action(s):  Defer               Appointments  past 12m or future 3m with PCP    Date Provider   Last Visit   10/10/2023 North Tucker, DO   Next Visit   Visit date not found North Tucker, DO   ED visits in past 90 days: 0        Note composed:1:16 PM 11/29/2023

## 2023-12-18 ENCOUNTER — TELEPHONE (OUTPATIENT)
Dept: UROGYNECOLOGY | Facility: CLINIC | Age: 73
End: 2023-12-18
Payer: MEDICARE

## 2023-12-18 NOTE — TELEPHONE ENCOUNTER
----- Message from Jailene Marshall sent at 12/18/2023  3:41 PM CST -----  Contact: Self  Type:  Same Day Appointment Request    Caller is requesting a same day appointment.  Caller declined first available appointment listed below.      Name of Caller:  Patient  When is the first available appointment?  N/A  Symptoms:  needs f/u for med refill  Best Call Back Number:  [phone]  Additional Information:   Pt is out of her medications and needs to be seen to get her refills, can we please see if we can get her in tomorrow morning, possibly the 11:20 appt. Please call pt back to advise. Thank You

## 2023-12-19 ENCOUNTER — OFFICE VISIT (OUTPATIENT)
Dept: UROGYNECOLOGY | Facility: CLINIC | Age: 73
End: 2023-12-19
Payer: MEDICARE

## 2023-12-19 DIAGNOSIS — N32.81 OAB (OVERACTIVE BLADDER): Primary | ICD-10-CM

## 2023-12-19 PROCEDURE — 1159F PR MEDICATION LIST DOCUMENTED IN MEDICAL RECORD: ICD-10-PCS | Mod: HCNC,CPTII,S$GLB, | Performed by: OBSTETRICS & GYNECOLOGY

## 2023-12-19 PROCEDURE — 3044F PR MOST RECENT HEMOGLOBIN A1C LEVEL <7.0%: ICD-10-PCS | Mod: HCNC,CPTII,S$GLB, | Performed by: OBSTETRICS & GYNECOLOGY

## 2023-12-19 PROCEDURE — 1101F PR PT FALLS ASSESS DOC 0-1 FALLS W/OUT INJ PAST YR: ICD-10-PCS | Mod: HCNC,CPTII,S$GLB, | Performed by: OBSTETRICS & GYNECOLOGY

## 2023-12-19 PROCEDURE — 3044F HG A1C LEVEL LT 7.0%: CPT | Mod: HCNC,CPTII,S$GLB, | Performed by: OBSTETRICS & GYNECOLOGY

## 2023-12-19 PROCEDURE — 3288F PR FALLS RISK ASSESSMENT DOCUMENTED: ICD-10-PCS | Mod: HCNC,CPTII,S$GLB, | Performed by: OBSTETRICS & GYNECOLOGY

## 2023-12-19 PROCEDURE — 99999 PR PBB SHADOW E&M-EST. PATIENT-LVL III: CPT | Mod: PBBFAC,HCNC,, | Performed by: OBSTETRICS & GYNECOLOGY

## 2023-12-19 PROCEDURE — 99999 PR PBB SHADOW E&M-EST. PATIENT-LVL III: ICD-10-PCS | Mod: PBBFAC,HCNC,, | Performed by: OBSTETRICS & GYNECOLOGY

## 2023-12-19 PROCEDURE — 1160F PR REVIEW ALL MEDS BY PRESCRIBER/CLIN PHARMACIST DOCUMENTED: ICD-10-PCS | Mod: HCNC,CPTII,S$GLB, | Performed by: OBSTETRICS & GYNECOLOGY

## 2023-12-19 PROCEDURE — 99213 OFFICE O/P EST LOW 20 MIN: CPT | Mod: HCNC,S$GLB,, | Performed by: OBSTETRICS & GYNECOLOGY

## 2023-12-19 PROCEDURE — 1159F MED LIST DOCD IN RCRD: CPT | Mod: HCNC,CPTII,S$GLB, | Performed by: OBSTETRICS & GYNECOLOGY

## 2023-12-19 PROCEDURE — 3288F FALL RISK ASSESSMENT DOCD: CPT | Mod: HCNC,CPTII,S$GLB, | Performed by: OBSTETRICS & GYNECOLOGY

## 2023-12-19 PROCEDURE — 1101F PT FALLS ASSESS-DOCD LE1/YR: CPT | Mod: HCNC,CPTII,S$GLB, | Performed by: OBSTETRICS & GYNECOLOGY

## 2023-12-19 PROCEDURE — 99213 PR OFFICE/OUTPT VISIT, EST, LEVL III, 20-29 MIN: ICD-10-PCS | Mod: HCNC,S$GLB,, | Performed by: OBSTETRICS & GYNECOLOGY

## 2023-12-19 PROCEDURE — 1160F RVW MEDS BY RX/DR IN RCRD: CPT | Mod: HCNC,CPTII,S$GLB, | Performed by: OBSTETRICS & GYNECOLOGY

## 2023-12-19 NOTE — PROGRESS NOTES
Subjective:     Chief Complaint:  Urge incontinence  History of Present Illness: Lyudmila Neri is a 73 y.o. female who presents for urge incontinence.  She had originally done well combination VESIcare mirabegron but could not get insurance coverage.  She was switched to Detrol but says it does not work as well and she has dry eyes and dry mouth.  She would like to get off of it and go back to VESIcare.  She is still taking Myrbetriq in the morning and takes the anticholinergic at bedtime.  She previously had CMG which did show some stress incontinence but her major complaint is the urge component    Review of Systems    Constitutional: KATHY  Eyes:  Macular degeneration  ENT: No headaches.   Respiratory: No SOB.  Cardiovascular: Hypertension  Gastrointestinal: GIST, GERD  Genitourinary: CKD  Integument/Breast:  Psoriasis  Hematologic/Lymphatic: No history of anemia.  Musculoskeletal: OA  Neurological: DDD  Behavioral/Psych: No history of depression.   Endocrine: No hormonal replacement.  Allergy/Immune: no recent reactions     Objective:   General Exam:  General appearance: WDNF. NAD.   HEENT: Kim. EOM's intact.  Neck: Normal thyroid.   Back: No CVA tenderness.  RESP: No SOB.  Breasts: deferred  Abdomen: Benign without localizing signs.  Extremities: No edema. No varices.  Lymphatic: noncontributory  Skin: No rashes. No lesions.  Neurologic:  Weak gait  Psych: Oriented.       Assessment:      Does better on combination of mirabegron and VESIcare.  Also has significantly less side effects.  She will try to get coverage through her insurance and if not will obtain from [facility]    Plan:    Refill mirabegron.  Written prescription for VESIcare 5 mg.  Will call if she needs to go to the 10 mg dose

## 2023-12-21 ENCOUNTER — TELEPHONE (OUTPATIENT)
Dept: UROGYNECOLOGY | Facility: CLINIC | Age: 73
End: 2023-12-21
Payer: MEDICARE

## 2023-12-21 NOTE — TELEPHONE ENCOUNTER
----- Message from Rosalinda Dubois sent at 12/21/2023  9:28 AM CST -----  Type:  RX Refill Request    Who Called:  Pt    Refill or New Rx:  refill    RX Name and Strength:  MYRBETRIQ 50 mg Tb24    How is the patient currently taking it? (ex. 1XDay):  as directed    Is this a 30 day or 90 day RX:  90    Preferred Pharmacy with phone number:    [id number] [place] [address] AT [facility] & [address] [address]  Phone: [phone] Fax: [phone]    Local or Mail Order:  Local    Ordering Provider:  Dr Dhillon    Would the patient rather a call back or a response via [facility]?  Call back    Best Call Back Number:  [phone]    Additional Information:  Pt needs this sent to [facility]. [facility] is out of medication.   Please call back to advise. Thanks!

## 2024-01-09 ENCOUNTER — LAB VISIT (OUTPATIENT)
Dept: LAB | Facility: HOSPITAL | Age: 74
End: 2024-01-09
Attending: INTERNAL MEDICINE
Payer: MEDICARE

## 2024-01-09 DIAGNOSIS — Z79.1 LONG TERM (CURRENT) USE OF NON-STEROIDAL ANTI-INFLAMMATORIES (NSAID): ICD-10-CM

## 2024-01-09 DIAGNOSIS — M15.8 OTHER OSTEOARTHRITIS INVOLVING MULTIPLE JOINTS: ICD-10-CM

## 2024-01-09 LAB
ALBUMIN SERPL BCP-MCNC: 3.5 G/DL (ref 3.5–5.2)
ANION GAP SERPL CALC-SCNC: 7 MMOL/L (ref 8–16)
BUN SERPL-MCNC: 15 MG/DL (ref 8–23)
CALCIUM SERPL-MCNC: 9.4 MG/DL (ref 8.7–10.5)
CHLORIDE SERPL-SCNC: 106 MMOL/L (ref 95–110)
CO2 SERPL-SCNC: 27 MMOL/L (ref 23–29)
CREAT SERPL-MCNC: 0.8 MG/DL (ref 0.5–1.4)
EST. GFR  (NO RACE VARIABLE): >60 ML/MIN/1.73 M^2
GLUCOSE SERPL-MCNC: 115 MG/DL (ref 70–110)
PHOSPHATE SERPL-MCNC: 2.6 MG/DL (ref 2.7–4.5)
POTASSIUM SERPL-SCNC: 4.1 MMOL/L (ref 3.5–5.1)
SODIUM SERPL-SCNC: 140 MMOL/L (ref 136–145)

## 2024-01-09 PROCEDURE — 36415 COLL VENOUS BLD VENIPUNCTURE: CPT | Mod: HCNC,PO | Performed by: INTERNAL MEDICINE

## 2024-01-09 PROCEDURE — 80069 RENAL FUNCTION PANEL: CPT | Mod: HCNC | Performed by: INTERNAL MEDICINE

## 2024-02-08 ENCOUNTER — OFFICE VISIT (OUTPATIENT)
Dept: RHEUMATOLOGY | Facility: CLINIC | Age: 74
End: 2024-02-08
Payer: MEDICARE

## 2024-02-08 VITALS
BODY MASS INDEX: 26.9 KG/M2 | WEIGHT: 151.81 LBS | HEIGHT: 63 IN | SYSTOLIC BLOOD PRESSURE: 135 MMHG | DIASTOLIC BLOOD PRESSURE: 78 MMHG | HEART RATE: 80 BPM

## 2024-02-08 DIAGNOSIS — N28.9 RENAL INSUFFICIENCY: ICD-10-CM

## 2024-02-08 DIAGNOSIS — M19.90 OSTEOARTHRITIS, UNSPECIFIED OSTEOARTHRITIS TYPE, UNSPECIFIED SITE: Primary | ICD-10-CM

## 2024-02-08 PROCEDURE — 3078F DIAST BP <80 MM HG: CPT | Mod: HCNC,CPTII,S$GLB, | Performed by: INTERNAL MEDICINE

## 2024-02-08 PROCEDURE — 1125F AMNT PAIN NOTED PAIN PRSNT: CPT | Mod: HCNC,CPTII,S$GLB, | Performed by: INTERNAL MEDICINE

## 2024-02-08 PROCEDURE — 99999 PR PBB SHADOW E&M-EST. PATIENT-LVL III: CPT | Mod: PBBFAC,HCNC,, | Performed by: INTERNAL MEDICINE

## 2024-02-08 PROCEDURE — 3288F FALL RISK ASSESSMENT DOCD: CPT | Mod: HCNC,CPTII,S$GLB, | Performed by: INTERNAL MEDICINE

## 2024-02-08 PROCEDURE — 1159F MED LIST DOCD IN RCRD: CPT | Mod: HCNC,CPTII,S$GLB, | Performed by: INTERNAL MEDICINE

## 2024-02-08 PROCEDURE — 99214 OFFICE O/P EST MOD 30 MIN: CPT | Mod: HCNC,S$GLB,, | Performed by: INTERNAL MEDICINE

## 2024-02-08 PROCEDURE — 3075F SYST BP GE 130 - 139MM HG: CPT | Mod: HCNC,CPTII,S$GLB, | Performed by: INTERNAL MEDICINE

## 2024-02-08 PROCEDURE — 1100F PTFALLS ASSESS-DOCD GE2>/YR: CPT | Mod: HCNC,CPTII,S$GLB, | Performed by: INTERNAL MEDICINE

## 2024-02-08 RX ORDER — DULOXETIN HYDROCHLORIDE 30 MG/1
30 CAPSULE, DELAYED RELEASE ORAL DAILY
Qty: 30 CAPSULE | Refills: 0 | Status: SHIPPED | OUTPATIENT
Start: 2024-02-08 | End: 2024-03-20

## 2024-02-08 ASSESSMENT — ROUTINE ASSESSMENT OF PATIENT INDEX DATA (RAPID3)
PAIN SCORE: 4.5
PATIENT GLOBAL ASSESSMENT SCORE: 6
TOTAL RAPID3 SCORE: 4.61
MDHAQ FUNCTION SCORE: 1
PSYCHOLOGICAL DISTRESS SCORE: 2.2
FATIGUE SCORE: 1.1

## 2024-02-08 NOTE — PROGRESS NOTES
Subjective:          Chief Complaint: Lyudmila Neri is a 74 y.o. female who had concerns including Disease Management.    HPI:    Patient is a 74-year-old female here for f/u of OA various joints to discuss management options:     Feet now painful all the time, previously only end of day. Pain first thing in AM, pain at rest. Dorsal mid foot predominant. Not as much in toes- working with Dr. Post. Was receiving injections ultimately did have right foot surgery for fusion with marked improvement of her pain, pending the left foot. Recent increased knee pain: working with Ortho. MRI right knee with degenrative changes.     New anterior thigh pain in same distribution of her L4-5 lesion. No pain in groin, sitting crossed legged in visit today w/o pain. No GTB pain. Mild and manageable at this time describes as an ache.     Wondering if Cymbalta is still helping, she is noting progression of global stiffness over the years rate pain 5/10 wandering OA pattern in various joints: knees low back and cervical spine.   Hand are doing well     Celebrex as of 2020 insurance will not fill  Mobic in past not as effective.     Using Aleve OTC BID doing better than other Rx NSAIDs.   Cymbalta 30mg daily   BP has been good    Rheumatic Hx:    As a history of nonerosive erosive reflux gastritis as well as GIST resected 2011 follows with Dr. Pro.  Recent serologies rheumatoid factor negative, sedimentation rate within normal limits, SINDI negative, C-reactive protein is slightly elevated.  Affected joints: knees, shoulders cervical spine (hx of fusion), hips, hands are stiff in the CMC and MCP and PIP, feet. Right ankle with known fx and ORIF.   Stifffness in AM 45 min with hot shower.   No dactylitis, no known other joint swelling. No IBD. She notes drys eyes, no scleritis, episcleritis  She did use aleve which helps, meloxicam not really. Celebrex does help but cannot use BID due to HTN. Did recently have BP meds adjusted.   Is having relief but not complete. No GI upset.   She has sensitivity to soft tissue pain.  She has hx of Psoriasis that is controlled for at least 10 years-scalp predominant but diffusely.   A new well marginated erosion present at the radial aspect of the base of the right fourth proximal phalanx joint space narrowing in the third fourth MCP and the left third MCP joint degenerative changes otherwise.    REVIEW OF SYSTEMS:    Review of Systems   Constitutional:  Positive for malaise/fatigue. Negative for fever and weight loss.   HENT:  Negative for sore throat.    Eyes:  Negative for double vision, photophobia and redness.   Respiratory:  Negative for cough, shortness of breath and wheezing.    Cardiovascular:  Negative for chest pain, palpitations and orthopnea.   Gastrointestinal:  Negative for abdominal pain, constipation and diarrhea.   Genitourinary:  Negative for dysuria, hematuria and urgency.   Musculoskeletal:  Positive for joint pain and myalgias. Negative for back pain.   Skin:  Negative for rash.   Neurological:  Negative for dizziness, tingling, focal weakness and headaches.   Endo/Heme/Allergies:  Does not bruise/bleed easily.   Psychiatric/Behavioral:  Negative for depression, hallucinations and suicidal ideas.                Objective:            Past Medical History:   Diagnosis Date    Anticoagulant long-term use     Arthritis     osteoarthritis    Blood transfusion     Cancer 2011    stromal tumor, stomach    Depression     Disorder of kidney and ureter     CKD 2    GERD (gastroesophageal reflux disease)     GIST (gastrointestinal stromal tumor), malignant     H. pylori infection     Hypertension     KTAHY (obstructive sleep apnea) 6/24/2013    Psoriasis 6/24/2013    Trouble in sleeping     arthritic pain wakes her up    Urinary incontinence     stress incontinence     Family History   Problem Relation Age of Onset    Heart disease Mother     Arthritis Mother         osteoarthritis    COPD Mother      Hyperlipidemia Mother     Hypertension Mother     Kidney disease Mother     Stroke Mother     Colon cancer Mother 75    Cancer Mother 70        colon cancer    Cancer Father         brain and lung    Arthritis Father     COPD Sister     Depression Daughter     Arthritis Maternal Aunt         rheumatoid arthritis    Heart disease Maternal Uncle     Early death Maternal Uncle         in 50s due to heart disease    Arthritis Paternal Aunt         rheuamtoid arthritis    Heart disease Maternal Grandfather     Arthritis Paternal Grandmother         rheumatoid arthritis    Diabetes Paternal Grandmother     Diabetes Cousin     Heart disease Cousin     Crohn's disease Neg Hx     Stomach cancer Neg Hx     Ulcerative colitis Neg Hx     Esophageal cancer Neg Hx      Social History     Tobacco Use    Smoking status: Never    Smokeless tobacco: Never   Substance Use Topics    Alcohol use: No    Drug use: No         Current Outpatient Medications on File Prior to Visit   Medication Sig Dispense Refill    albuterol (VENTOLIN HFA) 90 mcg/actuation inhaler Inhale 2 puffs into the lungs every 6 (six) hours as needed for Wheezing. Rescue 18 g 0    amLODIPine (NORVASC) 5 MG tablet TAKE 1 TABLET(5 MG) BY MOUTH EVERY DAY 90 tablet 3    aspirin (ECOTRIN) 81 MG EC tablet Take 81 mg by mouth once daily.      cetirizine (ZYRTEC) 10 MG tablet Take 10 mg by mouth once daily.      cholecalciferol, vitamin D3, (VITAMIN D3) 25 mcg (1,000 unit) capsule Take 1,000 Units by mouth once daily.      clobetasol (TEMOVATE) 0.05 % external solution Apply topically as needed.       DULoxetine (CYMBALTA) 60 MG capsule Take 1 capsule (60 mg total) by mouth once daily. 30 capsule 4    famotidine (PEPCID) 40 MG tablet Take 1 tablet (40 mg total) by mouth every evening. 60 tablet 5    fish oil-omega-3 fatty acids 300-1,000 mg capsule Take by mouth once daily.      FOLIC ACID/MULTIVIT-MIN/LUTEIN (CENTRUM SILVER ORAL) Take by mouth.      levothyroxine  (SYNTHROID) 50 MCG tablet Take 50 mcg by mouth.      losartan-hydrochlorothiazide 100-12.5 mg (HYZAAR) 100-12.5 mg Tab Take 1 tablet by mouth once daily. 90 tablet 3    mirabegron (MYRBETRIQ) 50 mg Tb24 Take 1 tablet (50 mg total) by mouth once daily. 90 tablet 3    MYRBETRIQ 50 mg Tb24 Take 1 tablet by mouth once daily 90 tablet 0    omeprazole (PRILOSEC) 40 MG capsule TAKE 1 CAPSULE BY MOUTH ONCE DAILY BEFORE BREAKFAST 90 capsule 3    vit C/E/Zn/coppr/lutein/zeaxan (PRESERVISION AREDS-2 ORAL) Take by mouth 2 (two) times a day.      diclofenac sodium (VOLTAREN) 1 % Gel Apply 2 g topically 3 (three) times daily as needed. 100 g 5    tolterodine (DETROL LA) 2 MG Cp24 Take 1 capsule (2 mg total) by mouth once daily. (Patient taking differently: Take 2 mg by mouth every evening.) 30 capsule 2     No current facility-administered medications on file prior to visit.       Vitals:    02/08/24 1442   BP: 135/78   Pulse: 80       Physical Exam:    Physical Exam  Constitutional:       Appearance: Normal appearance. She is well-developed.   HENT:      Nose: No septal deviation.      Mouth/Throat:      Mouth: No oral lesions.   Eyes:      Conjunctiva/sclera:      Right eye: Right conjunctiva is not injected.      Left eye: Left conjunctiva is not injected.      Pupils: Pupils are equal, round, and reactive to light.   Neck:      Thyroid: No thyroid mass or thyromegaly.      Vascular: No JVD.   Cardiovascular:      Rate and Rhythm: Normal rate and regular rhythm.      Pulses: Normal pulses.      Comments: No edema  Pulmonary:      Effort: Pulmonary effort is normal.      Breath sounds: Normal breath sounds.   Abdominal:      Palpations: Abdomen is soft.   Musculoskeletal:      Right shoulder: Tenderness present. No swelling. Normal range of motion.      Left shoulder: Tenderness present. No swelling. Normal range of motion.      Right elbow: No swelling. Normal range of motion. No tenderness.      Left elbow: No swelling.  Normal range of motion. No tenderness.      Right wrist: No swelling or tenderness. Normal range of motion.      Left wrist: No swelling or tenderness. Normal range of motion.      Right hand: Tenderness present.      Left hand: Tenderness present.      Right hip: Normal range of motion. Normal strength.      Left hip: No tenderness. Normal range of motion.      Right knee: No swelling. Normal range of motion. Tenderness present.      Left knee: No swelling. Normal range of motion. Tenderness present.      Right ankle: No swelling. No tenderness. Normal range of motion.      Left ankle: No swelling. No tenderness. Normal range of motion.      Right foot: Tenderness present.      Left foot: Tenderness present.      Comments: 2nd DIP b/l bony hypertrophy.   No active synovitis but tenderness at fingers, shoulders, knees and metatarsalgia.   No nodules of the achilles.     No pain with ROM hips, negative SLR  No pain with resisted knee extension, hip ab and ad duction.   Pain in the spine and buttock with hip resisted hip flexion.    Lymphadenopathy:      Cervical: No cervical adenopathy.   Skin:     General: Skin is dry.   Neurological:      Deep Tendon Reflexes: Reflexes are normal and symmetric.               Assessment:       Encounter Diagnoses   Name Primary?    Osteoarthritis, unspecified osteoarthritis type, unspecified site Yes    Renal insufficiency               Plan:        Osteoarthritis, unspecified osteoarthritis type, unspecified site    Renal insufficiency    Other orders  -     DULoxetine (CYMBALTA) 30 MG capsule; Take 1 capsule (30 mg total) by mouth once daily.  Dispense: 30 capsule; Refill: 0           OA in the setting of CKDII    Voltaren most effective for feet.   Aleve 220mg using rarely, renal function is good. But we have to limit as hx of some fluctuations with GFR.   Ok for Aleve 220mg every day and then only use twice daily for really bad days.   Kidney's did well ok to keep Aleve 1 tablet  daily and 2 tablets for bad days.   Add in tylenol (acetomenophen) arthritis 650mg tablet. You can take up to 3,200mg in 24 hours  Decreasing Duloxetine 60mg back to 30mg to see if less tired and constipation resolves.   Renal panel prior to f/u in 3 months.     Regarding thighs : I do feel this is in the distribution of her L4-L5 spinal disease, no evidence for hip pathology on exam.     We spent some time discussion the progression of OA and procedures (feet and interventional pain) that can help mitigate pain    No follow-ups on file.         30min consultation with greater than 50% of that time included Preparing to see the patient (review records, tests), Obtaining and/or reviewing separately obtained historical data, Performing a medically appropriate examination and/or evaluation , Ordering medications, tests, and/or procedures, Referring and communicating with other healthcare professionals , Documenting clinical information in the electronic or other health record and Independently interpreting results  (as warranted) & communicating results to the patient/family/caregiver. All questions answered.

## 2024-02-08 NOTE — PATIENT INSTRUCTIONS
Kidney's did well ok to keep Aleve 1 tablet daily and 2 tablets for bad days.   Add in tylenol (acetomenophen) arthritis 650mg tablet. You can take up to 3,200mg in 24 hours  Decreasing Duloxetine 60mg back to 30mg to see if less tired and constipation resolves.

## 2024-02-19 ENCOUNTER — OFFICE VISIT (OUTPATIENT)
Dept: PODIATRY | Facility: CLINIC | Age: 74
End: 2024-02-19
Payer: MEDICARE

## 2024-02-19 VITALS — WEIGHT: 151.88 LBS | BODY MASS INDEX: 26.91 KG/M2 | HEIGHT: 63 IN

## 2024-02-19 DIAGNOSIS — M19.079 ARTHRITIS, MIDFOOT: Primary | ICD-10-CM

## 2024-02-19 PROCEDURE — 99212 OFFICE O/P EST SF 10 MIN: CPT | Mod: S$GLB,,, | Performed by: PODIATRIST

## 2024-02-19 PROCEDURE — 1101F PT FALLS ASSESS-DOCD LE1/YR: CPT | Mod: CPTII,S$GLB,, | Performed by: PODIATRIST

## 2024-02-19 PROCEDURE — 3288F FALL RISK ASSESSMENT DOCD: CPT | Mod: CPTII,S$GLB,, | Performed by: PODIATRIST

## 2024-02-19 PROCEDURE — 1125F AMNT PAIN NOTED PAIN PRSNT: CPT | Mod: CPTII,S$GLB,, | Performed by: PODIATRIST

## 2024-02-19 PROCEDURE — 99999 PR PBB SHADOW E&M-EST. PATIENT-LVL III: CPT | Mod: PBBFAC,HCNC,, | Performed by: PODIATRIST

## 2024-02-19 PROCEDURE — 1159F MED LIST DOCD IN RCRD: CPT | Mod: CPTII,S$GLB,, | Performed by: PODIATRIST

## 2024-02-19 NOTE — PROGRESS NOTES
Subjective:      Patient ID: Lyudmila Neri is a 74 y.o. female.    Chief Complaint: No chief complaint on file.      Lyudmila is a 74 y.o. female     9/27/22  Patient returns for injections to bilateral midfoot for arthritic changes and pain, wearing good supportive shoes gets the injections a couple times a year they last several months when she gets them    4/24/23: Patient returns for follow up bilateral midfoot arthritis for injections and wants to discuss surgical correction of the right foot.     5/10/23: Patient returns for follow up right foot CT to discuss treatment options moving forward    7/19/23: Patient returns s/p 1 week midfoot fusions with gastroc recession, she is in a posterior splint non weight bearing in wheelchair    7/26/23: Patient returns s/p 2 weeks midfoot fusion and gastroc recession right foot, non weight bearing with cast in place    8/2/23: Patient returns s/p 3 weeks right midfoot fusion and gastroc recession, non weight bearing in below knee cast no new complaints.    8/9/23: Patient returns s/p 4 weeks right midfoot fusion and gastroc recession, non weight bearing in below knee cast doing well    9/6/23: Patient returns s/p 8 weeks right midfoot fusion and gastroc recession, weight bearing in the tall boot now, and relates she walks short distances in her house without the boot.     10/3/23: Patient returns s/p 2.5 months right midfoot fusion and gastroc recession, weight bearing in tennis shoes mild discomfort at times pain 2-4/10 with some edema present the more she is on her feet    11/14/23: Patient returns s/p 4 months right midfoot fusion and gastroc recession, weight bearing in her tennis shoes she relates overall much better with only some discomfort at times in the ball of the foot and some minor numbness. Doing well overall although was unable to start her PT yet due to time constraints    2/19/24: Patient returns s/p 7months right midfoot fusion doing well done with PT  and able to walk with minimal pain, on naproxen BID and with that even the left foot is not bothering her today. No new complaints.    Review of Systems   Constitutional: Negative for chills and fever.   Cardiovascular:  Negative for claudication and leg swelling.   Respiratory:  Negative for shortness of breath.    Skin:  Negative for itching, nail changes and rash.   Musculoskeletal:  Positive for arthritis and joint pain. Negative for muscle cramps, muscle weakness and myalgias.   Gastrointestinal:  Negative for nausea and vomiting.   Neurological:  Negative for focal weakness, loss of balance, numbness and paresthesias.           Objective:      Physical Exam  Constitutional:       General: She is not in acute distress.     Appearance: She is well-developed. She is not diaphoretic.   Cardiovascular:      Pulses:           Dorsalis pedis pulses are 2+ on the right side and 2+ on the left side.        Posterior tibial pulses are 2+ on the right side and 2+ on the left side.      Comments: < 3 sec capillary refill time to toes 1-5 bilateral. Toes and feet are warm to touch proximally with normal distal cooling b/l. There is some hair growth on the feet and toes b/l. There is no edema b/l. No spider veins or varicosities present b/l.     Musculoskeletal:      Comments: Equinus noted b/l ankles with < 5 deg DF noted. MMT 5/5 in DF/PF/Inv/Ev resistance with no reproduction of pain in any direction. Passive range of motion of ankle and pedal joints is painless b/l.     Skin:     General: Skin is warm and dry.      Coloration: Skin is not pale.      Findings: No abrasion, bruising, burn, ecchymosis, erythema, laceration, lesion, petechiae or rash.      Nails: There is no clubbing.      Comments: Skin temperature, texture and turgor within normal limits.    Incisions dorsal right foot well healed   Neurological:      Mental Status: She is alert and oriented to person, place, and time.      Sensory: No sensory deficit.       Motor: No tremor, atrophy or abnormal muscle tone.      Comments: Negative tinel sign bilateral.   Psychiatric:         Behavior: Behavior normal.               Assessment:       Encounter Diagnosis   Name Primary?    Arthritis, midfoot Yes                       Plan:       Diagnoses and all orders for this visit:    Arthritis, midfoot          I counseled the patient on her conditions, their implications and medical management.      Can continue activity in her shoes to toleration doing very well    Left foot can get injections as needed, not needed today, conisdering surgery next year but right now minimal pain on the naproxen    Return PRN    Agusto Edwards DPM

## 2024-02-20 ENCOUNTER — HOSPITAL ENCOUNTER (OUTPATIENT)
Dept: RADIOLOGY | Facility: HOSPITAL | Age: 74
Discharge: HOME | End: 2024-02-20
Attending: INTERNAL MEDICINE
Payer: MEDICARE

## 2024-02-20 ENCOUNTER — OFFICE VISIT (OUTPATIENT)
Dept: FAMILY MEDICINE | Facility: CLINIC | Age: 74
End: 2024-02-20
Payer: MEDICARE

## 2024-02-20 VITALS
DIASTOLIC BLOOD PRESSURE: 80 MMHG | HEART RATE: 76 BPM | SYSTOLIC BLOOD PRESSURE: 132 MMHG | BODY MASS INDEX: 28.05 KG/M2 | WEIGHT: 158.31 LBS | HEIGHT: 63 IN | OXYGEN SATURATION: 99 %

## 2024-02-20 DIAGNOSIS — M54.12 CERVICAL RADICULOPATHY: ICD-10-CM

## 2024-02-20 DIAGNOSIS — R20.0 RIGHT ARM NUMBNESS: ICD-10-CM

## 2024-02-20 DIAGNOSIS — M54.2 CERVICALGIA: ICD-10-CM

## 2024-02-20 DIAGNOSIS — M54.12 CERVICAL RADICULOPATHY: Primary | ICD-10-CM

## 2024-02-20 PROCEDURE — 3079F DIAST BP 80-89 MM HG: CPT | Mod: CPTII,S$GLB,, | Performed by: INTERNAL MEDICINE

## 2024-02-20 PROCEDURE — 99999 PR PBB SHADOW E&M-EST. PATIENT-LVL V: CPT | Mod: PBBFAC,HCNC,, | Performed by: INTERNAL MEDICINE

## 2024-02-20 PROCEDURE — 72040 X-RAY EXAM NECK SPINE 2-3 VW: CPT | Mod: 26,HCNC,, | Performed by: RADIOLOGY

## 2024-02-20 PROCEDURE — 1160F RVW MEDS BY RX/DR IN RCRD: CPT | Mod: CPTII,S$GLB,, | Performed by: INTERNAL MEDICINE

## 2024-02-20 PROCEDURE — 1101F PT FALLS ASSESS-DOCD LE1/YR: CPT | Mod: CPTII,S$GLB,, | Performed by: INTERNAL MEDICINE

## 2024-02-20 PROCEDURE — 1126F AMNT PAIN NOTED NONE PRSNT: CPT | Mod: CPTII,S$GLB,, | Performed by: INTERNAL MEDICINE

## 2024-02-20 PROCEDURE — 1159F MED LIST DOCD IN RCRD: CPT | Mod: CPTII,S$GLB,, | Performed by: INTERNAL MEDICINE

## 2024-02-20 PROCEDURE — 99213 OFFICE O/P EST LOW 20 MIN: CPT | Mod: S$GLB,,, | Performed by: INTERNAL MEDICINE

## 2024-02-20 PROCEDURE — 72040 X-RAY EXAM NECK SPINE 2-3 VW: CPT | Mod: TC,HCNC,FY,PO

## 2024-02-20 PROCEDURE — 3288F FALL RISK ASSESSMENT DOCD: CPT | Mod: CPTII,S$GLB,, | Performed by: INTERNAL MEDICINE

## 2024-02-20 PROCEDURE — 3075F SYST BP GE 130 - 139MM HG: CPT | Mod: CPTII,S$GLB,, | Performed by: INTERNAL MEDICINE

## 2024-02-20 NOTE — PROGRESS NOTES
Patient ID: Lyudmila Neri is a 74 y.o. female.    Chief Complaint: Tremors (Patient states that she has been having tremors and numbness in her shoulder going down to her hand . This has been going on for a couple months now )      Assessment and plan     1. Cervical radiculopathy  - X-Ray Cervical Spine AP And Lateral; Future    2. Right arm numbness  - X-Ray Cervical Spine AP And Lateral; Future    3. Cervicalgia  - MRI Cervical Spine Without Contrast; Future     Start with an x-ray of the cervical spine to assess hardware   Schedule MRI cervical spine   Consider EMG     HPI     She has been having pain and numbness of right arm for 2 months.  This is the 1st time this has happened.  She does have a history of a cervical fusion.  She takes Aleve for the pain.  The pain starts just under her right scapula wraps around to the back of the arm near the axilla and continues down to the forearm and the hand.  She also has numbness from the hand to the forearm.  Negative Spurling test on exam.     Review of Systems   Constitutional:  Negative for fever.   Respiratory:  Negative for shortness of breath.    Cardiovascular:  Negative for chest pain.   Gastrointestinal:  Negative for abdominal pain.   Musculoskeletal:         Right arm pain        Objective     Vitals:    02/20/24 1605   BP: 132/80   Pulse: 76     Wt Readings from Last 3 Encounters:   02/20/24 1605 71.8 kg (158 lb 4.6 oz)   02/19/24 1642 68.9 kg (151 lb 14.4 oz)   02/08/24 1442 68.9 kg (151 lb 12.6 oz)      Body mass index is 28.04 kg/m².     Physical Exam  Cardiovascular:      Rate and Rhythm: Normal rate and regular rhythm.      Heart sounds: No murmur heard.     No gallop.   Pulmonary:      Breath sounds: Normal breath sounds. No wheezing or rhonchi.   Abdominal:      Palpations: Abdomen is soft.      Tenderness: There is no abdominal tenderness.            Hypertension Medications               amLODIPine (NORVASC) 5 MG tablet TAKE 1 TABLET(5 MG)  BY MOUTH EVERY DAY    losartan-hydrochlorothiazide 100-12.5 mg (HYZAAR) 100-12.5 mg Tab Take 1 tablet by mouth once daily.           Hyperlipidemia Medications               fish oil-omega-3 fatty acids 300-1,000 mg capsule Take by mouth once daily.           Medication List with Changes/Refills   Current Medications    ALBUTEROL (VENTOLIN HFA) 90 MCG/ACTUATION INHALER    Inhale 2 puffs into the lungs every 6 (six) hours as needed for Wheezing. Rescue    AMLODIPINE (NORVASC) 5 MG TABLET    TAKE 1 TABLET(5 MG) BY MOUTH EVERY DAY    ASPIRIN (ECOTRIN) 81 MG EC TABLET    Take 81 mg by mouth once daily.    CETIRIZINE (ZYRTEC) 10 MG TABLET    Take 10 mg by mouth once daily.    CHOLECALCIFEROL, VITAMIN D3, (VITAMIN D3) 25 MCG (1,000 UNIT) CAPSULE    Take 1,000 Units by mouth once daily.    CLOBETASOL (TEMOVATE) 0.05 % EXTERNAL SOLUTION    Apply topically as needed.     DICLOFENAC SODIUM (VOLTAREN) 1 % GEL    Apply 2 g topically 3 (three) times daily as needed.    DULOXETINE (CYMBALTA) 30 MG CAPSULE    Take 1 capsule (30 mg total) by mouth once daily.    FAMOTIDINE (PEPCID) 40 MG TABLET    Take 1 tablet (40 mg total) by mouth every evening.    FISH OIL-OMEGA-3 FATTY ACIDS 300-1,000 MG CAPSULE    Take by mouth once daily.    FOLIC ACID/MULTIVIT-MIN/LUTEIN (CENTRUM SILVER ORAL)    Take by mouth.    LEVOTHYROXINE (SYNTHROID) 50 MCG TABLET    Take 50 mcg by mouth.    LOSARTAN-HYDROCHLOROTHIAZIDE 100-12.5 MG (HYZAAR) 100-12.5 MG TAB    Take 1 tablet by mouth once daily.    MIRABEGRON (MYRBETRIQ) 50 MG TB24    Take 1 tablet (50 mg total) by mouth once daily.    MYRBETRIQ 50 MG TB24    Take 1 tablet by mouth once daily    OMEPRAZOLE (PRILOSEC) 40 MG CAPSULE    TAKE 1 CAPSULE BY MOUTH ONCE DAILY BEFORE BREAKFAST    TOLTERODINE (DETROL LA) 2 MG CP24    Take 1 capsule (2 mg total) by mouth once daily.    VIT C/E/ZN/COPPR/LUTEIN/ZEAXAN (PRESERVISION AREDS-2 ORAL)    Take by mouth 2 (two) times a day.       I personally reviewed past  medical, family and social history.

## 2024-02-22 ENCOUNTER — HOSPITAL ENCOUNTER (OUTPATIENT)
Dept: RADIOLOGY | Facility: HOSPITAL | Age: 74
Discharge: HOME | End: 2024-02-22
Attending: INTERNAL MEDICINE
Payer: MEDICARE

## 2024-02-22 DIAGNOSIS — M54.2 CERVICALGIA: ICD-10-CM

## 2024-02-22 PROCEDURE — 72141 MRI NECK SPINE W/O DYE: CPT | Mod: TC,PO

## 2024-02-22 PROCEDURE — 72141 MRI NECK SPINE W/O DYE: CPT | Mod: 26,,, | Performed by: RADIOLOGY

## 2024-02-26 ENCOUNTER — TELEPHONE (OUTPATIENT)
Dept: PAIN MEDICINE | Facility: CLINIC | Age: 74
End: 2024-02-26
Payer: MEDICARE

## 2024-03-20 RX ORDER — DULOXETIN HYDROCHLORIDE 30 MG/1
30 CAPSULE, DELAYED RELEASE ORAL
Qty: 30 CAPSULE | Refills: 6 | Status: SHIPPED | OUTPATIENT
Start: 2024-03-20

## 2024-05-06 ENCOUNTER — TELEPHONE (OUTPATIENT)
Dept: ADMINISTRATIVE | Facility: CLINIC | Age: 74
End: 2024-05-06
Payer: MEDICARE

## 2024-05-28 ENCOUNTER — TELEPHONE (OUTPATIENT)
Dept: FAMILY MEDICINE | Facility: CLINIC | Age: 74
End: 2024-05-28
Payer: MEDICARE

## 2024-05-28 ENCOUNTER — TELEPHONE (OUTPATIENT)
Dept: PAIN MEDICINE | Facility: CLINIC | Age: 74
End: 2024-05-28
Payer: MEDICARE

## 2024-05-28 DIAGNOSIS — M54.12 CERVICAL RADICULOPATHY: Primary | ICD-10-CM

## 2024-05-28 NOTE — TELEPHONE ENCOUNTER
----- Message from Charisse Gunn sent at 5/28/2024 12:54 PM CDT -----  Type: Needs Medical Advice  Who Called:  pt  Best Call Back Number: [phone]    Additional Information:     Pt is calling the office was scheduled as an established pt 3/20 appt time no longer worked for pt, pt is calling to see if she would need a new referral.Please call back and advise.

## 2024-05-28 NOTE — TELEPHONE ENCOUNTER
----- Message from Haimlety Hayde sent at 5/28/2024  1:11 PM CDT -----  Type: Needs Medical Advice  Who Called:  pt called   Pharmacy name and phone #:    [id number] [place] - [address]  [address]  [facility] [address]  Phone: [phone] Fax: [phone]    [address] [place] - [address]  [address]  [facility] [address]  Phone: [phone] Fax: [phone]    Best Call Back Number: ,[phone]  Additional Information: pt is calling the office for Dr to send referral to pain clinic to Dr. Olvera. Please call back to advise,

## 2024-05-30 NOTE — PATIENT INSTRUCTIONS
BACK: Child's Pose (Sciatica)        Sit in knee-chest position and reach arms forward. Separate knees for comfort. Hold position for ___ breaths.  Repeat ___ times. Do ___ times per day.    Copyright © [employer]. All rights reserved.   Cat Back        Kneel on hands and knees. Tuck chin and tighten stomach. Exhale and round back upward. Inhale and arch back downward. Hold each position ___ seconds.  Repeat ___ times per session. Do ___ sessions per day.    Copyright © [employer]. All rights reserved.   Axial Extension (Chin Tuck)        Pull chin in and lengthen back of neck. Hold ____ seconds while counting out loud.  Repeat ____ times. Do ____ sessions per day.     [email or web address]     Copyright © [employer]. All rights reserved.      DECLAN @ 11:17am to inquire if he's getting care somewhere else for his colon cancer.  Sae

## 2024-06-26 ENCOUNTER — OFFICE VISIT (OUTPATIENT)
Dept: FAMILY MEDICINE | Facility: CLINIC | Age: 74
End: 2024-06-26
Payer: MEDICARE

## 2024-06-26 VITALS
WEIGHT: 164 LBS | BODY MASS INDEX: 29.06 KG/M2 | SYSTOLIC BLOOD PRESSURE: 126 MMHG | HEART RATE: 66 BPM | OXYGEN SATURATION: 98 % | DIASTOLIC BLOOD PRESSURE: 74 MMHG | HEIGHT: 63 IN

## 2024-06-26 DIAGNOSIS — I77.1 TORTUOUS AORTA: ICD-10-CM

## 2024-06-26 DIAGNOSIS — I10 ESSENTIAL HYPERTENSION: ICD-10-CM

## 2024-06-26 DIAGNOSIS — H35.3131 EARLY DRY STAGE NONEXUDATIVE AGE-RELATED MACULAR DEGENERATION OF BOTH EYES: ICD-10-CM

## 2024-06-26 DIAGNOSIS — N39.41 URGE INCONTINENCE OF URINE: ICD-10-CM

## 2024-06-26 DIAGNOSIS — G47.33 OSA (OBSTRUCTIVE SLEEP APNEA): ICD-10-CM

## 2024-06-26 DIAGNOSIS — Z00.00 ENCOUNTER FOR MEDICARE ANNUAL WELLNESS EXAM: Primary | ICD-10-CM

## 2024-06-26 DIAGNOSIS — H91.90 DECREASED HEARING, UNSPECIFIED LATERALITY: ICD-10-CM

## 2024-06-26 DIAGNOSIS — I77.9 BILATERAL CAROTID ARTERY DISEASE, UNSPECIFIED TYPE: ICD-10-CM

## 2024-06-26 DIAGNOSIS — N18.2 CHRONIC KIDNEY DISEASE, STAGE II (MILD): ICD-10-CM

## 2024-06-26 DIAGNOSIS — K59.00 CONSTIPATION, UNSPECIFIED CONSTIPATION TYPE: ICD-10-CM

## 2024-06-26 DIAGNOSIS — M54.16 LUMBAR RADICULOPATHY: ICD-10-CM

## 2024-06-26 PROCEDURE — 3078F DIAST BP <80 MM HG: CPT | Mod: HCNC,CPTII,S$GLB, | Performed by: NURSE PRACTITIONER

## 2024-06-26 PROCEDURE — 3074F SYST BP LT 130 MM HG: CPT | Mod: HCNC,CPTII,S$GLB, | Performed by: NURSE PRACTITIONER

## 2024-06-26 PROCEDURE — 3288F FALL RISK ASSESSMENT DOCD: CPT | Mod: HCNC,CPTII,S$GLB, | Performed by: NURSE PRACTITIONER

## 2024-06-26 PROCEDURE — 1158F ADVNC CARE PLAN TLK DOCD: CPT | Mod: HCNC,CPTII,S$GLB, | Performed by: NURSE PRACTITIONER

## 2024-06-26 PROCEDURE — 1101F PT FALLS ASSESS-DOCD LE1/YR: CPT | Mod: HCNC,CPTII,S$GLB, | Performed by: NURSE PRACTITIONER

## 2024-06-26 PROCEDURE — G0439 PPPS, SUBSEQ VISIT: HCPCS | Mod: HCNC,S$GLB,, | Performed by: NURSE PRACTITIONER

## 2024-06-26 PROCEDURE — 99999 PR PBB SHADOW E&M-EST. PATIENT-LVL V: CPT | Mod: PBBFAC,HCNC,, | Performed by: NURSE PRACTITIONER

## 2024-06-26 PROCEDURE — 1126F AMNT PAIN NOTED NONE PRSNT: CPT | Mod: HCNC,CPTII,S$GLB, | Performed by: NURSE PRACTITIONER

## 2024-06-26 RX ORDER — SOLIFENACIN SUCCINATE 5 MG/1
5 TABLET, FILM COATED ORAL DAILY
COMMUNITY
Start: 2024-03-29

## 2024-07-02 ENCOUNTER — OFFICE VISIT (OUTPATIENT)
Dept: PODIATRY | Facility: CLINIC | Age: 74
End: 2024-07-02
Payer: MEDICARE

## 2024-07-02 VITALS — HEIGHT: 63 IN | BODY MASS INDEX: 29.06 KG/M2 | WEIGHT: 164 LBS

## 2024-07-02 DIAGNOSIS — M19.072 ARTHRITIS OF LEFT MIDFOOT: Primary | ICD-10-CM

## 2024-07-02 DIAGNOSIS — M62.461 GASTROCNEMIUS EQUINUS OF RIGHT LOWER EXTREMITY: ICD-10-CM

## 2024-07-02 DIAGNOSIS — G57.30 DEEP PERONEAL NEUROPATHY, UNSPECIFIED LATERALITY: ICD-10-CM

## 2024-07-02 DIAGNOSIS — M79.673 FOOT ARCH PAIN, UNSPECIFIED LATERALITY: ICD-10-CM

## 2024-07-02 PROCEDURE — 1160F RVW MEDS BY RX/DR IN RCRD: CPT | Mod: HCNC,CPTII,S$GLB, | Performed by: PODIATRIST

## 2024-07-02 PROCEDURE — 99999 PR PBB SHADOW E&M-EST. PATIENT-LVL III: CPT | Mod: PBBFAC,HCNC,, | Performed by: PODIATRIST

## 2024-07-02 PROCEDURE — 99213 OFFICE O/P EST LOW 20 MIN: CPT | Mod: HCNC,S$GLB,, | Performed by: PODIATRIST

## 2024-07-02 PROCEDURE — 3288F FALL RISK ASSESSMENT DOCD: CPT | Mod: HCNC,CPTII,S$GLB, | Performed by: PODIATRIST

## 2024-07-02 PROCEDURE — 1125F AMNT PAIN NOTED PAIN PRSNT: CPT | Mod: HCNC,CPTII,S$GLB, | Performed by: PODIATRIST

## 2024-07-02 PROCEDURE — 1101F PT FALLS ASSESS-DOCD LE1/YR: CPT | Mod: HCNC,CPTII,S$GLB, | Performed by: PODIATRIST

## 2024-07-02 PROCEDURE — 1159F MED LIST DOCD IN RCRD: CPT | Mod: HCNC,CPTII,S$GLB, | Performed by: PODIATRIST

## 2024-07-02 NOTE — PROGRESS NOTES
Subjective:      Patient ID: Lyudmila Neri is a 74 y.o. female.    Chief Complaint: Foot Pain      Lyudmila is a 74 y.o. female     9/27/22  Patient returns for injections to bilateral midfoot for arthritic changes and pain, wearing good supportive shoes gets the injections a couple times a year they last several months when she gets them    4/24/23: Patient returns for follow up bilateral midfoot arthritis for injections and wants to discuss surgical correction of the right foot.     5/10/23: Patient returns for follow up right foot CT to discuss treatment options moving forward    7/19/23: Patient returns s/p 1 week midfoot fusions with gastroc recession, she is in a posterior splint non weight bearing in wheelchair    7/26/23: Patient returns s/p 2 weeks midfoot fusion and gastroc recession right foot, non weight bearing with cast in place    8/2/23: Patient returns s/p 3 weeks right midfoot fusion and gastroc recession, non weight bearing in below knee cast no new complaints.    8/9/23: Patient returns s/p 4 weeks right midfoot fusion and gastroc recession, non weight bearing in below knee cast doing well    9/6/23: Patient returns s/p 8 weeks right midfoot fusion and gastroc recession, weight bearing in the tall boot now, and relates she walks short distances in her house without the boot.     10/3/23: Patient returns s/p 2.5 months right midfoot fusion and gastroc recession, weight bearing in tennis shoes mild discomfort at times pain 2-4/10 with some edema present the more she is on her feet    11/14/23: Patient returns s/p 4 months right midfoot fusion and gastroc recession, weight bearing in her tennis shoes she relates overall much better with only some discomfort at times in the ball of the foot and some minor numbness. Doing well overall although was unable to start her PT yet due to time constraints    2/19/24: Patient returns s/p 7months right midfoot fusion doing well done with PT and able to walk  with minimal pain, on naproxen BID and with that even the left foot is not bothering her today. No new complaints.    7/2/24: Patient returns with bilateral plantar arch pain that radiates into her medial ankle some, she relates that this happens more often after being on her feet throughout the day, sharp pain that hits for a time then goes away. Doing well otherwise from the fusion to the right foot feeling well.    Review of Systems   Constitutional: Negative for chills and fever.   Cardiovascular:  Negative for claudication and leg swelling.   Respiratory:  Negative for shortness of breath.    Skin:  Negative for itching, nail changes and rash.   Musculoskeletal:  Positive for arthritis and joint pain. Negative for muscle cramps, muscle weakness and myalgias.   Gastrointestinal:  Negative for nausea and vomiting.   Neurological:  Negative for focal weakness, loss of balance, numbness and paresthesias.           Objective:      Physical Exam  Constitutional:       General: She is not in acute distress.     Appearance: She is well-developed. She is not diaphoretic.   Cardiovascular:      Pulses:           Dorsalis pedis pulses are 2+ on the right side and 2+ on the left side.        Posterior tibial pulses are 2+ on the right side and 2+ on the left side.      Comments: < 3 sec capillary refill time to toes 1-5 bilateral. Toes and feet are warm to touch proximally with normal distal cooling b/l. There is some hair growth on the feet and toes b/l. There is no edema b/l. No spider veins or varicosities present b/l.     Musculoskeletal:      Comments: Equinus noted b/l ankles with < 5 deg DF noted. MMT 5/5 in DF/PF/Inv/Ev resistance with no reproduction of pain in any direction. Passive range of motion of ankle and pedal joints is painless b/l.    No pain presently with palpation to the plantar arch or medial ankle, no pain with ROM of the foot or ankle     Skin:     General: Skin is warm and dry.      Coloration:  Skin is not pale.      Findings: No abrasion, bruising, burn, ecchymosis, erythema, laceration, lesion, petechiae or rash.      Nails: There is no clubbing.      Comments: Skin temperature, texture and turgor within normal limits.    Incisions dorsal right foot well healed   Neurological:      Mental Status: She is alert and oriented to person, place, and time.      Sensory: No sensory deficit.      Motor: No tremor, atrophy or abnormal muscle tone.      Comments: Negative tinel sign bilateral.   Psychiatric:         Behavior: Behavior normal.               Assessment:       Encounter Diagnoses   Name Primary?    Arthritis of left midfoot Yes    Deep peroneal neuropathy, unspecified laterality     Gastrocnemius equinus of right lower extremity     Foot arch pain, unspecified laterality            Plan:       Lyudmila was seen today for foot pain.    Diagnoses and all orders for this visit:    Arthritis of left midfoot    Deep peroneal neuropathy, unspecified laterality    Gastrocnemius equinus of right lower extremity    Foot arch pain, unspecified laterality      I counseled the patient on her conditions, their implications and medical management.      Can continue activity in her shoes to toleration doing very well    Left foot can get injections as needed, not needed today, conisdering surgery     Will get my recommended over the counter powerstep inserts for her feet to better support her arches and consider custom arch supports if this is not working    Return FRANKLYN Edwards DPM

## 2024-07-10 ENCOUNTER — OFFICE VISIT (OUTPATIENT)
Dept: PAIN MEDICINE | Facility: CLINIC | Age: 74
End: 2024-07-10
Payer: MEDICARE

## 2024-07-10 ENCOUNTER — TELEPHONE (OUTPATIENT)
Dept: PAIN MEDICINE | Facility: CLINIC | Age: 74
End: 2024-07-10
Payer: MEDICARE

## 2024-07-10 VITALS — WEIGHT: 164 LBS | HEIGHT: 63 IN | BODY MASS INDEX: 29.06 KG/M2

## 2024-07-10 DIAGNOSIS — M54.12 CERVICAL RADICULOPATHY: Primary | ICD-10-CM

## 2024-07-10 DIAGNOSIS — M54.12 CERVICAL RADICULOPATHY: ICD-10-CM

## 2024-07-10 PROCEDURE — 99999 PR PBB SHADOW E&M-EST. PATIENT-LVL IV: CPT | Mod: PBBFAC,HCNC,, | Performed by: STUDENT IN AN ORGANIZED HEALTH CARE EDUCATION/TRAINING PROGRAM

## 2024-07-10 PROCEDURE — 3288F FALL RISK ASSESSMENT DOCD: CPT | Mod: HCNC,CPTII,S$GLB, | Performed by: STUDENT IN AN ORGANIZED HEALTH CARE EDUCATION/TRAINING PROGRAM

## 2024-07-10 PROCEDURE — 1125F AMNT PAIN NOTED PAIN PRSNT: CPT | Mod: HCNC,CPTII,S$GLB, | Performed by: STUDENT IN AN ORGANIZED HEALTH CARE EDUCATION/TRAINING PROGRAM

## 2024-07-10 PROCEDURE — 1159F MED LIST DOCD IN RCRD: CPT | Mod: HCNC,CPTII,S$GLB, | Performed by: STUDENT IN AN ORGANIZED HEALTH CARE EDUCATION/TRAINING PROGRAM

## 2024-07-10 PROCEDURE — 99214 OFFICE O/P EST MOD 30 MIN: CPT | Mod: HCNC,S$GLB,, | Performed by: STUDENT IN AN ORGANIZED HEALTH CARE EDUCATION/TRAINING PROGRAM

## 2024-07-10 PROCEDURE — 1101F PT FALLS ASSESS-DOCD LE1/YR: CPT | Mod: HCNC,CPTII,S$GLB, | Performed by: STUDENT IN AN ORGANIZED HEALTH CARE EDUCATION/TRAINING PROGRAM

## 2024-07-10 PROCEDURE — 1160F RVW MEDS BY RX/DR IN RCRD: CPT | Mod: HCNC,CPTII,S$GLB, | Performed by: STUDENT IN AN ORGANIZED HEALTH CARE EDUCATION/TRAINING PROGRAM

## 2024-07-10 RX ORDER — SODIUM CHLORIDE, SODIUM LACTATE, POTASSIUM CHLORIDE, CALCIUM CHLORIDE 600; 310; 30; 20 MG/100ML; MG/100ML; MG/100ML; MG/100ML
INJECTION, SOLUTION INTRAVENOUS CONTINUOUS
OUTPATIENT
Start: 2024-07-10

## 2024-07-10 NOTE — PROGRESS NOTES
[facility] - Department    North Tucker DO      First Office Visit: 7/10/24  Today' Date: 7/10/2024  Last Office Visit: None    Chief complaint: neck pain     HPI: Pt is a pleasant 74 y.o., who presents for evaluation. Referred by Dr. Tucker. Pt complains of neck pain for yrs and bilateral arm pain more recently. Of note, pt has had am anterior osseous fusion at C5-6 and C6-7. Endorses neck tightness/discomfort going out to both shoulders. Endorses having sharp, shooting pain going down both arms (R>L) affecting third and 4th fingers. States R arm is numb. Endorses problems dropping objects. Denies balance issues, headaches, or BB changes. Is doing HEP.         Pain disability index score: 56  Pain score: 5    Relevant Imaging/ Testing:   MR C-spine 2/24  XR C-spine 2/24  CT C-spine 6/22      Procedures:     B TFESI L4-5 4/9/19, 6/5/19      Date of board of pharmacy review:7/10/2024  Date of opioid risk screening/ pain psych: None  Date of opioid agreement and consent: None  Date of urine drug screen: None  Date of random pill count: None     was reviewed today: reviewed, no concerns     Prescribed medications: None    See EHR for  PMH, PSH, FH, SH, Medications and Allergy    ROS:  Positive for pain  ROS     PE:  There were no vitals filed for this visit.  General: Pleasant, no distress  HEENT: NC/ AT. PERRLA  CV: Radial pulses intact  Pulm: No distress  Ext: No edema    Physical Exam     Neuromusculoskeletal:  Head: NC, AT. PERRLA. No occipital tenderness  Neck: limited range of motions d/t pain, pain with extension, flexion, rotation. Bilat Facet loading. Neg Spurling. Min Tenderness.  5/5 Strength, normal tone. Neg Meza's  Shoulder: Intact range of motion. Min Bicep groove tenderness. 5/5 Strength  Lumbar: Intact range of motion  Hip: Intact range of motion  SI: Level  Knee: Intact range of motion  Reflexes: normal Bicep  Strength: 5/5 globally   Sensory: Grossly intact   Skin: No bruising,  erythema  Gait: Normal      Impression:  Neck pain  Bilateral arm pain (R>L)  R arm numbness  H/o anterior osseous fusion C5-6 and C6-7   Relevant History  BMI 29.05  KATHY         Plan:  Discussed options  Imaging/ relevant records viewed/ reviewed/ discussed  Imaging results viewed and reviewed (noted above)/ reviewed with patient   reviewed  Cont HEP  ILESI C7-T1  Re-eval after  Neurosurgery referral if needed  Consider workup for lower back pain        Prescribed medications:  1. None    The impression and plan were discussed and explained in detail. All the questions were answered. Education was provided accordingly.     The procedure was explained in detail, along with risks and potential side effects.      Follow-up:  For procedure     Jamaica Olvera MD

## 2024-07-10 NOTE — H&P (VIEW-ONLY)
[facility] - Department    North Tucker DO      First Office Visit: 7/10/24  Today' Date: 7/10/2024  Last Office Visit: None    Chief complaint: neck pain     HPI: Pt is a pleasant 74 y.o., who presents for evaluation. Referred by Dr. Tucker. Pt complains of neck pain for yrs and bilateral arm pain more recently. Of note, pt has had am anterior osseous fusion at C5-6 and C6-7. Endorses neck tightness/discomfort going out to both shoulders. Endorses having sharp, shooting pain going down both arms (R>L) affecting third and 4th fingers. States R arm is numb. Endorses problems dropping objects. Denies balance issues, headaches, or BB changes. Is doing HEP.         Pain disability index score: 56  Pain score: 5    Relevant Imaging/ Testing:   MR C-spine 2/24  XR C-spine 2/24  CT C-spine 6/22      Procedures:     B TFESI L4-5 4/9/19, 6/5/19      Date of board of pharmacy review:7/10/2024  Date of opioid risk screening/ pain psych: None  Date of opioid agreement and consent: None  Date of urine drug screen: None  Date of random pill count: None     was reviewed today: reviewed, no concerns     Prescribed medications: None    See EHR for  PMH, PSH, FH, SH, Medications and Allergy    ROS:  Positive for pain  ROS     PE:  There were no vitals filed for this visit.  General: Pleasant, no distress  HEENT: NC/ AT. PERRLA  CV: Radial pulses intact  Pulm: No distress  Ext: No edema    Physical Exam     Neuromusculoskeletal:  Head: NC, AT. PERRLA. No occipital tenderness  Neck: limited range of motions d/t pain, pain with extension, flexion, rotation. Bilat Facet loading. Neg Spurling. Min Tenderness.  5/5 Strength, normal tone. Neg Meza's  Shoulder: Intact range of motion. Min Bicep groove tenderness. 5/5 Strength  Lumbar: Intact range of motion  Hip: Intact range of motion  SI: Level  Knee: Intact range of motion  Reflexes: normal Bicep  Strength: 5/5 globally   Sensory: Grossly intact   Skin: No bruising,  erythema  Gait: Normal      Impression:  Neck pain  Bilateral arm pain (R>L)  R arm numbness  H/o anterior osseous fusion C5-6 and C6-7   Relevant History  BMI 29.05  KATHY         Plan:  Discussed options  Imaging/ relevant records viewed/ reviewed/ discussed  Imaging results viewed and reviewed (noted above)/ reviewed with patient   reviewed  Cont HEP  ILESI C7-T1  Re-eval after  Neurosurgery referral if needed  Consider workup for lower back pain        Prescribed medications:  1. None    The impression and plan were discussed and explained in detail. All the questions were answered. Education was provided accordingly.     The procedure was explained in detail, along with risks and potential side effects.      Follow-up:  For procedure     Jamaica Olvera MD

## 2024-07-10 NOTE — TELEPHONE ENCOUNTER
Types of orders made on 07/10/2024: Outpatient Referral, Procedure Request      Order Date:7/10/2024   Ordering User:KING HENDERSON [[id number]]   Encounter Provider:King Henderson MD [[id number]]   A   uthorizing Provider: King Henderson MD [[id number]]   Department:[facility][[id number]]      Common Order Information   Procedure -> Epidural Injection (specify level) Cmt: ILESI C7-T1 (Cov)      Order Specific Information   Order: Procedure Order to Pain Management [Custom: [id number]]  Order #:          [id number]: 1 FUTURE     Priority: Routine  Class: Clinic Performed     Future Order Information         Expires on:07/10/2025            Expected by:07/10/2024                   Associated Diagnoses       M54.12 Cervical radiculopathy       Facility Name: -> [facility]          Follow-up: -> 2 weeks              Priority: Routine  Class: Clinic Performed     Future Order Information       Expires on:07/10/2025            Expected by:07/10/2024                   Associated Diagnoses       M54.12 Cervical radiculopathy

## 2024-07-12 ENCOUNTER — PATIENT MESSAGE (OUTPATIENT)
Dept: PAIN MEDICINE | Facility: CLINIC | Age: 74
End: 2024-07-12
Payer: MEDICARE

## 2024-07-14 NOTE — PATIENT INSTRUCTIONS
Counseling and Referral of Other Preventative  (Italic type indicates deductible and co-insurance are waived)    Patient Name: Lyudmila Neri  Today's Date: 7/14/2024    Health Maintenance       Date Due Completion Date    COVID-19 Vaccine (6 - 2023-24 season) 09/01/2023 6/20/2022    Influenza Vaccine (1) 09/01/2024 10/25/2023    Hemoglobin A1c (Prediabetes) 10/03/2024 10/3/2023    Mammogram 11/02/2024 11/2/2023    DEXA Scan 11/02/2026 11/2/2023    Lipid Panel 10/03/2028 10/3/2023    TETANUS VACCINE 04/16/2029 4/16/2019    Override on 9/23/2016: Declined        Orders Placed This Encounter   Procedures    Ambulatory referral/consult to Audiology    Ambulatory referral/consult to Physical/Occupational Therapy     The following information is provided to all patients.  This information is to help you find resources for any of the problems found today that may be affecting your health:                  Living healthy guide: www.[facility].[place].gov      Understanding Diabetes: www.diabetes.org      Eating healthy: www.cdc.gov/healthyweight      CDC home safety checklist: www.cdc.gov/steadi/patient.html      Agency on Aging: www.goea.[place].gov      Alcoholics anonymous (AA): www.aa.org      Physical Activity: www.eliud.nih.gov/jr3lpog      Tobacco use: www.quitwithusla.org

## 2024-07-14 NOTE — PROGRESS NOTES
"  Lyudmila Neri presented for a follow-up Medicare AWV today. The following components were reviewed and updated:    Medical history  Family History  Social history  Allergies and Current Medications  Health Risk Assessment  Health Maintenance  Care Team    **See Completed Assessments for Annual Wellness visit with in the encounter summary    The following assessments were completed:  Depression Screening  Cognitive function Screening    Timed Get Up Test  Whisper Test      Opioid documentation:      Patient does not have a current opioid prescription.          Vitals:    06/26/24 0723   BP: 126/74   BP Location: Left arm   Patient Position: Sitting   BP Method: Medium (Manual)   Pulse: 66   SpO2: 98%   Weight: 74.4 kg (164 lb 0.4 oz)   Height: 5' 3" (1.6 m)     Body mass index is 29.06 kg/m².       Physical Exam  Vitals reviewed.   Constitutional:       General: She is not in acute distress.  HENT:      Head: Normocephalic.   Cardiovascular:      Rate and Rhythm: Normal rate.   Pulmonary:      Effort: Pulmonary effort is normal. No respiratory distress.   Skin:     General: Skin is warm.   Neurological:      General: No focal deficit present.      Mental Status: She is alert.   Psychiatric:         Mood and Affect: Mood normal.           Diagnoses and health risks identified today and associated recommendations/orders:  1. Encounter for Medicare annual wellness exam  Reviewed health maintenance and provided recommendations  UTD on all health maintenance    - Ambulatory Referral/Consult to Enhanced Annual Wellness Visit (eAWV)    2. Chronic kidney disease, stage II (mild)  Continue to monitor  Followed by North Tucker, DO   Encourage adequate water intake  Avoid nsaids.      3. Bilateral carotid artery disease, unspecified type  Continue to monitor  Followed by North Tucker, DO   Bp controlled.      4. Tortuous aorta  Continue to monitor  Followed by North Tucker, DO   Bp controlled.      5. " Essential hypertension  Continue to monitor  Followed by North Tucker DO   Taking losartan-hctz and amlodipine.      6. Decreased hearing, unspecified laterality    - Ambulatory referral/consult to Audiology; Future    7. Early dry stage nonexudative age-related macular degeneration of both eyes  Continue to monitor  Followed by Dr Ríos.      8. Lumbar radiculopathy  Continue to monitor  Followed by Dr Olvera.      9. KATHY (obstructive sleep apnea)  Continue to monitor  Followed by North Tukcer DO .   Does not use cpap     10. Urge incontinence of urine    - Ambulatory referral/consult to Physical/Occupational Therapy; Future    11. Constipation, unspecified constipation type  - Ambulatory referral/consult to Physical/Occupational Therapy; Future      Provided Lyudmila with a 5-10 year written screening schedule and personal prevention plan. Recommendations were developed using the USPSTF age appropriate recommendations. Education, counseling, and referrals were provided as needed.  After Visit Summary printed and given to patient which includes a list of additional screenings\tests needed.    No follow-ups on file.      Tamie Torres NP

## 2024-07-19 ENCOUNTER — CLINICAL SUPPORT (OUTPATIENT)
Dept: AUDIOLOGY | Facility: CLINIC | Age: 74
End: 2024-07-19
Payer: MEDICARE

## 2024-07-19 DIAGNOSIS — H91.90 DECREASED HEARING, UNSPECIFIED LATERALITY: ICD-10-CM

## 2024-07-19 DIAGNOSIS — H93.13 TINNITUS OF BOTH EARS: ICD-10-CM

## 2024-07-19 DIAGNOSIS — H91.93 HIGH FREQUENCY HEARING LOSS OF BOTH EARS: Primary | ICD-10-CM

## 2024-07-19 PROCEDURE — 92567 TYMPANOMETRY: CPT | Mod: HCNC,S$GLB,, | Performed by: AUDIOLOGIST

## 2024-07-19 PROCEDURE — 92557 COMPREHENSIVE HEARING TEST: CPT | Mod: HCNC,S$GLB,, | Performed by: AUDIOLOGIST

## 2024-07-19 PROCEDURE — 99999 PR PBB SHADOW E&M-EST. PATIENT-LVL I: CPT | Mod: PBBFAC,HCNC,, | Performed by: AUDIOLOGIST

## 2024-07-19 NOTE — PROGRESS NOTES
The patient was referred by Tamie Sears NP for a hearing evaluation.    Report from the patient was as follows:    Difficulty Hearing/Understanding - Patient's  thinks she has hearing difficulty. last hearing evaluation many years ago  Tinnitus - AU  History of Loud Noise Exposure - negative  Family History of Hearing Loss - negative    Otoscopic screening revealed a clear view of TM AU    A hearing evaluation was performed today. Test results indicated mild to moderate high frequency hearing loss bilaterally. Impedance testing showed a Type A tympanogram in each ear, consistent with normal middle ear function.    The recommendations were as follows:    (1)  Hearing aid consult pending medical clearance  (2)  Ear protection in loud noise   (3)  Hearing evaluation in one year or sooner if hearing decrease is noted       Today's test results and recommendations were discussed with the patient.         
MD

## 2024-08-05 ENCOUNTER — LAB VISIT (OUTPATIENT)
Dept: LAB | Facility: HOSPITAL | Age: 74
End: 2024-08-05
Attending: INTERNAL MEDICINE
Payer: MEDICARE

## 2024-08-05 DIAGNOSIS — M19.90 OSTEOARTHRITIS, UNSPECIFIED OSTEOARTHRITIS TYPE, UNSPECIFIED SITE: ICD-10-CM

## 2024-08-05 DIAGNOSIS — N28.9 RENAL INSUFFICIENCY: ICD-10-CM

## 2024-08-05 LAB
ANION GAP SERPL CALC-SCNC: 6 MMOL/L (ref 8–16)
BUN SERPL-MCNC: 18 MG/DL (ref 8–23)
CALCIUM SERPL-MCNC: 9.4 MG/DL (ref 8.7–10.5)
CHLORIDE SERPL-SCNC: 103 MMOL/L (ref 95–110)
CO2 SERPL-SCNC: 31 MMOL/L (ref 23–29)
CREAT SERPL-MCNC: 1 MG/DL (ref 0.5–1.4)
EST. GFR  (NO RACE VARIABLE): 58.4 ML/MIN/1.73 M^2
GLUCOSE SERPL-MCNC: 103 MG/DL (ref 70–110)
POTASSIUM SERPL-SCNC: 4.4 MMOL/L (ref 3.5–5.1)
SODIUM SERPL-SCNC: 140 MMOL/L (ref 136–145)

## 2024-08-05 PROCEDURE — 36415 COLL VENOUS BLD VENIPUNCTURE: CPT | Mod: HCNC,PO | Performed by: INTERNAL MEDICINE

## 2024-08-05 PROCEDURE — 80048 BASIC METABOLIC PNL TOTAL CA: CPT | Mod: HCNC | Performed by: INTERNAL MEDICINE

## 2024-08-08 ENCOUNTER — HOSPITAL ENCOUNTER (OUTPATIENT)
Dept: RADIOLOGY | Facility: HOSPITAL | Age: 74
Discharge: HOME | End: 2024-08-08
Attending: STUDENT IN AN ORGANIZED HEALTH CARE EDUCATION/TRAINING PROGRAM | Admitting: STUDENT IN AN ORGANIZED HEALTH CARE EDUCATION/TRAINING PROGRAM
Payer: MEDICARE

## 2024-08-08 ENCOUNTER — HOSPITAL ENCOUNTER (OUTPATIENT)
Facility: HOSPITAL | Age: 74
Discharge: HOME | End: 2024-08-08
Attending: STUDENT IN AN ORGANIZED HEALTH CARE EDUCATION/TRAINING PROGRAM | Admitting: STUDENT IN AN ORGANIZED HEALTH CARE EDUCATION/TRAINING PROGRAM
Payer: MEDICARE

## 2024-08-08 VITALS
SYSTOLIC BLOOD PRESSURE: 149 MMHG | TEMPERATURE: 97 F | RESPIRATION RATE: 16 BRPM | DIASTOLIC BLOOD PRESSURE: 72 MMHG | HEIGHT: 63 IN | OXYGEN SATURATION: 96 % | HEART RATE: 61 BPM | BODY MASS INDEX: 29.06 KG/M2 | WEIGHT: 164 LBS

## 2024-08-08 DIAGNOSIS — M54.2 NECK PAIN: ICD-10-CM

## 2024-08-08 DIAGNOSIS — M54.12 CERVICAL RADICULOPATHY: ICD-10-CM

## 2024-08-08 PROCEDURE — 62321 NJX INTERLAMINAR CRV/THRC: CPT | Mod: HCNC,PO | Performed by: STUDENT IN AN ORGANIZED HEALTH CARE EDUCATION/TRAINING PROGRAM

## 2024-08-08 PROCEDURE — 62321 NJX INTERLAMINAR CRV/THRC: CPT | Mod: HCNC,,, | Performed by: STUDENT IN AN ORGANIZED HEALTH CARE EDUCATION/TRAINING PROGRAM

## 2024-08-08 PROCEDURE — A4216 STERILE WATER/SALINE, 10 ML: HCPCS | Mod: HCNC,PO | Performed by: STUDENT IN AN ORGANIZED HEALTH CARE EDUCATION/TRAINING PROGRAM

## 2024-08-08 PROCEDURE — 63600175 PHARM REV CODE 636 W HCPCS: Mod: HCNC,PO | Performed by: STUDENT IN AN ORGANIZED HEALTH CARE EDUCATION/TRAINING PROGRAM

## 2024-08-08 PROCEDURE — 25500020 PHARM REV CODE 255: Mod: HCNC,PO | Performed by: STUDENT IN AN ORGANIZED HEALTH CARE EDUCATION/TRAINING PROGRAM

## 2024-08-08 PROCEDURE — 25000003 PHARM REV CODE 250: Mod: HCNC,PO | Performed by: STUDENT IN AN ORGANIZED HEALTH CARE EDUCATION/TRAINING PROGRAM

## 2024-08-08 RX ORDER — LIDOCAINE HYDROCHLORIDE 10 MG/ML
INJECTION, SOLUTION EPIDURAL; INFILTRATION; INTRACAUDAL; PERINEURAL
Status: DISCONTINUED | OUTPATIENT
Start: 2024-08-08 | End: 2024-08-08 | Stop reason: HOSPADM

## 2024-08-08 RX ORDER — SODIUM CHLORIDE, SODIUM LACTATE, POTASSIUM CHLORIDE, CALCIUM CHLORIDE 600; 310; 30; 20 MG/100ML; MG/100ML; MG/100ML; MG/100ML
INJECTION, SOLUTION INTRAVENOUS CONTINUOUS
Status: DISCONTINUED | OUTPATIENT
Start: 2024-08-08 | End: 2024-08-08 | Stop reason: HOSPADM

## 2024-08-08 RX ORDER — DEXAMETHASONE SODIUM PHOSPHATE 10 MG/ML
INJECTION INTRAMUSCULAR; INTRAVENOUS
Status: DISCONTINUED | OUTPATIENT
Start: 2024-08-08 | End: 2024-08-08 | Stop reason: HOSPADM

## 2024-08-08 RX ORDER — SODIUM CHLORIDE 9 MG/ML
INJECTION, SOLUTION INTRAMUSCULAR; INTRAVENOUS; SUBCUTANEOUS
Status: DISCONTINUED | OUTPATIENT
Start: 2024-08-08 | End: 2024-08-08 | Stop reason: HOSPADM

## 2024-08-08 RX ORDER — ALPRAZOLAM 0.5 MG/1
1 TABLET, ORALLY DISINTEGRATING ORAL ONCE
Status: COMPLETED | OUTPATIENT
Start: 2024-08-08 | End: 2024-08-08

## 2024-08-08 RX ADMIN — ALPRAZOLAM 1 MG: 0.5 TABLET, ORALLY DISINTEGRATING ORAL at 08:08

## 2024-08-08 RX ADMIN — SODIUM CHLORIDE, POTASSIUM CHLORIDE, SODIUM LACTATE AND CALCIUM CHLORIDE: 600; 310; 30; 20 INJECTION, SOLUTION INTRAVENOUS at 08:08

## 2024-08-08 NOTE — DISCHARGE SUMMARY
[facility] - Surgery  Discharge Note  Short Stay    Procedure(s) (LRB):  Injection-steroid-epidural-cervical c7-T1 (N/A)      OUTCOME: Patient tolerated treatment/procedure well without complication and is now ready for discharge.    DISPOSITION: Home or Self Care    FINAL DIAGNOSIS:  <principal problem not specified>    FOLLOWUP: In clinic    DISCHARGE INSTRUCTIONS:    Discharge Procedure Orders   Notify your health care provider if you experience any of the following:  temperature >100.4     Notify your health care provider if you experience any of the following:  severe uncontrolled pain     Notify your health care provider if you experience any of the following:  redness, tenderness, or signs of infection (pain, swelling, redness, odor or green/yellow discharge around incision site)     Activity as tolerated        TIME SPENT ON DISCHARGE: 20 minutes

## 2024-08-08 NOTE — OP NOTE
Patient: Lyudmila Neri                                                    MRN: [id number]  : 1950                                              Date of procedure: 2024      Pre Procedure Diagnosis: Cervical, cervicothoracic Radiculopathy    Post Procedure Diagnosis: Same    Procedure: Cervical Epidural Steroid Injection Under Fluoroscopy at C7-T1    Attending: Jamaica Olvera MD     Local Anesthetic Injected: Lidocaine 1% 3 ml    Sedation Medications: None    Estimated Blood Loss: None    Complication: None    Procedure:  After informed consent was obtained, patient was taken to the fluoroscopy suite and placed in a prone position.  The skin was prepped and draped in the usual sterile fashion using chlorhexidine. The skin and subcutaneous tissue was infiltrated with 3mLs of 1% Lidocaine using a 25 gauge 1.5 inch needle.  The 20-gauge Tuoehy needle was advanced with the aid of fluoroscopy using the water drop loss of resistance technique at the C7-T1 interspinous space using an intralaminer approach.  Proper placement into the epidural space was confirmed by the loss of resistance.  There was no CSF, heme or paresthesias.  After negative aspiration, 0.5mL of contrast was injected.  The contrast confirmed the needle placement by spread delineating the epidural space in multiple views.  Then after negative aspiration, an injectate consisting of 4mLs of 0.5mL of 10mg/mL of Dexamethasone, 0.5mL of preservative free lidocaine and 3mL of preservative free normal saline was injected.  Patient tolerated the procedure well and all needles were removed intact.  There were no complications.    Patient was observed in recovery and discharged home under supervision with discharge instructions in stable condition.

## 2024-08-21 ENCOUNTER — OFFICE VISIT (OUTPATIENT)
Dept: PODIATRY | Facility: CLINIC | Age: 74
End: 2024-08-21
Payer: MEDICARE

## 2024-08-21 VITALS — WEIGHT: 164 LBS | BODY MASS INDEX: 29.06 KG/M2 | HEIGHT: 63 IN

## 2024-08-21 DIAGNOSIS — M19.072 ARTHRITIS OF LEFT MIDFOOT: Primary | ICD-10-CM

## 2024-08-21 PROCEDURE — 99999 PR PBB SHADOW E&M-EST. PATIENT-LVL III: CPT | Mod: PBBFAC,HCNC,, | Performed by: PODIATRIST

## 2024-08-21 PROCEDURE — 20600 DRAIN/INJ JOINT/BURSA W/O US: CPT | Mod: HCNC,LT,S$GLB, | Performed by: PODIATRIST

## 2024-08-21 PROCEDURE — 99499 UNLISTED E&M SERVICE: CPT | Mod: HCNC,S$GLB,, | Performed by: PODIATRIST

## 2024-08-21 RX ORDER — METHYLPREDNISOLONE ACETATE 40 MG/ML
20 INJECTION, SUSPENSION INTRA-ARTICULAR; INTRALESIONAL; INTRAMUSCULAR; SOFT TISSUE
Status: COMPLETED | OUTPATIENT
Start: 2024-08-21 | End: 2024-08-21

## 2024-08-21 RX ADMIN — METHYLPREDNISOLONE ACETATE 20 MG: 40 INJECTION, SUSPENSION INTRA-ARTICULAR; INTRALESIONAL; INTRAMUSCULAR; SOFT TISSUE at 08:08

## 2024-08-21 NOTE — PROGRESS NOTES
Subjective:      Patient ID: Lyudmila Neri is a 74 y.o. female.    Chief Complaint: Foot Pain      Lyudmila is a 74 y.o. female     9/27/22  Patient returns for injections to bilateral midfoot for arthritic changes and pain, wearing good supportive shoes gets the injections a couple times a year they last several months when she gets them    4/24/23: Patient returns for follow up bilateral midfoot arthritis for injections and wants to discuss surgical correction of the right foot.     5/10/23: Patient returns for follow up right foot CT to discuss treatment options moving forward    7/19/23: Patient returns s/p 1 week midfoot fusions with gastroc recession, she is in a posterior splint non weight bearing in wheelchair    7/26/23: Patient returns s/p 2 weeks midfoot fusion and gastroc recession right foot, non weight bearing with cast in place    8/2/23: Patient returns s/p 3 weeks right midfoot fusion and gastroc recession, non weight bearing in below knee cast no new complaints.    8/9/23: Patient returns s/p 4 weeks right midfoot fusion and gastroc recession, non weight bearing in below knee cast doing well    9/6/23: Patient returns s/p 8 weeks right midfoot fusion and gastroc recession, weight bearing in the tall boot now, and relates she walks short distances in her house without the boot.     10/3/23: Patient returns s/p 2.5 months right midfoot fusion and gastroc recession, weight bearing in tennis shoes mild discomfort at times pain 2-4/10 with some edema present the more she is on her feet    11/14/23: Patient returns s/p 4 months right midfoot fusion and gastroc recession, weight bearing in her tennis shoes she relates overall much better with only some discomfort at times in the ball of the foot and some minor numbness. Doing well overall although was unable to start her PT yet due to time constraints    2/19/24: Patient returns s/p 7months right midfoot fusion doing well done with PT and able to walk  with minimal pain, on naproxen BID and with that even the left foot is not bothering her today. No new complaints.    7/2/24: Patient returns with bilateral plantar arch pain that radiates into her medial ankle some, she relates that this happens more often after being on her feet throughout the day, sharp pain that hits for a time then goes away. Doing well otherwise from the fusion to the right foot feeling well.    8/21/24: Patient returns for left midfoot arthritis pain here today seeking a possible steroid injection we talked about previously    Review of Systems   Constitutional: Negative for chills and fever.   Cardiovascular:  Negative for claudication and leg swelling.   Respiratory:  Negative for shortness of breath.    Skin:  Negative for itching, nail changes and rash.   Musculoskeletal:  Positive for arthritis and joint pain. Negative for muscle cramps, muscle weakness and myalgias.   Gastrointestinal:  Negative for nausea and vomiting.   Neurological:  Negative for focal weakness, loss of balance, numbness and paresthesias.           Objective:      Physical Exam  Constitutional:       General: She is not in acute distress.     Appearance: She is well-developed. She is not diaphoretic.   Cardiovascular:      Pulses:           Dorsalis pedis pulses are 2+ on the right side and 2+ on the left side.        Posterior tibial pulses are 2+ on the right side and 2+ on the left side.      Comments: < 3 sec capillary refill time to toes 1-5 bilateral. Toes and feet are warm to touch proximally with normal distal cooling b/l. There is some hair growth on the feet and toes b/l. There is no edema b/l. No spider veins or varicosities present b/l.     Musculoskeletal:      Comments: Equinus noted b/l ankles with < 5 deg DF noted. MMT 5/5 in DF/PF/Inv/Ev resistance with no reproduction of pain in any direction. Passive range of motion of ankle and pedal joints is painless b/l.    No pain presently right foot with  palpation to the plantar arch or medial ankle, no pain with ROM of the foot or ankle    Left foot pain with palpation to the NC joint dorsally     Skin:     General: Skin is warm and dry.      Coloration: Skin is not pale.      Findings: No abrasion, bruising, burn, ecchymosis, erythema, laceration, lesion, petechiae or rash.      Nails: There is no clubbing.      Comments: Skin temperature, texture and turgor within normal limits.    Incisions dorsal right foot well healed   Neurological:      Mental Status: She is alert and oriented to person, place, and time.      Sensory: No sensory deficit.      Motor: No tremor, atrophy or abnormal muscle tone.      Comments: Negative tinel sign bilateral.   Psychiatric:         Behavior: Behavior normal.               Assessment:       Encounter Diagnosis   Name Primary?    Arthritis of left midfoot Yes             Plan:       Lyudmila was seen today for foot pain.    Diagnoses and all orders for this visit:    Arthritis of left midfoot        I counseled the patient on her conditions, their implications and medical management.      A steroid injection was performed at the dorsal NC joint  using 1% plain Lidocaine and 20 mg of depo medrol. This was well tolerated.     Continue using good supportive shoes and inserts    Return PRRANI Edwards DPM

## 2024-08-28 ENCOUNTER — OFFICE VISIT (OUTPATIENT)
Dept: PAIN MEDICINE | Facility: CLINIC | Age: 74
End: 2024-08-28
Payer: MEDICARE

## 2024-08-28 ENCOUNTER — TELEPHONE (OUTPATIENT)
Dept: FAMILY MEDICINE | Facility: CLINIC | Age: 74
End: 2024-08-28
Payer: MEDICARE

## 2024-08-28 ENCOUNTER — PATIENT MESSAGE (OUTPATIENT)
Dept: PAIN MEDICINE | Facility: CLINIC | Age: 74
End: 2024-08-28

## 2024-08-28 ENCOUNTER — HOSPITAL ENCOUNTER (OUTPATIENT)
Dept: RADIOLOGY | Facility: HOSPITAL | Age: 74
Discharge: HOME | End: 2024-08-28
Attending: STUDENT IN AN ORGANIZED HEALTH CARE EDUCATION/TRAINING PROGRAM
Payer: MEDICARE

## 2024-08-28 VITALS — HEIGHT: 63 IN | BODY MASS INDEX: 29.06 KG/M2 | WEIGHT: 164 LBS

## 2024-08-28 DIAGNOSIS — M54.9 BACK PAIN, UNSPECIFIED BACK LOCATION, UNSPECIFIED BACK PAIN LATERALITY, UNSPECIFIED CHRONICITY: ICD-10-CM

## 2024-08-28 DIAGNOSIS — Z12.31 BREAST CANCER SCREENING BY MAMMOGRAM: Primary | ICD-10-CM

## 2024-08-28 DIAGNOSIS — M54.9 BACK PAIN, UNSPECIFIED BACK LOCATION, UNSPECIFIED BACK PAIN LATERALITY, UNSPECIFIED CHRONICITY: Primary | ICD-10-CM

## 2024-08-28 PROCEDURE — 72114 X-RAY EXAM L-S SPINE BENDING: CPT | Mod: 26,,, | Performed by: RADIOLOGY

## 2024-08-28 PROCEDURE — 1101F PT FALLS ASSESS-DOCD LE1/YR: CPT | Mod: CPTII,S$GLB,, | Performed by: STUDENT IN AN ORGANIZED HEALTH CARE EDUCATION/TRAINING PROGRAM

## 2024-08-28 PROCEDURE — 3288F FALL RISK ASSESSMENT DOCD: CPT | Mod: CPTII,S$GLB,, | Performed by: STUDENT IN AN ORGANIZED HEALTH CARE EDUCATION/TRAINING PROGRAM

## 2024-08-28 PROCEDURE — 1125F AMNT PAIN NOTED PAIN PRSNT: CPT | Mod: CPTII,S$GLB,, | Performed by: STUDENT IN AN ORGANIZED HEALTH CARE EDUCATION/TRAINING PROGRAM

## 2024-08-28 PROCEDURE — 1160F RVW MEDS BY RX/DR IN RCRD: CPT | Mod: CPTII,S$GLB,, | Performed by: STUDENT IN AN ORGANIZED HEALTH CARE EDUCATION/TRAINING PROGRAM

## 2024-08-28 PROCEDURE — 99999 PR PBB SHADOW E&M-EST. PATIENT-LVL IV: CPT | Mod: PBBFAC,,, | Performed by: STUDENT IN AN ORGANIZED HEALTH CARE EDUCATION/TRAINING PROGRAM

## 2024-08-28 PROCEDURE — 99214 OFFICE O/P EST MOD 30 MIN: CPT | Mod: S$GLB,,, | Performed by: STUDENT IN AN ORGANIZED HEALTH CARE EDUCATION/TRAINING PROGRAM

## 2024-08-28 PROCEDURE — 1159F MED LIST DOCD IN RCRD: CPT | Mod: CPTII,S$GLB,, | Performed by: STUDENT IN AN ORGANIZED HEALTH CARE EDUCATION/TRAINING PROGRAM

## 2024-08-28 PROCEDURE — 72114 X-RAY EXAM L-S SPINE BENDING: CPT | Mod: TC,PO

## 2024-08-28 NOTE — TELEPHONE ENCOUNTER
Mammogram can not be done prior to 11/02/2024 or the patient will be charged for it.  Always safer to do it a couple days beyond this date.

## 2024-08-28 NOTE — TELEPHONE ENCOUNTER
----- Message from Nolan Merrill sent at 8/28/2024  1:30 PM CDT -----  Type: Needs Medical Advice  Who Called:  pt   Symptoms (please be specific):  needs mammo orders   Best Call Back Number: [phone]  Additional Information: pt stated she would like to be advised in regards to getting orders for pt to doroteo and complete mammo please ensure to call back to advise asap thanks!

## 2024-08-28 NOTE — PROGRESS NOTES
[facility] - Department    North Tucker DO      First Office Visit: 7/10/24  Today' Date: 8/28/2024  Last Office Visit: 7/10/24    Chief complaint: neck pain     HPI: Pt is a pleasant 74 y.o., who presents for evaluation. Referred by Dr. Tucker. Pt complains of neck pain for yrs and bilateral arm pain more recently. Of note, pt has had an anterior osseous fusion at C5-6 and C6-7. Pt seen today for f/u from [facility] C7-T1. States she has had significant pain relief from the FLORENCIO. States most of the neck stiffness and discomfort has gone away. Pt would like her lower back worked up. States she has low back pain and sharp, shooting pain going down the back to below the buttocks. Back pain is worse with bending and lifting. No BB changes. Is doing PT and HEP.         Pain disability index score: 56  Pain score: 5    Relevant Imaging/ Testing:   MR C-spine 2/24  XR C-spine 2/24  CT C-spine 6/22      Procedures:   ILESI C7-T1 8/8/24  B TFESI L4-5 4/9/19, 6/5/19      Date of board of pharmacy review:8/28/2024  Date of opioid risk screening/ pain psych: None  Date of opioid agreement and consent: None  Date of urine drug screen: None  Date of random pill count: None     was reviewed today: reviewed, no concerns     Prescribed medications: None    See EHR for  PMH, PSH, FH, SH, Medications and Allergy    ROS:  Positive for pain  ROS     PE:  There were no vitals filed for this visit.  General: Pleasant, no distress  HEENT: NC/ AT. PERRLA  CV: Radial pulses intact  Pulm: No distress  Ext: No edema    Physical Exam     Neuromusculoskeletal:  Head: NC, AT. PERRLA. No occipital tenderness  Neck: limited range of motions d/t pain, pain with extension, flexion, rotation. Bilat Facet loading. Neg Spurling. Min Tenderness.  5/5 Strength, normal tone. Neg Meza's  Shoulder: Intact range of motion. Min Bicep groove tenderness. 5/5 Strength  Lumbar: Intact range of motion, min tenderness, bilat facet loading, neg SL. No  pain with extension or flexion  Hip: Intact range of motion  SI: Level, bilat SIJ TTP, pos BETHEL's, gaenslen's and thigh thrust bilaterally  Knee: Intact range of motion  Reflexes: normal Bicep and knee  Strength: 5/5 globally   Sensory: Grossly intact   Skin: No bruising, erythema  Gait: Normal      Impression:  Back pain  Bilateral buttock pain   Neck pain  Bilateral arm pain (R>L)  R arm numbness  H/o anterior osseous fusion C5-6 and C6-7   Relevant History  BMI 29.05  KATHY     Assessment:  Bilateral sacroiliitis vs axial back pain  Cervical DDD  Cervical radiculopathy       Plan:  Discussed options  Imaging/ relevant records viewed/ reviewed/ discussed  Imaging results viewed and reviewed (noted above)/ reviewed with patient   reviewed  Cont HEP  Cont PT   XR L-spine  Re-eval after  MR L-spine if pain persists  Consider B SIJ vs B MBB L4-5 and L5-S1  Neurosurgery referral if needed        Prescribed medications:  1. None    The impression and plan were discussed and explained in detail. All the questions were answered. Education was provided accordingly.         Follow-up:  6 wks or sooner if needed    Jamaica Olvera MD

## 2024-08-29 ENCOUNTER — TELEPHONE (OUTPATIENT)
Dept: RHEUMATOLOGY | Facility: CLINIC | Age: 74
End: 2024-08-29
Payer: MEDICARE

## 2024-08-29 NOTE — TELEPHONE ENCOUNTER
----- Message from Kelle Geiger sent at 8/29/2024  9:28 AM CDT -----  Type:  Sooner Appointment Request    Caller is requesting a sooner appointment.  Caller declined first available appointment listed below.  Caller will not accept being placed on the waitlist and is requesting a message be sent to doctor.    Name of Caller:  pt   When is the first available appointment?  Needs resched   Symptoms:  6 month f/u   Would the patient rather a call back or a response via [facility]? Call   Best Call Back Number:  [phone] (home)     Additional Information:  please advise

## 2024-09-17 ENCOUNTER — OFFICE VISIT (OUTPATIENT)
Dept: RHEUMATOLOGY | Facility: CLINIC | Age: 74
End: 2024-09-17
Payer: MEDICARE

## 2024-09-17 VITALS — HEART RATE: 65 BPM | SYSTOLIC BLOOD PRESSURE: 111 MMHG | DIASTOLIC BLOOD PRESSURE: 70 MMHG

## 2024-09-17 DIAGNOSIS — M15.0 PRIMARY OSTEOARTHRITIS INVOLVING MULTIPLE JOINTS: Primary | ICD-10-CM

## 2024-09-17 DIAGNOSIS — N18.2 CHRONIC KIDNEY DISEASE, STAGE II (MILD): ICD-10-CM

## 2024-09-17 PROCEDURE — 3288F FALL RISK ASSESSMENT DOCD: CPT | Mod: HCNC,CPTII,S$GLB, | Performed by: INTERNAL MEDICINE

## 2024-09-17 PROCEDURE — 1159F MED LIST DOCD IN RCRD: CPT | Mod: HCNC,CPTII,S$GLB, | Performed by: INTERNAL MEDICINE

## 2024-09-17 PROCEDURE — 3078F DIAST BP <80 MM HG: CPT | Mod: HCNC,CPTII,S$GLB, | Performed by: INTERNAL MEDICINE

## 2024-09-17 PROCEDURE — 99214 OFFICE O/P EST MOD 30 MIN: CPT | Mod: HCNC,S$GLB,, | Performed by: INTERNAL MEDICINE

## 2024-09-17 PROCEDURE — 1101F PT FALLS ASSESS-DOCD LE1/YR: CPT | Mod: HCNC,CPTII,S$GLB, | Performed by: INTERNAL MEDICINE

## 2024-09-17 PROCEDURE — 1160F RVW MEDS BY RX/DR IN RCRD: CPT | Mod: HCNC,CPTII,S$GLB, | Performed by: INTERNAL MEDICINE

## 2024-09-17 PROCEDURE — 1125F AMNT PAIN NOTED PAIN PRSNT: CPT | Mod: HCNC,CPTII,S$GLB, | Performed by: INTERNAL MEDICINE

## 2024-09-17 PROCEDURE — 99999 PR PBB SHADOW E&M-EST. PATIENT-LVL III: CPT | Mod: PBBFAC,HCNC,, | Performed by: INTERNAL MEDICINE

## 2024-09-17 PROCEDURE — 3074F SYST BP LT 130 MM HG: CPT | Mod: HCNC,CPTII,S$GLB, | Performed by: INTERNAL MEDICINE

## 2024-09-17 RX ORDER — DICLOFENAC SODIUM 10 MG/G
2 GEL TOPICAL DAILY
COMMUNITY

## 2024-09-17 NOTE — PROGRESS NOTES
Subjective:          Chief Complaint: Lyudmila Neri is a 74 y.o. female who had no chief complaint listed for this encounter.    HPI:    Patient is a 74-year-old female here for f/u of OA various joints to discuss management options:     Feet now painful all the time, previously only end of day. Pain first thing in AM, pain at rest. Dorsal mid foot predominant. Not as much in toes- working with Dr. Post. Was receiving injections ultimately did have right foot surgery for fusion with marked improvement of her pain, pending the left foot. Recent increased knee pain: working with Ortho. MRI right knee with degenrative changes.     New anterior thigh pain in same distribution of her L4-5 lesion. No pain in groin, sitting crossed legged in visit today w/o pain. No GTB pain. Mild and manageable at this time describes as an ache.     Wondering if Cymbalta is still helping, she is noting progression of global stiffness over the years rate pain 5/10 wandering OA pattern in various joints: knees low back and cervical spine.   Hand are doing well     Celebrex as of 2020 insurance will not fill  Mobic in past not as effective.     Using Aleve OTC BID doing better than other Rx NSAIDs.   Cymbalta 30mg daily   BP has been good    Rheumatic Hx:    As a history of nonerosive erosive reflux gastritis as well as GIST resected 2011 follows with Dr. Pro.  Recent serologies rheumatoid factor negative, sedimentation rate within normal limits, SINDI negative, C-reactive protein is slightly elevated.  Affected joints: knees, shoulders cervical spine (hx of fusion), hips, hands are stiff in the CMC and MCP and PIP, feet. Right ankle with known fx and ORIF.   Stifffness in AM 45 min with hot shower.   No dactylitis, no known other joint swelling. No IBD. She notes drys eyes, no scleritis, episcleritis  She did use aleve which helps, meloxicam not really. Celebrex does help but cannot use BID due to HTN. Did recently have BP meds  adjusted.  Is having relief but not complete. No GI upset.   She has sensitivity to soft tissue pain.  She has hx of Psoriasis that is controlled for at least 10 years-scalp predominant but diffusely.   A new well marginated erosion present at the radial aspect of the base of the right fourth proximal phalanx joint space narrowing in the third fourth MCP and the left third MCP joint degenerative changes otherwise.    REVIEW OF SYSTEMS:    Review of Systems   Constitutional:  Positive for malaise/fatigue. Negative for fever and weight loss.   HENT:  Negative for sore throat.    Eyes:  Negative for double vision, photophobia and redness.   Respiratory:  Negative for cough, shortness of breath and wheezing.    Cardiovascular:  Negative for chest pain, palpitations and orthopnea.   Gastrointestinal:  Negative for abdominal pain, constipation and diarrhea.   Genitourinary:  Negative for dysuria, hematuria and urgency.   Musculoskeletal:  Positive for joint pain and myalgias. Negative for back pain.   Skin:  Negative for rash.   Neurological:  Negative for dizziness, tingling, focal weakness and headaches.   Endo/Heme/Allergies:  Does not bruise/bleed easily.   Psychiatric/Behavioral:  Negative for depression, hallucinations and suicidal ideas.                Objective:            Past Medical History:   Diagnosis Date    Anticoagulant long-term use     Arthritis     osteoarthritis    Blood transfusion     Cancer 2011    stromal tumor, stomach    Depression     Disorder of kidney and ureter     CKD 2    GERD (gastroesophageal reflux disease)     GIST (gastrointestinal stromal tumor), malignant     H. pylori infection     Hypertension     KATHY (obstructive sleep apnea) 6/24/2013    Psoriasis 6/24/2013    Trouble in sleeping     arthritic pain wakes her up    Urinary incontinence     stress incontinence     Family History   Problem Relation Name Age of Onset    Heart disease Mother      Arthritis Mother           osteoarthritis    COPD Mother      Hyperlipidemia Mother      Hypertension Mother      Kidney disease Mother      Stroke Mother      Colon cancer Mother  75    Cancer Mother  70        colon cancer    Cancer Father          brain and lung    Arthritis Father      COPD Sister      Depression Daughter      Arthritis Maternal Aunt          rheumatoid arthritis    Heart disease Maternal Uncle      Early death Maternal Uncle          in 50s due to heart disease    Arthritis Paternal Aunt          rheuamtoid arthritis    Heart disease Maternal Grandfather      Arthritis Paternal Grandmother          rheumatoid arthritis    Diabetes Paternal Grandmother      Diabetes Cousin      Heart disease Cousin      Crohn's disease Neg Hx      Stomach cancer Neg Hx      Ulcerative colitis Neg Hx      Esophageal cancer Neg Hx       Social History     Tobacco Use    Smoking status: Never    Smokeless tobacco: Never   Substance Use Topics    Alcohol use: No    Drug use: No         Current Outpatient Medications on File Prior to Visit   Medication Sig Dispense Refill    albuterol (VENTOLIN HFA) 90 mcg/actuation inhaler Inhale 2 puffs into the lungs every 6 (six) hours as needed for Wheezing. Rescue 18 g 0    amLODIPine (NORVASC) 5 MG tablet TAKE 1 TABLET(5 MG) BY MOUTH EVERY DAY 90 tablet 3    aspirin (ECOTRIN) 81 MG EC tablet Take 81 mg by mouth once daily.      cetirizine (ZYRTEC) 10 MG tablet Take 10 mg by mouth once daily.      cholecalciferol, vitamin D3, (VITAMIN D3) 25 mcg (1,000 unit) capsule Take 1,000 Units by mouth once daily.      clobetasol (TEMOVATE) 0.05 % external solution Apply topically as needed.      diclofenac sodium (VOLTAREN ARTHRITIS PAIN) 1 % Gel Apply 2 g topically once daily.      DULoxetine (CYMBALTA) 30 MG capsule TAKE 1 CAPSULE(30 MG) BY MOUTH EVERY DAY 30 capsule 6    famotidine (PEPCID) 40 MG tablet Take 1 tablet (40 mg total) by mouth every evening. 60 tablet 5    fish oil-omega-3 fatty acids 300-1,000 mg  capsule Take by mouth once daily.      FOLIC ACID/MULTIVIT-MIN/LUTEIN (CENTRUM SILVER ORAL) Take by mouth.      levothyroxine (SYNTHROID) 50 MCG tablet Take 50 mcg by mouth.      losartan-hydrochlorothiazide 100-12.5 mg (HYZAAR) 100-12.5 mg Tab Take 1 tablet by mouth once daily. 90 tablet 3    mirabegron (MYRBETRIQ) 50 mg Tb24 Take 1 tablet (50 mg total) by mouth once daily. 90 tablet 3    MYRBETRIQ 50 mg Tb24 Take 1 tablet by mouth once daily 90 tablet 0    omeprazole (PRILOSEC) 40 MG capsule TAKE 1 CAPSULE BY MOUTH ONCE DAILY BEFORE BREAKFAST 90 capsule 3    solifenacin (VESICARE) 5 MG tablet Take 5 mg by mouth once daily.      tolterodine (DETROL LA) 2 MG Cp24 Take 1 capsule (2 mg total) by mouth once daily. (Patient not taking: Reported on 6/26/2024) 30 capsule 2    vit C/E/Zn/coppr/lutein/zeaxan (PRESERVISION AREDS-2 ORAL) Take by mouth 2 (two) times a day.       No current facility-administered medications on file prior to visit.       Vitals:    09/17/24 1215   BP: 111/70   Pulse: 65       Physical Exam:    Physical Exam  Constitutional:       Appearance: Normal appearance. She is well-developed.   HENT:      Nose: No septal deviation.      Mouth/Throat:      Mouth: No oral lesions.   Eyes:      Conjunctiva/sclera:      Right eye: Right conjunctiva is not injected.      Left eye: Left conjunctiva is not injected.      Pupils: Pupils are equal, round, and reactive to light.   Neck:      Thyroid: No thyroid mass or thyromegaly.      Vascular: No JVD.   Cardiovascular:      Rate and Rhythm: Normal rate and regular rhythm.      Pulses: Normal pulses.      Comments: No edema  Pulmonary:      Effort: Pulmonary effort is normal.      Breath sounds: Normal breath sounds.   Abdominal:      Palpations: Abdomen is soft.   Musculoskeletal:      Right shoulder: Tenderness present. No swelling. Normal range of motion.      Left shoulder: Tenderness present. No swelling. Normal range of motion.      Right elbow: No  swelling. Normal range of motion. No tenderness.      Left elbow: No swelling. Normal range of motion. No tenderness.      Right wrist: No swelling or tenderness. Normal range of motion.      Left wrist: No swelling or tenderness. Normal range of motion.      Right hand: Tenderness present.      Left hand: Tenderness present.      Right hip: Normal range of motion. Normal strength.      Left hip: No tenderness. Normal range of motion.      Right knee: No swelling. Normal range of motion. Tenderness present.      Left knee: No swelling. Normal range of motion. Tenderness present.      Right ankle: No swelling. No tenderness. Normal range of motion.      Left ankle: No swelling. No tenderness. Normal range of motion.      Right foot: Tenderness present.      Left foot: Tenderness present.      Comments: 2nd DIP b/l bony hypertrophy.   No active synovitis but tenderness at fingers, shoulders, knees and metatarsalgia.   No nodules of the achilles.     No pain with ROM hips, negative SLR  No pain with resisted knee extension, hip ab and ad duction.   Pain in the spine and buttock with hip resisted hip flexion.    Lymphadenopathy:      Cervical: No cervical adenopathy.   Skin:     General: Skin is dry.   Neurological:      Deep Tendon Reflexes: Reflexes are normal and symmetric.               Assessment:       Encounter Diagnoses   Name Primary?    Primary osteoarthritis involving multiple joints Yes    Chronic kidney disease, stage II (mild)          Plan:        Primary osteoarthritis involving multiple joints    Chronic kidney disease, stage II (mild)      OA in the setting of CKDII    Voltaren most effective for feet.   Aleve 220mg using rarely, renal function is good. But we have to limit as hx of some fluctuations with GFR.   Ok for Aleve 220mg every day (and she is using one daily)   Kidney's did well ok to keep Aleve 1 tablet daily and 2 tablets for bad days.   Add in tylenol (acetomenophen) arthritis 650mg  tablet. You can take up to 3,200mg in 24 hours  Decreasing Duloxetine 30mg to see if less tired and constipation resolves.   Renal panel prior to f/u in 3 months.     Regarding thighs : I do feel this is in the distribution of her L4-L5 spinal disease, no evidence for hip pathology on exam.     We spent some time discussion the progression of OA and procedures (feet and interventional pain) that can help mitigate pain    No follow-ups on file.         30min consultation with greater than 50% of that time included Preparing to see the patient (review records, tests), Obtaining and/or reviewing separately obtained historical data, Performing a medically appropriate examination and/or evaluation , Ordering medications, tests, and/or procedures, Referring and communicating with other healthcare professionals , Documenting clinical information in the electronic or other health record and Independently interpreting results  (as warranted) & communicating results to the patient/family/caregiver. All questions answered.

## 2024-09-27 ENCOUNTER — OFFICE VISIT (OUTPATIENT)
Dept: FAMILY MEDICINE | Facility: CLINIC | Age: 74
End: 2024-09-27
Payer: MEDICARE

## 2024-09-27 VITALS
WEIGHT: 159.81 LBS | HEART RATE: 65 BPM | BODY MASS INDEX: 28.32 KG/M2 | OXYGEN SATURATION: 97 % | HEIGHT: 63 IN | DIASTOLIC BLOOD PRESSURE: 78 MMHG | SYSTOLIC BLOOD PRESSURE: 116 MMHG

## 2024-09-27 DIAGNOSIS — I10 ESSENTIAL HYPERTENSION: ICD-10-CM

## 2024-09-27 DIAGNOSIS — M54.12 CERVICAL RADICULOPATHY: ICD-10-CM

## 2024-09-27 DIAGNOSIS — L40.9 PSORIASIS: ICD-10-CM

## 2024-09-27 DIAGNOSIS — R41.3 MEMORY PROBLEM: ICD-10-CM

## 2024-09-27 DIAGNOSIS — N18.2 CHRONIC KIDNEY DISEASE, STAGE II (MILD): Primary | ICD-10-CM

## 2024-09-27 DIAGNOSIS — K21.9 GASTROESOPHAGEAL REFLUX DISEASE WITHOUT ESOPHAGITIS: ICD-10-CM

## 2024-09-27 DIAGNOSIS — E04.1 LEFT THYROID NODULE: ICD-10-CM

## 2024-09-27 DIAGNOSIS — G47.33 OSA (OBSTRUCTIVE SLEEP APNEA): ICD-10-CM

## 2024-09-27 DIAGNOSIS — M54.2 CERVICALGIA: ICD-10-CM

## 2024-09-27 PROCEDURE — 1126F AMNT PAIN NOTED NONE PRSNT: CPT | Mod: HCNC,CPTII,S$GLB, | Performed by: INTERNAL MEDICINE

## 2024-09-27 PROCEDURE — 99214 OFFICE O/P EST MOD 30 MIN: CPT | Mod: HCNC,S$GLB,, | Performed by: INTERNAL MEDICINE

## 2024-09-27 PROCEDURE — 3078F DIAST BP <80 MM HG: CPT | Mod: HCNC,CPTII,S$GLB, | Performed by: INTERNAL MEDICINE

## 2024-09-27 PROCEDURE — 1159F MED LIST DOCD IN RCRD: CPT | Mod: HCNC,CPTII,S$GLB, | Performed by: INTERNAL MEDICINE

## 2024-09-27 PROCEDURE — 1160F RVW MEDS BY RX/DR IN RCRD: CPT | Mod: HCNC,CPTII,S$GLB, | Performed by: INTERNAL MEDICINE

## 2024-09-27 PROCEDURE — G2211 COMPLEX E/M VISIT ADD ON: HCPCS | Mod: HCNC,S$GLB,, | Performed by: INTERNAL MEDICINE

## 2024-09-27 PROCEDURE — 3074F SYST BP LT 130 MM HG: CPT | Mod: HCNC,CPTII,S$GLB, | Performed by: INTERNAL MEDICINE

## 2024-09-27 PROCEDURE — 99999 PR PBB SHADOW E&M-EST. PATIENT-LVL V: CPT | Mod: PBBFAC,HCNC,, | Performed by: INTERNAL MEDICINE

## 2024-09-27 NOTE — PROGRESS NOTES
Patient ID: Lyudmila Neri is a 74 y.o. female.    Chief Complaint: Follow-up     Assessment and Plan   1. Chronic kidney disease, stage II (mild)    2. Essential hypertension  - CBC Auto Differential; Future  - Comprehensive Metabolic Panel; Future  - Lipid Panel; Future    3. Gastroesophageal reflux disease without esophagitis    4. KATHY (obstructive sleep apnea)  - Ambulatory referral/consult to ENT; Future    5. Psoriasis    6. Left thyroid nodule  - US Thyroid; Future    7. Memory problem  - TSH; Future  - Ammonia; Future  - Folate; Future  - Vitamin B12; Future  - Vitamin B1; Future    8. Cervicalgia  - Ambulatory referral/consult to Pain Clinic; Future    9. Cervical radiculopathy  - Ambulatory referral/consult to Pain Clinic; Future     Memory labs   Close follow-up for [place]  ENT referral to evaluate for inspire  Pain management 2nd opinion    Continue medications listed below at dosages listed in the comprehensive med list found further down in the note.  albuterol  amLODIPine  aspirin  CENTRUM SILVER ORAL  cetirizine  cholecalciferol (vitamin D3)  clobetasoL  diclofenac sodium Gel  DULoxetine  famotidine  fish oil-omega-3 fatty acids  levothyroxine  losartan-hydrochlorothiazide 100-12.5 mg Tab  omeprazole     HPI     Annual   Lt thyroid nodule on [facility] carotid US.   Tinnitus is getting worse.   Having trouble remembering names of people and objects. She is under a lot of stress. She has sleep apnea but did not tolerate CPAP. Last sleep study 2021 (moderate sleep apnea).   Had Cervical and thoracic BRIGITTE. There was some improvement. She is still having stiffness in neck.   Hypertension controlled    Exercise- walking   Diet- ok   Alcohol- rarely   Tobacco- none   Health maintenance-    Review of Systems   Constitutional:  Negative for fever.   Respiratory:  Negative for shortness of breath.    Cardiovascular:  Negative for chest pain.   Gastrointestinal:  Negative for abdominal pain.       I  personally reviewed past medical, family and social history.   Objective    Vitals:    09/27/24 1539   BP: 116/78   Pulse: 65      Wt Readings from Last 3 Encounters:   09/27/24 1539 72.5 kg (159 lb 13.3 oz)   08/28/24 0958 74.4 kg (164 lb)   08/21/24 0808 74.4 kg (164 lb 0.4 oz)      Body mass index is 28.31 kg/m².     Physical Exam  Cardiovascular:      Rate and Rhythm: Normal rate and regular rhythm.      Heart sounds: No murmur heard.     No gallop.   Pulmonary:      Breath sounds: Normal breath sounds. No wheezing or rhonchi.   Abdominal:      Palpations: Abdomen is soft.      Tenderness: There is no abdominal tenderness.        Reference     : Visit today included increased complexity associated with the care of the episodic problem hypertension addressed and managing the longitudinal care of the patient due to the serious and/or complex managed problem(s)     Active Problem List with Overview Notes    Diagnosis Date Noted    Urge incontinence of urine 10/10/2023    Arthritis of midfoot 07/13/2023    Arthritis of metatarsophalangeal (MTP) joint of great toe 07/13/2023    Gastrocnemius equinus of right lower extremity 07/13/2023    Tortuous aorta 10/13/2022     CxR 2/2020      Lactose intolerance 07/27/2021    Early dry stage nonexudative age-related macular degeneration of both eyes 07/27/2021    Epigastric pain 05/14/2019    Carotid disease, bilateral 04/18/2019     Carotid US 9/23/16      Lumbar radiculopathy 04/09/2019    Degenerative disc disease, lumbar 11/01/2017    History of gastrointestinal stromal tumor (GIST) 09/23/2016    Family history of colon cancer in mother 09/23/2016    Chronic kidney disease, stage II (mild) 09/23/2016    Primary osteoarthritis involving multiple joints 09/23/2016    KATHY (obstructive sleep apnea) 06/24/2013    Psoriasis 06/24/2013    GERD (gastroesophageal reflux disease)     Essential hypertension            Hypertension Medications               amLODIPine (NORVASC) 5  MG tablet TAKE 1 TABLET(5 MG) BY MOUTH EVERY DAY    losartan-hydrochlorothiazide 100-12.5 mg (HYZAAR) 100-12.5 mg Tab Take 1 tablet by mouth once daily.           Hyperlipidemia Medications               fish oil-omega-3 fatty acids 300-1,000 mg capsule Take by mouth once daily.           Medication List with Changes/Refills   Current Medications    ALBUTEROL (VENTOLIN HFA) 90 MCG/ACTUATION INHALER    Inhale 2 puffs into the lungs every 6 (six) hours as needed for Wheezing. Rescue    AMLODIPINE (NORVASC) 5 MG TABLET    TAKE 1 TABLET(5 MG) BY MOUTH EVERY DAY    ASPIRIN (ECOTRIN) 81 MG EC TABLET    Take 81 mg by mouth once daily.    CETIRIZINE (ZYRTEC) 10 MG TABLET    Take 10 mg by mouth once daily.    CHOLECALCIFEROL, VITAMIN D3, (VITAMIN D3) 25 MCG (1,000 UNIT) CAPSULE    Take 1,000 Units by mouth once daily.    CLOBETASOL (TEMOVATE) 0.05 % EXTERNAL SOLUTION    Apply topically as needed.    DICLOFENAC SODIUM (VOLTAREN ARTHRITIS PAIN) 1 % GEL    Apply 2 g topically once daily.    DICLOFENAC SODIUM (VOLTAREN) 1 % GEL    Apply 2 g topically 3 (three) times daily as needed.    DULOXETINE (CYMBALTA) 30 MG CAPSULE    TAKE 1 CAPSULE(30 MG) BY MOUTH EVERY DAY    FAMOTIDINE (PEPCID) 40 MG TABLET    Take 1 tablet (40 mg total) by mouth every evening.    FISH OIL-OMEGA-3 FATTY ACIDS 300-1,000 MG CAPSULE    Take by mouth once daily.    FOLIC ACID/MULTIVIT-MIN/LUTEIN (CENTRUM SILVER ORAL)    Take by mouth.    LEVOTHYROXINE (SYNTHROID) 50 MCG TABLET    Take 50 mcg by mouth.    LOSARTAN-HYDROCHLOROTHIAZIDE 100-12.5 MG (HYZAAR) 100-12.5 MG TAB    Take 1 tablet by mouth once daily.    MIRABEGRON (MYRBETRIQ) 50 MG TB24    Take 1 tablet (50 mg total) by mouth once daily.    MYRBETRIQ 50 MG TB24    Take 1 tablet by mouth once daily    OMEPRAZOLE (PRILOSEC) 40 MG CAPSULE    TAKE 1 CAPSULE BY MOUTH ONCE DAILY BEFORE BREAKFAST    SOLIFENACIN (VESICARE) 5 MG TABLET    Take 5 mg by mouth once daily.    TOLTERODINE (DETROL LA) 2 MG CP24     Take 1 capsule (2 mg total) by mouth once daily.    VIT C/E/ZN/COPPR/LUTEIN/ZEAXAN (PRESERVISION AREDS-2 ORAL)    Take by mouth 2 (two) times a day.         Sodium   Date Value Ref Range Status   08/05/2024 140 136 - 145 mmol/L Final     Potassium   Date Value Ref Range Status   08/05/2024 4.4 3.5 - 5.1 mmol/L Final     Chloride   Date Value Ref Range Status   08/05/2024 103 95 - 110 mmol/L Final     CO2   Date Value Ref Range Status   08/05/2024 31 (H) 23 - 29 mmol/L Final     Glucose   Date Value Ref Range Status   08/05/2024 103 70 - 110 mg/dL Final     BUN   Date Value Ref Range Status   08/05/2024 18 8 - 23 mg/dL Final     Creatinine   Date Value Ref Range Status   08/05/2024 1.0 0.5 - 1.4 mg/dL Final     Calcium   Date Value Ref Range Status   08/05/2024 9.4 8.7 - 10.5 mg/dL Final     Total Protein   Date Value Ref Range Status   10/03/2023 7.1 6.0 - 8.4 g/dL Final     Albumin   Date Value Ref Range Status   01/09/2024 3.5 3.5 - 5.2 g/dL Final     Total Bilirubin   Date Value Ref Range Status   10/03/2023 0.7 0.1 - 1.0 mg/dL Final     Comment:     For infants and newborns, interpretation of results should be based  on gestational age, weight and in agreement with clinical  observations.    Premature Infant recommended reference ranges:  Up to 24 hours.............<8.0 mg/dL  Up to 48 hours............<12.0 mg/dL  3-5 days..................<15.0 mg/dL  6-29 days.................<15.0 mg/dL       Alkaline Phosphatase   Date Value Ref Range Status   10/03/2023 57 55 - 135 U/L Final     AST   Date Value Ref Range Status   10/03/2023 14 10 - 40 U/L Final     ALT   Date Value Ref Range Status   10/03/2023 9 (L) 10 - 44 U/L Final     Anion Gap   Date Value Ref Range Status   08/05/2024 6 (L) 8 - 16 mmol/L Final     eGFR   Date Value Ref Range Status   08/05/2024 58.4 (A) >60 mL/min/1.73 m^2 Final      Lab Results   Component Value Date    HGBA1C 5.3 10/03/2023    HGBA1C 5.6 09/29/2022    HGBA1C 5.5 07/29/2021       Lab Results   Component Value Date    TSH 1.295 10/03/2023      Lab Results   Component Value Date    CHOL 176 10/03/2023    CHOL 237 (H) 09/29/2022    CHOL 202 (H) 02/27/2018     Lab Results   Component Value Date    HDL 45 10/03/2023    HDL 59 09/29/2022    HDL 48 02/27/2018     Lab Results   Component Value Date    LDLCALC 108.8 10/03/2023    LDLCALC 150.4 09/29/2022    LDLCALC 118.6 02/27/2018     Lab Results   Component Value Date    TRIG 111 10/03/2023    TRIG 138 09/29/2022    TRIG 177 (H) 02/27/2018     Lab Results   Component Value Date    CHOLHDL 25.6 10/03/2023    CHOLHDL 24.9 09/29/2022    CHOLHDL 23.8 02/27/2018       No results found for this or any previous visit.     No results found for this or any previous visit.

## 2024-09-30 ENCOUNTER — HOSPITAL ENCOUNTER (OUTPATIENT)
Dept: RADIOLOGY | Facility: HOSPITAL | Age: 74
Discharge: HOME | End: 2024-09-30
Attending: INTERNAL MEDICINE
Payer: MEDICARE

## 2024-09-30 DIAGNOSIS — E04.1 LEFT THYROID NODULE: Primary | ICD-10-CM

## 2024-09-30 DIAGNOSIS — E04.1 LEFT THYROID NODULE: ICD-10-CM

## 2024-09-30 PROCEDURE — 76536 US EXAM OF HEAD AND NECK: CPT | Mod: 26,HCNC,, | Performed by: RADIOLOGY

## 2024-09-30 PROCEDURE — 76536 US EXAM OF HEAD AND NECK: CPT | Mod: TC,HCNC,PO

## 2024-10-02 DIAGNOSIS — Z85.09 PERSONAL HISTORY OF MALIGNANT NEOPLASM OF GALLBLADDER: Primary | ICD-10-CM

## 2024-10-03 ENCOUNTER — HOSPITAL ENCOUNTER (OUTPATIENT)
Dept: RADIOLOGY | Facility: HOSPITAL | Age: 74
Discharge: HOME | End: 2024-10-03
Attending: NURSE PRACTITIONER
Payer: MEDICARE

## 2024-10-03 DIAGNOSIS — Z85.09 HISTORY OF GASTROINTESTINAL STROMAL TUMOR (GIST): ICD-10-CM

## 2024-10-03 PROCEDURE — 74177 CT ABD & PELVIS W/CONTRAST: CPT | Mod: 26,HCNC,, | Performed by: STUDENT IN AN ORGANIZED HEALTH CARE EDUCATION/TRAINING PROGRAM

## 2024-10-03 PROCEDURE — A9698 NON-RAD CONTRAST MATERIALNOC: HCPCS | Mod: HCNC,PO | Performed by: NURSE PRACTITIONER

## 2024-10-03 PROCEDURE — 25500020 PHARM REV CODE 255: Mod: HCNC,PO | Performed by: NURSE PRACTITIONER

## 2024-10-03 PROCEDURE — 74177 CT ABD & PELVIS W/CONTRAST: CPT | Mod: TC,HCNC,PO

## 2024-10-03 RX ADMIN — IOHEXOL 1000 ML: 9 SOLUTION ORAL at 09:10

## 2024-10-03 RX ADMIN — IOHEXOL 75 ML: 350 INJECTION, SOLUTION INTRAVENOUS at 09:10

## 2024-10-07 ENCOUNTER — OFFICE VISIT (OUTPATIENT)
Dept: HEMATOLOGY/ONCOLOGY | Facility: CLINIC | Age: 74
End: 2024-10-07
Payer: MEDICARE

## 2024-10-07 VITALS
SYSTOLIC BLOOD PRESSURE: 132 MMHG | OXYGEN SATURATION: 95 % | HEART RATE: 68 BPM | DIASTOLIC BLOOD PRESSURE: 78 MMHG | WEIGHT: 161.19 LBS | TEMPERATURE: 97 F | HEIGHT: 63 IN | RESPIRATION RATE: 18 BRPM | BODY MASS INDEX: 28.56 KG/M2

## 2024-10-07 DIAGNOSIS — Z85.09 HISTORY OF GASTROINTESTINAL STROMAL TUMOR (GIST): Primary | ICD-10-CM

## 2024-10-07 PROCEDURE — 99999 PR PBB SHADOW E&M-EST. PATIENT-LVL IV: CPT | Mod: PBBFAC,HCNC,, | Performed by: NURSE PRACTITIONER

## 2024-10-07 NOTE — PROGRESS NOTES
"  Subjective:      Name: Lyudmila Neri  : 1950  MRN: [id number]    CC:  Annual GIST evaluation    HISTORY OF PRESENT ILLNESS:  Ms. Neri is a 74-year-old white female with GERD, HTN, KATHY, , CKDII, Left thyroid nodule, Cervicalgia, Psoriasis was known to Dr. Pro for resected low-grade GIST (2011).    No adjuvant chemotherapy was given in light of low-grade status.    Presentation:  palpable RUQ mass; abdominal discomfort  22: US Abd:  IMPRESSION: SOLID HYPERECHOIC 3 CM MASS WITHIN THE SUBCUTANEOUS FAT OF   THE ANTERIOR RIGHT ABDOMEN CORRESPONDING TO A PALPABLE LUMP.  THOUGH THE   MASS PROBABLY REPRESENTS A LIPOMA, ULTRASOUND IS RELATIVELY NONSPECIFIC   AND CT OR MRI IS RECOMMENDED.     22: CT Abdomen/Pelvis-  IMPRESSION:   1.  A SOLID MASS APPEARS TO BE PRESENT THAT APPEARS TO BE EXTENDING FROM,   OR VERY CLOSELY OPPOSED TO THE STOMACH.  THIS MOST LIKELY RELATES TO A   GASTRIC WALL NEOPLASM, POSSIBLY A LEIOMYOMA.  GIVEN THAT ADENOCARCINOMAS   CAN APPEAR SIMILAR, FURTHER EVALUATION IS SUGGESTED.  THIS LESION IS   FAVORED TO BE EXTENDING INTO THE GASTRIC LUMEN AND CAN LIKELY BE ACCESSED   BY ENDOSCOPY.  FURTHER EVALUATION IS SUGGESTED TO EXCLUDE AN AGGRESSIVE   NEOPLASM.     2.  CHOLELITHIASIS.     11:  Resection of gastric GIST (3.2 x 2.6 cm); cholecystectomy; excision of abdominal wall lipoma per Dr. Cai    TODAY:  10/07/24  Ms. Neri presents to the clinic today for annual (13 years post) evaluation.  She has continuous abdominal tenderness since her surgery.  She remains active.  She reports some "fullness" in her neck.  PCP is monitoring her thyroid.  Last UGI was 10/09/23:  Normal. Patient remains on Omeprazole & Famotidine.   She denies any difficulties with fevers, chills, vomiting, constipation, diarrhea, painful lymphadenopathy, drenching night sweats, unexplained weight loss, bleeding, etc.  No other new complaints or pertinent findings on a 14-point review of " systems.    Past Medical History:   Diagnosis Date    Anticoagulant long-term use     Arthritis     osteoarthritis    Blood transfusion     Cancer 2011    stromal tumor, stomach    Depression     Disorder of kidney and ureter     CKD 2    GERD (gastroesophageal reflux disease)     GIST (gastrointestinal stromal tumor), malignant     H. pylori infection     Hypertension     KATHY (obstructive sleep apnea) 6/24/2013    Psoriasis 6/24/2013    Trouble in sleeping     arthritic pain wakes her up    Urinary incontinence     stress incontinence       Past Surgical History:   Procedure Laterality Date    ANKLE FRACTURE SURGERY      RT ankle    APPENDECTOMY      BLADDER SUSPENSION  1995    CARPAL TUNNEL RELEASE      left    CERVICAL FUSION      CHEILECTOMY Right 07/13/2023    Procedure: CHEILECTOMY;  Surgeon: Agusto Edwards DPM;  Location: [facility] OR;  Service: Podiatry;  Laterality: Right;    CHOLECYSTECTOMY  12/7/2011  Dr. Cai    COLONOSCOPY  7/26/2005  Thomas    Non-erosive esophageal reflux (NERD) disease?.  Post-surgical deformity in the gastric body.   Gastric mucosal abnormality (erythema) in the greater  curve of antrum.  STEVIE Test performed on 02/24/12 was Negative.    COLONOSCOPY  12/12/2008  Thomas    Small internal hemorrhoids.  Slightly redundant left colon.    COLONOSCOPY N/A 04/18/2018    Procedure: COLONOSCOPY;  Surgeon: David Guzman Jr., MD;  Location: [facility] ENDO;  Service: Endoscopy;  Laterality: N/A;    COLONOSCOPY  04/18/2018    Dr. Guzman; hemorrhoids, otherwise unremarkable, repeat 5 years for surveillance due to family history of colon cancer    CYST REMOVAL Left 2016    left middle finger, Dr. Johnson    ENDOSCOPIC GASTROCNEMIUS RECESSION Right 07/13/2023    Procedure: RECESSION, GASTROCNEMIUS, ENDOSCOPIC;  Surgeon: Agusto Edwards DPM;  Location: [facility] OR;  Service: Podiatry;  Laterality: Right;  popliteal saphenous block,    EPIDURAL STEROID INJECTION INTO CERVICAL SPINE N/A 8/8/2024     Procedure: Injection-steroid-epidural-cervical c7-T1;  Surgeon: Jamaica Olvera MD;  Location: [facility] OR;  Service: Anesthesiology;  Laterality: N/A;    ESOPHAGOGASTRODUODENOSCOPY N/A 05/14/2019    Procedure: EGD (ESOPHAGOGASTRODUODENOSCOPY);  Surgeon: David Guzman Jr., MD;  Location: [facility] ENDO;  Service: Endoscopy;  Laterality: N/A;    ESOPHAGOGASTRODUODENOSCOPY N/A 10/09/2023    Procedure: EGD (ESOPHAGOGASTRODUODENOSCOPY);  Surgeon: David Guzman Jr., MD;  Location: [facility] ENDO;  Service: Endoscopy;  Laterality: N/A;    EYE SURGERY Bilateral 1995    RK surgery    EYE SURGERY Bilateral 2015    cataract removal    FOOT ARTHRODESIS Right 07/13/2023    Procedure: FUSION, FOOT;  Surgeon: Agusto Edwards DPM;  Location: [facility] OR;  Service: Podiatry;  Laterality: Right;  popliteal saphenous block, fusion of the naviculocuneiform and tarsometatarsal joints 1-3.    GASTRECTOMY      HYSTERECTOMY      KNEE ARTHROSCOPY W/ DEBRIDEMENT      lt knee    OOPHORECTOMY      SHOULDER OPEN ROTATOR CUFF REPAIR      left    STOMACH SURGERY  12/7/2011  Dr. Cai    removal of GIST tumor from wall of the stomach, 2.3 cm in size.    TRANSFORAMINAL EPIDURAL INJECTION OF STEROID Bilateral 04/09/2019    Procedure: Injection,steroid,epidural,transforaminal approach L4/5;  Surgeon: Pa Pruett MD;  Location: [facility] OR;  Service: Pain Management;  Laterality: Bilateral;    TRANSFORAMINAL EPIDURAL INJECTION OF STEROID Bilateral 06/05/2019    Procedure: Injection,steroid,epidural,transforaminal approach L4/5;  Surgeon: Pa Pruett MD;  Location: [facility] OR;  Service: Pain Management;  Laterality: Bilateral;    UPPER GASTROINTESTINAL ENDOSCOPY  2/24/2012  [facility]    Non-erosive esophageal reflux (NERD) disease?.  Post-surgical deformity in the gastric body.   Gastric mucosal abnormality (erythema) in the greater  curve of antrum.  STEVIE Test performed on 02/24/12 was Negative.    UPPER GASTROINTESTINAL ENDOSCOPY  04/18/2018    Dr. Guzman,  antritis, evidence of prior surgery with healthy mucosa       Family History   Problem Relation Name Age of Onset    Heart disease Mother      Arthritis Mother          osteoarthritis    COPD Mother      Hyperlipidemia Mother      Hypertension Mother      Kidney disease Mother      Stroke Mother      Colon cancer Mother  75    Cancer Mother  70        colon cancer    Cancer Father          brain and lung    Arthritis Father      COPD Sister      Depression Daughter      Arthritis Maternal Aunt          rheumatoid arthritis    Heart disease Maternal Uncle      Early death Maternal Uncle          in 50s due to heart disease    Arthritis Paternal Aunt          rheuamtoid arthritis    Heart disease Maternal Grandfather      Arthritis Paternal Grandmother          rheumatoid arthritis    Diabetes Paternal Grandmother      Diabetes Cousin      Heart disease Cousin      Crohn's disease Neg Hx      Stomach cancer Neg Hx      Ulcerative colitis Neg Hx      Esophageal cancer Neg Hx         Social History     Socioeconomic History    Marital status:    Tobacco Use    Smoking status: Never    Smokeless tobacco: Never   Substance and Sexual Activity    Alcohol use: No    Drug use: No    Sexual activity: Yes     Partners: Male     Social Drivers of Health     Financial Resource Strain: Low Risk  (6/26/2024)    Overall Financial Resource Strain (CARDIA)     Difficulty of Paying Living Expenses: Not hard at all   Food Insecurity: No Food Insecurity (6/26/2024)    Hunger Vital Sign     Worried About Running Out of Food in the Last Year: Never true     Ran Out of Food in the Last Year: Never true   Transportation Needs: No Transportation Needs (6/26/2024)    PRAPARE - Transportation     Lack of Transportation (Medical): No     Lack of Transportation (Non-Medical): No   Physical Activity: Inactive (6/26/2024)    Exercise Vital Sign     Days of Exercise per Week: 0 days     Minutes of Exercise per Session: 0 min   Stress: Stress  "Concern Present (6/26/2024)    [employer] - Occupational Stress Questionnaire     Feeling of Stress : To some extent   Housing Stability: Low Risk  (6/26/2024)    Housing Stability Vital Sign     Unable to Pay for Housing in the Last Year: No     Homeless in the Last Year: No       Review of patient's allergies indicates:   Allergen Reactions    Codeine      Other reaction(s): Nausea       Review of Systems   Gastrointestinal:         Abdominal tenderness   All other systems reviewed and are negative.           Objective:   Weight:  Gain of 12 1/2 pounds in 1 year  Vitals:    10/07/24 1345   BP: 132/78   BP Location: Left arm   Patient Position: Sitting   Pulse: 68   Resp: 18   Temp: 97 °F (36.1 °C)   TempSrc: Temporal   SpO2: 95%   Weight: 73.1 kg (161 lb 2.5 oz)   Height: 5' 3" (1.6 m)        Physical Exam  Vitals reviewed.   Constitutional:       General: She is not in acute distress.  HENT:      Head: Normocephalic and atraumatic.      Mouth/Throat:      Pharynx: Oropharynx is clear.   Eyes:      Conjunctiva/sclera: Conjunctivae normal.   Abdominal:      Tenderness: There is abdominal tenderness (mid to RUQ).   Musculoskeletal:      Cervical back: Neck supple.   Lymphadenopathy:      Cervical: Cervical adenopathy present.      Left cervical: Superficial cervical adenopathy present.   Neurological:      Mental Status: She is alert.             Current Outpatient Medications on File Prior to Visit   Medication Sig    amLODIPine (NORVASC) 5 MG tablet TAKE 1 TABLET(5 MG) BY MOUTH EVERY DAY    aspirin (ECOTRIN) 81 MG EC tablet Take 81 mg by mouth once daily.    cetirizine (ZYRTEC) 10 MG tablet Take 10 mg by mouth once daily.    cholecalciferol, vitamin D3, (VITAMIN D3) 25 mcg (1,000 unit) capsule Take 1,000 Units by mouth once daily.    clobetasol (TEMOVATE) 0.05 % external solution Apply topically as needed.    diclofenac sodium (VOLTAREN ARTHRITIS PAIN) 1 % Gel Apply 2 g topically once " daily.    DULoxetine (CYMBALTA) 30 MG capsule TAKE 1 CAPSULE(30 MG) BY MOUTH EVERY DAY    famotidine (PEPCID) 40 MG tablet Take 1 tablet (40 mg total) by mouth every evening.    fish oil-omega-3 fatty acids 300-1,000 mg capsule Take by mouth once daily.    FOLIC ACID/MULTIVIT-MIN/LUTEIN (CENTRUM SILVER ORAL) Take by mouth.    levothyroxine (SYNTHROID) 50 MCG tablet Take 50 mcg by mouth.    losartan-hydrochlorothiazide 100-12.5 mg (HYZAAR) 100-12.5 mg Tab Take 1 tablet by mouth once daily.    mirabegron (MYRBETRIQ) 50 mg Tb24 Take 1 tablet (50 mg total) by mouth once daily.    omeprazole (PRILOSEC) 40 MG capsule TAKE 1 CAPSULE BY MOUTH ONCE DAILY BEFORE BREAKFAST    solifenacin (VESICARE) 5 MG tablet Take 5 mg by mouth once daily.    vit C/E/Zn/coppr/lutein/zeaxan (PRESERVISION AREDS-2 ORAL) Take by mouth 2 (two) times a day.    albuterol (VENTOLIN HFA) 90 mcg/actuation inhaler Inhale 2 puffs into the lungs every 6 (six) hours as needed for Wheezing. Rescue (Patient not taking: Reported on 10/7/2024)    tolterodine (DETROL LA) 2 MG Cp24 Take 1 capsule (2 mg total) by mouth once daily. (Patient not taking: Reported on 6/26/2024)    [DISCONTINUED] MYRBETRIQ 50 mg Tb24 Take 1 tablet by mouth once daily     No current facility-administered medications on file prior to visit.       CBC:  Lab Results   Component Value Date    WBC 8.71 10/03/2024    HGB 14.4 10/03/2024    HCT 44.3 10/03/2024    MCV 92 10/03/2024     10/03/2024         CMP:  Sodium   Date Value Ref Range Status   10/03/2024 138 136 - 145 mmol/L Final     Potassium   Date Value Ref Range Status   10/03/2024 4.2 3.5 - 5.1 mmol/L Final     Chloride   Date Value Ref Range Status   10/03/2024 102 95 - 110 mmol/L Final     CO2   Date Value Ref Range Status   10/03/2024 28 23 - 29 mmol/L Final     Glucose   Date Value Ref Range Status   10/03/2024 121 (H) 70 - 110 mg/dL Final     BUN   Date Value Ref Range Status   10/03/2024 14 8 - 23 mg/dL Final      Creatinine   Date Value Ref Range Status   10/03/2024 0.9 0.5 - 1.4 mg/dL Final   10/03/2024 0.9 0.5 - 1.4 mg/dL Final     Calcium   Date Value Ref Range Status   10/03/2024 9.0 8.7 - 10.5 mg/dL Final     Total Protein   Date Value Ref Range Status   10/03/2024 7.2 6.0 - 8.4 g/dL Final     Albumin   Date Value Ref Range Status   10/03/2024 3.5 3.5 - 5.2 g/dL Final     Total Bilirubin   Date Value Ref Range Status   10/03/2024 0.3 0.1 - 1.0 mg/dL Final     Comment:     For infants and newborns, interpretation of results should be based  on gestational age, weight and in agreement with clinical  observations.    Premature Infant recommended reference ranges:  Up to 24 hours.............<8.0 mg/dL  Up to 48 hours............<12.0 mg/dL  3-5 days..................<15.0 mg/dL  6-29 days.................<15.0 mg/dL       Alkaline Phosphatase   Date Value Ref Range Status   10/03/2024 56 55 - 135 U/L Final     AST   Date Value Ref Range Status   10/03/2024 21 10 - 40 U/L Final     ALT   Date Value Ref Range Status   10/03/2024 21 10 - 44 U/L Final     Anion Gap   Date Value Ref Range Status   10/03/2024 8 8 - 16 mmol/L Final     eGFR if    Date Value Ref Range Status   10/05/2021 >60 >60 mL/min/1.73 m^2 Final     eGFR if non    Date Value Ref Range Status   10/05/2021 56 (A) >60 mL/min/1.73 m^2 Final     Comment:     Calculation used to obtain the estimated glomerular filtration  rate (eGFR) is the CKD-EPI equation.          CT Abdomen Pelvis With IV Contrast NO Oral Contrast  Narrative: EXAMINATION:  CT ABDOMEN PELVIS WITH IV CONTRAST    CLINICAL HISTORY:  Gastric cancer, monitor;    TECHNIQUE:  Routine axial CT images of the abdomen and pelvis were obtained after administration 75 ml of IV Omnipaque 350 and 1000 mL Omnipaque 9 oral contrast.  Coronal and Sagittal reformatted images were also obtained.    COMPARISON:  CT abdomen pelvis 10/03/2023    FINDINGS:  Heart is normal size.   Stable micronodule in the left lower lobe.  Mild peripheral interstitial reticulation and ground-glass density in the bilateral lung bases.    Liver is normal size.  Stable small calcifications in the liver which may represent granulomas.  No concerning liver lesion.  Gallbladder is surgically absent.  No biliary ductal dilatation.    Spleen is upper limit of normal size.  Small accessory splenule adrenal glands and pancreas are unremarkable.    Symmetric enhancement of the bilateral kidneys.  Suspect bilateral parapelvic cysts.  No hydronephrosis or ureteral dilatation.    Bladder is unremarkable.  Hysterectomy.    Postoperative changes of partial gastrectomy.  No evidence of recurrent gastric mass.  No bowel obstruction or inflammation.    No free intraperitoneal fluid or air.  No pathologically enlarged abdominopelvic lymph nodes.    Abdominal aorta is normal caliber.  Aortoiliac atherosclerosis.    Tiny fat containing umbilical hernia.    Few stable sclerotic foci in the pelvis which may represent bone islands.  Degenerative changes of the osseous structures.  No acute fracture or aggressive bone lesion.  Impression: 1. Postoperative changes of partial gastrectomy.  No evidence of local disease recurrence or metastasis.  2. Additional findings as above.    Electronically signed by: Alcides Prajapati  Date:    10/03/2024  Time:    09:47       All pertinent labs and imaging reviewed.    Assessment:       1. History of gastrointestinal stromal tumor (GIST)         Plan:     History of gastrointestinal stromal tumor (GIST)  -     CBC Auto Differential; Future; Expected date: 10/07/2024  -     Comprehensive Metabolic Panel; Future; Expected date: 10/07/2024  -     CT Abdomen Pelvis With IV Contrast Routine Oral Contrast; Future; Expected date: 10/07/2024       Hx of GIST:  BERNARDO @ 11 yr post; f/u in 1 year with CBC, CMP, CT AP prior  F/u with GI   Call for any concerns.    Route Chart for Scheduling    Med Onc Chart  Routing      Follow up with physician    Follow up with [facility] 1 year. f/u in 1 year with CBC, CMP, CT ABD/PELVIS prior   Infusion scheduling note    Injection scheduling note    Labs    Imaging    Pharmacy appointment    Other referrals              RUKHSANA Sellers, FNP-C  [facility]  30 minutes were spent in coordination of patient's care, record review and counseling.

## 2024-10-10 ENCOUNTER — OFFICE VISIT (OUTPATIENT)
Dept: FAMILY MEDICINE | Facility: CLINIC | Age: 74
End: 2024-10-10
Payer: MEDICARE

## 2024-10-10 VITALS
HEART RATE: 70 BPM | HEIGHT: 63 IN | WEIGHT: 159.63 LBS | BODY MASS INDEX: 28.29 KG/M2 | OXYGEN SATURATION: 97 % | DIASTOLIC BLOOD PRESSURE: 74 MMHG | SYSTOLIC BLOOD PRESSURE: 126 MMHG

## 2024-10-10 DIAGNOSIS — G31.84 MILD COGNITIVE IMPAIRMENT: ICD-10-CM

## 2024-10-10 DIAGNOSIS — R05.3 CHRONIC COUGH: ICD-10-CM

## 2024-10-10 DIAGNOSIS — R41.3 OTHER AMNESIA: Primary | ICD-10-CM

## 2024-10-10 PROCEDURE — 1160F RVW MEDS BY RX/DR IN RCRD: CPT | Mod: HCNC,CPTII,S$GLB, | Performed by: INTERNAL MEDICINE

## 2024-10-10 PROCEDURE — 3288F FALL RISK ASSESSMENT DOCD: CPT | Mod: HCNC,CPTII,S$GLB, | Performed by: INTERNAL MEDICINE

## 2024-10-10 PROCEDURE — G2211 COMPLEX E/M VISIT ADD ON: HCPCS | Mod: HCNC,S$GLB,, | Performed by: INTERNAL MEDICINE

## 2024-10-10 PROCEDURE — 99999 PR PBB SHADOW E&M-EST. PATIENT-LVL IV: CPT | Mod: PBBFAC,HCNC,, | Performed by: INTERNAL MEDICINE

## 2024-10-10 PROCEDURE — 3074F SYST BP LT 130 MM HG: CPT | Mod: HCNC,CPTII,S$GLB, | Performed by: INTERNAL MEDICINE

## 2024-10-10 PROCEDURE — 99214 OFFICE O/P EST MOD 30 MIN: CPT | Mod: HCNC,S$GLB,, | Performed by: INTERNAL MEDICINE

## 2024-10-10 PROCEDURE — 1159F MED LIST DOCD IN RCRD: CPT | Mod: HCNC,CPTII,S$GLB, | Performed by: INTERNAL MEDICINE

## 2024-10-10 PROCEDURE — 1101F PT FALLS ASSESS-DOCD LE1/YR: CPT | Mod: HCNC,CPTII,S$GLB, | Performed by: INTERNAL MEDICINE

## 2024-10-10 PROCEDURE — 3078F DIAST BP <80 MM HG: CPT | Mod: HCNC,CPTII,S$GLB, | Performed by: INTERNAL MEDICINE

## 2024-10-10 NOTE — PROGRESS NOTES
Patient ID: Lyudmila Neri is a 74 y.o. female.    Chief Complaint: Follow-up     HPI / Assessment and Plan     Follow-up memory.  B12, B1, and folate normal as well as TSH.  Ammonia slightly elevated at 55.    Today we performed [facility] memory test, she got a 25/30. she missed points on drawing the cube, she called a rhinoceros a hip, she remembered 3 of the 5 words.    - We discussed options and we decided that she would get an MRI brain without contrast.    Dry cough x 1 month, tickling sensation. Occurs more during the day. No post nasal drip. Some hoarseness from time to time.   - chest x-ray    She also has a bony prominence of her left clavicular sternal junction.  Noticed about 3 days ago.    Review of Systems   Constitutional:  Negative for fever.   Respiratory:  Positive for cough. Negative for shortness of breath.    Cardiovascular:  Negative for chest pain.   Gastrointestinal:  Negative for abdominal pain.      Orders     1. Other amnesia  - MRI Brain Without Contrast; Future    2. Mild cognitive impairment  - MRI Brain Without Contrast; Future    3. Chronic cough  - X-Ray Chest PA And Lateral; Future       I personally reviewed past medical, family and social history.   Objective    Vitals:    10/10/24 1012   BP: 126/74   Pulse: 70      Wt Readings from Last 3 Encounters:   10/10/24 1012 72.4 kg (159 lb 9.8 oz)   10/07/24 1345 73.1 kg (161 lb 2.5 oz)   09/27/24 1539 72.5 kg (159 lb 13.3 oz)      Body mass index is 28.27 kg/m².     Physical Exam  Cardiovascular:      Rate and Rhythm: Normal rate and regular rhythm.      Heart sounds: No murmur heard.     No gallop.   Pulmonary:      Breath sounds: Normal breath sounds. No wheezing or rhonchi.   Abdominal:      Palpations: Abdomen is soft.      Tenderness: There is no abdominal tenderness.        Reference     : Visit today included increased complexity associated with the care of the episodic problem memory problem addressed and managing the  longitudinal care of the patient due to the serious and/or complex managed problem(s)     Active Problem List with Overview Notes    Diagnosis Date Noted    Mild cognitive impairment 10/10/2024    Urge incontinence of urine 10/10/2023    Arthritis of midfoot 07/13/2023    Arthritis of metatarsophalangeal (MTP) joint of great toe 07/13/2023    Gastrocnemius equinus of right lower extremity 07/13/2023    Tortuous aorta 10/13/2022     CxR 2/2020      Lactose intolerance 07/27/2021    Early dry stage nonexudative age-related macular degeneration of both eyes 07/27/2021    Epigastric pain 05/14/2019    Carotid disease, bilateral 04/18/2019     Carotid US 9/23/16      Lumbar radiculopathy 04/09/2019    Degenerative disc disease, lumbar 11/01/2017    History of gastrointestinal stromal tumor (GIST) 09/23/2016    Family history of colon cancer in mother 09/23/2016    Chronic kidney disease, stage II (mild) 09/23/2016    Primary osteoarthritis involving multiple joints 09/23/2016    KATHY (obstructive sleep apnea) 06/24/2013    Psoriasis 06/24/2013    GERD (gastroesophageal reflux disease)     Essential hypertension            Hypertension Medications               amLODIPine (NORVASC) 5 MG tablet TAKE 1 TABLET(5 MG) BY MOUTH EVERY DAY    losartan-hydrochlorothiazide 100-12.5 mg (HYZAAR) 100-12.5 mg Tab Take 1 tablet by mouth once daily.           Hyperlipidemia Medications               fish oil-omega-3 fatty acids 300-1,000 mg capsule Take by mouth once daily.           Medication List with Changes/Refills   Current Medications    ALBUTEROL (VENTOLIN HFA) 90 MCG/ACTUATION INHALER    Inhale 2 puffs into the lungs every 6 (six) hours as needed for Wheezing. Rescue    AMLODIPINE (NORVASC) 5 MG TABLET    TAKE 1 TABLET(5 MG) BY MOUTH EVERY DAY    ASPIRIN (ECOTRIN) 81 MG EC TABLET    Take 81 mg by mouth once daily.    CETIRIZINE (ZYRTEC) 10 MG TABLET    Take 10 mg by mouth once daily.    CHOLECALCIFEROL, VITAMIN D3, (VITAMIN D3)  25 MCG (1,000 UNIT) CAPSULE    Take 1,000 Units by mouth once daily.    CLOBETASOL (TEMOVATE) 0.05 % EXTERNAL SOLUTION    Apply topically as needed.    DICLOFENAC SODIUM (VOLTAREN ARTHRITIS PAIN) 1 % GEL    Apply 2 g topically once daily.    DICLOFENAC SODIUM (VOLTAREN) 1 % GEL    Apply 2 g topically 3 (three) times daily as needed.    DULOXETINE (CYMBALTA) 30 MG CAPSULE    TAKE 1 CAPSULE(30 MG) BY MOUTH EVERY DAY    FAMOTIDINE (PEPCID) 40 MG TABLET    Take 1 tablet (40 mg total) by mouth every evening.    FISH OIL-OMEGA-3 FATTY ACIDS 300-1,000 MG CAPSULE    Take by mouth once daily.    FOLIC ACID/MULTIVIT-MIN/LUTEIN (CENTRUM SILVER ORAL)    Take by mouth.    LEVOTHYROXINE (SYNTHROID) 50 MCG TABLET    Take 50 mcg by mouth.    LOSARTAN-HYDROCHLOROTHIAZIDE 100-12.5 MG (HYZAAR) 100-12.5 MG TAB    Take 1 tablet by mouth once daily.    MIRABEGRON (MYRBETRIQ) 50 MG TB24    Take 1 tablet (50 mg total) by mouth once daily.    OMEPRAZOLE (PRILOSEC) 40 MG CAPSULE    TAKE 1 CAPSULE BY MOUTH ONCE DAILY BEFORE BREAKFAST    SOLIFENACIN (VESICARE) 5 MG TABLET    Take 5 mg by mouth once daily.    TOLTERODINE (DETROL LA) 2 MG CP24    Take 1 capsule (2 mg total) by mouth once daily.    VIT C/E/ZN/COPPR/LUTEIN/ZEAXAN (PRESERVISION AREDS-2 ORAL)    Take by mouth 2 (two) times a day.

## 2024-10-14 ENCOUNTER — HOSPITAL ENCOUNTER (OUTPATIENT)
Dept: RADIOLOGY | Facility: HOSPITAL | Age: 74
Discharge: HOME | End: 2024-10-14
Attending: INTERNAL MEDICINE
Payer: MEDICARE

## 2024-10-14 DIAGNOSIS — R05.3 CHRONIC COUGH: ICD-10-CM

## 2024-10-14 PROCEDURE — 71046 X-RAY EXAM CHEST 2 VIEWS: CPT | Mod: 26,HCNC,, | Performed by: STUDENT IN AN ORGANIZED HEALTH CARE EDUCATION/TRAINING PROGRAM

## 2024-10-14 PROCEDURE — 71046 X-RAY EXAM CHEST 2 VIEWS: CPT | Mod: TC,HCNC,FY,PO

## 2024-10-16 ENCOUNTER — PATIENT MESSAGE (OUTPATIENT)
Dept: RADIOLOGY | Facility: HOSPITAL | Age: 74
End: 2024-10-16
Payer: MEDICARE

## 2024-10-21 ENCOUNTER — PATIENT MESSAGE (OUTPATIENT)
Dept: FAMILY MEDICINE | Facility: CLINIC | Age: 74
End: 2024-10-21
Payer: MEDICARE

## 2024-10-21 ENCOUNTER — HOSPITAL ENCOUNTER (OUTPATIENT)
Dept: RADIOLOGY | Facility: HOSPITAL | Age: 74
Discharge: HOME | End: 2024-10-21
Attending: INTERNAL MEDICINE
Payer: MEDICARE

## 2024-10-21 DIAGNOSIS — R41.3 OTHER AMNESIA: ICD-10-CM

## 2024-10-21 DIAGNOSIS — G31.84 MILD COGNITIVE IMPAIRMENT: ICD-10-CM

## 2024-10-21 PROCEDURE — 70551 MRI BRAIN STEM W/O DYE: CPT | Mod: TC,HCNC,PO

## 2024-10-21 PROCEDURE — 70551 MRI BRAIN STEM W/O DYE: CPT | Mod: 26,HCNC,, | Performed by: RADIOLOGY

## 2024-10-31 DIAGNOSIS — M54.12 CERVICAL RADICULOPATHY: Primary | ICD-10-CM

## 2024-11-05 ENCOUNTER — HOSPITAL ENCOUNTER (OUTPATIENT)
Dept: RADIOLOGY | Facility: HOSPITAL | Age: 74
Discharge: HOME | End: 2024-11-05
Attending: INTERNAL MEDICINE
Payer: MEDICARE

## 2024-11-05 DIAGNOSIS — Z12.31 BREAST CANCER SCREENING BY MAMMOGRAM: ICD-10-CM

## 2024-11-05 PROCEDURE — 77063 BREAST TOMOSYNTHESIS BI: CPT | Mod: 26,HCNC,, | Performed by: RADIOLOGY

## 2024-11-05 PROCEDURE — 77067 SCR MAMMO BI INCL CAD: CPT | Mod: 26,HCNC,, | Performed by: RADIOLOGY

## 2024-11-05 PROCEDURE — 77063 BREAST TOMOSYNTHESIS BI: CPT | Mod: TC,HCNC,PO

## 2024-11-14 ENCOUNTER — PATIENT MESSAGE (OUTPATIENT)
Dept: FAMILY MEDICINE | Facility: CLINIC | Age: 74
End: 2024-11-14
Payer: MEDICARE

## 2024-11-20 ENCOUNTER — TELEPHONE (OUTPATIENT)
Dept: PHYSICAL MEDICINE AND REHAB | Facility: CLINIC | Age: 74
End: 2024-11-20
Payer: MEDICARE

## 2024-11-20 ENCOUNTER — OFFICE VISIT (OUTPATIENT)
Dept: FAMILY MEDICINE | Facility: CLINIC | Age: 74
End: 2024-11-20
Payer: MEDICARE

## 2024-11-20 VITALS
SYSTOLIC BLOOD PRESSURE: 118 MMHG | WEIGHT: 159.81 LBS | BODY MASS INDEX: 28.32 KG/M2 | DIASTOLIC BLOOD PRESSURE: 72 MMHG | HEIGHT: 63 IN | HEART RATE: 71 BPM | OXYGEN SATURATION: 97 %

## 2024-11-20 DIAGNOSIS — G31.84 MILD COGNITIVE IMPAIRMENT: Primary | ICD-10-CM

## 2024-11-20 PROCEDURE — 3288F FALL RISK ASSESSMENT DOCD: CPT | Mod: HCNC,CPTII,S$GLB, | Performed by: INTERNAL MEDICINE

## 2024-11-20 PROCEDURE — 3074F SYST BP LT 130 MM HG: CPT | Mod: HCNC,CPTII,S$GLB, | Performed by: INTERNAL MEDICINE

## 2024-11-20 PROCEDURE — 99213 OFFICE O/P EST LOW 20 MIN: CPT | Mod: HCNC,S$GLB,, | Performed by: INTERNAL MEDICINE

## 2024-11-20 PROCEDURE — 1160F RVW MEDS BY RX/DR IN RCRD: CPT | Mod: HCNC,CPTII,S$GLB, | Performed by: INTERNAL MEDICINE

## 2024-11-20 PROCEDURE — G2211 COMPLEX E/M VISIT ADD ON: HCPCS | Mod: HCNC,S$GLB,, | Performed by: INTERNAL MEDICINE

## 2024-11-20 PROCEDURE — 1159F MED LIST DOCD IN RCRD: CPT | Mod: HCNC,CPTII,S$GLB, | Performed by: INTERNAL MEDICINE

## 2024-11-20 PROCEDURE — 1125F AMNT PAIN NOTED PAIN PRSNT: CPT | Mod: HCNC,CPTII,S$GLB, | Performed by: INTERNAL MEDICINE

## 2024-11-20 PROCEDURE — 3078F DIAST BP <80 MM HG: CPT | Mod: HCNC,CPTII,S$GLB, | Performed by: INTERNAL MEDICINE

## 2024-11-20 PROCEDURE — 99999 PR PBB SHADOW E&M-EST. PATIENT-LVL IV: CPT | Mod: PBBFAC,HCNC,, | Performed by: INTERNAL MEDICINE

## 2024-11-20 PROCEDURE — 1101F PT FALLS ASSESS-DOCD LE1/YR: CPT | Mod: HCNC,CPTII,S$GLB, | Performed by: INTERNAL MEDICINE

## 2024-11-20 NOTE — TELEPHONE ENCOUNTER
Patient was informed of [facility] scheduling and was advised to keep appt at this time in December due to availability.

## 2024-11-20 NOTE — PROGRESS NOTES
Patient ID: Lyudmila Neri is a 74 y.o. female.    Chief Complaint: Follow-up (Discuss neurology referral )     Assessment and Plan     1. Mild cognitive impairment       Assessment & Plan    PLAN SUMMARY:   Continue Levothyroxine at current dose   Proceed with scheduled ENT appointment for Inspire treatment evaluation   Focus on diet and exercise to improve cholesterol levels, particularly HDL   Follow up in March for annual visit   Address sleep apnea to potentially improve memory issues and headaches    PATIENT EDUCATION:   Explained that small vessel ischemic changes on MRI are often age-related   Discussed the potential link between untreated sleep apnea and morning headaches   Explained the relationship between sleep apnea and memory issues    ACTION ITEMS/LIFESTYLE:   Lyudmila to focus on diet and exercise to improve cholesterol levels, particularly to increase HDL   Recommend addressing sleep apnea to potentially improve memory issues and headaches         No follow-ups on file.   HPI     History of Present Illness    CHIEF COMPLAINT:  Lyudmila presents to discuss memory concerns and follow up on recent MRI results.    HPI:  Lyudmila reports memory issues, specifically difficulty recalling words and people's names, which are distressing. She notes that occasionally she can recall the information 24 hours later, but not in the moment when needed. Lyudmila had a MoCA test, scoring 25 out of 30, missing points for drawing a cube, misidentifying a rhinoceros as a hippo, and remembering 3/5 words.    Lyudmila mentions having an ongoing headache since her last visit, which resolved today. The headache was localized to a specific area.    Lyudmila has sleep apnea and is not using her CPAP machine. She reports not sleeping well, and her  wakes her up throughout the night due to snoring. She has an upcoming appointment with ENT for potential Inspire treatment for her sleep apnea.    Lyudmila notes having painful  feet consistently, which she attributes to arthritis.    Lyudmila mentions developing dark, wiry hair all over her body, which appeared suddenly. She saw her gynecologist a few days ago about this issue, who attributed it to testosterone implants and prescribed Aldactone (spironolactone).    Lyudmila denies chest pain, shortness of breath, nausea, vomiting, and depression.    TEST RESULTS:  Lyudmila's B12 and Folate levels are normal. Her ammonia level is slightly elevated, just barely above the normal range. TSH is 0.7, on the low side of normal. Her cholesterol panel shows elevated triglycerides, low HDL, and an LDL of 114. Lyudmila underwent a MoCA test, scoring 25/30. She missed points for drawing a cube, called a rhinoceros a hippo, and remembered 3/5 words.      ROS:  Cardiovascular: -chest pain  Respiratory: -shortness of breath  Gastrointestinal: -nausea, -vomiting  Musculoskeletal: +joint pain  Neurological: +headache  Psychiatric: -depression, +sleep difficulty, +memory problems         Review of Systems    I personally reviewed past medical, family and social history.     Objective    Vitals:    11/20/24 0841   BP: 118/72   Pulse: 71      Wt Readings from Last 3 Encounters:   11/20/24 0841 72.5 kg (159 lb 13.3 oz)   10/10/24 1012 72.4 kg (159 lb 9.8 oz)   10/07/24 1345 73.1 kg (161 lb 2.5 oz)      Body mass index is 28.31 kg/m².     Physical Exam    Cardiovascular: Regular rate. Regular rhythm. No murmurs. No rubs. No gallops. Normal S1, S2.  Respiratory: Normal respiratory effort. Clear to auscultation bilaterally. No rales. No rhonchi. No wheezing.  Skin: Lipoma under xiphoid process.        Reference     : Visit today included increased complexity associated with the care of the episodic problem Mild cognitive impairment [G31.84] addressed and managing the longitudinal care of the patient due to the serious and/or complex managed problem(s)     Active Problem List with Overview Notes    Diagnosis Date  Noted    Mild cognitive impairment 10/10/2024    Urge incontinence of urine 10/10/2023    Arthritis of midfoot 07/13/2023    Arthritis of metatarsophalangeal (MTP) joint of great toe 07/13/2023    Gastrocnemius equinus of right lower extremity 07/13/2023    Tortuous aorta 10/13/2022     CxR 2/2020      Lactose intolerance 07/27/2021    Early dry stage nonexudative age-related macular degeneration of both eyes 07/27/2021    Epigastric pain 05/14/2019    Carotid disease, bilateral 04/18/2019     Carotid US 9/23/16      Lumbar radiculopathy 04/09/2019    Degenerative disc disease, lumbar 11/01/2017    History of gastrointestinal stromal tumor (GIST) 09/23/2016    Family history of colon cancer in mother 09/23/2016    Chronic kidney disease, stage II (mild) 09/23/2016    Primary osteoarthritis involving multiple joints 09/23/2016    KATHY (obstructive sleep apnea) 06/24/2013    Psoriasis 06/24/2013    GERD (gastroesophageal reflux disease)     Essential hypertension            Hypertension Medications               amLODIPine (NORVASC) 5 MG tablet TAKE 1 TABLET(5 MG) BY MOUTH EVERY DAY    losartan-hydrochlorothiazide 100-12.5 mg (HYZAAR) 100-12.5 mg Tab Take 1 tablet by mouth once daily.           Hyperlipidemia Medications               fish oil-omega-3 fatty acids 300-1,000 mg capsule Take by mouth once daily.           Medication List with Changes/Refills   Current Medications    ALBUTEROL (VENTOLIN HFA) 90 MCG/ACTUATION INHALER    Inhale 2 puffs into the lungs every 6 (six) hours as needed for Wheezing. Rescue    AMLODIPINE (NORVASC) 5 MG TABLET    TAKE 1 TABLET(5 MG) BY MOUTH EVERY DAY    ASPIRIN (ECOTRIN) 81 MG EC TABLET    Take 81 mg by mouth once daily.    CETIRIZINE (ZYRTEC) 10 MG TABLET    Take 10 mg by mouth once daily.    CHOLECALCIFEROL, VITAMIN D3, (VITAMIN D3) 25 MCG (1,000 UNIT) CAPSULE    Take 1,000 Units by mouth once daily.    CLOBETASOL (TEMOVATE) 0.05 % EXTERNAL SOLUTION    Apply topically as needed.     DICLOFENAC SODIUM (VOLTAREN ARTHRITIS PAIN) 1 % GEL    Apply 2 g topically once daily.    DULOXETINE (CYMBALTA) 30 MG CAPSULE    TAKE 1 CAPSULE(30 MG) BY MOUTH EVERY DAY    FAMOTIDINE (PEPCID) 40 MG TABLET    Take 1 tablet (40 mg total) by mouth every evening.    FISH OIL-OMEGA-3 FATTY ACIDS 300-1,000 MG CAPSULE    Take by mouth once daily.    FOLIC ACID/MULTIVIT-MIN/LUTEIN (CENTRUM SILVER ORAL)    Take by mouth.    LEVOTHYROXINE (SYNTHROID) 50 MCG TABLET    Take 50 mcg by mouth.    LOSARTAN-HYDROCHLOROTHIAZIDE 100-12.5 MG (HYZAAR) 100-12.5 MG TAB    Take 1 tablet by mouth once daily.    MIRABEGRON (MYRBETRIQ) 50 MG TB24    Take 1 tablet (50 mg total) by mouth once daily.    OMEPRAZOLE (PRILOSEC) 40 MG CAPSULE    TAKE 1 CAPSULE BY MOUTH ONCE DAILY BEFORE BREAKFAST    SOLIFENACIN (VESICARE) 5 MG TABLET    Take 5 mg by mouth once daily.    TOLTERODINE (DETROL LA) 2 MG CP24    Take 1 capsule (2 mg total) by mouth once daily.    VIT C/E/ZN/COPPR/LUTEIN/ZEAXAN (PRESERVISION AREDS-2 ORAL)    Take by mouth 2 (two) times a day.         This note was generated with the assistance of ambient listening technology. Verbal consent was obtained by the patient and accompanying visitor(s) for the recording of patient appointment to facilitate this note. I attest to having reviewed and edited the generated note for accuracy, though some syntax or spelling errors may persist. Please contact the author of this note for any clarification.

## 2024-12-02 RX ORDER — DULOXETIN HYDROCHLORIDE 30 MG/1
30 CAPSULE, DELAYED RELEASE ORAL
Qty: 30 CAPSULE | Refills: 6 | Status: SHIPPED | OUTPATIENT
Start: 2024-12-02

## 2024-12-04 ENCOUNTER — HOSPITAL ENCOUNTER (OUTPATIENT)
Dept: RADIOLOGY | Facility: HOSPITAL | Age: 74
Discharge: HOME | End: 2024-12-04
Attending: PAIN MEDICINE
Payer: MEDICARE

## 2024-12-04 DIAGNOSIS — M54.16 RADICULOPATHY, LUMBAR REGION: ICD-10-CM

## 2024-12-04 PROCEDURE — 72148 MRI LUMBAR SPINE W/O DYE: CPT | Mod: TC,PO

## 2024-12-04 PROCEDURE — 72148 MRI LUMBAR SPINE W/O DYE: CPT | Mod: 26,,, | Performed by: RADIOLOGY

## 2024-12-05 ENCOUNTER — OFFICE VISIT (OUTPATIENT)
Dept: OTOLARYNGOLOGY | Facility: CLINIC | Age: 74
End: 2024-12-05
Payer: MEDICARE

## 2024-12-05 VITALS — HEIGHT: 63 IN | BODY MASS INDEX: 28.24 KG/M2 | WEIGHT: 159.38 LBS

## 2024-12-05 DIAGNOSIS — H93.19 TINNITUS, UNSPECIFIED LATERALITY: ICD-10-CM

## 2024-12-05 DIAGNOSIS — Z78.9 INTOLERANCE OF CONTINUOUS POSITIVE AIRWAY PRESSURE (CPAP) VENTILATION: ICD-10-CM

## 2024-12-05 DIAGNOSIS — G47.33 OSA (OBSTRUCTIVE SLEEP APNEA): Primary | ICD-10-CM

## 2024-12-05 DIAGNOSIS — H90.3 SENSORINEURAL HEARING LOSS (SNHL) OF BOTH EARS: ICD-10-CM

## 2024-12-05 DIAGNOSIS — R40.0 DAYTIME SOMNOLENCE: ICD-10-CM

## 2024-12-05 PROCEDURE — 1159F MED LIST DOCD IN RCRD: CPT | Mod: CPTII,S$GLB,, | Performed by: STUDENT IN AN ORGANIZED HEALTH CARE EDUCATION/TRAINING PROGRAM

## 2024-12-05 PROCEDURE — 99999 PR PBB SHADOW E&M-EST. PATIENT-LVL IV: CPT | Mod: PBBFAC,,, | Performed by: STUDENT IN AN ORGANIZED HEALTH CARE EDUCATION/TRAINING PROGRAM

## 2024-12-05 PROCEDURE — 99204 OFFICE O/P NEW MOD 45 MIN: CPT | Mod: S$GLB,,, | Performed by: STUDENT IN AN ORGANIZED HEALTH CARE EDUCATION/TRAINING PROGRAM

## 2024-12-05 PROCEDURE — 1126F AMNT PAIN NOTED NONE PRSNT: CPT | Mod: CPTII,S$GLB,, | Performed by: STUDENT IN AN ORGANIZED HEALTH CARE EDUCATION/TRAINING PROGRAM

## 2024-12-05 PROCEDURE — 3288F FALL RISK ASSESSMENT DOCD: CPT | Mod: CPTII,S$GLB,, | Performed by: STUDENT IN AN ORGANIZED HEALTH CARE EDUCATION/TRAINING PROGRAM

## 2024-12-05 PROCEDURE — 1101F PT FALLS ASSESS-DOCD LE1/YR: CPT | Mod: CPTII,S$GLB,, | Performed by: STUDENT IN AN ORGANIZED HEALTH CARE EDUCATION/TRAINING PROGRAM

## 2024-12-05 NOTE — PATIENT INSTRUCTIONS
Tinnitus measures:  1.  Avoid exposure to loud sounds and noises.  Wear ear protection around loud noises.  2.  Get your blood pressure check regularly.  If it is high, see your doctor.  3.  Decrease salt intake.  4.  Avoid stimulants such as caffeine and tobacco.  5.  Exercise daily to improve circulation.  6.  Get adequate rest.  Avoid fatigue.  7.  Stop worrying about the noise.  Recognize the noise as an annoyance and learned to ignore it as much as possible.  8.  Consider a trial of lipoflavanoids, slow-release niacin, or Arches' Tinnitus Formula (over-the-counter).  9.  Purchase a white noise machine to mask tinnitus.  10. Hearing aids can help.     Plan for new sleep study then drug induced sleep endscopy

## 2024-12-05 NOTE — PROGRESS NOTES
Otolaryngology Clinic Note    Subjective:       Patient ID: Lyudmila Neri is a 74 y.o. female.    Chief Complaint: Consult (inspire) and Tinnitus      History of Present Illness: Lyudmila Neri is a 74 y.o. female presenting with sleep apnea. She is not able to tolerate CPAP. Tried twice. Claustrophobic and fights it all night. She tried nasal mask and pillows. Cannot tolerate full face. Rips it off. ESS 14 today. She can breathe ok out of her nose. She is snoring.   Also with tinnitus- sounds like bugs chirping. Years. Is bothersome. Getting worse. Audio as below.   No ENT surgeries in past. Weight stable.   No blood thinners. BP well controlled on meds.       Past Surgical History:   Procedure Laterality Date    ANKLE FRACTURE SURGERY      RT ankle    APPENDECTOMY      BLADDER SUSPENSION  1995    CARPAL TUNNEL RELEASE      left    CERVICAL FUSION      CHEILECTOMY Right 07/13/2023    Procedure: CHEILECTOMY;  Surgeon: Agusto Edwards DPM;  Location: [facility] OR;  Service: Podiatry;  Laterality: Right;    CHOLECYSTECTOMY  12/7/2011  Dr. Cai    COLONOSCOPY  7/26/2005  [facility]    Non-erosive esophageal reflux (NERD) disease?.  Post-surgical deformity in the gastric body.   Gastric mucosal abnormality (erythema) in the greater  curve of antrum.  STEVIE Test performed on 02/24/12 was Negative.    COLONOSCOPY  12/12/2008  [facility]    Small internal hemorrhoids.  Slightly redundant left colon.    COLONOSCOPY N/A 04/18/2018    Procedure: COLONOSCOPY;  Surgeon: David Guzman Jr., MD;  Location: [facility];  Service: Endoscopy;  Laterality: N/A;    COLONOSCOPY  04/18/2018    Dr. Guzman; hemorrhoids, otherwise unremarkable, repeat 5 years for surveillance due to family history of colon cancer    CYST REMOVAL Left 2016    left middle finger, Dr. Johnson    ENDOSCOPIC GASTROCNEMIUS RECESSION Right 07/13/2023    Procedure: RECESSION, GASTROCNEMIUS, ENDOSCOPIC;  Surgeon: Agusto Edwards DPM;  Location: [facility] OR;  Service:  Podiatry;  Laterality: Right;  popliteal saphenous block,    EPIDURAL STEROID INJECTION INTO CERVICAL SPINE N/A 8/8/2024    Procedure: Injection-steroid-epidural-cervical c7-T1;  Surgeon: Jamaica Olvera MD;  Location: [facility] OR;  Service: Anesthesiology;  Laterality: N/A;    ESOPHAGOGASTRODUODENOSCOPY N/A 05/14/2019    Procedure: EGD (ESOPHAGOGASTRODUODENOSCOPY);  Surgeon: David Guzman Jr., MD;  Location: [facility] ENDO;  Service: Endoscopy;  Laterality: N/A;    ESOPHAGOGASTRODUODENOSCOPY N/A 10/09/2023    Procedure: EGD (ESOPHAGOGASTRODUODENOSCOPY);  Surgeon: David Guzman Jr., MD;  Location: [facility] ENDO;  Service: Endoscopy;  Laterality: N/A;    EYE SURGERY Bilateral 1995    RK surgery    EYE SURGERY Bilateral 2015    cataract removal    FOOT ARTHRODESIS Right 07/13/2023    Procedure: FUSION, FOOT;  Surgeon: Agusto Edwards DPM;  Location: [facility] OR;  Service: Podiatry;  Laterality: Right;  popliteal saphenous block, fusion of the naviculocuneiform and tarsometatarsal joints 1-3.    GASTRECTOMY      HYSTERECTOMY      KNEE ARTHROSCOPY W/ DEBRIDEMENT      lt knee    OOPHORECTOMY      SHOULDER OPEN ROTATOR CUFF REPAIR      left    STOMACH SURGERY  12/7/2011  Dr. Cai    removal of GIST tumor from wall of the stomach, 2.3 cm in size.    TRANSFORAMINAL EPIDURAL INJECTION OF STEROID Bilateral 04/09/2019    Procedure: Injection,steroid,epidural,transforaminal approach L4/5;  Surgeon: Pa Pruett MD;  Location: [facility] OR;  Service: Pain Management;  Laterality: Bilateral;    TRANSFORAMINAL EPIDURAL INJECTION OF STEROID Bilateral 06/05/2019    Procedure: Injection,steroid,epidural,transforaminal approach L4/5;  Surgeon: Pa Pruett MD;  Location: [facility] OR;  Service: Pain Management;  Laterality: Bilateral;    UPPER GASTROINTESTINAL ENDOSCOPY  2/24/2012  [facility]    Non-erosive esophageal reflux (NERD) disease?.  Post-surgical deformity in the gastric body.   Gastric mucosal abnormality (erythema) in the greater   curve of antrum.  STEVIE Test performed on 02/24/12 was Negative.    UPPER GASTROINTESTINAL ENDOSCOPY  04/18/2018    Dr. Guzman, antritis, evidence of prior surgery with healthy mucosa     Past Medical History:   Diagnosis Date    Anticoagulant long-term use     Arthritis     osteoarthritis    Blood transfusion     Cancer 2011    stromal tumor, stomach    Depression     Disorder of kidney and ureter     CKD 2    GERD (gastroesophageal reflux disease)     GIST (gastrointestinal stromal tumor), malignant     H. pylori infection     Hypertension     KATHY (obstructive sleep apnea) 6/24/2013    Psoriasis 6/24/2013    Trouble in sleeping     arthritic pain wakes her up    Urinary incontinence     stress incontinence     Social Drivers of Health     Tobacco Use: Low Risk  (11/20/2024)    Patient History     Smoking Tobacco Use: Never     Smokeless Tobacco Use: Never     Passive Exposure: Not on file   Alcohol Use: Not At Risk (6/26/2024)    AUDIT-C     Frequency of Alcohol Consumption: Never     Average Number of Drinks: Patient does not drink     Frequency of Binge Drinking: Never   Financial Resource Strain: Low Risk  (6/26/2024)    Overall Financial Resource Strain (CARDIA)     Difficulty of Paying Living Expenses: Not hard at all   Food Insecurity: No Food Insecurity (6/26/2024)    Hunger Vital Sign     Worried About Running Out of Food in the Last Year: Never true     Ran Out of Food in the Last Year: Never true   Transportation Needs: No Transportation Needs (6/26/2024)    PRAPARE - Transportation     Lack of Transportation (Medical): No     Lack of Transportation (Non-Medical): No   Physical Activity: Inactive (6/26/2024)    Exercise Vital Sign     Days of Exercise per Week: 0 days     Minutes of Exercise per Session: 0 min   Stress: Stress Concern Present (6/26/2024)    [employer] - Occupational Stress Questionnaire     Feeling of Stress : To some extent   Housing Stability: Low Risk   (6/26/2024)    Housing Stability Vital Sign     Unable to Pay for Housing in the Last Year: No     Homeless in the Last Year: No   Depression: Low Risk  (10/7/2024)    Depression     Last PHQ-4: Flowsheet Data: 0   Utilities: Not At Risk (6/26/2024)    [facility] Utilities     Threatened with loss of utilities: No   Health Literacy: Adequate Health Literacy (6/26/2024)     Health Literacy     Frequency of need for help with medical instructions: Never   Social Isolation: Not on file     Review of patient's allergies indicates:   Allergen Reactions    Codeine      Other reaction(s): Nausea     Current Outpatient Medications   Medication Instructions    albuterol (VENTOLIN HFA) 90 mcg/actuation inhaler 2 puffs, Inhalation, Every 6 hours PRN, Rescue    amLODIPine (NORVASC) 5 MG tablet TAKE 1 TABLET(5 MG) BY MOUTH EVERY DAY    aspirin (ECOTRIN) 81 mg, Daily    cetirizine (ZYRTEC) 10 mg, Daily    cholecalciferol (vitamin D3) (VITAMIN D3) 1,000 Units, Daily    clobetasol (TEMOVATE) 0.05 % external solution As needed (PRN)    diclofenac sodium (VOLTAREN ARTHRITIS PAIN) 2 g, Daily    DULoxetine (CYMBALTA) 30 mg, Oral    famotidine (PEPCID) 40 mg, Oral, Nightly    fish oil-omega-3 fatty acids 300-1,000 mg capsule Daily    FOLIC ACID/MULTIVIT-MIN/LUTEIN (CENTRUM SILVER ORAL) Take by mouth.    levothyroxine (SYNTHROID) 50 mcg    losartan-hydrochlorothiazide 100-12.5 mg (HYZAAR) 100-12.5 mg Tab 1 tablet, Oral, Daily    mirabegron (MYRBETRIQ) 50 mg, Oral, Daily    omeprazole (PRILOSEC) 40 MG capsule TAKE 1 CAPSULE BY MOUTH ONCE DAILY BEFORE BREAKFAST    solifenacin (VESICARE) 5 mg, Daily    tolterodine (DETROL LA) 2 mg, Oral, Daily    vit C/E/Zn/coppr/lutein/zeaxan (PRESERVISION AREDS-2 ORAL) 2 times daily         ENT ROS negative except as stated above.     Patient answers are not available for this visit.            Objective:      There were no vitals filed for this visit.    General: NAD, well appearing  Eyes: Normal  conjunctiva and lids  Face: symmetric, nerve intact  Nose: The nose is without any evidence of any deformity. The nasal mucosa is moist. The septum is midline. There is no evidence of septal hematoma. The turbinates are without abnormality.   Ears: The ears are with normal-appearing pinna. Examination of the canals is normal appearing bilaterally. There is no drainage or erythema noted. The tympanic membranes are normal appearing with pearly color, normal-appearing landmarks and normal light reflex. Hearing is grossly intact.  Mouth: No obvious abnormalities to the lips. The teeth are unremarkable. The gingivae are without any obvious evidence of infection or lesion. The oral mucosa is moist and pink. There are no obvious masses to the hard or soft palate.   Oropharynx: The uvula is midline.  The tongue is midline. The posterior pharynx is without erythema or exudate. The tonsils are normal appearing.  Salivary glands: The salivary glands are symmetric and not enlarged, no masses  Neck: No lymphadenopathy, trachea midline, thryoid not enlarged.  Psych: Normal mood and affect.   Neuro: Grossly intact  Speech: fluent    Test Review: I personally reviewed audiogram         Assessment and Plan:       1. KATHY (obstructive sleep apnea)    2. Intolerance of continuous positive airway pressure (CPAP) ventilation    3. Daytime somnolence    4. BMI 28.0-28.9,adult    5. Tinnitus, unspecified laterality    6. Sensorineural hearing loss (SNHL) of both ears          Reviewed tinnitus and measures.     Reviewed her sleep study. She will need a new one. She wants to wait until next year. Will order now to schedule for January. Then proceed with DISE if she still qualifies.     RTC: will contact after HSAT for DISE.     Plan of care was discussed in detail with the patient, who agreed with the plan as above. All questions were answered in detail.     Deloris River MD  Otolaryngology

## 2024-12-19 ENCOUNTER — TELEPHONE (OUTPATIENT)
Dept: UROLOGY | Facility: CLINIC | Age: 74
End: 2024-12-19
Payer: MEDICARE

## 2024-12-19 ENCOUNTER — OFFICE VISIT (OUTPATIENT)
Dept: PHYSICAL MEDICINE AND REHAB | Facility: CLINIC | Age: 74
End: 2024-12-19
Payer: MEDICARE

## 2024-12-19 DIAGNOSIS — M54.12 CERVICAL RADICULOPATHY: ICD-10-CM

## 2024-12-19 DIAGNOSIS — G56.01 CARPAL TUNNEL SYNDROME OF RIGHT WRIST: Primary | ICD-10-CM

## 2024-12-19 NOTE — TELEPHONE ENCOUNTER
----- Message from Shirley sent at 12/19/2024  2:42 PM CST -----  Regarding: Return Call  Type:  Patient Returning Call    Who Called:pt     Who Left Message for Patient:horacio     Does the patient know what this is regarding?:maybe    Would the patient rather a call back or a response via [facility]? Call back     Best Call Back Number:[phone]     Additional Information: pt returning a call from office believes it's to schedule appt. please call to advise, Thank You.

## 2024-12-19 NOTE — PROGRESS NOTES
[facility]  [address] [facility] [address]  [place] [address]       Full Name: Lyudmila Neri Gender: Female  MRN: [id number]  YOB: 1950  History: Numbness and tingling in the hands.  Neck pain with radiating symptoms down the posterior arm and into the hand.  Her entire hand goes numb at times.  Positive Phalen's at the wrist.  Visit Date: 12/19/2024 12:19 PM  Age: 74 Years  Examining Physician: Neeraj Arrieta MD  Referring Physician:  Flo Bradford MD  Height: 5 feet 3 inch      Sensory NCS      Nerve / Sites Rec. Site Onset Lat Peak Lat NP Amp PP Amp Segments Distance Velocity     ms ms µV µV  cm m/s   R Median - Digit II (Antidromic)      Wrist Dig II 6.71 8.48 5.6 21.0 Wrist - Dig II 13 19   R Ulnar - Digit V (Antidromic)      Wrist Dig V 2.85 3.69 13.2 30.9 Wrist - Dig V 14 49   R Radial - Anatomical snuff box (Forearm)      Forearm Wrist 1.69 2.83 23.4  Forearm - Wrist 10 59       Motor NCS      Nerve / Sites Muscle Latency Amplitude Amp % Duration Segments Distance Lat Diff Velocity     ms mV % ms  cm ms m/s   R Median - APB      Wrist APB 9.15 3.3 100 9.19 Wrist - APB 8        Elbow APB 13.98 2.7 83.6 8.79 Elbow - Wrist 16 4.83 33   R Ulnar - ADM      Wrist ADM 3.35 7.2 100 6.10 Wrist - ADM 8        B.Elbow ADM 5.67 6.2 86.8 6.06 B.Elbow - Wrist 12 2.31 52      A.Elbow ADM 7.31 5.8 81.1 7.50 A.Elbow - B.Elbow 9 1.65 55       EMG Summary Table     Spontaneous MUAP Recruitment   Muscle IA Fib PSW Fasc CRD Amp Dur. Poly Pattern   R. Deltoid N None None None None N N None N   R. Triceps brachii N None None None None N N None N   R. Biceps brachii N None None None None N N None N   R. Deltoid (posterior) N None None None None N N None N   R. Pronator teres N None None None None N N None N   R. First dorsal interosseous N None None None None N N None N   R. Abductor pollicis brevis N None None None None N N None N       Summary    The motor conduction test was performed on 2 nerve(s). The  results were normal in 1 nerve(s): R Ulnar - ADM. Results outside the specified normal range were found in 1 nerve(s), as follows:  In the R Median - APB study  the take off latency result was increased for Wrist stimulation  the peak amplitude result was reduced for Wrist stimulation  the take off velocity result was reduced for Elbow - Wrist segment    The sensory conduction test was performed on 3 nerve(s). The results were normal in 2 nerve(s): R Ulnar - Digit V (Antidromic), R Radial - Anatomical snuff box (Forearm). Results outside the specified normal range were found in 1 nerve(s), as follows:  In the R Median - Digit II (Antidromic) study  the peak latency result was increased for Wrist stimulation  the peak amplitude result was reduced for Wrist stimulation    The needle EMG study was normal in all 7 tested muscles: R. Deltoid, R. Triceps brachii, R. Biceps brachii, R. Deltoid (posterior), R. Pronator teres, R. First dorsal interosseous, R. Abductor pollicis brevis.    CONCLUSION:     Abnormal study    EMG and nerve conduction study of the RIGHT upper extremity revealed the following:      RIGHT severe median mononeuropathy at the wrist (carpal tunnel syndrome)    There was no electrodiagnostic evidence of radiculopathy, plexopathy, peripheral polyneuropathy or myopathy in the right upper extremity.     PLAN: Recommended bracing of the involved wrist at night and f/u with referring provider for additional management.  ------------------------------  Neeraj Arrieta MD

## 2024-12-20 DIAGNOSIS — K21.9 GASTROESOPHAGEAL REFLUX DISEASE WITHOUT ESOPHAGITIS: Primary | ICD-10-CM

## 2024-12-21 DIAGNOSIS — K21.9 GASTROESOPHAGEAL REFLUX DISEASE WITHOUT ESOPHAGITIS: Primary | ICD-10-CM

## 2024-12-22 RX ORDER — OMEPRAZOLE 40 MG/1
CAPSULE, DELAYED RELEASE ORAL
Qty: 90 CAPSULE | Refills: 3 | Status: SHIPPED | OUTPATIENT
Start: 2024-12-22

## 2024-12-23 RX ORDER — FAMOTIDINE 40 MG/1
40 TABLET, FILM COATED ORAL NIGHTLY
Qty: 90 TABLET | Refills: 4 | Status: SHIPPED | OUTPATIENT
Start: 2024-12-23

## 2024-12-26 RX ORDER — FAMOTIDINE 40 MG/1
40 TABLET, FILM COATED ORAL NIGHTLY
Qty: 90 TABLET | Refills: 3 | Status: SHIPPED | OUTPATIENT
Start: 2024-12-26

## 2025-01-10 ENCOUNTER — TELEPHONE (OUTPATIENT)
Dept: UROGYNECOLOGY | Facility: CLINIC | Age: 75
End: 2025-01-10
Payer: OTHER MISCELLANEOUS

## 2025-01-10 NOTE — TELEPHONE ENCOUNTER
----- Message from Rosita sent at 1/10/2025 11:38 AM CST -----  Contact: self  Type:  Reschedule Appointment Request    Caller is requesting to reschedule appointment.      Name of Caller:pt     When is the first available appointment?n/a    Would the patient rather a call back or a response via [facility]? Call back    Best Call Back Number:[phone]      Additional Information: pt states she would like someone can give her a call back today, will be out of town for a week. Until the 20th   Please call back to advise. Thanks!

## 2025-01-21 NOTE — BRIEF OP NOTE
2ND TRIMESTER CHECK-IN CALL     Overall how are you doing? Patient states she is doing well.    Compliant with routine OB care appointments? Yes    Have you completed your 1st trimester labs? Yes    If you had NIPS with MFM, do you have a order for MSAFP? Yes, patient has order and was reminded to have done in the appropriate time frame.   Can be completed 15w-22w6d, ideally 16w-18w    Have you seen MFM and do you have your detailed US scheduled? Yes, scheduled 1/27/25.    Pregnancy Education-have you had a chance to review the classes offered and registered? Yes, patient registered for classes.    Additional Notes: offers no c/o at this time    Discharge Note  Short Stay      SUMMARY     Admit Date: 4/18/2018    Attending Physician: David Guzman Jr., MD     Discharge Physician: David Guzman Jr., MD    Discharge Date: 4/18/2018 1:58 PM    Final Diagnosis: Nausea [R11.0]  History of gastrointestinal stromal tumor (GIST) [Z85.09]    Impression:          - Normal oropharynx.                       - Normal esophagus.                       - Slight reflux esophagitis.                       - Z-line regular, 35-36 cm from the incisors.                       - Patulous lower esophageal sphincter.                       - Non-erosive esophageal reflux (NERD) disease?.                       - Evidence of prior surgery was found, characterized                        by healthy appearing mucosa.                       - Antritis. Biopsied.                       - Normal stomach otherwise.                       - Normal examined duodenum.  Recommendation:      - Perform a colonoscopy as previously scheduled.                       - Discharge patient to home after that.                       - Await pathology and CLOtest results.                       - Follow an antireflux regimen.                       - Use Prilosec (omeprazole) 40 mg PO daily for 3                        months.                       - Return to primary care physician as previously                        scheduled.                       - Return to GI clinic PRN.    Impression:          - The anus is normal.                       - Non-bleeding internal hemorrhoids.                       - The entire examined colon is normal.                       - The examined portion of the ileum was normal.                       - No specimens collected.  Recommendation:      - Discharge patient to home.                       - High fiber diet.                       - Take a PROBIOTIC, such as a carton of GREEK YOGURT                        (Chobani or Oikos, or Activia or Dannon); or tablets                         of ALIGN or CULTURELLE or BHARATH-Q (all                        non-prescription), every day for a month.                       - Use Analpram HC Cream 2.5%: Apply externally BID                        for 1 week.                       - If further symptoms, consider referral to                        colon/rectal surgery.                       - Repeat colonoscopy in 8 years for screening                        purposes.                       - Continue present medications.    David Guzman MD  4/18/2018   Disposition: HOME OR SELF CARE    Patient Instructions:   Current Discharge Medication List      START taking these medications    Details   hydrocortisone-pramoxine (ANALPRAM-HC) 2.5-1 % Crea Place rectally 3 (three) times daily. Apply to perianal area up to 3 times a day as needed.  Qty: 30 g, Refills: 3      omeprazole (PRILOSEC) 40 MG capsule Take 1 capsule (40 mg total) by mouth before breakfast.  Qty: 90 capsule, Refills: 3         CONTINUE these medications which have NOT CHANGED    Details   amlodipine (NORVASC) 5 MG tablet Take 1 tablet (5 mg total) by mouth once daily.  Qty: 90 tablet, Refills: 3    Associated Diagnoses: Essential hypertension      aspirin (ECOTRIN) 81 MG EC tablet Take 81 mg by mouth once daily.      celecoxib (CELEBREX) 200 MG capsule Take 1 capsule (200 mg total) by mouth 2 (two) times daily.  Qty: 180 capsule, Refills: 3    Comments: **Patient requests 90 days supply**  Associated Diagnoses: Arthritis      cholecalciferol, vitamin D3, (VITAMIN D3) 1,000 unit capsule Take 1,000 Units by mouth once daily.      clobetasol (TEMOVATE) 0.05 % external solution Apply topically as needed.     Associated Diagnoses: GIST (gastrointestinal stromal tumor), malignant      fish oil-omega-3 fatty acids 300-1,000 mg capsule Take by mouth once daily.      FOLIC ACID/MULTIVIT-MIN/LUTEIN (CENTRUM SILVER ORAL) Take by mouth.      hydrochlorothiazide (MICROZIDE) 12.5 mg capsule Take 1  capsule (12.5 mg total) by mouth once daily.  Qty: 90 capsule, Refills: 3      lidocaine 3.75 % Crea Apply 1 application topically 2 (two) times daily as needed (Pain).  Qty: 60 g, Refills: 0    Associated Diagnoses: Rectal pain      losartan (COZAAR) 100 MG tablet Take 1 tablet (100 mg total) by mouth once daily.  Qty: 90 tablet, Refills: 3      TUMS 200 mg calcium (500 mg) chewable tablet              Discharge Procedure Orders (must include Diet, Follow-up, Activity)    Follow Up:  Follow up with PCP as per your routine.  Please follow an anti reflux diet and a high fiber diet.  Activity as tolerated.    No driving day of procedure.    PROBIOTICS:  Now that your colon is so cleaned out, now is a good time for a round of PROBIOTICS.  Eat a container of Greek Yogurt, such as OIKOS or CHOBANI,  Or Activia or Dannon    Greek Yogurt.    Or Take a similar Probiotic product such as Align or Culturelle or Mary-Q, every day for a month.                  (The products listed are non-prescription, but you may need to ask the pharmacist for their location.)  Repeat this 5-6 times a year.   soft

## 2025-01-27 ENCOUNTER — TELEPHONE (OUTPATIENT)
Dept: OTOLARYNGOLOGY | Facility: CLINIC | Age: 75
End: 2025-01-27
Payer: OTHER MISCELLANEOUS

## 2025-01-27 ENCOUNTER — TELEPHONE (OUTPATIENT)
Dept: UROLOGY | Facility: CLINIC | Age: 75
End: 2025-01-27
Payer: OTHER MISCELLANEOUS

## 2025-01-27 NOTE — TELEPHONE ENCOUNTER
----- Message from Addis sent at 1/27/2025  9:18 AM CST -----  Type:  Patient Returning Call    Who Called:pt       Who Left Message for Patient:elliott       Does the patient know what this is regarding?:      Would the patient rather a call back or a response via [facility]? Call back       Best Call Back Number:[phone]       Additional Information:   Please call back to advise. Thank you.

## 2025-01-27 NOTE — TELEPHONE ENCOUNTER
Appt is at the [facility] [phone].     March 2017 [facility] Cardiology  Consultation H&P  Myrna Brown  1973  [phone]  There is no work phone number on file..    VISIT DATE:  03/11/2024    PCP: Angie Dickson MD  [address] [place] [id number]    Dimitri De Oliveira III, MD, FACC

## 2025-01-27 NOTE — TELEPHONE ENCOUNTER
----- Message from Sergio sent at 1/27/2025  8:45 AM CST -----  Contact: Pt  .Type:  Needs Medical Advice    Who Called: pt    Would the patient rather a call back or a response via [facility]?  Call back  Best Call Back Number:  [phone]  Additional Information: Pt. Is calling to get the correct information regarding her appt. On tomorrow 01/28/25 @ 1:00 p.m.

## 2025-02-04 ENCOUNTER — OFFICE VISIT (OUTPATIENT)
Dept: UROGYNECOLOGY | Facility: CLINIC | Age: 75
End: 2025-02-04
Payer: MEDICARE

## 2025-02-04 DIAGNOSIS — N32.81 OAB (OVERACTIVE BLADDER): Primary | ICD-10-CM

## 2025-02-04 PROCEDURE — 1126F AMNT PAIN NOTED NONE PRSNT: CPT | Mod: CPTII,S$GLB,, | Performed by: OBSTETRICS & GYNECOLOGY

## 2025-02-04 PROCEDURE — 3288F FALL RISK ASSESSMENT DOCD: CPT | Mod: CPTII,S$GLB,, | Performed by: OBSTETRICS & GYNECOLOGY

## 2025-02-04 PROCEDURE — 1159F MED LIST DOCD IN RCRD: CPT | Mod: CPTII,S$GLB,, | Performed by: OBSTETRICS & GYNECOLOGY

## 2025-02-04 PROCEDURE — 99999 PR PBB SHADOW E&M-EST. PATIENT-LVL III: CPT | Mod: PBBFAC,,, | Performed by: OBSTETRICS & GYNECOLOGY

## 2025-02-04 PROCEDURE — 1101F PT FALLS ASSESS-DOCD LE1/YR: CPT | Mod: CPTII,S$GLB,, | Performed by: OBSTETRICS & GYNECOLOGY

## 2025-02-04 PROCEDURE — 99213 OFFICE O/P EST LOW 20 MIN: CPT | Mod: S$GLB,,, | Performed by: OBSTETRICS & GYNECOLOGY

## 2025-02-04 NOTE — PROGRESS NOTES
Subjective:     Chief Complaint:  Urge incontinence  History of Present Illness: Lyudmila Neri is a 75 y.o. female who presents for overactive bladder.  She has been on a combination of Myrbetriq in the morning and 5 mg of solifenacin in the evening.  She has done well with this combination.  She has no side effects.  She is on no new medications since our last visit and has not had any significant health problems.  She would like to continue the combination.  On previous visit we discussed going to 10 mg solifenacin but she says she is doing fine on the 5 and does not need to go up.  She originally had insurance coverage problems but she says they are covering both medications well now.  She had some problems with constipation but went to PT -this seemed to correct the problem    Review of Systems    Constitutional: No acute distress. No weight gain/loss.  Eyes:  Macular degeneration  ENT:  Obstructive sleep apnea  Respiratory: No SOB.  Cardiovascular: Hypertension  Gastrointestinal:  GIST GERD   Genitourinary:  CKD  Integument/Breast:  Psoriasis  Hematologic/Lymphatic: No history of anemia.  Musculoskeletal:  OA  Neurological:  DDD  Behavioral/Psych: No history of depression.   Endocrine: No hormonal replacement.  Allergy/Immune: no recent reactions     Objective:   General Exam:  General appearance: WDNF. NAD.   HEENT: Kim. EOM's intact.  Neck: Normal thyroid.   Back: No CVA tenderness.  RESP: No SOB.  Breasts: deferred  Abdomen: Benign without localizing signs.  Extremities: No edema. No varices.  Lymphatic: noncontributory  Skin: No rashes. No lesions.  Neurologic: Intact.   Psych: Oriented.       Assessment:   Doing well on combination of Myrbetriq and solifenacin with no side effects.  No recent changes in health status or new medications       Plan:      Refill medications p.r.n. will follow-up if need

## 2025-02-17 ENCOUNTER — PATIENT MESSAGE (OUTPATIENT)
Dept: UROGYNECOLOGY | Facility: CLINIC | Age: 75
End: 2025-02-17
Payer: MEDICARE

## 2025-02-18 RX ORDER — SOLIFENACIN SUCCINATE 5 MG/1
5 TABLET, FILM COATED ORAL DAILY
Qty: 30 TABLET | Refills: 11 | Status: SHIPPED | OUTPATIENT
Start: 2025-02-18 | End: 2026-02-13

## 2025-02-19 ENCOUNTER — RESULTS FOLLOW-UP (OUTPATIENT)
Dept: OTOLARYNGOLOGY | Facility: CLINIC | Age: 75
End: 2025-02-19
Payer: MEDICARE

## 2025-02-19 DIAGNOSIS — G47.33 OSA (OBSTRUCTIVE SLEEP APNEA): Primary | ICD-10-CM

## 2025-02-19 DIAGNOSIS — R09.02 HYPOXIA: ICD-10-CM

## 2025-02-19 NOTE — PROGRESS NOTES
In lab sleep study ordered due to abnormal findings on home study. Left Vm for patient to let her know. No portal access noted but left message on chart as well.

## 2025-02-19 NOTE — TELEPHONE ENCOUNTER
Reviewed results and recommendations with pt, she verbalized understanding. Faxed orders for PSG to [facility] sleep lab and provided pt the number to call for scheduling.

## 2025-02-19 NOTE — TELEPHONE ENCOUNTER
----- Message from Deloris River MD sent at 2/19/2025  2:27 PM CST -----  In lab sleep study ordered due to abnormal findings on home study. Left Vm for patient to let her know. No portal access noted but left message on chart as well.   ----- Message -----  From: Interface, Lab In [facility]  Sent: 2/19/2025   1:52 PM CST  To: Deloris River MD

## 2025-02-19 NOTE — TELEPHONE ENCOUNTER
In lab study ordered to assess significant hypoxia noted during HST not associated with obstructions.     Deloris River MD

## 2025-02-28 RX ORDER — LOSARTAN POTASSIUM AND HYDROCHLOROTHIAZIDE 12.5; 1 MG/1; MG/1
1 TABLET ORAL DAILY
Qty: 90 TABLET | Refills: 2 | Status: SHIPPED | OUTPATIENT
Start: 2025-02-28

## 2025-02-28 NOTE — TELEPHONE ENCOUNTER
No care due was identified.  [facility] Embedded Care Due Messages. Reference number: [id number].   2/28/2025 2:32:09 PM CST

## 2025-02-28 NOTE — TELEPHONE ENCOUNTER
Refill Decision Note   Lyudmila Neri  is requesting a refill authorization.  Brief Assessment and Rationale for Refill:  Approve     Medication Therapy Plan:         Comments:     Note composed:2:52 PM 02/28/2025

## 2025-03-03 ENCOUNTER — HOSPITAL ENCOUNTER (OUTPATIENT)
Dept: RADIOLOGY | Facility: HOSPITAL | Age: 75
Discharge: HOME | End: 2025-03-03
Attending: INTERNAL MEDICINE
Payer: MEDICARE

## 2025-03-03 ENCOUNTER — OFFICE VISIT (OUTPATIENT)
Dept: FAMILY MEDICINE | Facility: CLINIC | Age: 75
End: 2025-03-03
Payer: MEDICARE

## 2025-03-03 ENCOUNTER — RESULTS FOLLOW-UP (OUTPATIENT)
Dept: FAMILY MEDICINE | Facility: CLINIC | Age: 75
End: 2025-03-03
Payer: MEDICARE

## 2025-03-03 VITALS
SYSTOLIC BLOOD PRESSURE: 116 MMHG | OXYGEN SATURATION: 98 % | DIASTOLIC BLOOD PRESSURE: 68 MMHG | HEIGHT: 63 IN | WEIGHT: 161.81 LBS | BODY MASS INDEX: 28.67 KG/M2 | HEART RATE: 64 BPM

## 2025-03-03 DIAGNOSIS — M54.2 CERVICALGIA: Primary | ICD-10-CM

## 2025-03-03 DIAGNOSIS — E03.9 HYPOTHYROIDISM, UNSPECIFIED TYPE: ICD-10-CM

## 2025-03-03 DIAGNOSIS — M79.89 SWELLING OF CLAVICULAR REGION: ICD-10-CM

## 2025-03-03 DIAGNOSIS — R13.10 DYSPHAGIA, UNSPECIFIED TYPE: ICD-10-CM

## 2025-03-03 DIAGNOSIS — Z79.890 HORMONE REPLACEMENT THERAPY (HRT): ICD-10-CM

## 2025-03-03 DIAGNOSIS — M54.16 LUMBAR RADICULOPATHY: ICD-10-CM

## 2025-03-03 DIAGNOSIS — I10 ESSENTIAL HYPERTENSION: ICD-10-CM

## 2025-03-03 DIAGNOSIS — M79.89 SWELLING OF CLAVICULAR REGION: Primary | ICD-10-CM

## 2025-03-03 PROCEDURE — 73000 X-RAY EXAM OF COLLAR BONE: CPT | Mod: TC,FY,PO,LT

## 2025-03-03 PROCEDURE — 73000 X-RAY EXAM OF COLLAR BONE: CPT | Mod: 26,LT,, | Performed by: RADIOLOGY

## 2025-03-03 PROCEDURE — 99999 PR PBB SHADOW E&M-EST. PATIENT-LVL V: CPT | Mod: PBBFAC,,, | Performed by: INTERNAL MEDICINE

## 2025-03-03 NOTE — TELEPHONE ENCOUNTER
No care due was identified.  Health [facility] Embedded Care Due Messages. Reference number: [id number].   3/03/2025 11:20:03 AM CST

## 2025-03-03 NOTE — PROGRESS NOTES
Patient ID: Lyudmila Neri is a 75 y.o. female.    Chief Complaint: Follow-up       HPI / Assessment and plan     Annual    Chronic medical includes hypertension, carotid artery disease, CKD stage 2, GERD, history of gastrointestinal stromal tumor, KATHY, and psoriasis    She presents today for follow-up.  She is seeing Dr. Banerjee in rheumatology and was placed on Cymbalta and Aleve.  This is helping.  She has chronic low back and neck pain.  She saw Dr. Pruett and Dr. Olvera in the past.  She would like to see pain management here again.  Referral sent for her to see Dr. Lawson. Of note she has low back pain with radiation down the back of her leg and between the toes of her left foot.  She is having extreme fatigue and dysphagia.  She is currently working with ENT to possibly get the inspire implant because she can not tolerate CPAP.  She is scheduled for an in clinic sleep study. I ordered an EGD for her dysphagia today.  She also has some swelling at the left sternoclavicular junction.  For this we will order x-ray of the clavicle.  Unfortunately this did not show sternoclavicular junction very well.  Therefore a sternal x-ray was ordered.  For GERD she will continue famotidine and omeprazole.  Her hypertension is controlled so will continue amlodipine and losartan-hydrochlorothiazide.  Continue Cymbalta.  Hypothyroidism is controlled so will continue levothyroxine 50 mcg.  Overactive bladder is stable on VESIcare and Myrbetriq.      Review of Systems   Constitutional:  Negative for fever.   Respiratory:  Negative for shortness of breath.    Cardiovascular:  Negative for chest pain.   Gastrointestinal:  Negative for abdominal pain.   Musculoskeletal:  Positive for back pain.       I personally reviewed past medical, family and social history.     ORDERS   Cervicalgia  -     Ambulatory referral/consult to Pain Clinic; Future; Expected date: 03/10/2025    Lumbar radiculopathy  -     Ambulatory referral/consult to  Pain Clinic; Future; Expected date: 03/10/2025    Swelling of clavicular region  -     X-Ray Clavicle Left; Future; Expected date: 03/03/2025    Dysphagia, unspecified type  -     Case Request Endoscopy: ESOPHAGOGASTRODUODENOSCOPY (EGD)    Hypothyroidism, unspecified type  -     TSH; Future; Expected date: 03/03/2025    Essential hypertension  -     Lipid Panel; Future; Expected date: 03/03/2025    Hormone replacement therapy (HRT)  -     Ambulatory referral/consult to Obstetrics / Gynecology; Future; Expected date: 03/10/2025        Objective    Vitals:    03/03/25 0850   BP: 116/68   Pulse: 64      Wt Readings from Last 3 Encounters:   03/03/25 0850 73.4 kg (161 lb 13.1 oz)   12/05/24 1029 72.3 kg (159 lb 6.3 oz)   11/20/24 0841 72.5 kg (159 lb 13.3 oz)      Body mass index is 28.66 kg/m².     Physical Exam  Cardiovascular:      Rate and Rhythm: Normal rate and regular rhythm.      Heart sounds: No murmur heard.     No gallop.   Pulmonary:      Breath sounds: Normal breath sounds. No wheezing or rhonchi.   Abdominal:      Palpations: Abdomen is soft.      Tenderness: There is no abdominal tenderness.   Musculoskeletal:      Comments: Slight swelling or enlargement of left sternoclavicular junction        Reference     : Visit today included increased complexity associated with the care of the episodic problem Cervicalgia [M54.2] addressed and managing the longitudinal care of the patient due to the serious and/or complex managed problem(s)     Active Problem List with Overview Notes    Diagnosis Date Noted    Carpal tunnel syndrome of right wrist 12/19/2024    Mild cognitive impairment 10/10/2024    Urge incontinence of urine 10/10/2023    Arthritis of midfoot 07/13/2023    Arthritis of metatarsophalangeal (MTP) joint of great toe 07/13/2023    Gastrocnemius equinus of right lower extremity 07/13/2023    Tortuous aorta 10/13/2022     CxR 2/2020      Lactose intolerance 07/27/2021    Early dry stage  nonexudative age-related macular degeneration of both eyes 07/27/2021    Epigastric pain 05/14/2019    Carotid disease, bilateral 04/18/2019     Carotid US 9/23/16      Lumbar radiculopathy 04/09/2019    Degenerative disc disease, lumbar 11/01/2017    History of gastrointestinal stromal tumor (GIST) 09/23/2016    Family history of colon cancer in mother 09/23/2016    Chronic kidney disease, stage II (mild) 09/23/2016    Primary osteoarthritis involving multiple joints 09/23/2016    KATHY (obstructive sleep apnea) 06/24/2013    Psoriasis 06/24/2013    GERD (gastroesophageal reflux disease)     Essential hypertension            Hypertension Medications              amLODIPine (NORVASC) 5 MG tablet TAKE 1 TABLET(5 MG) BY MOUTH EVERY DAY    losartan-hydrochlorothiazide 100-12.5 mg (HYZAAR) 100-12.5 mg Tab Take 1 tablet by mouth once daily.           Hyperlipidemia Medications              fish oil-omega-3 fatty acids 300-1,000 mg capsule Take by mouth once daily.           Medication List with Changes/Refills   Current Medications    ALBUTEROL (VENTOLIN HFA) 90 MCG/ACTUATION INHALER    Inhale 2 puffs into the lungs every 6 (six) hours as needed for Wheezing. Rescue    AMLODIPINE (NORVASC) 5 MG TABLET    TAKE 1 TABLET(5 MG) BY MOUTH EVERY DAY    ASPIRIN (ECOTRIN) 81 MG EC TABLET    Take 81 mg by mouth once daily.    CETIRIZINE (ZYRTEC) 10 MG TABLET    Take 10 mg by mouth once daily.    CHOLECALCIFEROL, VITAMIN D3, (VITAMIN D3) 25 MCG (1,000 UNIT) CAPSULE    Take 1,000 Units by mouth once daily.    CLOBETASOL (TEMOVATE) 0.05 % EXTERNAL SOLUTION    Apply topically as needed.    DICLOFENAC SODIUM (VOLTAREN ARTHRITIS PAIN) 1 % GEL    Apply 2 g topically once daily.    DULOXETINE (CYMBALTA) 30 MG CAPSULE    TAKE 1 CAPSULE(30 MG) BY MOUTH EVERY DAY    FAMOTIDINE (PEPCID) 40 MG TABLET    TAKE 1 TABLET(40 MG) BY MOUTH EVERY EVENING    FAMOTIDINE (PEPCID) 40 MG TABLET    TAKE 1 TABLET(40 MG) BY MOUTH EVERY EVENING    FISH  OIL-OMEGA-3 FATTY ACIDS 300-1,000 MG CAPSULE    Take by mouth once daily.    FOLIC ACID/MULTIVIT-MIN/LUTEIN (CENTRUM SILVER ORAL)    Take by mouth.    LEVOTHYROXINE (SYNTHROID) 50 MCG TABLET    Take 50 mcg by mouth.    LOSARTAN-HYDROCHLOROTHIAZIDE 100-12.5 MG (HYZAAR) 100-12.5 MG TAB    Take 1 tablet by mouth once daily.    MIRABEGRON (MYRBETRIQ) 50 MG TB24    Take 1 tablet (50 mg total) by mouth once daily.    OMEPRAZOLE (PRILOSEC) 40 MG CAPSULE    TAKE 1 CAPSULE BY MOUTH ONCE DAILY BEFORE BREAKFAST    SOLIFENACIN (VESICARE) 5 MG TABLET    Take 1 tablet (5 mg total) by mouth once daily.    VIT C/E/ZN/COPPR/LUTEIN/ZEAXAN (PRESERVISION AREDS-2 ORAL)    Take by mouth 2 (two) times a day.

## 2025-03-03 NOTE — TELEPHONE ENCOUNTER
----- Message from North Tucker DO sent at 3/3/2025 12:33 PM CST -----  Sternoclavicular articulation not well seen.  We need to get an x-ray of the sternum to see if we can get a better look., please schedule  ----- Message -----  From: Interface, Rad Results In  Sent: 3/3/2025  11:21 AM CST  To: Nroth Tucker DO

## 2025-03-05 RX ORDER — AMLODIPINE BESYLATE 5 MG/1
5 TABLET ORAL
Qty: 90 TABLET | Refills: 3 | Status: SHIPPED | OUTPATIENT
Start: 2025-03-05

## 2025-03-05 NOTE — TELEPHONE ENCOUNTER
Refill Routing Note   Medication(s) are not appropriate for processing by [facility] for the following reason(s):        Due for refill >6 months ago    [facility] action(s):  Defer               Appointments  past 12m or future 3m with PCP    Date Provider   Last Visit   3/3/2025 North Tucker, DO   Next Visit   Visit date not found North Tucker, DO   ED visits in past 90 days: 0        Note composed:11:49 PM 03/04/2025

## 2025-03-06 ENCOUNTER — TELEPHONE (OUTPATIENT)
Dept: PAIN MEDICINE | Facility: CLINIC | Age: 75
End: 2025-03-06

## 2025-03-06 ENCOUNTER — OFFICE VISIT (OUTPATIENT)
Dept: PAIN MEDICINE | Facility: CLINIC | Age: 75
End: 2025-03-06
Payer: MEDICARE

## 2025-03-06 VITALS
HEART RATE: 63 BPM | SYSTOLIC BLOOD PRESSURE: 119 MMHG | HEIGHT: 63 IN | WEIGHT: 161.19 LBS | BODY MASS INDEX: 28.56 KG/M2 | DIASTOLIC BLOOD PRESSURE: 70 MMHG

## 2025-03-06 DIAGNOSIS — M54.16 LUMBAR RADICULOPATHY: ICD-10-CM

## 2025-03-06 DIAGNOSIS — M54.2 CERVICALGIA: ICD-10-CM

## 2025-03-06 DIAGNOSIS — M54.16 LUMBAR RADICULOPATHY: Primary | ICD-10-CM

## 2025-03-06 PROCEDURE — 99999 PR PBB SHADOW E&M-EST. PATIENT-LVL IV: CPT | Mod: PBBFAC,,, | Performed by: ANESTHESIOLOGY

## 2025-03-06 RX ORDER — SODIUM CHLORIDE, SODIUM LACTATE, POTASSIUM CHLORIDE, CALCIUM CHLORIDE 600; 310; 30; 20 MG/100ML; MG/100ML; MG/100ML; MG/100ML
INJECTION, SOLUTION INTRAVENOUS CONTINUOUS
OUTPATIENT
Start: 2025-03-06

## 2025-03-06 NOTE — TELEPHONE ENCOUNTER
Left patient voicemail that she is scheduled for 3/18. Let her know to hold ASA for 6 days prior. Made fu and provided call back number for if she needed to change anything

## 2025-03-06 NOTE — TELEPHONE ENCOUNTER
The patient is leaving for vacation on March 22nd    Can we please try and schedule this injection for the 1st half of the week of March 17      Physician - Dr Lawson    Type of Procedure/Injection - Lumbar Epidural  L5/S1           Laterality - NA      Priority - Normal      Anxiolysis- RNIV      Need to hold medication - Yes      Aspirin for 6 days      Clearance needed - No      Follow up - 3 week

## 2025-03-06 NOTE — PROGRESS NOTES
[facility] Follow Up Evaluation      Referred by: Dr. North Tucker    PCP:     CC:   Chief Complaint   Patient presents with    Neck Pain    Low-back Pain          3/6/2025     8:33 AM 8/28/2024     9:59 AM 7/10/2024    10:10 AM   Last 3 PDI Scores   Pain Disability Index (PDI) 19 35 56         HPI:   Lyudmila Neri is a 75 y.o. female patient who has a past medical history of Anticoagulant long-term use, Arthritis, Blood transfusion, Cancer, Depression, Disorder of kidney and ureter, GERD (gastroesophageal reflux disease), GIST (gastrointestinal stromal tumor), malignant, H. pylori infection, Hypertension, KATHY (obstructive sleep apnea), Psoriasis, Trouble in sleeping, and Urinary incontinence. She presents with neck and back pain.  Today her primary complaint is lower back pain.  The pain can radiate from her lower back down both buttocks and inner thighs.  She also having some burning into her left foot.  Her pain can get to 6/10, constant, aching, sharp.  Worse with standing and activities.      Pain Intervention History:  - s/p b/l L4/5 TFESI on 4/9/19  - s/p b/l L4/5 TFESI on 6/5/19  - s/p cervical C7-T1 BRIGITTE on 8/8/24      Past Spine Surgical History:      Past and current medications:  Antineuropathics:  NSAIDs: aleve   Physical therapy: no, currently too painful  Antidepressants: cymbalta   Muscle relaxers:  Opioids:  Antiplatelets/Anticoagulants: aspirin     History:  Current Medications[1]    Past Medical History:   Diagnosis Date    Anticoagulant long-term use     Arthritis     osteoarthritis    Blood transfusion     Cancer 2011    stromal tumor, stomach    Depression     Disorder of kidney and ureter     CKD 2    GERD (gastroesophageal reflux disease)     GIST (gastrointestinal stromal tumor), malignant     H. pylori infection     Hypertension     KATHY (obstructive sleep apnea) 6/24/2013    Psoriasis 6/24/2013    Trouble in sleeping     arthritic pain wakes her up    Urinary incontinence      stress incontinence       Past Surgical History:   Procedure Laterality Date    ANKLE FRACTURE SURGERY      RT ankle    APPENDECTOMY      BLADDER SUSPENSION  1995    CARPAL TUNNEL RELEASE      left    CERVICAL FUSION      CHEILECTOMY Right 07/13/2023    Procedure: CHEILECTOMY;  Surgeon: Agusto Edwards DPM;  Location: [facility] OR;  Service: Podiatry;  Laterality: Right;    CHOLECYSTECTOMY  12/7/2011  Dr. Cai    COLONOSCOPY  7/26/2005  Thomas    Non-erosive esophageal reflux (NERD) disease?.  Post-surgical deformity in the gastric body.   Gastric mucosal abnormality (erythema) in the greater  curve of antrum.  STEVIE Test performed on 02/24/12 was Negative.    COLONOSCOPY  12/12/2008  Thomas    Small internal hemorrhoids.  Slightly redundant left colon.    COLONOSCOPY N/A 04/18/2018    Procedure: COLONOSCOPY;  Surgeon: David Guzman Jr., MD;  Location: [facility] ENDO;  Service: Endoscopy;  Laterality: N/A;    COLONOSCOPY  04/18/2018    Dr. Guzman; hemorrhoids, otherwise unremarkable, repeat 5 years for surveillance due to family history of colon cancer    CYST REMOVAL Left 2016    left middle finger, Dr. Johnson    ENDOSCOPIC GASTROCNEMIUS RECESSION Right 07/13/2023    Procedure: RECESSION, GASTROCNEMIUS, ENDOSCOPIC;  Surgeon: Agusto Edwards DPM;  Location: [facility] OR;  Service: Podiatry;  Laterality: Right;  popliteal saphenous block,    EPIDURAL STEROID INJECTION INTO CERVICAL SPINE N/A 8/8/2024    Procedure: Injection-steroid-epidural-cervical c7-T1;  Surgeon: Jamaica Olvera MD;  Location: [facility] OR;  Service: Anesthesiology;  Laterality: N/A;    ESOPHAGOGASTRODUODENOSCOPY N/A 05/14/2019    Procedure: EGD (ESOPHAGOGASTRODUODENOSCOPY);  Surgeon: David Guzman Jr., MD;  Location: [facility] ENDO;  Service: Endoscopy;  Laterality: N/A;    ESOPHAGOGASTRODUODENOSCOPY N/A 10/09/2023    Procedure: EGD (ESOPHAGOGASTRODUODENOSCOPY);  Surgeon: David Guzman Jr., MD;  Location: [facility] ENDO;  Service: Endoscopy;  Laterality: N/A;     EYE SURGERY Bilateral 1995    RK surgery    EYE SURGERY Bilateral 2015    cataract removal    FOOT ARTHRODESIS Right 07/13/2023    Procedure: FUSION, FOOT;  Surgeon: Agusto Edwards DPM;  Location: [facility] OR;  Service: Podiatry;  Laterality: Right;  popliteal saphenous block, fusion of the naviculocuneiform and tarsometatarsal joints 1-3.    GASTRECTOMY      HYSTERECTOMY      KNEE ARTHROSCOPY W/ DEBRIDEMENT      lt knee    OOPHORECTOMY      SHOULDER OPEN ROTATOR CUFF REPAIR      left    STOMACH SURGERY  12/7/2011  Dr. Cai    removal of GIST tumor from wall of the stomach, 2.3 cm in size.    TRANSFORAMINAL EPIDURAL INJECTION OF STEROID Bilateral 04/09/2019    Procedure: Injection,steroid,epidural,transforaminal approach L4/5;  Surgeon: Pa Pruett MD;  Location: [facility] OR;  Service: Pain Management;  Laterality: Bilateral;    TRANSFORAMINAL EPIDURAL INJECTION OF STEROID Bilateral 06/05/2019    Procedure: Injection,steroid,epidural,transforaminal approach L4/5;  Surgeon: Pa Pruett MD;  Location: [facility] OR;  Service: Pain Management;  Laterality: Bilateral;    UPPER GASTROINTESTINAL ENDOSCOPY  2/24/2012  Thomas    Non-erosive esophageal reflux (NERD) disease?.  Post-surgical deformity in the gastric body.   Gastric mucosal abnormality (erythema) in the greater  curve of antrum.  STEVIE Test performed on 02/24/12 was Negative.    UPPER GASTROINTESTINAL ENDOSCOPY  04/18/2018    Dr. Guzman, antritis, evidence of prior surgery with healthy mucosa       Family History   Problem Relation Name Age of Onset    Heart disease Mother      Arthritis Mother          osteoarthritis    COPD Mother      Hyperlipidemia Mother      Hypertension Mother      Kidney disease Mother      Stroke Mother      Colon cancer Mother  75    Cancer Mother  70        colon cancer    Cancer Father          brain and lung    Arthritis Father      COPD Sister      Depression Daughter      Arthritis Maternal Aunt          rheumatoid  arthritis    Heart disease Maternal Uncle      Early death Maternal Uncle          in 50s due to heart disease    Arthritis Paternal Aunt          rheuamtoid arthritis    Heart disease Maternal Grandfather      Arthritis Paternal Grandmother          rheumatoid arthritis    Diabetes Paternal Grandmother      Diabetes Cousin      Heart disease Cousin      Crohn's disease Neg Hx      Stomach cancer Neg Hx      Ulcerative colitis Neg Hx      Esophageal cancer Neg Hx         Social History     Socioeconomic History    Marital status:    Tobacco Use    Smoking status: Never    Smokeless tobacco: Never   Substance and Sexual Activity    Alcohol use: No    Drug use: No    Sexual activity: Yes     Partners: Male     Social Drivers of Health     Financial Resource Strain: Low Risk  (6/26/2024)    Overall Financial Resource Strain (CARDIA)     Difficulty of Paying Living Expenses: Not hard at all   Food Insecurity: No Food Insecurity (6/26/2024)    Hunger Vital Sign     Worried About Running Out of Food in the Last Year: Never true     Ran Out of Food in the Last Year: Never true   Transportation Needs: No Transportation Needs (6/26/2024)    PRAPARE - Transportation     Lack of Transportation (Medical): No     Lack of Transportation (Non-Medical): No   Physical Activity: Inactive (6/26/2024)    Exercise Vital Sign     Days of Exercise per Week: 0 days     Minutes of Exercise per Session: 0 min   Stress: Stress Concern Present (6/26/2024)    [employer] - Occupational Stress Questionnaire     Feeling of Stress : To some extent   Housing Stability: Unknown (6/26/2024)    Housing Stability Vital Sign     Unable to Pay for Housing in the Last Year: No     Homeless in the Last Year: No       Review of patient's allergies indicates:   Allergen Reactions    Codeine      Other reaction(s): Nausea       Review of Systems:  12 point review of systems is negative.    Physical Exam:  Vitals:     "03/06/25 0835   BP: 119/70   Pulse: 63   Weight: 73.1 kg (161 lb 2.5 oz)   Height: 5' 3" (1.6 m)   PainSc:   3   PainLoc: Back     Body mass index is 28.55 kg/m².    Gen: NAD  Psych: mood appropriate for given condition  HEENT: eyes anicteric   CV: RRR  HEENT: anicteric   Respiratory: non-labored, no signs of respiratory distress  Abd: non-distended  Skin: warm, dry and intact.  Gait: No antalgic gait.     Sensory:  Intact and symmetrical to light touch in L1-S1 dermatomes bilaterally.    Motor:     Right Left   L2/3 Iliacus Hip flexion  5  5   L3/4 Qudratus Femoris Knee Extension  5  5   L4/5 Tib Anterior Ankle Dorsiflexion   5  5   L5/S1 Extensor Hallicus Longus Great toe extension  5  5   S1/S2 Gastroc/Soleus Plantar Flexion  5  5       Labs:  Lab Results   Component Value Date    HGBA1C 5.3 10/03/2023       Lab Results   Component Value Date    WBC 8.71 10/03/2024    HGB 14.4 10/03/2024    HCT 44.3 10/03/2024    MCV 92 10/03/2024     10/03/2024       Imaging:  MRI cervical spine 2/22/24  FINDINGS:  Vertebral column: As seen on comparison studies, there is solid bony fusion of C5 and C6.  There is marked disc space narrowing or incomplete bony fusion of C6 and C7.  Additionally there is mixed endplate signal change, including Modic type 1 change towards the right at the C6-7 and C7-T1 levels.  There is marked disc space narrowing at the remainder of the cervical levels as well as T1-2 and T2-3.  The odontoid process is intact.  There is stable trace anterolisthesis of C3 on C4, C4 on C5 and retrolisthesis of C2 on C3.  There is multilevel endplate osteophyte formation.  Baseline marrow signal is normal.  The odontoid process is intact.     Spinal canal, cord, epidural space: The spinal canal is developmentally normal.  There is minimal flattening of the ventral cord surface at the C4-5 level.  Cord signal is normal without changes of edema or myelomalacia.     Findings by level:     C2-3: There is marked " disc space narrowing, trace retrolisthesis of C2 on C3.  There is right greater than left facet joint arthropathy.  There is no spinal stenosis.  There is mild-to-moderate right foraminal stenosis.  C3-4: There is marked disc space narrowing, trace anterolisthesis of C3 on C4.  There is right greater than left facet joint arthropathy with bilateral uncovertebral spurring as well as a mild disc osteophyte complex resulting in borderline spinal stenosis, marked right and mild left foraminal stenosis.  C4-5: There is marked disc space narrowing, trace anterolisthesis of C4 on C5.  There is right greater than left facet joint arthropathy and uncovertebral spurring with a broad disc osteophyte complex which results in minimal flattening of the ventral cord surface.  Cord signal is normal.  There is only borderline spinal stenosis with moderate to marked right and mild-to-moderate left foraminal stenosis.  C5-6: There is solid osseous fusion.  There is no spinal stenosis.  There is mild left foraminal stenosis.  C6-7: There is partial bony fusion or significant disc space narrowing.  There is left greater than right uncovertebral spurring.  There is a broad disc osteophyte complex.  There is mild-to-moderate spinal stenosis with severe left and moderate right foraminal stenosis.  C7-T1: There is marked disc space narrowing.  There is bilateral, left greater than right, facet joint arthropathy and uncovertebral spurring with a broad disc osteophyte complex.  There is mild spinal stenosis with severe left and at least moderate right foraminal stenosis.     Soft tissues, other: The prevertebral soft tissues are normal.  The airway is patent.      MRI lumbar spine 12/4/24  FINDINGS:  Vertebral column: There is extensive multilevel degenerative change.  There is a broad dextrocurvature at the thoracolumbar junction.  There is severe disc space narrowing at the L1-2, L2-3, L4-5 levels with moderate to severe disc space  narrowing at the L3-4 level.  Vertebral body height is preserved.  There is no fracture.  There is mild 4 mm anterolisthesis of L5 on S1, L4 on L5.  There is trace retrolisthesis of L1 on L2, L2 on L3 and L3 on L4.  All of the discs are desiccated.  There is mild Modic type 1 degenerative endplate signal change posteriorly at the L3-4 level.  The prior study demonstrated Modic type 1 change at the L4-5 level where there is now sclerosis.     Spinal canal, conus, epidural space: The spinal canal is developmentally normal.  The conus terminates at the level of T12-L1 and is normal in contour and signal intensity.     Findings by level:     On the sagittal images, there is no spinal stenosis or cord compression at the T10-11 or T11-12 levels.     T12-L1: There is no spinal canal or foraminal stenosis.  There is only mild facet joint arthropathy without change.  L1-2: There is severe disc space narrowing, worse towards the left.  There is a diffuse disc bulge with osteophytic ridging in addition to left greater than right facet joint arthropathy.  There is no significant spinal stenosis but there is moderate crowding of the left lateral recess.  There is moderate to severe left and only mild right foraminal stenosis with only minimal interval progression.  L2-3: There is severe disc space narrowing, trace retrolisthesis of L2 on L3.  There is a diffuse disc bulge with shallow central disc protrusion, osteophytic ridging and left greater than right facet joint arthropathy with ligamentum flavum thickening resulting in moderate spinal stenosis, crowding of the left lateral recess and at least moderate bilateral foraminal stenosis without significant change.  L3-4: There is bilateral facet joint arthropathy with ligamentum flavum thickening.  There is a broad central disc protrusion.  There is flattening of the ventral dural sac.  There is mild spinal stenosis.  There is crowding of the lateral recess bilaterally.  There  is at least moderate bilateral foraminal stenosis.  Findings have slightly progressed.  L4-5: There is disc space narrowing, mild anterolisthesis of L4 on L5.  There is unroofing of a broad disc protrusion.  There is right greater than left facet joint arthropathy.  There is a small 5 mm right-sided synovial cyst.  All of these factors contribute to severe crowding of the right lateral recess, mild spinal stenosis.  There is severe right and only mild left foraminal stenosis.  Findings may have slightly progressed.  L5-S1: There is mild anterolisthesis of L5 on S1.  There is bilateral facet joint arthropathy.  There is unroofing of a diffuse disc bulge which contacts both S1 roots.  There is mild spinal stenosis with mild left and moderate right foraminal stenosis.  Findings have slightly progressed.     Soft tissues, other: The posterior spinous muscles are atrophic.  The prevertebral soft tissues are normal.  The aorta is normal in caliber.  There is no hydronephrosis.    Diagnosis:  1. Cervicalgia  -     Ambulatory referral/consult to Pain Clinic    2. Lumbar radiculopathy  -     Ambulatory referral/consult to Pain Clinic          Lyudmila Neri is a 75 y.o. female patient who has a past medical history of Anticoagulant long-term use, Arthritis, Blood transfusion, Cancer, Depression, Disorder of kidney and ureter, GERD (gastroesophageal reflux disease), GIST (gastrointestinal stromal tumor), malignant, H. pylori infection, Hypertension, KATHY (obstructive sleep apnea), Psoriasis, Trouble in sleeping, and Urinary incontinence. She presents with neck and back pain.  Today her primary complaint is lower back pain.  The pain can radiate from her lower back down both buttocks and inner thighs.  She also having some burning into her left foot.  Her pain can get to 6/10, constant, aching, sharp.  Worse with standing and activities.    - on exam she has full strength in his lower extremities intact sensation to light  touch bilateral L2-S1  - I independently reviewed her lumbar MRI and she has significant degenerative changes throughout the lumbar spine.  She has multilevel bilateral facet arthritis.  At L4-5 she has a right-sided synovial cyst causing significant right lateral recess narrowing and severe right and mild left foraminal narrowing.  She has a unroofing with diffuse disc bulge at L5-S1 which contacts bilateral S1 nerve roots.  - her pain is currently too severe to participate in formal physical therapy.  Over the past 6 months she has been using NSAIDs for inflammatory pain and Cymbalta for the neuropathic component of her pain  - she continues to report pain that limits her mobility and interfering with the quality of her life  - we will schedule for an L5-S1 BRIGITTE. The risks and benefits of this intervention, and alternative therapies were discussed with the patient.  The discussion of risks included infection, bleeding, need for additional procedures or surgery, nerve damage.  Questions regarding the procedure, risks, expected outcome, and possible side effects were solicited and answered to the patient's satisfaction.  Lyudmila Neri wishes to proceed with the injection or procedure.  Written consent was obtained.  - she takes aspirin preventatively.  We will have her hold this prior to injection  - follow up 2-3 weeks post injection      : Not applicable    Walt Lawson M.D.  Interventional Pain Medicine / Anesthesiology    This note was completed with dictation software and grammatical errors may exist.         [1]   Current Outpatient Medications:     albuterol (VENTOLIN HFA) 90 mcg/actuation inhaler, Inhale 2 puffs into the lungs every 6 (six) hours as needed for Wheezing. Rescue, Disp: 18 g, Rfl: 0    amLODIPine (NORVASC) 5 MG tablet, TAKE 1 TABLET(5 MG) BY MOUTH EVERY DAY, Disp: 90 tablet, Rfl: 3    aspirin (ECOTRIN) 81 MG EC tablet, Take 81 mg by mouth once daily., Disp: , Rfl:     cetirizine (ZYRTEC) 10  MG tablet, Take 10 mg by mouth once daily., Disp: , Rfl:     cholecalciferol, vitamin D3, (VITAMIN D3) 25 mcg (1,000 unit) capsule, Take 1,000 Units by mouth once daily., Disp: , Rfl:     clobetasol (TEMOVATE) 0.05 % external solution, Apply topically as needed., Disp: , Rfl:     diclofenac sodium (VOLTAREN ARTHRITIS PAIN) 1 % Gel, Apply 2 g topically once daily., Disp: , Rfl:     DULoxetine (CYMBALTA) 30 MG capsule, TAKE 1 CAPSULE(30 MG) BY MOUTH EVERY DAY, Disp: 30 capsule, Rfl: 6    famotidine (PEPCID) 40 MG tablet, TAKE 1 TABLET(40 MG) BY MOUTH EVERY EVENING, Disp: 90 tablet, Rfl: 4    famotidine (PEPCID) 40 MG tablet, TAKE 1 TABLET(40 MG) BY MOUTH EVERY EVENING, Disp: 90 tablet, Rfl: 3    fish oil-omega-3 fatty acids 300-1,000 mg capsule, Take by mouth once daily., Disp: , Rfl:     FOLIC ACID/MULTIVIT-MIN/LUTEIN (CENTRUM SILVER ORAL), Take by mouth., Disp: , Rfl:     levothyroxine (SYNTHROID) 50 MCG tablet, Take 50 mcg by mouth., Disp: , Rfl:     losartan-hydrochlorothiazide 100-12.5 mg (HYZAAR) 100-12.5 mg Tab, Take 1 tablet by mouth once daily., Disp: 90 tablet, Rfl: 2    omeprazole (PRILOSEC) 40 MG capsule, TAKE 1 CAPSULE BY MOUTH ONCE DAILY BEFORE BREAKFAST, Disp: 90 capsule, Rfl: 3    solifenacin (VESICARE) 5 MG tablet, Take 1 tablet (5 mg total) by mouth once daily., Disp: 30 tablet, Rfl: 11    vit C/E/Zn/coppr/lutein/zeaxan (PRESERVISION AREDS-2 ORAL), Take by mouth 2 (two) times a day., Disp: , Rfl:     mirabegron (MYRBETRIQ) 50 mg Tb24, Take 1 tablet (50 mg total) by mouth once daily., Disp: 90 tablet, Rfl: 3

## 2025-03-06 NOTE — H&P (VIEW-ONLY)
[facility] Follow Up Evaluation      Referred by: Dr. North Tucker    PCP:     CC:   Chief Complaint   Patient presents with    Neck Pain    Low-back Pain          3/6/2025     8:33 AM 8/28/2024     9:59 AM 7/10/2024    10:10 AM   Last 3 PDI Scores   Pain Disability Index (PDI) 19 35 56         HPI:   Lyudmila Neri is a 75 y.o. female patient who has a past medical history of Anticoagulant long-term use, Arthritis, Blood transfusion, Cancer, Depression, Disorder of kidney and ureter, GERD (gastroesophageal reflux disease), GIST (gastrointestinal stromal tumor), malignant, H. pylori infection, Hypertension, KATHY (obstructive sleep apnea), Psoriasis, Trouble in sleeping, and Urinary incontinence. She presents with neck and back pain.  Today her primary complaint is lower back pain.  The pain can radiate from her lower back down both buttocks and inner thighs.  She also having some burning into her left foot.  Her pain can get to 6/10, constant, aching, sharp.  Worse with standing and activities.      Pain Intervention History:  - s/p b/l L4/5 TFESI on 4/9/19  - s/p b/l L4/5 TFESI on 6/5/19  - s/p cervical C7-T1 BRIGITTE on 8/8/24      Past Spine Surgical History:      Past and current medications:  Antineuropathics:  NSAIDs: aleve   Physical therapy: no, currently too painful  Antidepressants: cymbalta   Muscle relaxers:  Opioids:  Antiplatelets/Anticoagulants: aspirin     History:  Current Medications[1]    Past Medical History:   Diagnosis Date    Anticoagulant long-term use     Arthritis     osteoarthritis    Blood transfusion     Cancer 2011    stromal tumor, stomach    Depression     Disorder of kidney and ureter     CKD 2    GERD (gastroesophageal reflux disease)     GIST (gastrointestinal stromal tumor), malignant     H. pylori infection     Hypertension     KATHY (obstructive sleep apnea) 6/24/2013    Psoriasis 6/24/2013    Trouble in sleeping     arthritic pain wakes her up    Urinary incontinence      stress incontinence       Past Surgical History:   Procedure Laterality Date    ANKLE FRACTURE SURGERY      RT ankle    APPENDECTOMY      BLADDER SUSPENSION  1995    CARPAL TUNNEL RELEASE      left    CERVICAL FUSION      CHEILECTOMY Right 07/13/2023    Procedure: CHEILECTOMY;  Surgeon: Agusto Edwards DPM;  Location: [facility] OR;  Service: Podiatry;  Laterality: Right;    CHOLECYSTECTOMY  12/7/2011  Dr. Cai    COLONOSCOPY  7/26/2005  Thomas    Non-erosive esophageal reflux (NERD) disease?.  Post-surgical deformity in the gastric body.   Gastric mucosal abnormality (erythema) in the greater  curve of antrum.  STEVIE Test performed on 02/24/12 was Negative.    COLONOSCOPY  12/12/2008  Thomas    Small internal hemorrhoids.  Slightly redundant left colon.    COLONOSCOPY N/A 04/18/2018    Procedure: COLONOSCOPY;  Surgeon: David Guzman Jr., MD;  Location: [facility] ENDO;  Service: Endoscopy;  Laterality: N/A;    COLONOSCOPY  04/18/2018    Dr. Guzman; hemorrhoids, otherwise unremarkable, repeat 5 years for surveillance due to family history of colon cancer    CYST REMOVAL Left 2016    left middle finger, Dr. Johnson    ENDOSCOPIC GASTROCNEMIUS RECESSION Right 07/13/2023    Procedure: RECESSION, GASTROCNEMIUS, ENDOSCOPIC;  Surgeon: Agusto Edwards DPM;  Location: [facility] OR;  Service: Podiatry;  Laterality: Right;  popliteal saphenous block,    EPIDURAL STEROID INJECTION INTO CERVICAL SPINE N/A 8/8/2024    Procedure: Injection-steroid-epidural-cervical c7-T1;  Surgeon: Jamaica Olvera MD;  Location: [facility] OR;  Service: Anesthesiology;  Laterality: N/A;    ESOPHAGOGASTRODUODENOSCOPY N/A 05/14/2019    Procedure: EGD (ESOPHAGOGASTRODUODENOSCOPY);  Surgeon: David Guzman Jr., MD;  Location: [facility] ENDO;  Service: Endoscopy;  Laterality: N/A;    ESOPHAGOGASTRODUODENOSCOPY N/A 10/09/2023    Procedure: EGD (ESOPHAGOGASTRODUODENOSCOPY);  Surgeon: David Guzman Jr., MD;  Location: [facility] ENDO;  Service: Endoscopy;  Laterality: N/A;     EYE SURGERY Bilateral 1995    RK surgery    EYE SURGERY Bilateral 2015    cataract removal    FOOT ARTHRODESIS Right 07/13/2023    Procedure: FUSION, FOOT;  Surgeon: Agusto Edwards DPM;  Location: [facility] OR;  Service: Podiatry;  Laterality: Right;  popliteal saphenous block, fusion of the naviculocuneiform and tarsometatarsal joints 1-3.    GASTRECTOMY      HYSTERECTOMY      KNEE ARTHROSCOPY W/ DEBRIDEMENT      lt knee    OOPHORECTOMY      SHOULDER OPEN ROTATOR CUFF REPAIR      left    STOMACH SURGERY  12/7/2011  Dr. Cai    removal of GIST tumor from wall of the stomach, 2.3 cm in size.    TRANSFORAMINAL EPIDURAL INJECTION OF STEROID Bilateral 04/09/2019    Procedure: Injection,steroid,epidural,transforaminal approach L4/5;  Surgeon: Pa Pruett MD;  Location: [facility] OR;  Service: Pain Management;  Laterality: Bilateral;    TRANSFORAMINAL EPIDURAL INJECTION OF STEROID Bilateral 06/05/2019    Procedure: Injection,steroid,epidural,transforaminal approach L4/5;  Surgeon: Pa Pruett MD;  Location: [facility] OR;  Service: Pain Management;  Laterality: Bilateral;    UPPER GASTROINTESTINAL ENDOSCOPY  2/24/2012  Thomas    Non-erosive esophageal reflux (NERD) disease?.  Post-surgical deformity in the gastric body.   Gastric mucosal abnormality (erythema) in the greater  curve of antrum.  STEVIE Test performed on 02/24/12 was Negative.    UPPER GASTROINTESTINAL ENDOSCOPY  04/18/2018    Dr. Guzman, antritis, evidence of prior surgery with healthy mucosa       Family History   Problem Relation Name Age of Onset    Heart disease Mother      Arthritis Mother          osteoarthritis    COPD Mother      Hyperlipidemia Mother      Hypertension Mother      Kidney disease Mother      Stroke Mother      Colon cancer Mother  75    Cancer Mother  70        colon cancer    Cancer Father          brain and lung    Arthritis Father      COPD Sister      Depression Daughter      Arthritis Maternal Aunt          rheumatoid  arthritis    Heart disease Maternal Uncle      Early death Maternal Uncle          in 50s due to heart disease    Arthritis Paternal Aunt          rheuamtoid arthritis    Heart disease Maternal Grandfather      Arthritis Paternal Grandmother          rheumatoid arthritis    Diabetes Paternal Grandmother      Diabetes Cousin      Heart disease Cousin      Crohn's disease Neg Hx      Stomach cancer Neg Hx      Ulcerative colitis Neg Hx      Esophageal cancer Neg Hx         Social History     Socioeconomic History    Marital status:    Tobacco Use    Smoking status: Never    Smokeless tobacco: Never   Substance and Sexual Activity    Alcohol use: No    Drug use: No    Sexual activity: Yes     Partners: Male     Social Drivers of Health     Financial Resource Strain: Low Risk  (6/26/2024)    Overall Financial Resource Strain (CARDIA)     Difficulty of Paying Living Expenses: Not hard at all   Food Insecurity: No Food Insecurity (6/26/2024)    Hunger Vital Sign     Worried About Running Out of Food in the Last Year: Never true     Ran Out of Food in the Last Year: Never true   Transportation Needs: No Transportation Needs (6/26/2024)    PRAPARE - Transportation     Lack of Transportation (Medical): No     Lack of Transportation (Non-Medical): No   Physical Activity: Inactive (6/26/2024)    Exercise Vital Sign     Days of Exercise per Week: 0 days     Minutes of Exercise per Session: 0 min   Stress: Stress Concern Present (6/26/2024)    [employer] - Occupational Stress Questionnaire     Feeling of Stress : To some extent   Housing Stability: Unknown (6/26/2024)    Housing Stability Vital Sign     Unable to Pay for Housing in the Last Year: No     Homeless in the Last Year: No       Review of patient's allergies indicates:   Allergen Reactions    Codeine      Other reaction(s): Nausea       Review of Systems:  12 point review of systems is negative.    Physical Exam:  Vitals:     "03/06/25 0835   BP: 119/70   Pulse: 63   Weight: 73.1 kg (161 lb 2.5 oz)   Height: 5' 3" (1.6 m)   PainSc:   3   PainLoc: Back     Body mass index is 28.55 kg/m².    Gen: NAD  Psych: mood appropriate for given condition  HEENT: eyes anicteric   CV: RRR  HEENT: anicteric   Respiratory: non-labored, no signs of respiratory distress  Abd: non-distended  Skin: warm, dry and intact.  Gait: No antalgic gait.     Sensory:  Intact and symmetrical to light touch in L1-S1 dermatomes bilaterally.    Motor:     Right Left   L2/3 Iliacus Hip flexion  5  5   L3/4 Qudratus Femoris Knee Extension  5  5   L4/5 Tib Anterior Ankle Dorsiflexion   5  5   L5/S1 Extensor Hallicus Longus Great toe extension  5  5   S1/S2 Gastroc/Soleus Plantar Flexion  5  5       Labs:  Lab Results   Component Value Date    HGBA1C 5.3 10/03/2023       Lab Results   Component Value Date    WBC 8.71 10/03/2024    HGB 14.4 10/03/2024    HCT 44.3 10/03/2024    MCV 92 10/03/2024     10/03/2024       Imaging:  MRI cervical spine 2/22/24  FINDINGS:  Vertebral column: As seen on comparison studies, there is solid bony fusion of C5 and C6.  There is marked disc space narrowing or incomplete bony fusion of C6 and C7.  Additionally there is mixed endplate signal change, including Modic type 1 change towards the right at the C6-7 and C7-T1 levels.  There is marked disc space narrowing at the remainder of the cervical levels as well as T1-2 and T2-3.  The odontoid process is intact.  There is stable trace anterolisthesis of C3 on C4, C4 on C5 and retrolisthesis of C2 on C3.  There is multilevel endplate osteophyte formation.  Baseline marrow signal is normal.  The odontoid process is intact.     Spinal canal, cord, epidural space: The spinal canal is developmentally normal.  There is minimal flattening of the ventral cord surface at the C4-5 level.  Cord signal is normal without changes of edema or myelomalacia.     Findings by level:     C2-3: There is marked " disc space narrowing, trace retrolisthesis of C2 on C3.  There is right greater than left facet joint arthropathy.  There is no spinal stenosis.  There is mild-to-moderate right foraminal stenosis.  C3-4: There is marked disc space narrowing, trace anterolisthesis of C3 on C4.  There is right greater than left facet joint arthropathy with bilateral uncovertebral spurring as well as a mild disc osteophyte complex resulting in borderline spinal stenosis, marked right and mild left foraminal stenosis.  C4-5: There is marked disc space narrowing, trace anterolisthesis of C4 on C5.  There is right greater than left facet joint arthropathy and uncovertebral spurring with a broad disc osteophyte complex which results in minimal flattening of the ventral cord surface.  Cord signal is normal.  There is only borderline spinal stenosis with moderate to marked right and mild-to-moderate left foraminal stenosis.  C5-6: There is solid osseous fusion.  There is no spinal stenosis.  There is mild left foraminal stenosis.  C6-7: There is partial bony fusion or significant disc space narrowing.  There is left greater than right uncovertebral spurring.  There is a broad disc osteophyte complex.  There is mild-to-moderate spinal stenosis with severe left and moderate right foraminal stenosis.  C7-T1: There is marked disc space narrowing.  There is bilateral, left greater than right, facet joint arthropathy and uncovertebral spurring with a broad disc osteophyte complex.  There is mild spinal stenosis with severe left and at least moderate right foraminal stenosis.     Soft tissues, other: The prevertebral soft tissues are normal.  The airway is patent.      MRI lumbar spine 12/4/24  FINDINGS:  Vertebral column: There is extensive multilevel degenerative change.  There is a broad dextrocurvature at the thoracolumbar junction.  There is severe disc space narrowing at the L1-2, L2-3, L4-5 levels with moderate to severe disc space  narrowing at the L3-4 level.  Vertebral body height is preserved.  There is no fracture.  There is mild 4 mm anterolisthesis of L5 on S1, L4 on L5.  There is trace retrolisthesis of L1 on L2, L2 on L3 and L3 on L4.  All of the discs are desiccated.  There is mild Modic type 1 degenerative endplate signal change posteriorly at the L3-4 level.  The prior study demonstrated Modic type 1 change at the L4-5 level where there is now sclerosis.     Spinal canal, conus, epidural space: The spinal canal is developmentally normal.  The conus terminates at the level of T12-L1 and is normal in contour and signal intensity.     Findings by level:     On the sagittal images, there is no spinal stenosis or cord compression at the T10-11 or T11-12 levels.     T12-L1: There is no spinal canal or foraminal stenosis.  There is only mild facet joint arthropathy without change.  L1-2: There is severe disc space narrowing, worse towards the left.  There is a diffuse disc bulge with osteophytic ridging in addition to left greater than right facet joint arthropathy.  There is no significant spinal stenosis but there is moderate crowding of the left lateral recess.  There is moderate to severe left and only mild right foraminal stenosis with only minimal interval progression.  L2-3: There is severe disc space narrowing, trace retrolisthesis of L2 on L3.  There is a diffuse disc bulge with shallow central disc protrusion, osteophytic ridging and left greater than right facet joint arthropathy with ligamentum flavum thickening resulting in moderate spinal stenosis, crowding of the left lateral recess and at least moderate bilateral foraminal stenosis without significant change.  L3-4: There is bilateral facet joint arthropathy with ligamentum flavum thickening.  There is a broad central disc protrusion.  There is flattening of the ventral dural sac.  There is mild spinal stenosis.  There is crowding of the lateral recess bilaterally.  There  is at least moderate bilateral foraminal stenosis.  Findings have slightly progressed.  L4-5: There is disc space narrowing, mild anterolisthesis of L4 on L5.  There is unroofing of a broad disc protrusion.  There is right greater than left facet joint arthropathy.  There is a small 5 mm right-sided synovial cyst.  All of these factors contribute to severe crowding of the right lateral recess, mild spinal stenosis.  There is severe right and only mild left foraminal stenosis.  Findings may have slightly progressed.  L5-S1: There is mild anterolisthesis of L5 on S1.  There is bilateral facet joint arthropathy.  There is unroofing of a diffuse disc bulge which contacts both S1 roots.  There is mild spinal stenosis with mild left and moderate right foraminal stenosis.  Findings have slightly progressed.     Soft tissues, other: The posterior spinous muscles are atrophic.  The prevertebral soft tissues are normal.  The aorta is normal in caliber.  There is no hydronephrosis.    Diagnosis:  1. Cervicalgia  -     Ambulatory referral/consult to Pain Clinic    2. Lumbar radiculopathy  -     Ambulatory referral/consult to Pain Clinic          Lyudmila Neri is a 75 y.o. female patient who has a past medical history of Anticoagulant long-term use, Arthritis, Blood transfusion, Cancer, Depression, Disorder of kidney and ureter, GERD (gastroesophageal reflux disease), GIST (gastrointestinal stromal tumor), malignant, H. pylori infection, Hypertension, KATHY (obstructive sleep apnea), Psoriasis, Trouble in sleeping, and Urinary incontinence. She presents with neck and back pain.  Today her primary complaint is lower back pain.  The pain can radiate from her lower back down both buttocks and inner thighs.  She also having some burning into her left foot.  Her pain can get to 6/10, constant, aching, sharp.  Worse with standing and activities.    - on exam she has full strength in his lower extremities intact sensation to light  touch bilateral L2-S1  - I independently reviewed her lumbar MRI and she has significant degenerative changes throughout the lumbar spine.  She has multilevel bilateral facet arthritis.  At L4-5 she has a right-sided synovial cyst causing significant right lateral recess narrowing and severe right and mild left foraminal narrowing.  She has a unroofing with diffuse disc bulge at L5-S1 which contacts bilateral S1 nerve roots.  - her pain is currently too severe to participate in formal physical therapy.  Over the past 6 months she has been using NSAIDs for inflammatory pain and Cymbalta for the neuropathic component of her pain  - she continues to report pain that limits her mobility and interfering with the quality of her life  - we will schedule for an L5-S1 BRIGITTE. The risks and benefits of this intervention, and alternative therapies were discussed with the patient.  The discussion of risks included infection, bleeding, need for additional procedures or surgery, nerve damage.  Questions regarding the procedure, risks, expected outcome, and possible side effects were solicited and answered to the patient's satisfaction.  Lyudmila Neri wishes to proceed with the injection or procedure.  Written consent was obtained.  - she takes aspirin preventatively.  We will have her hold this prior to injection  - follow up 2-3 weeks post injection      : Not applicable    Walt Lawson M.D.  Interventional Pain Medicine / Anesthesiology    This note was completed with dictation software and grammatical errors may exist.         [1]   Current Outpatient Medications:     albuterol (VENTOLIN HFA) 90 mcg/actuation inhaler, Inhale 2 puffs into the lungs every 6 (six) hours as needed for Wheezing. Rescue, Disp: 18 g, Rfl: 0    amLODIPine (NORVASC) 5 MG tablet, TAKE 1 TABLET(5 MG) BY MOUTH EVERY DAY, Disp: 90 tablet, Rfl: 3    aspirin (ECOTRIN) 81 MG EC tablet, Take 81 mg by mouth once daily., Disp: , Rfl:     cetirizine (ZYRTEC) 10  MG tablet, Take 10 mg by mouth once daily., Disp: , Rfl:     cholecalciferol, vitamin D3, (VITAMIN D3) 25 mcg (1,000 unit) capsule, Take 1,000 Units by mouth once daily., Disp: , Rfl:     clobetasol (TEMOVATE) 0.05 % external solution, Apply topically as needed., Disp: , Rfl:     diclofenac sodium (VOLTAREN ARTHRITIS PAIN) 1 % Gel, Apply 2 g topically once daily., Disp: , Rfl:     DULoxetine (CYMBALTA) 30 MG capsule, TAKE 1 CAPSULE(30 MG) BY MOUTH EVERY DAY, Disp: 30 capsule, Rfl: 6    famotidine (PEPCID) 40 MG tablet, TAKE 1 TABLET(40 MG) BY MOUTH EVERY EVENING, Disp: 90 tablet, Rfl: 4    famotidine (PEPCID) 40 MG tablet, TAKE 1 TABLET(40 MG) BY MOUTH EVERY EVENING, Disp: 90 tablet, Rfl: 3    fish oil-omega-3 fatty acids 300-1,000 mg capsule, Take by mouth once daily., Disp: , Rfl:     FOLIC ACID/MULTIVIT-MIN/LUTEIN (CENTRUM SILVER ORAL), Take by mouth., Disp: , Rfl:     levothyroxine (SYNTHROID) 50 MCG tablet, Take 50 mcg by mouth., Disp: , Rfl:     losartan-hydrochlorothiazide 100-12.5 mg (HYZAAR) 100-12.5 mg Tab, Take 1 tablet by mouth once daily., Disp: 90 tablet, Rfl: 2    omeprazole (PRILOSEC) 40 MG capsule, TAKE 1 CAPSULE BY MOUTH ONCE DAILY BEFORE BREAKFAST, Disp: 90 capsule, Rfl: 3    solifenacin (VESICARE) 5 MG tablet, Take 1 tablet (5 mg total) by mouth once daily., Disp: 30 tablet, Rfl: 11    vit C/E/Zn/coppr/lutein/zeaxan (PRESERVISION AREDS-2 ORAL), Take by mouth 2 (two) times a day., Disp: , Rfl:     mirabegron (MYRBETRIQ) 50 mg Tb24, Take 1 tablet (50 mg total) by mouth once daily., Disp: 90 tablet, Rfl: 3

## 2025-03-06 NOTE — TELEPHONE ENCOUNTER
----- Message from Amy sent at 3/6/2025  9:58 AM CST -----  Contact: PT  Type:  Patient Returning CallWho Called:PT Who Left Message for Patient:WESLEY Does the patient know what this is regarding?:N/AWould the patient rather a call back or a response via [facility]? CALL Best Call Back Number:[phone] Information: THANK YOU

## 2025-03-07 ENCOUNTER — HOSPITAL ENCOUNTER (OUTPATIENT)
Dept: RADIOLOGY | Facility: HOSPITAL | Age: 75
Discharge: HOME | End: 2025-03-07
Attending: INTERNAL MEDICINE
Payer: MEDICARE

## 2025-03-07 DIAGNOSIS — M79.89 SWELLING OF CLAVICULAR REGION: ICD-10-CM

## 2025-03-07 PROCEDURE — 71120 X-RAY EXAM BREASTBONE 2/>VWS: CPT | Mod: 26,,, | Performed by: RADIOLOGY

## 2025-03-07 PROCEDURE — 71120 X-RAY EXAM BREASTBONE 2/>VWS: CPT | Mod: TC,FY,PO

## 2025-03-08 ENCOUNTER — RESULTS FOLLOW-UP (OUTPATIENT)
Dept: FAMILY MEDICINE | Facility: CLINIC | Age: 75
End: 2025-03-08

## 2025-03-14 RX ORDER — NAPROXEN SODIUM 220 MG
220 TABLET ORAL 2 TIMES DAILY WITH MEALS
COMMUNITY

## 2025-03-17 ENCOUNTER — OFFICE VISIT (OUTPATIENT)
Dept: RHEUMATOLOGY | Facility: CLINIC | Age: 75
End: 2025-03-17
Payer: MEDICARE

## 2025-03-17 VITALS
BODY MASS INDEX: 28.56 KG/M2 | HEART RATE: 69 BPM | SYSTOLIC BLOOD PRESSURE: 129 MMHG | WEIGHT: 161.19 LBS | DIASTOLIC BLOOD PRESSURE: 80 MMHG | HEIGHT: 63 IN

## 2025-03-17 DIAGNOSIS — N18.2 CHRONIC KIDNEY DISEASE, STAGE II (MILD): Primary | ICD-10-CM

## 2025-03-17 DIAGNOSIS — Z79.1 ENCOUNTER FOR MONITORING CHRONIC NSAID THERAPY: ICD-10-CM

## 2025-03-17 DIAGNOSIS — Z51.81 ENCOUNTER FOR MONITORING CHRONIC NSAID THERAPY: ICD-10-CM

## 2025-03-17 DIAGNOSIS — M15.0 PRIMARY OSTEOARTHRITIS INVOLVING MULTIPLE JOINTS: ICD-10-CM

## 2025-03-17 PROCEDURE — 1101F PT FALLS ASSESS-DOCD LE1/YR: CPT | Mod: CPTII,S$GLB,, | Performed by: INTERNAL MEDICINE

## 2025-03-17 PROCEDURE — 1125F AMNT PAIN NOTED PAIN PRSNT: CPT | Mod: CPTII,S$GLB,, | Performed by: INTERNAL MEDICINE

## 2025-03-17 PROCEDURE — 3074F SYST BP LT 130 MM HG: CPT | Mod: CPTII,S$GLB,, | Performed by: INTERNAL MEDICINE

## 2025-03-17 PROCEDURE — 3079F DIAST BP 80-89 MM HG: CPT | Mod: CPTII,S$GLB,, | Performed by: INTERNAL MEDICINE

## 2025-03-17 PROCEDURE — 99999 PR PBB SHADOW E&M-EST. PATIENT-LVL III: CPT | Mod: PBBFAC,,, | Performed by: INTERNAL MEDICINE

## 2025-03-17 PROCEDURE — 99214 OFFICE O/P EST MOD 30 MIN: CPT | Mod: S$GLB,,, | Performed by: INTERNAL MEDICINE

## 2025-03-17 PROCEDURE — 3288F FALL RISK ASSESSMENT DOCD: CPT | Mod: CPTII,S$GLB,, | Performed by: INTERNAL MEDICINE

## 2025-03-17 PROCEDURE — 1159F MED LIST DOCD IN RCRD: CPT | Mod: CPTII,S$GLB,, | Performed by: INTERNAL MEDICINE

## 2025-03-17 RX ORDER — DULOXETIN HYDROCHLORIDE 30 MG/1
30 CAPSULE, DELAYED RELEASE ORAL DAILY
Qty: 30 CAPSULE | Refills: 6 | Status: SHIPPED | OUTPATIENT
Start: 2025-03-17 | End: 2026-03-17

## 2025-03-17 ASSESSMENT — ROUTINE ASSESSMENT OF PATIENT INDEX DATA (RAPID3)
FATIGUE SCORE: 1.1
PATIENT GLOBAL ASSESSMENT SCORE: 4.5
PSYCHOLOGICAL DISTRESS SCORE: 0
PAIN SCORE: 6
TOTAL RAPID3 SCORE: 4.39
MDHAQ FUNCTION SCORE: 0.8

## 2025-03-17 NOTE — PROGRESS NOTES
Subjective:          Chief Complaint: Lyudmila Neri is a 75 y.o. female who had concerns including Disease Management.    HPI:    Patient is a 74-year-old female here for f/u of OA various joints to discuss management options:     Feet now painful all the time, previously only end of day. Pain first thing in AM, pain at rest. Dorsal mid foot predominant. Not as much in toes- working with Dr. Post. Was receiving injections ultimately did have right foot surgery for fusion with marked improvement of her pain, pending the left foot. Recent increased knee pain: working with Ortho. MRI right knee with degenrative changes.     New anterior thigh pain in same distribution of her L4-5 lesion. No pain in groin, sitting crossed legged in visit today w/o pain. No GTB pain. Mild and manageable at this time describes as an ache.     3/2025: patient overall doing well. New LBP pending TFESI tomorrow.     Using Aleve OTC BID doing better than other Rx NSAIDs.   Cymbalta 30mg daily   BP has been good    Rheumatic Hx:    As a history of nonerosive erosive reflux gastritis as well as GIST resected 2011 follows with Dr. Pro.  Recent serologies rheumatoid factor negative, sedimentation rate within normal limits, SINDI negative, C-reactive protein is slightly elevated.  Affected joints: knees, shoulders cervical spine (hx of fusion), hips, hands are stiff in the CMC and MCP and PIP, feet. Right ankle with known fx and ORIF.   Stifffness in AM 45 min with hot shower.   No dactylitis, no known other joint swelling. No IBD. She notes drys eyes, no scleritis, episcleritis  She did use aleve which helps, meloxicam not really. Celebrex does help but cannot use BID due to HTN. Did recently have BP meds adjusted.  Is having relief but not complete. No GI upset.   She has sensitivity to soft tissue pain.  She has hx of Psoriasis that is controlled for at least 10 years-scalp predominant but diffusely.   A new well marginated erosion  present at the radial aspect of the base of the right fourth proximal phalanx joint space narrowing in the third fourth MCP and the left third MCP joint degenerative changes otherwise.    REVIEW OF SYSTEMS:    Review of Systems   Constitutional:  Positive for malaise/fatigue. Negative for fever and weight loss.   HENT:  Negative for sore throat.    Eyes:  Negative for double vision, photophobia and redness.   Respiratory:  Negative for cough, shortness of breath and wheezing.    Cardiovascular:  Negative for chest pain, palpitations and orthopnea.   Gastrointestinal:  Negative for abdominal pain, constipation and diarrhea.   Genitourinary:  Negative for dysuria, hematuria and urgency.   Musculoskeletal:  Positive for joint pain and myalgias. Negative for back pain.   Skin:  Negative for rash.   Neurological:  Negative for dizziness, tingling, focal weakness and headaches.   Endo/Heme/Allergies:  Does not bruise/bleed easily.   Psychiatric/Behavioral:  Negative for depression, hallucinations and suicidal ideas.                Objective:            Past Medical History:   Diagnosis Date    Anticoagulant long-term use     Arthritis     osteoarthritis    Blood transfusion     Cancer 2011    stromal tumor, stomach    Depression     Disorder of kidney and ureter     CKD 2    GERD (gastroesophageal reflux disease)     GIST (gastrointestinal stromal tumor), malignant     H. pylori infection     Hypertension     KATHY (obstructive sleep apnea) 6/24/2013    Psoriasis 6/24/2013    Trouble in sleeping     arthritic pain wakes her up    Urinary incontinence     stress incontinence     Family History   Problem Relation Name Age of Onset    Heart disease Mother      Arthritis Mother          osteoarthritis    COPD Mother      Hyperlipidemia Mother      Hypertension Mother      Kidney disease Mother      Stroke Mother      Colon cancer Mother  75    Cancer Mother  70        colon cancer    Cancer Father          brain and lung     Arthritis Father      COPD Sister      Depression Daughter      Arthritis Maternal Aunt          rheumatoid arthritis    Heart disease Maternal Uncle      Early death Maternal Uncle          in 50s due to heart disease    Arthritis Paternal Aunt          rheuamtoid arthritis    Heart disease Maternal Grandfather      Arthritis Paternal Grandmother          rheumatoid arthritis    Diabetes Paternal Grandmother      Diabetes Cousin      Heart disease Cousin      Crohn's disease Neg Hx      Stomach cancer Neg Hx      Ulcerative colitis Neg Hx      Esophageal cancer Neg Hx       Social History     Tobacco Use    Smoking status: Never    Smokeless tobacco: Never   Substance Use Topics    Alcohol use: No    Drug use: No         Current Outpatient Medications on File Prior to Visit   Medication Sig Dispense Refill    albuterol (VENTOLIN HFA) 90 mcg/actuation inhaler Inhale 2 puffs into the lungs every 6 (six) hours as needed for Wheezing. Rescue 18 g 0    amLODIPine (NORVASC) 5 MG tablet TAKE 1 TABLET(5 MG) BY MOUTH EVERY DAY 90 tablet 3    aspirin (ECOTRIN) 81 MG EC tablet Take 81 mg by mouth once daily.      cetirizine (ZYRTEC) 10 MG tablet Take 10 mg by mouth once daily.      cholecalciferol, vitamin D3, (VITAMIN D3) 25 mcg (1,000 unit) capsule Take 1,000 Units by mouth once daily.      clobetasol (TEMOVATE) 0.05 % external solution Apply topically as needed.      diclofenac sodium (VOLTAREN ARTHRITIS PAIN) 1 % Gel Apply 2 g topically once daily.      DULoxetine (CYMBALTA) 30 MG capsule TAKE 1 CAPSULE(30 MG) BY MOUTH EVERY DAY 30 capsule 6    famotidine (PEPCID) 40 MG tablet TAKE 1 TABLET(40 MG) BY MOUTH EVERY EVENING 90 tablet 3    fish oil-omega-3 fatty acids 300-1,000 mg capsule Take by mouth once daily.      FOLIC ACID/MULTIVIT-MIN/LUTEIN (CENTRUM SILVER ORAL) Take by mouth.      levothyroxine (SYNTHROID) 50 MCG tablet Take 50 mcg by mouth.      losartan-hydrochlorothiazide 100-12.5 mg (HYZAAR) 100-12.5 mg Tab Take  1 tablet by mouth once daily. 90 tablet 2    naproxen sodium (ANAPROX) 220 MG tablet Take 220 mg by mouth 2 (two) times daily with meals.      omeprazole (PRILOSEC) 40 MG capsule TAKE 1 CAPSULE BY MOUTH ONCE DAILY BEFORE BREAKFAST 90 capsule 3    solifenacin (VESICARE) 5 MG tablet Take 1 tablet (5 mg total) by mouth once daily. 30 tablet 11    vit C/E/Zn/coppr/lutein/zeaxan (PRESERVISION AREDS-2 ORAL) Take by mouth 2 (two) times a day.      famotidine (PEPCID) 40 MG tablet TAKE 1 TABLET(40 MG) BY MOUTH EVERY EVENING 90 tablet 4    mirabegron (MYRBETRIQ) 50 mg Tb24 Take 1 tablet (50 mg total) by mouth once daily. 90 tablet 3     No current facility-administered medications on file prior to visit.       Vitals:    03/17/25 1118   BP: 129/80   Pulse: 69       Physical Exam:    Physical Exam  Constitutional:       Appearance: Normal appearance. She is well-developed.   HENT:      Nose: No septal deviation.      Mouth/Throat:      Mouth: No oral lesions.   Eyes:      Conjunctiva/sclera:      Right eye: Right conjunctiva is not injected.      Left eye: Left conjunctiva is not injected.      Pupils: Pupils are equal, round, and reactive to light.   Neck:      Thyroid: No thyroid mass or thyromegaly.      Vascular: No JVD.   Cardiovascular:      Rate and Rhythm: Normal rate and regular rhythm.      Pulses: Normal pulses.      Comments: No edema  Pulmonary:      Effort: Pulmonary effort is normal.      Breath sounds: Normal breath sounds.   Abdominal:      Palpations: Abdomen is soft.   Musculoskeletal:      Right shoulder: Tenderness present. No swelling. Normal range of motion.      Left shoulder: Tenderness present. No swelling. Normal range of motion.      Right elbow: No swelling. Normal range of motion. No tenderness.      Left elbow: No swelling. Normal range of motion. No tenderness.      Right wrist: No swelling or tenderness. Normal range of motion.      Left wrist: No swelling or tenderness. Normal range of  motion.      Right hand: Tenderness present.      Left hand: Tenderness present.      Right hip: Normal range of motion. Normal strength.      Left hip: No tenderness. Normal range of motion.      Right knee: No swelling. Normal range of motion. Tenderness present.      Left knee: No swelling. Normal range of motion. Tenderness present.      Right ankle: No swelling. No tenderness. Normal range of motion.      Left ankle: No swelling. No tenderness. Normal range of motion.      Right foot: Tenderness present.      Left foot: Tenderness present.      Comments: 2nd DIP b/l bony hypertrophy.   No active synovitis but tenderness at fingers, shoulders, knees and metatarsalgia.   No nodules of the achilles.     No pain with ROM hips, negative SLR  No pain with resisted knee extension, hip ab and ad duction.   Pain in the spine and buttock with hip resisted hip flexion.    Lymphadenopathy:      Cervical: No cervical adenopathy.   Skin:     General: Skin is dry.   Neurological:      Deep Tendon Reflexes: Reflexes are normal and symmetric.               Assessment:       Encounter Diagnoses   Name Primary?    Chronic kidney disease, stage II (mild) Yes    Primary osteoarthritis involving multiple joints     Encounter for monitoring chronic NSAID therapy            Plan:        Chronic kidney disease, stage II (mild)    Primary osteoarthritis involving multiple joints  -     Basic Metabolic Panel; Future; Expected date: 03/17/2025    Encounter for monitoring chronic NSAID therapy  -     Basic Metabolic Panel; Future; Expected date: 03/17/2025    Other orders  -     DULoxetine (CYMBALTA) 30 MG capsule; Take 1 capsule (30 mg total) by mouth once daily.  Dispense: 30 capsule; Refill: 6        OA in the setting of CKDII    Voltaren most effective for feet.   Aleve 220mg using rarely, renal function is good. But we have to limit as hx of some fluctuations with GFR.   Ok for Aleve 220mg every day (and she is using one daily)    Kidney's did well ok to keep Aleve 1 tablet daily and 2 tablets for bad days. She has been decreasing to mostly one daily, once her back injection she will try again for   Add in tylenol (acetomenophen) arthritis 650mg tablet. You can take up to 3,200mg in 24 hours    Using OMEGA XL supplement that seems to be helping with aches and pains.   Renal panel prior to f/u in 3 months.     Regarding thighs : I do feel this is in the distribution of her L4-L5 spinal disease, no evidence for hip pathology on exam.     We spent some time discussion the progression of OA and procedures (feet and interventional pain) that can help mitigate pain    No follow-ups on file.         30min consultation with greater than 50% of that time included Preparing to see the patient (review records, tests), Obtaining and/or reviewing separately obtained historical data, Performing a medically appropriate examination and/or evaluation , Ordering medications, tests, and/or procedures, Referring and communicating with other healthcare professionals , Documenting clinical information in the electronic or other health record and Independently interpreting results  (as warranted) & communicating results to the patient/family/caregiver. All questions answered.

## 2025-03-18 ENCOUNTER — OFFICE VISIT (OUTPATIENT)
Dept: GASTROENTEROLOGY | Facility: CLINIC | Age: 75
End: 2025-03-18
Payer: MEDICARE

## 2025-03-18 ENCOUNTER — HOSPITAL ENCOUNTER (OUTPATIENT)
Dept: RADIOLOGY | Facility: HOSPITAL | Age: 75
Discharge: HOME | End: 2025-03-18
Attending: ANESTHESIOLOGY | Admitting: ANESTHESIOLOGY
Payer: MEDICARE

## 2025-03-18 ENCOUNTER — HOSPITAL ENCOUNTER (OUTPATIENT)
Facility: HOSPITAL | Age: 75
Discharge: HOME | End: 2025-03-18
Attending: ANESTHESIOLOGY | Admitting: ANESTHESIOLOGY
Payer: MEDICARE

## 2025-03-18 VITALS — HEIGHT: 63 IN | BODY MASS INDEX: 28.75 KG/M2 | WEIGHT: 162.25 LBS

## 2025-03-18 VITALS
HEIGHT: 63 IN | TEMPERATURE: 98 F | BODY MASS INDEX: 28.53 KG/M2 | HEART RATE: 76 BPM | SYSTOLIC BLOOD PRESSURE: 153 MMHG | WEIGHT: 161 LBS | DIASTOLIC BLOOD PRESSURE: 80 MMHG | RESPIRATION RATE: 16 BRPM | OXYGEN SATURATION: 96 %

## 2025-03-18 DIAGNOSIS — Z90.49 S/P CHOLECYSTECTOMY: ICD-10-CM

## 2025-03-18 DIAGNOSIS — K59.09 CHRONIC CONSTIPATION: ICD-10-CM

## 2025-03-18 DIAGNOSIS — M54.16 LUMBAR RADICULOPATHY: Primary | ICD-10-CM

## 2025-03-18 DIAGNOSIS — Z85.09 HISTORY OF GASTROINTESTINAL STROMAL TUMOR (GIST): ICD-10-CM

## 2025-03-18 DIAGNOSIS — Z12.11 SCREENING FOR COLON CANCER: ICD-10-CM

## 2025-03-18 DIAGNOSIS — K21.9 GASTROESOPHAGEAL REFLUX DISEASE WITHOUT ESOPHAGITIS: ICD-10-CM

## 2025-03-18 DIAGNOSIS — M54.50 LOWER BACK PAIN: ICD-10-CM

## 2025-03-18 DIAGNOSIS — R13.19 ESOPHAGEAL DYSPHAGIA: Primary | ICD-10-CM

## 2025-03-18 PROCEDURE — 25000003 PHARM REV CODE 250: Mod: PO | Performed by: ANESTHESIOLOGY

## 2025-03-18 PROCEDURE — 99999 PR PBB SHADOW E&M-EST. PATIENT-LVL IV: CPT | Mod: PBBFAC,,, | Performed by: NURSE PRACTITIONER

## 2025-03-18 PROCEDURE — 63600175 PHARM REV CODE 636 W HCPCS: Mod: PO | Performed by: ANESTHESIOLOGY

## 2025-03-18 PROCEDURE — 62323 NJX INTERLAMINAR LMBR/SAC: CPT | Mod: PO | Performed by: ANESTHESIOLOGY

## 2025-03-18 PROCEDURE — A4216 STERILE WATER/SALINE, 10 ML: HCPCS | Mod: PO | Performed by: ANESTHESIOLOGY

## 2025-03-18 PROCEDURE — 62323 NJX INTERLAMINAR LMBR/SAC: CPT | Mod: ,,, | Performed by: ANESTHESIOLOGY

## 2025-03-18 RX ORDER — METHYLPREDNISOLONE ACETATE 80 MG/ML
INJECTION, SUSPENSION INTRA-ARTICULAR; INTRALESIONAL; INTRAMUSCULAR; SOFT TISSUE
Status: DISCONTINUED | OUTPATIENT
Start: 2025-03-18 | End: 2025-03-18 | Stop reason: HOSPADM

## 2025-03-18 RX ORDER — LIDOCAINE HYDROCHLORIDE 10 MG/ML
INJECTION, SOLUTION EPIDURAL; INFILTRATION; INTRACAUDAL; PERINEURAL
Status: DISCONTINUED | OUTPATIENT
Start: 2025-03-18 | End: 2025-03-18 | Stop reason: HOSPADM

## 2025-03-18 RX ORDER — SODIUM CHLORIDE 9 MG/ML
INJECTION, SOLUTION INTRAMUSCULAR; INTRAVENOUS; SUBCUTANEOUS
Status: DISCONTINUED | OUTPATIENT
Start: 2025-03-18 | End: 2025-03-18 | Stop reason: HOSPADM

## 2025-03-18 RX ORDER — MIDAZOLAM HYDROCHLORIDE 1 MG/ML
INJECTION INTRAMUSCULAR; INTRAVENOUS
Status: DISCONTINUED | OUTPATIENT
Start: 2025-03-18 | End: 2025-03-18 | Stop reason: HOSPADM

## 2025-03-18 RX ORDER — SODIUM CHLORIDE 9 MG/ML
INJECTION, SOLUTION INTRAVENOUS CONTINUOUS
Status: DISCONTINUED | OUTPATIENT
Start: 2025-03-18 | End: 2025-03-18 | Stop reason: HOSPADM

## 2025-03-18 RX ORDER — SODIUM CHLORIDE, SODIUM LACTATE, POTASSIUM CHLORIDE, CALCIUM CHLORIDE 600; 310; 30; 20 MG/100ML; MG/100ML; MG/100ML; MG/100ML
INJECTION, SOLUTION INTRAVENOUS CONTINUOUS
Status: DISCONTINUED | OUTPATIENT
Start: 2025-03-18 | End: 2025-03-18

## 2025-03-18 NOTE — INTERVAL H&P NOTE
The patient has been examined and the H&P has been reviewed:    I concur with the findings and no changes have occurred since H&P was written.  She has held aspirin appropriately.  ASA 2, MP II      There are no hospital problems to display for this patient.

## 2025-03-18 NOTE — DISCHARGE SUMMARY
[facility]  Discharge Note  Short Stay    Admit Date: 3/18/2025    Discharge Date: 3/18/2025    Attending Physician: Walt Lawson     Discharge Provider: Walt Lawson    Diagnoses:  There are no hospital problems to display for this patient.      Discharged Condition: Good    Final Diagnoses: Lumbar radiculopathy [M54.16]    Disposition: Home or Self Care    Hospital Course: No complications, uneventful    Outcome of Hospitalization, Treatment, Procedure, or Surgery:  Patient was admitted for outpatient interventional pain management procedure. The patient tolerated the procedure well with no complications.    Follow up/Patient Instructions:  Follow up as scheduled in Pain Management office in 2-3 weeks.  Patient has received instructions and follow up date and time.    Medications:  Continue previous medications, except restart aspirin in 24 hours    Discharge Procedure Orders   Notify your health care provider if you experience any of the following:  temperature >100.4     Notify your health care provider if you experience any of the following:  persistent nausea and vomiting or diarrhea     Notify your health care provider if you experience any of the following:  severe uncontrolled pain     Notify your health care provider if you experience any of the following:  redness, tenderness, or signs of infection (pain, swelling, redness, odor or green/yellow discharge around incision site)     Notify your health care provider if you experience any of the following:  difficulty breathing or increased cough     Notify your health care provider if you experience any of the following:  severe persistent headache     Notify your health care provider if you experience any of the following:  worsening rash     Notify your health care provider if you experience any of the following:  persistent dizziness, light-headedness, or visual disturbances     Notify your health care provider if you experience any of the  following:  increased confusion or weakness     Activity as tolerated

## 2025-03-18 NOTE — OP NOTE
"Procedure Note    Procedure Date: 3/18/2025    Procedure Performed:  L5/S1 lumbar interlaminar epidural steroid injection under fluoroscopy.    Indications:  Lyudmila Neri presents with lumbar radiculitis/radiculopathy secondary to disc herniation, osteophyte/osteophyte complexes, and/or severe degenerative disc disease producing foraminal or central spinal stenosis.  The pain has been present for at least 4 weeks and the patient has failed to respond to noninvasive conservative care.  Pain rated by NRS at baseline prior to intervention is 6/10.  Their radiculitis/radiculopathy and/or neurogenic claudication is severe enough to greatly impact their quality of life or function.     Pre-op diagnosis: Lumbar Radiculopathy    Post-op diagnosis: same    Physician: Walt Lawson MD    IV anxiolysis medications: versed 2mg    Medications injected: depomedrol 80mg, 1% Lidocaine 1ml, 2 mL sterile, preservative-free normal saline.    Local anesthetic used: 1% Lidocaine, 1 ml    Estimated Blood Loss: none    Complications:  none    Technique:  The patient was interviewed in the holding area and Risks/Benefits were discussed, including, but not limited to, the possibility of new or different pain, bleeding or infection.   All questions were answered.  The patient understood and accepted risks.  Consent was verfied.  A time-out was taken to identify patient and procedure prior to starting the procedure. The patient was placed in the prone position on the fluoroscopy table. The area of the lumbar spine was prepped with Chloraprep x2 and draped in a sterile manner. The L5/S1 interspace was identified and marked under AP fluoroscopy. The skin and subcutaneous tissues overlying the targeted interspace were anesthetized with 3-5 mL of 1% lidocaine using a 25G, 1.5" needle.  A 20G, 3.5" Tuohy epidural needle was directed toward the interspace under fluoroscopic guidance until the ligamentum flavum was engaged. From this point, a " loss of resistance technique with a glass syringe and saline was used to identify entrance of the needle into the epidural space. Once loss of resistance was observed 1 mL of contrast solution was injected. An appropriate epidurogram was noted.  A 4 mL mixture consisting of saline, 1 mL 1% Lidocaine and 80 mg of depomedrol was injected slowly and without resistance.  The needle was flushed with normal saline and removed. The contrast was seen to be displaced after injection. Patient was awake/responsive during all injections.  The patient tolerated the procedure well and was transferred to the [facility] in stable condition.  The patient was monitored after the procedure and was given post-procedure and discharge instructions to follow at home. The patient was discharged in a stable condition.

## 2025-03-18 NOTE — PROGRESS NOTES
Subjective:       Patient ID: Lyudmila Neri is a 75 y.o. female, Body mass index is 28.74 kg/m².    Chief Complaint: Dysphagia      Established patient of Jailene Roberson, NP & myself.    Gastroesophageal Reflux  She complains of abdominal pain (reports epigastric region tenderness), dysphagia (dysphagia to solids, especially rice and bread, and saliva; denies trouble swallowing liquids) and globus sensation (followed by ENT; recently diagnosed with thyroid nodules). She reports no belching, no chest pain, no choking, no coughing, no early satiety, no heartburn, no hoarse voice, no nausea, no sore throat, no stridor, no tooth decay, no water brash or no wheezing. This is a new problem. The current episode started more than 1 month ago (Started ~ three months ago). The problem occurs frequently. The problem has been unchanged. The symptoms are aggravated by certain foods. Pertinent negatives include no anemia, melena or weight loss. Risk factors include NSAIDs. She has tried a histamine-2 antagonist and a PPI (Currently: Prilosec 40 mg once daily and Pepcid 40 mg nightly- helps) for the symptoms. Past procedures include an EGD.     Review of Systems   Constitutional:  Negative for appetite change, fever, unexpected weight change and weight loss.   HENT:  Positive for trouble swallowing. Negative for hoarse voice and sore throat.    Respiratory:  Negative for cough, choking, shortness of breath and wheezing.    Cardiovascular:  Negative for chest pain.   Gastrointestinal:  Positive for abdominal pain (reports epigastric region tenderness), constipation (chronic problem; taking OTC fiber gummies somewhat helps) and dysphagia (dysphagia to solids, especially rice and bread, and saliva; denies trouble swallowing liquids). Negative for abdominal distention, anal bleeding, blood in stool, diarrhea, heartburn, melena, nausea, rectal pain and vomiting.   Genitourinary:  Negative for difficulty urinating and  dysuria.   Musculoskeletal:  Positive for back pain. Negative for gait problem.   Skin:  Negative for rash.   Neurological:  Negative for speech difficulty.   Psychiatric/Behavioral:  Negative for confusion.        Past Medical History:   Diagnosis Date    Anticoagulant long-term use     Arthritis     osteoarthritis    Blood transfusion     Cancer 2011    stromal tumor, stomach    Depression     Disorder of kidney and ureter     CKD 2    GERD (gastroesophageal reflux disease)     GIST (gastrointestinal stromal tumor), malignant     H. pylori infection     Hypertension     KATHY (obstructive sleep apnea) 6/24/2013    Psoriasis 6/24/2013    Trouble in sleeping     arthritic pain wakes her up    Urinary incontinence     stress incontinence      Past Surgical History:   Procedure Laterality Date    ANKLE FRACTURE SURGERY      RT ankle    APPENDECTOMY      BLADDER SUSPENSION  1995    CARPAL TUNNEL RELEASE      left    CERVICAL FUSION      CHEILECTOMY Right 07/13/2023    Procedure: CHEILECTOMY;  Surgeon: Agusto Edwards DPM;  Location: [facility] OR;  Service: Podiatry;  Laterality: Right;    CHOLECYSTECTOMY  12/7/2011  Dr. Cai    COLONOSCOPY  7/26/2005  Thomas    Non-erosive esophageal reflux (NERD) disease?.  Post-surgical deformity in the gastric body.   Gastric mucosal abnormality (erythema) in the greater  curve of antrum.  STEVIE Test performed on 02/24/12 was Negative.    COLONOSCOPY  12/12/2008  Thomas    Small internal hemorrhoids.  Slightly redundant left colon.    COLONOSCOPY N/A 04/18/2018    Procedure: COLONOSCOPY;  Surgeon: David Guzman Jr., MD;  Location: [facility];  Service: Endoscopy;  Laterality: N/A;    COLONOSCOPY  04/18/2018    Dr. Guzman; hemorrhoids, otherwise unremarkable, repeat 5 years for surveillance due to family history of colon cancer    CYST REMOVAL Left 2016    left middle finger, Dr. Johnson    ENDOSCOPIC GASTROCNEMIUS RECESSION Right 07/13/2023    Procedure: RECESSION, GASTROCNEMIUS,  ENDOSCOPIC;  Surgeon: Agusto Edwards DPM;  Location: [facility] OR;  Service: Podiatry;  Laterality: Right;  popliteal saphenous block,    EPIDURAL STEROID INJECTION INTO CERVICAL SPINE N/A 8/8/2024    Procedure: Injection-steroid-epidural-cervical c7-T1;  Surgeon: Jamaica Olvera MD;  Location: [facility] OR;  Service: Anesthesiology;  Laterality: N/A;    ESOPHAGOGASTRODUODENOSCOPY N/A 05/14/2019    Procedure: EGD (ESOPHAGOGASTRODUODENOSCOPY);  Surgeon: David Guzman Jr., MD;  Location: [facility] ENDO;  Service: Endoscopy;  Laterality: N/A;    ESOPHAGOGASTRODUODENOSCOPY N/A 10/09/2023    Procedure: EGD (ESOPHAGOGASTRODUODENOSCOPY);  Surgeon: David Guzman Jr., MD;  Location: [facility] ENDO;  Service: Endoscopy;  Laterality: N/A;    EYE SURGERY Bilateral 1995    RK surgery    EYE SURGERY Bilateral 2015    cataract removal    FOOT ARTHRODESIS Right 07/13/2023    Procedure: FUSION, FOOT;  Surgeon: Agusto Edwards DPM;  Location: [facility] OR;  Service: Podiatry;  Laterality: Right;  popliteal saphenous block, fusion of the naviculocuneiform and tarsometatarsal joints 1-3.    GASTRECTOMY      HYSTERECTOMY      KNEE ARTHROSCOPY W/ DEBRIDEMENT      lt knee    OOPHORECTOMY      SHOULDER OPEN ROTATOR CUFF REPAIR      left    STOMACH SURGERY  12/7/2011  Dr. Cai    removal of GIST tumor from wall of the stomach, 2.3 cm in size.    TRANSFORAMINAL EPIDURAL INJECTION OF STEROID Bilateral 04/09/2019    Procedure: Injection,steroid,epidural,transforaminal approach L4/5;  Surgeon: Pa Pruett MD;  Location: [facility] OR;  Service: Pain Management;  Laterality: Bilateral;    TRANSFORAMINAL EPIDURAL INJECTION OF STEROID Bilateral 06/05/2019    Procedure: Injection,steroid,epidural,transforaminal approach L4/5;  Surgeon: Pa Pruett MD;  Location: [facility] OR;  Service: Pain Management;  Laterality: Bilateral;    UPPER GASTROINTESTINAL ENDOSCOPY  2/24/2012  Thomas    Non-erosive esophageal reflux (NERD) disease?.  Post-surgical deformity in  the gastric body.   Gastric mucosal abnormality (erythema) in the greater  curve of antrum.  STEVIE Test performed on 02/24/12 was Negative.    UPPER GASTROINTESTINAL ENDOSCOPY  04/18/2018    Dr. Guzman, antritis, evidence of prior surgery with healthy mucosa      Family History   Problem Relation Name Age of Onset    Heart disease Mother      Arthritis Mother          osteoarthritis    COPD Mother      Hyperlipidemia Mother      Hypertension Mother      Kidney disease Mother      Stroke Mother      Colon cancer Mother  75    Cancer Mother  70        colon cancer    Cancer Father          brain and lung    Arthritis Father      COPD Sister      Depression Daughter      Arthritis Maternal Aunt          rheumatoid arthritis    Heart disease Maternal Uncle      Early death Maternal Uncle          in 50s due to heart disease    Arthritis Paternal Aunt          rheuamtoid arthritis    Heart disease Maternal Grandfather      Arthritis Paternal Grandmother          rheumatoid arthritis    Diabetes Paternal Grandmother      Diabetes Cousin      Heart disease Cousin      Crohn's disease Neg Hx      Stomach cancer Neg Hx      Ulcerative colitis Neg Hx      Esophageal cancer Neg Hx        Wt Readings from Last 10 Encounters:   03/18/25 73.6 kg (162 lb 4.1 oz)   03/17/25 73.1 kg (161 lb 2.5 oz)   03/06/25 73.1 kg (161 lb 2.5 oz)   03/03/25 73.4 kg (161 lb 13.1 oz)   12/05/24 72.3 kg (159 lb 6.3 oz)   11/20/24 72.5 kg (159 lb 13.3 oz)   10/10/24 72.4 kg (159 lb 9.8 oz)   10/07/24 73.1 kg (161 lb 2.5 oz)   09/27/24 72.5 kg (159 lb 13.3 oz)   08/28/24 74.4 kg (164 lb)     Lab Results   Component Value Date    WBC 8.71 10/03/2024    HGB 14.4 10/03/2024    HCT 44.3 10/03/2024    MCV 92 10/03/2024     10/03/2024     CMP  Sodium   Date Value Ref Range Status   10/03/2024 138 136 - 145 mmol/L Final     Potassium   Date Value Ref Range Status   10/03/2024 4.2 3.5 - 5.1 mmol/L Final     Chloride   Date Value Ref Range Status    10/03/2024 102 95 - 110 mmol/L Final     CO2   Date Value Ref Range Status   10/03/2024 28 23 - 29 mmol/L Final     Glucose   Date Value Ref Range Status   10/03/2024 121 (H) 70 - 110 mg/dL Final     BUN   Date Value Ref Range Status   10/03/2024 14 8 - 23 mg/dL Final     Creatinine   Date Value Ref Range Status   10/03/2024 0.9 0.5 - 1.4 mg/dL Final   10/03/2024 0.9 0.5 - 1.4 mg/dL Final     Calcium   Date Value Ref Range Status   10/03/2024 9.0 8.7 - 10.5 mg/dL Final     Total Protein   Date Value Ref Range Status   10/03/2024 7.2 6.0 - 8.4 g/dL Final     Albumin   Date Value Ref Range Status   10/03/2024 3.5 3.5 - 5.2 g/dL Final     Total Bilirubin   Date Value Ref Range Status   10/03/2024 0.3 0.1 - 1.0 mg/dL Final     Comment:     For infants and newborns, interpretation of results should be based  on gestational age, weight and in agreement with clinical  observations.    Premature Infant recommended reference ranges:  Up to 24 hours.............<8.0 mg/dL  Up to 48 hours............<12.0 mg/dL  3-5 days..................<15.0 mg/dL  6-29 days.................<15.0 mg/dL       Alkaline Phosphatase   Date Value Ref Range Status   10/03/2024 56 55 - 135 U/L Final     AST   Date Value Ref Range Status   10/03/2024 21 10 - 40 U/L Final     ALT   Date Value Ref Range Status   10/03/2024 21 10 - 44 U/L Final     Anion Gap   Date Value Ref Range Status   10/03/2024 8 8 - 16 mmol/L Final     eGFR if    Date Value Ref Range Status   10/05/2021 >60 >60 mL/min/1.73 m^2 Final     eGFR if non    Date Value Ref Range Status   10/05/2021 56 (A) >60 mL/min/1.73 m^2 Final     Comment:     Calculation used to obtain the estimated glomerular filtration  rate (eGFR) is the CKD-EPI equation.                 Reviewed prior medical records including radiology report of CT of abdomen and pelvis 10/3/24 & endoscopy history (see surgical history).     Objective:      Physical Exam  Constitutional:        General: She is not in acute distress.     Appearance: She is well-developed.   HENT:      Head: Normocephalic.      Right Ear: Hearing normal.      Left Ear: Hearing normal.      Nose: Nose normal.      Mouth/Throat:      Mouth: No oral lesions.      Pharynx: Uvula midline.   Eyes:      General: Lids are normal.      Conjunctiva/sclera: Conjunctivae normal.      Pupils: Pupils are equal, round, and reactive to light.   Neck:      Trachea: Trachea normal.   Cardiovascular:      Rate and Rhythm: Normal rate and regular rhythm.      Heart sounds: Normal heart sounds. No murmur heard.  Pulmonary:      Effort: Pulmonary effort is normal. No respiratory distress.      Breath sounds: Normal breath sounds. No stridor. No wheezing.   Abdominal:      General: Bowel sounds are normal. There is no distension.      Palpations: Abdomen is soft. There is no mass.      Tenderness: There is abdominal tenderness in the right upper quadrant, epigastric area, periumbilical area and left upper quadrant. There is no guarding or rebound.   Musculoskeletal:         General: Normal range of motion.      Cervical back: Normal range of motion.   Skin:     General: Skin is warm and dry.      Findings: No rash.      Comments: Non jaundiced   Neurological:      Mental Status: She is alert and oriented to person, place, and time.   Psychiatric:         Speech: Speech normal.         Behavior: Behavior normal. Behavior is cooperative.           Assessment:       1. Esophageal dysphagia    2. Gastroesophageal reflux disease without esophagitis    3. Chronic constipation    4. S/P cholecystectomy    5. History of gastrointestinal stromal tumor (GIST)    6. Screening for colon cancer           Plan:   All diagnoses and orders for this visit:    Esophageal dysphagia   - Schedule EGD with possible esophageal dilation if indicated  - Educated patient to eat smaller more frequent meals and to eat slowly and advised to eat a soft diet.  - Possible  UGI/esophagram/esophageal manometry if symptoms persist     Gastroesophageal reflux disease without esophagitis   - Continue Prilosec 40 mg once daily   - Continue Pepcid 40 mg nightly   - Take PPI in the morning 30 minutes before breakfast  - Recommend to avoid large meals, avoid eating within 3 hours of bedtime, elevate head of bed if nocturnal symptoms are present, smoking cessation (if current smoker), & weight loss (if overweight).   - Recommend minimize/avoid high-fat foods, chocolate, caffeine, citrus, alcohol, & tomato products.  - Advised to avoid/limit use of NSAID's, since they can cause GI upset, bleeding, and/or ulcers. If needed, take with food.      Chronic constipation   - Recommend daily exercise as tolerated, adequate water intake, and high fiber diet.   - Continue OTC fiber supplement  - Recommend OTC stool softener   - Recommend OTC MiraLax once daily (17g PO) as directed     S/P cholecystectomy    History of gastrointestinal stromal tumor (GIST)   - Recommend follow-up with hematology for continued evaluation and management     Screening for colon cancer   - Next surveillance colonoscopy due 4/2026    If no improvement in symptoms or symptoms worsen, call/follow-up at clinic or go to ER

## 2025-03-19 ENCOUNTER — PATIENT MESSAGE (OUTPATIENT)
Dept: FAMILY MEDICINE | Facility: CLINIC | Age: 75
End: 2025-03-19
Payer: MEDICARE

## 2025-03-20 RX ORDER — LEVOTHYROXINE SODIUM 50 UG/1
50 TABLET ORAL
Qty: 90 TABLET | Refills: 3 | Status: SHIPPED | OUTPATIENT
Start: 2025-03-20

## 2025-03-20 NOTE — TELEPHONE ENCOUNTER
No care due was identified.  [facility] Embedded Care Due Messages. Reference number: [id number].   3/20/2025 7:54:41 AM CDT

## 2025-04-07 ENCOUNTER — PATIENT MESSAGE (OUTPATIENT)
Dept: HEMATOLOGY/ONCOLOGY | Facility: CLINIC | Age: 75
End: 2025-04-07
Payer: MEDICARE

## 2025-04-09 ENCOUNTER — TELEPHONE (OUTPATIENT)
Dept: PAIN MEDICINE | Facility: CLINIC | Age: 75
End: 2025-04-09
Payer: MEDICARE

## 2025-04-09 NOTE — TELEPHONE ENCOUNTER
Called patient to reschedule visit with Dr. Lawson due to him being out of clinic. No answer, left message.

## 2025-04-10 ENCOUNTER — OFFICE VISIT (OUTPATIENT)
Dept: PAIN MEDICINE | Facility: CLINIC | Age: 75
End: 2025-04-10
Payer: MEDICARE

## 2025-04-10 VITALS
HEART RATE: 81 BPM | SYSTOLIC BLOOD PRESSURE: 121 MMHG | WEIGHT: 157.63 LBS | DIASTOLIC BLOOD PRESSURE: 68 MMHG | BODY MASS INDEX: 27.92 KG/M2

## 2025-04-10 DIAGNOSIS — M54.9 BACK PAIN, UNSPECIFIED BACK LOCATION, UNSPECIFIED BACK PAIN LATERALITY, UNSPECIFIED CHRONICITY: ICD-10-CM

## 2025-04-10 DIAGNOSIS — M54.16 LUMBAR RADICULOPATHY: Primary | ICD-10-CM

## 2025-04-10 DIAGNOSIS — M54.12 CERVICAL RADICULOPATHY: ICD-10-CM

## 2025-04-10 DIAGNOSIS — M54.2 CERVICALGIA: ICD-10-CM

## 2025-04-10 PROCEDURE — 99999 PR PBB SHADOW E&M-EST. PATIENT-LVL III: CPT | Mod: PBBFAC,,,

## 2025-04-10 NOTE — PROGRESS NOTES
[facility] Follow Up Evaluation      Referred by: No ref. provider found    PCP:     CC:   Chief Complaint   Patient presents with    Back Pain          4/10/2025     2:34 PM 3/6/2025     8:33 AM 8/28/2024     9:59 AM   Last 3 PDI Scores   Pain Disability Index (PDI) 15 19 35     Interval HPI 4/10/2025: Lyudmila Neri returns to the office for follow up.  She is s/p L5/S1 BRIGITTE on 03/18/2025 with 95% relief.  Overall, she found great relief with the lumbar epidural.  No new numbness, weakness or any new changes to her bowel or bladder function.  She reports continued relief with previous cervical BRIGITTE as well.      HPI:   Lyudmila Neri is a 75 y.o. female patient who has a past medical history of Anticoagulant long-term use, Arthritis, Blood transfusion, Cancer, Depression, Disorder of kidney and ureter, GERD (gastroesophageal reflux disease), GIST (gastrointestinal stromal tumor), malignant, H. pylori infection, Hypertension, KATHY (obstructive sleep apnea), Psoriasis, Trouble in sleeping, and Urinary incontinence. She presents with neck and back pain.  Today her primary complaint is lower back pain.  The pain can radiate from her lower back down both buttocks and inner thighs.  She also having some burning into her left foot.  Her pain can get to 6/10, constant, aching, sharp.  Worse with standing and activities.      Pain Intervention History:  - s/p b/l L4/5 TFESI on 4/9/19  - s/p b/l L4/5 TFESI on 6/5/19  - s/p cervical C7-T1 BRIGITTE on 8/8/24  - s/p L5/S1 BRIGITTE on 03/18/2025 with 95% relief.      Past Spine Surgical History:      Past and current medications:  Antineuropathics:  NSAIDs: aleve   Physical therapy: no, currently too painful  Antidepressants: cymbalta   Muscle relaxers:  Opioids:  Antiplatelets/Anticoagulants: aspirin     History:  Current Medications[1]    Past Medical History:   Diagnosis Date    Anticoagulant long-term use     Arthritis     osteoarthritis    Blood transfusion     Cancer  2011    stromal tumor, stomach    Depression     Disorder of kidney and ureter     CKD 2    GERD (gastroesophageal reflux disease)     GIST (gastrointestinal stromal tumor), malignant     H. pylori infection     Hypertension     KATHY (obstructive sleep apnea) 6/24/2013    Psoriasis 6/24/2013    Trouble in sleeping     arthritic pain wakes her up    Urinary incontinence     stress incontinence       Past Surgical History:   Procedure Laterality Date    ANKLE FRACTURE SURGERY      RT ankle    APPENDECTOMY      BLADDER SUSPENSION  1995    CARPAL TUNNEL RELEASE      left    CERVICAL FUSION      CHEILECTOMY Right 07/13/2023    Procedure: CHEILECTOMY;  Surgeon: Agusto Edwards DPM;  Location: [facility] OR;  Service: Podiatry;  Laterality: Right;    CHOLECYSTECTOMY  12/7/2011  Dr. Cai    COLONOSCOPY  7/26/2005  Thomas    Non-erosive esophageal reflux (NERD) disease?.  Post-surgical deformity in the gastric body.   Gastric mucosal abnormality (erythema) in the greater  curve of antrum.  STEVIE Test performed on 02/24/12 was Negative.    COLONOSCOPY  12/12/2008  Thomas    Small internal hemorrhoids.  Slightly redundant left colon.    COLONOSCOPY N/A 04/18/2018    Procedure: COLONOSCOPY;  Surgeon: David Guzman Jr., MD;  Location: [facility] ENDO;  Service: Endoscopy;  Laterality: N/A;    COLONOSCOPY  04/18/2018    Dr. Guzman; hemorrhoids, otherwise unremarkable, repeat 5 years for surveillance due to family history of colon cancer    CYST REMOVAL Left 2016    left middle finger, Dr. Johnson    ENDOSCOPIC GASTROCNEMIUS RECESSION Right 07/13/2023    Procedure: RECESSION, GASTROCNEMIUS, ENDOSCOPIC;  Surgeon: Agusto Edwards DPM;  Location: [facility] OR;  Service: Podiatry;  Laterality: Right;  popliteal saphenous block,    EPIDURAL STEROID INJECTION INTO CERVICAL SPINE N/A 8/8/2024    Procedure: Injection-steroid-epidural-cervical c7-T1;  Surgeon: Jamaica Olvera MD;  Location: [facility] OR;  Service: Anesthesiology;  Laterality: N/A;     EPIDURAL STEROID INJECTION INTO LUMBAR SPINE N/A 3/18/2025    Procedure: Injection-steroid-epidural-lumbar L5/S1;  Surgeon: Walt Lawson MD;  Location: [facility] OR;  Service: Pain Management;  Laterality: N/A;    ESOPHAGOGASTRODUODENOSCOPY N/A 05/14/2019    Procedure: EGD (ESOPHAGOGASTRODUODENOSCOPY);  Surgeon: David Guzman Jr., MD;  Location: [facility] ENDO;  Service: Endoscopy;  Laterality: N/A;    ESOPHAGOGASTRODUODENOSCOPY N/A 10/09/2023    Procedure: EGD (ESOPHAGOGASTRODUODENOSCOPY);  Surgeon: David Guzman Jr., MD;  Location: [facility] ENDO;  Service: Endoscopy;  Laterality: N/A;    EYE SURGERY Bilateral 1995    RK surgery    EYE SURGERY Bilateral 2015    cataract removal    FOOT ARTHRODESIS Right 07/13/2023    Procedure: FUSION, FOOT;  Surgeon: Agusto Edwards DPM;  Location: [facility] OR;  Service: Podiatry;  Laterality: Right;  popliteal saphenous block, fusion of the naviculocuneiform and tarsometatarsal joints 1-3.    GASTRECTOMY      HYSTERECTOMY      KNEE ARTHROSCOPY W/ DEBRIDEMENT      lt knee    OOPHORECTOMY      SHOULDER OPEN ROTATOR CUFF REPAIR      left    STOMACH SURGERY  12/7/2011  Dr. Cai    removal of GIST tumor from wall of the stomach, 2.3 cm in size.    TRANSFORAMINAL EPIDURAL INJECTION OF STEROID Bilateral 04/09/2019    Procedure: Injection,steroid,epidural,transforaminal approach L4/5;  Surgeon: Pa Pruett MD;  Location: [facility] OR;  Service: Pain Management;  Laterality: Bilateral;    TRANSFORAMINAL EPIDURAL INJECTION OF STEROID Bilateral 06/05/2019    Procedure: Injection,steroid,epidural,transforaminal approach L4/5;  Surgeon: Pa Pruett MD;  Location: [facility] OR;  Service: Pain Management;  Laterality: Bilateral;    UPPER GASTROINTESTINAL ENDOSCOPY  2/24/2012  Thomas    Non-erosive esophageal reflux (NERD) disease?.  Post-surgical deformity in the gastric body.   Gastric mucosal abnormality (erythema) in the greater  curve of antrum.  STEVIE Test performed on 02/24/12 was  Negative.    UPPER GASTROINTESTINAL ENDOSCOPY  04/18/2018    Dr. Guzman, antritis, evidence of prior surgery with healthy mucosa       Family History   Problem Relation Name Age of Onset    Heart disease Mother      Arthritis Mother          osteoarthritis    COPD Mother      Hyperlipidemia Mother      Hypertension Mother      Kidney disease Mother      Stroke Mother      Colon cancer Mother  75    Cancer Mother  70        colon cancer    Cancer Father          brain and lung    Arthritis Father      COPD Sister      Depression Daughter      Arthritis Maternal Aunt          rheumatoid arthritis    Heart disease Maternal Uncle      Early death Maternal Uncle          in 50s due to heart disease    Arthritis Paternal Aunt          rheuamtoid arthritis    Heart disease Maternal Grandfather      Arthritis Paternal Grandmother          rheumatoid arthritis    Diabetes Paternal Grandmother      Diabetes Cousin      Heart disease Cousin      Crohn's disease Neg Hx      Stomach cancer Neg Hx      Ulcerative colitis Neg Hx      Esophageal cancer Neg Hx         Social History     Socioeconomic History    Marital status:    Tobacco Use    Smoking status: Never    Smokeless tobacco: Never   Substance and Sexual Activity    Alcohol use: No    Drug use: No    Sexual activity: Yes     Partners: Male     Social Drivers of Health     Financial Resource Strain: Low Risk  (6/26/2024)    Overall Financial Resource Strain (CARDIA)     Difficulty of Paying Living Expenses: Not hard at all   Food Insecurity: No Food Insecurity (6/26/2024)    Hunger Vital Sign     Worried About Running Out of Food in the Last Year: Never true     Ran Out of Food in the Last Year: Never true   Transportation Needs: No Transportation Needs (6/26/2024)    PRAPARE - Transportation     Lack of Transportation (Medical): No     Lack of Transportation (Non-Medical): No   Physical Activity: Inactive (6/26/2024)    Exercise Vital Sign     Days of Exercise per  Week: 0 days     Minutes of Exercise per Session: 0 min   Stress: Stress Concern Present (6/26/2024)    [employer] - Occupational Stress Questionnaire     Feeling of Stress : To some extent   Housing Stability: Unknown (6/26/2024)    Housing Stability Vital Sign     Unable to Pay for Housing in the Last Year: No     Homeless in the Last Year: No       Review of patient's allergies indicates:   Allergen Reactions    Codeine      Other reaction(s): Nausea       Review of Systems:  12 point review of systems is negative.    Physical Exam:  Vitals:    04/10/25 1435   BP: 121/68   Pulse: 81   Weight: 71.5 kg (157 lb 10.1 oz)   PainSc:   1   PainLoc: Back       Body mass index is 27.92 kg/m².    Gen: NAD  Psych: mood appropriate for given condition  HEENT: eyes anicteric   CV: RRR  HEENT: anicteric   Respiratory: non-labored, no signs of respiratory distress  Abd: non-distended  Skin: warm, dry and intact.  Gait: No antalgic gait.     Sensory:  Intact and symmetrical to light touch in L1-S1 dermatomes bilaterally.    Motor:     Right Left   L2/3 Iliacus Hip flexion  5  5   L3/4 Qudratus Femoris Knee Extension  5  5   L4/5 Tib Anterior Ankle Dorsiflexion   5  5   L5/S1 Extensor Hallicus Longus Great toe extension  5  5   S1/S2 Gastroc/Soleus Plantar Flexion  5  5       Labs:  Lab Results   Component Value Date    HGBA1C 5.3 10/03/2023       Lab Results   Component Value Date    WBC 8.71 10/03/2024    HGB 14.4 10/03/2024    HCT 44.3 10/03/2024    MCV 92 10/03/2024     10/03/2024       Imaging:  MRI cervical spine 2/22/24  FINDINGS:  Vertebral column: As seen on comparison studies, there is solid bony fusion of C5 and C6.  There is marked disc space narrowing or incomplete bony fusion of C6 and C7.  Additionally there is mixed endplate signal change, including Modic type 1 change towards the right at the C6-7 and C7-T1 levels.  There is marked disc space narrowing at the remainder of the  cervical levels as well as T1-2 and T2-3.  The odontoid process is intact.  There is stable trace anterolisthesis of C3 on C4, C4 on C5 and retrolisthesis of C2 on C3.  There is multilevel endplate osteophyte formation.  Baseline marrow signal is normal.  The odontoid process is intact.     Spinal canal, cord, epidural space: The spinal canal is developmentally normal.  There is minimal flattening of the ventral cord surface at the C4-5 level.  Cord signal is normal without changes of edema or myelomalacia.     Findings by level:     C2-3: There is marked disc space narrowing, trace retrolisthesis of C2 on C3.  There is right greater than left facet joint arthropathy.  There is no spinal stenosis.  There is mild-to-moderate right foraminal stenosis.  C3-4: There is marked disc space narrowing, trace anterolisthesis of C3 on C4.  There is right greater than left facet joint arthropathy with bilateral uncovertebral spurring as well as a mild disc osteophyte complex resulting in borderline spinal stenosis, marked right and mild left foraminal stenosis.  C4-5: There is marked disc space narrowing, trace anterolisthesis of C4 on C5.  There is right greater than left facet joint arthropathy and uncovertebral spurring with a broad disc osteophyte complex which results in minimal flattening of the ventral cord surface.  Cord signal is normal.  There is only borderline spinal stenosis with moderate to marked right and mild-to-moderate left foraminal stenosis.  C5-6: There is solid osseous fusion.  There is no spinal stenosis.  There is mild left foraminal stenosis.  C6-7: There is partial bony fusion or significant disc space narrowing.  There is left greater than right uncovertebral spurring.  There is a broad disc osteophyte complex.  There is mild-to-moderate spinal stenosis with severe left and moderate right foraminal stenosis.  C7-T1: There is marked disc space narrowing.  There is bilateral, left greater than right,  facet joint arthropathy and uncovertebral spurring with a broad disc osteophyte complex.  There is mild spinal stenosis with severe left and at least moderate right foraminal stenosis.     Soft tissues, other: The prevertebral soft tissues are normal.  The airway is patent.      MRI lumbar spine 12/4/24  FINDINGS:  Vertebral column: There is extensive multilevel degenerative change.  There is a broad dextrocurvature at the thoracolumbar junction.  There is severe disc space narrowing at the L1-2, L2-3, L4-5 levels with moderate to severe disc space narrowing at the L3-4 level.  Vertebral body height is preserved.  There is no fracture.  There is mild 4 mm anterolisthesis of L5 on S1, L4 on L5.  There is trace retrolisthesis of L1 on L2, L2 on L3 and L3 on L4.  All of the discs are desiccated.  There is mild Modic type 1 degenerative endplate signal change posteriorly at the L3-4 level.  The prior study demonstrated Modic type 1 change at the L4-5 level where there is now sclerosis.     Spinal canal, conus, epidural space: The spinal canal is developmentally normal.  The conus terminates at the level of T12-L1 and is normal in contour and signal intensity.     Findings by level:     On the sagittal images, there is no spinal stenosis or cord compression at the T10-11 or T11-12 levels.     T12-L1: There is no spinal canal or foraminal stenosis.  There is only mild facet joint arthropathy without change.  L1-2: There is severe disc space narrowing, worse towards the left.  There is a diffuse disc bulge with osteophytic ridging in addition to left greater than right facet joint arthropathy.  There is no significant spinal stenosis but there is moderate crowding of the left lateral recess.  There is moderate to severe left and only mild right foraminal stenosis with only minimal interval progression.  L2-3: There is severe disc space narrowing, trace retrolisthesis of L2 on L3.  There is a diffuse disc bulge with shallow  central disc protrusion, osteophytic ridging and left greater than right facet joint arthropathy with ligamentum flavum thickening resulting in moderate spinal stenosis, crowding of the left lateral recess and at least moderate bilateral foraminal stenosis without significant change.  L3-4: There is bilateral facet joint arthropathy with ligamentum flavum thickening.  There is a broad central disc protrusion.  There is flattening of the ventral dural sac.  There is mild spinal stenosis.  There is crowding of the lateral recess bilaterally.  There is at least moderate bilateral foraminal stenosis.  Findings have slightly progressed.  L4-5: There is disc space narrowing, mild anterolisthesis of L4 on L5.  There is unroofing of a broad disc protrusion.  There is right greater than left facet joint arthropathy.  There is a small 5 mm right-sided synovial cyst.  All of these factors contribute to severe crowding of the right lateral recess, mild spinal stenosis.  There is severe right and only mild left foraminal stenosis.  Findings may have slightly progressed.  L5-S1: There is mild anterolisthesis of L5 on S1.  There is bilateral facet joint arthropathy.  There is unroofing of a diffuse disc bulge which contacts both S1 roots.  There is mild spinal stenosis with mild left and moderate right foraminal stenosis.  Findings have slightly progressed.     Soft tissues, other: The posterior spinous muscles are atrophic.  The prevertebral soft tissues are normal.  The aorta is normal in caliber.  There is no hydronephrosis.    Diagnosis:  1. Lumbar radiculopathy    2. Cervicalgia    3. Back pain, unspecified back location, unspecified back pain laterality, unspecified chronicity    4. Cervical radiculopathy            Lyudmila Neri is a 75 y.o. female patient who has a past medical history of Anticoagulant long-term use, Arthritis, Blood transfusion, Cancer, Depression, Disorder of kidney and ureter, GERD (gastroesophageal  reflux disease), GIST (gastrointestinal stromal tumor), malignant, H. pylori infection, Hypertension, KATHY (obstructive sleep apnea), Psoriasis, Trouble in sleeping, and Urinary incontinence. She presents with neck and back pain.  Today her primary complaint is lower back pain.  The pain can radiate from her lower back down both buttocks and inner thighs.  She also having some burning into her left foot.  Her pain can get to 6/10, constant, aching, sharp.  Worse with standing and activities.    4/10/2025: Lyudmila Neri returns to the office for follow up.  She is s/p L5/S1 BRIGITTE on 03/18/2025 with 95% relief.  Overall, she found great relief with the lumbar epidural.  No new numbness, weakness or any new changes to her bowel or bladder function.  She reports continued relief with previous cervical BRIGITTE as well.      - on exam she has full strength in her lower extremities intact sensation to light touch bilateral L2-S1  - She is s/p L5/S1 BRIGITTE on 03/18/2025 with 95% relief.  - I independently reviewed her lumbar MRI and she has significant degenerative changes throughout the lumbar spine.  She has multilevel bilateral facet arthritis.  At L4-5 she has a right-sided synovial cyst causing significant right lateral recess narrowing and severe right and mild left foraminal narrowing.  She has a unroofing with diffuse disc bulge at L5-S1 which contacts bilateral S1 nerve roots.  - she responded very well to the lumbar epidural, minimal remaining pain.  Still finding relief with previous cervical BRIGITTE.  - follow up as needed, can repeat in the future when indicated.      : Not applicable      This note was completed with dictation software and grammatical errors may exist.         [1]   Current Outpatient Medications:     albuterol (VENTOLIN HFA) 90 mcg/actuation inhaler, Inhale 2 puffs into the lungs every 6 (six) hours as needed for Wheezing. Rescue, Disp: 18 g, Rfl: 0    amLODIPine (NORVASC) 5 MG tablet, TAKE 1  TABLET(5 MG) BY MOUTH EVERY DAY, Disp: 90 tablet, Rfl: 3    aspirin (ECOTRIN) 81 MG EC tablet, Take 81 mg by mouth once daily., Disp: , Rfl:     cetirizine (ZYRTEC) 10 MG tablet, Take 10 mg by mouth once daily., Disp: , Rfl:     cholecalciferol, vitamin D3, (VITAMIN D3) 25 mcg (1,000 unit) capsule, Take 1,000 Units by mouth once daily., Disp: , Rfl:     clobetasol (TEMOVATE) 0.05 % external solution, Apply topically as needed., Disp: , Rfl:     diclofenac sodium (VOLTAREN ARTHRITIS PAIN) 1 % Gel, Apply 2 g topically once daily., Disp: , Rfl:     DULoxetine (CYMBALTA) 30 MG capsule, Take 1 capsule (30 mg total) by mouth once daily., Disp: 30 capsule, Rfl: 6    famotidine (PEPCID) 40 MG tablet, TAKE 1 TABLET(40 MG) BY MOUTH EVERY EVENING, Disp: 90 tablet, Rfl: 4    famotidine (PEPCID) 40 MG tablet, TAKE 1 TABLET(40 MG) BY MOUTH EVERY EVENING, Disp: 90 tablet, Rfl: 3    fish oil-omega-3 fatty acids 300-1,000 mg capsule, Take by mouth once daily., Disp: , Rfl:     FOLIC ACID/MULTIVIT-MIN/LUTEIN (CENTRUM SILVER ORAL), Take by mouth., Disp: , Rfl:     levothyroxine (SYNTHROID) 50 MCG tablet, Take 1 tablet (50 mcg total) by mouth before breakfast., Disp: 90 tablet, Rfl: 3    losartan-hydrochlorothiazide 100-12.5 mg (HYZAAR) 100-12.5 mg Tab, Take 1 tablet by mouth once daily., Disp: 90 tablet, Rfl: 2    naproxen sodium (ANAPROX) 220 MG tablet, Take 220 mg by mouth 2 (two) times daily with meals., Disp: , Rfl:     omeprazole (PRILOSEC) 40 MG capsule, TAKE 1 CAPSULE BY MOUTH ONCE DAILY BEFORE BREAKFAST, Disp: 90 capsule, Rfl: 3    solifenacin (VESICARE) 5 MG tablet, Take 1 tablet (5 mg total) by mouth once daily., Disp: 30 tablet, Rfl: 11    vit C/E/Zn/coppr/lutein/zeaxan (PRESERVISION AREDS-2 ORAL), Take by mouth 2 (two) times a day., Disp: , Rfl:     mirabegron (MYRBETRIQ) 50 mg Tb24, Take 1 tablet (50 mg total) by mouth once daily., Disp: 90 tablet, Rfl: 3

## 2025-04-22 RX ORDER — MIRABEGRON 50 MG/1
1 TABLET, FILM COATED, EXTENDED RELEASE ORAL DAILY
Qty: 90 TABLET | Refills: 3 | Status: SHIPPED | OUTPATIENT
Start: 2025-04-22 | End: 2026-04-22

## 2025-04-28 ENCOUNTER — LAB VISIT (OUTPATIENT)
Dept: LAB | Facility: HOSPITAL | Age: 75
End: 2025-04-28
Attending: OBSTETRICS & GYNECOLOGY
Payer: MEDICARE

## 2025-04-28 ENCOUNTER — OFFICE VISIT (OUTPATIENT)
Dept: OBSTETRICS AND GYNECOLOGY | Facility: CLINIC | Age: 75
End: 2025-04-28
Payer: MEDICARE

## 2025-04-28 VITALS
WEIGHT: 156.19 LBS | SYSTOLIC BLOOD PRESSURE: 118 MMHG | DIASTOLIC BLOOD PRESSURE: 72 MMHG | BODY MASS INDEX: 27.67 KG/M2

## 2025-04-28 DIAGNOSIS — Z79.890 HORMONE REPLACEMENT THERAPY (HRT): ICD-10-CM

## 2025-04-28 DIAGNOSIS — L67.9 HAIR CHANGES: ICD-10-CM

## 2025-04-28 DIAGNOSIS — L67.9 HAIR CHANGES: Primary | ICD-10-CM

## 2025-04-28 LAB — ESTRADIOL SERPL HS-MCNC: 61 PG/ML

## 2025-04-28 PROCEDURE — 82670 ASSAY OF TOTAL ESTRADIOL: CPT

## 2025-04-28 PROCEDURE — 99999 PR PBB SHADOW E&M-EST. PATIENT-LVL IV: CPT | Mod: PBBFAC,,, | Performed by: OBSTETRICS & GYNECOLOGY

## 2025-04-28 PROCEDURE — 84402 ASSAY OF FREE TESTOSTERONE: CPT

## 2025-04-28 PROCEDURE — 36415 COLL VENOUS BLD VENIPUNCTURE: CPT | Mod: PN

## 2025-04-28 RX ORDER — ESTRADIOL 1 MG/G
0.5 GEL TOPICAL DAILY
Qty: 15 G | Refills: 12 | Status: SHIPPED | OUTPATIENT
Start: 2025-04-28 | End: 2025-05-02

## 2025-04-28 NOTE — PROGRESS NOTES
Chief Complaint   Patient presents with    \[facility]\"" Care     C/o recently diagnosed with thyroid nodules.   C/o not sure when last pap smear was performed     Hormone Consult      C/o been getting hormone pellets with Dr Alvarado but she does not perform lab work in between. Was given aldactone but pharmacy wouldn't fill it d/t contraindication with another med         Hirsutism     C/o excessive hair growth       History of Present Illness   75 y.o. WF   patient presents today for hair growth on test pellets.  Need HRT for libido, active marriage with younger spouse    Past medical and surgical history reviewed.   I have reviewed the patient's medical history in detail and updated the computerized patient record.    Review of patient's allergies indicates:   Allergen Reactions    Codeine      Other reaction(s): Nausea         Review of Systems -   GEN ROS: positive for  - low libido off HRt  Breast ROS: negative for breast lumps  Genito-Urinary ROS: negative      Physical Examination:  /72 (BP Location: Right arm, Patient Position: Sitting)   Wt 70.9 kg (156 lb 3.2 oz)   BMI 27.67 kg/m²          Assessment:  Menopause  Prev hyst 29yo with BSO  1. Hair changes  Testosterone, free    Estradiol      2. Hormone replacement therapy (HRT)  Ambulatory referral/consult to Obstetrics / Gynecology    Testosterone, free    Estradiol          Plan:  6 weeks annual  Test and estradiol  Stay off pellet tx  Try divigel  annual  Patient informed will be contacted with results within 2 weeks. Encouraged to please call back or email if she has not heard from us by then.

## 2025-05-01 NOTE — TELEPHONE ENCOUNTER
----- Message from Gee sent at 5/1/2025 12:50 PM CDT -----  Contact: Jailene [phone]  Type:  Pharmacy Calling to Clarify an RXName of Caller:[facility]infatanyPharmacy Name[facility] Name:estradioL (DIVIGEL) 1 mg/gram (0.1 %) topical gelWhat do they need to clarify?: DirectionsBe Call Back Number: [phone] Information: Pharm adv the packets come in 1mg. Pls call back and adv. Thank you.

## 2025-05-01 NOTE — TELEPHONE ENCOUNTER
Per pharmacy Divigel packets only come in 1 gram packets. Instructions are for 0.5 gram. How is patient supposed to do this? Please advise

## 2025-05-02 LAB — W FREE TESTOSTERONE: <0.4 PG/ML

## 2025-05-02 RX ORDER — ESTRADIOL 0.5 MG/.5G
1 GEL TOPICAL DAILY
Qty: 30 PACKET | Refills: 3 | Status: SHIPPED | OUTPATIENT
Start: 2025-05-02 | End: 2026-05-02

## 2025-05-05 ENCOUNTER — OFFICE VISIT (OUTPATIENT)
Dept: PODIATRY | Facility: CLINIC | Age: 75
End: 2025-05-05
Payer: MEDICARE

## 2025-05-05 ENCOUNTER — RESULTS FOLLOW-UP (OUTPATIENT)
Dept: OBSTETRICS AND GYNECOLOGY | Facility: CLINIC | Age: 75
End: 2025-05-05

## 2025-05-05 ENCOUNTER — TELEPHONE (OUTPATIENT)
Dept: OBSTETRICS AND GYNECOLOGY | Facility: CLINIC | Age: 75
End: 2025-05-05
Payer: MEDICARE

## 2025-05-05 DIAGNOSIS — G57.30 DEEP PERONEAL NEUROPATHY, UNSPECIFIED LATERALITY: ICD-10-CM

## 2025-05-05 DIAGNOSIS — M19.072 ARTHRITIS OF LEFT MIDFOOT: Primary | ICD-10-CM

## 2025-05-05 DIAGNOSIS — M79.673 FOOT ARCH PAIN, UNSPECIFIED LATERALITY: ICD-10-CM

## 2025-05-05 PROCEDURE — 99999 PR PBB SHADOW E&M-EST. PATIENT-LVL I: CPT | Mod: PBBFAC,,, | Performed by: PODIATRIST

## 2025-05-05 RX ORDER — METHYLPREDNISOLONE ACETATE 40 MG/ML
20 INJECTION, SUSPENSION INTRA-ARTICULAR; INTRALESIONAL; INTRAMUSCULAR; SOFT TISSUE
Status: COMPLETED | OUTPATIENT
Start: 2025-05-05 | End: 2025-05-05

## 2025-05-05 RX ADMIN — METHYLPREDNISOLONE ACETATE 20 MG: 40 INJECTION, SUSPENSION INTRA-ARTICULAR; INTRALESIONAL; INTRAMUSCULAR; SOFT TISSUE at 01:05

## 2025-05-05 NOTE — PROGRESS NOTES
Subjective:      Patient ID: Lyudmila Neri is a 75 y.o. female.    Chief Complaint: No chief complaint on file.      Lyudmila is a 75 y.o. female     Patient returns for left midfoot arthritis pain here today seeking a possible steroid injection which has helped In the past also asking about possible custom orthotic inserts    Review of Systems   Constitutional: Negative for chills and fever.   Cardiovascular:  Negative for claudication and leg swelling.   Respiratory:  Negative for shortness of breath.    Skin:  Negative for itching, nail changes and rash.   Musculoskeletal:  Positive for arthritis and joint pain. Negative for muscle cramps, muscle weakness and myalgias.   Gastrointestinal:  Negative for nausea and vomiting.   Neurological:  Negative for focal weakness, loss of balance, numbness and paresthesias.           Objective:      Physical Exam  Constitutional:       General: She is not in acute distress.     Appearance: She is well-developed. She is not diaphoretic.   Cardiovascular:      Pulses:           Dorsalis pedis pulses are 2+ on the right side and 2+ on the left side.        Posterior tibial pulses are 2+ on the right side and 2+ on the left side.      Comments: < 3 sec capillary refill time to toes 1-5 bilateral. Toes and feet are warm to touch proximally with normal distal cooling b/l. There is some hair growth on the feet and toes b/l. There is no edema b/l. No spider veins or varicosities present b/l.     Musculoskeletal:      Comments: Equinus noted b/l ankles with < 5 deg DF noted. MMT 5/5 in DF/PF/Inv/Ev resistance with no reproduction of pain in any direction. Passive range of motion of ankle and pedal joints is painless b/l.    No pain presently right foot with palpation to the plantar arch or medial ankle, no pain with ROM of the foot or ankle    Left foot pain with palpation to the NC joint dorsally     Skin:     General: Skin is warm and dry.      Coloration: Skin is not pale.       Findings: No abrasion, bruising, burn, ecchymosis, erythema, laceration, lesion, petechiae or rash.      Nails: There is no clubbing.      Comments: Skin temperature, texture and turgor within normal limits.    Incisions dorsal right foot well healed   Neurological:      Mental Status: She is alert and oriented to person, place, and time.      Sensory: No sensory deficit.      Motor: No tremor, atrophy or abnormal muscle tone.      Comments: Negative tinel sign bilateral.   Psychiatric:         Behavior: Behavior normal.               Assessment:       Encounter Diagnoses   Name Primary?    Arthritis of left midfoot Yes    Deep peroneal neuropathy, unspecified laterality     Foot arch pain, unspecified laterality              Plan:       Diagnoses and all orders for this visit:    Arthritis of left midfoot  -     ORTHOTIC DEVICE (DME)    Deep peroneal neuropathy, unspecified laterality  -     ORTHOTIC DEVICE (DME)    Foot arch pain, unspecified laterality  -     ORTHOTIC DEVICE (DME)    Other orders  -     methylPREDNISolone acetate injection 20 mg        I counseled the patient on her conditions, their implications and medical management.      A steroid injection was performed at the dorsal NC joint left foot using 1% plain Lidocaine and 20 mg of depo medrol. This was well tolerated.     Continue using good supportive shoes and inserts    Prescriptions dispensed for custom inserts at [facility]    Return PRN    Agusto Edwards DPM

## 2025-05-05 NOTE — TELEPHONE ENCOUNTER
----- Message from Alexandrea sent at 5/5/2025  3:18 PM CDT -----  Name of Who is Calling:MIKI HOUSTON [[id number]]  What is the request in detail:Pt missed a call from the office and requesting a call back today if possible.  Can the clinic reply by MiyaobabeiRADHA:Call  What Number to Call Back if not in [facility]:Telephone Information:[facility]          [phone]

## 2025-05-07 ENCOUNTER — OFFICE VISIT (OUTPATIENT)
Dept: OTOLARYNGOLOGY | Facility: CLINIC | Age: 75
End: 2025-05-07
Payer: MEDICARE

## 2025-05-07 VITALS — HEIGHT: 63 IN | WEIGHT: 154.75 LBS | BODY MASS INDEX: 27.42 KG/M2

## 2025-05-07 DIAGNOSIS — J35.8 TONSILLITH: ICD-10-CM

## 2025-05-07 DIAGNOSIS — R40.0 DAYTIME SOMNOLENCE: ICD-10-CM

## 2025-05-07 DIAGNOSIS — J02.9 SORE THROAT: ICD-10-CM

## 2025-05-07 DIAGNOSIS — G47.33 OSA (OBSTRUCTIVE SLEEP APNEA): Primary | ICD-10-CM

## 2025-05-07 DIAGNOSIS — J35.1 TONSILLAR HYPERTROPHY: ICD-10-CM

## 2025-05-07 DIAGNOSIS — R09.A2 GLOBUS SYNDROME: ICD-10-CM

## 2025-05-07 DIAGNOSIS — Z78.9 INTOLERANCE OF CONTINUOUS POSITIVE AIRWAY PRESSURE (CPAP) VENTILATION: ICD-10-CM

## 2025-05-07 PROCEDURE — 1101F PT FALLS ASSESS-DOCD LE1/YR: CPT | Mod: CPTII,S$GLB,, | Performed by: STUDENT IN AN ORGANIZED HEALTH CARE EDUCATION/TRAINING PROGRAM

## 2025-05-07 PROCEDURE — 99214 OFFICE O/P EST MOD 30 MIN: CPT | Mod: S$GLB,,, | Performed by: STUDENT IN AN ORGANIZED HEALTH CARE EDUCATION/TRAINING PROGRAM

## 2025-05-07 PROCEDURE — 1159F MED LIST DOCD IN RCRD: CPT | Mod: CPTII,S$GLB,, | Performed by: STUDENT IN AN ORGANIZED HEALTH CARE EDUCATION/TRAINING PROGRAM

## 2025-05-07 PROCEDURE — 1126F AMNT PAIN NOTED NONE PRSNT: CPT | Mod: CPTII,S$GLB,, | Performed by: STUDENT IN AN ORGANIZED HEALTH CARE EDUCATION/TRAINING PROGRAM

## 2025-05-07 PROCEDURE — 3288F FALL RISK ASSESSMENT DOCD: CPT | Mod: CPTII,S$GLB,, | Performed by: STUDENT IN AN ORGANIZED HEALTH CARE EDUCATION/TRAINING PROGRAM

## 2025-05-07 PROCEDURE — 99999 PR PBB SHADOW E&M-EST. PATIENT-LVL III: CPT | Mod: PBBFAC,,, | Performed by: STUDENT IN AN ORGANIZED HEALTH CARE EDUCATION/TRAINING PROGRAM

## 2025-05-07 RX ORDER — CHLORHEXIDINE GLUCONATE ORAL RINSE 1.2 MG/ML
15 SOLUTION DENTAL 2 TIMES DAILY
Qty: 400 ML | Refills: 0 | Status: SHIPPED | OUTPATIENT
Start: 2025-05-07 | End: 2025-05-17

## 2025-05-07 RX ORDER — AZELASTINE 1 MG/ML
1 SPRAY, METERED NASAL 2 TIMES DAILY
Qty: 30 ML | Refills: 11 | Status: SHIPPED | OUTPATIENT
Start: 2025-05-07 | End: 2026-05-07

## 2025-05-07 NOTE — PROGRESS NOTES
[facility]    Subjective:       Patient ID: Lyudmila Neri is a 75 y.o. female.    Chief Complaint: Consult (Review sleep study) and Sore Throat      History of Present Illness: Lyudmila Neri is a 75 y.o. female presenting with sleep apnea. She is not able to tolerate CPAP. Tried twice. Claustrophobic and fights it all night. She tried nasal mask and pillows. Cannot tolerate full face. Rips it off. ESS 14 today. She can breathe ok out of her nose. She is snoring.   Also with tinnitus- sounds like bugs chirping. Years. Is bothersome. Getting worse. Audio as below.   No ENT surgeries in past. Weight stable.   No blood thinners. BP well controlled on meds.     5/7/25; RTC after HST then PSG due to results. She did feel more rested on CPAP when able to tolerate. Reports has thyroid nodules. Regular sore throat for a few months. Throat feels full. Denies reflux on omperazole. Has PND. Takes allergy pill nightly.       Past Surgical History:   Procedure Laterality Date    ANKLE FRACTURE SURGERY      RT ankle    APPENDECTOMY      BLADDER SUSPENSION  1995    CARPAL TUNNEL RELEASE      left    CERVICAL FUSION      CHEILECTOMY Right 07/13/2023    Procedure: CHEILECTOMY;  Surgeon: Agusto Edwards DPM;  Location: [facility] OR;  Service: Podiatry;  Laterality: Right;    CHOLECYSTECTOMY  12/7/2011  Dr. Cai    COLONOSCOPY  7/26/2005  [facility]    Non-erosive esophageal reflux (NERD) disease?.  Post-surgical deformity in the gastric body.   Gastric mucosal abnormality (erythema) in the greater  curve of antrum.  STEVIE Test performed on 02/24/12 was Negative.    COLONOSCOPY  12/12/2008  [facility]    Small internal hemorrhoids.  Slightly redundant left colon.    COLONOSCOPY N/A 04/18/2018    Procedure: COLONOSCOPY;  Surgeon: David Guzman Jr., MD;  Location: [facility] ENDO;  Service: Endoscopy;  Laterality: N/A;    COLONOSCOPY  04/18/2018    Dr. Guzman; hemorrhoids, otherwise unremarkable, repeat 5 years for  surveillance due to family history of colon cancer    CYST REMOVAL Left 2016    left middle finger, Dr. Johnson    ENDOSCOPIC GASTROCNEMIUS RECESSION Right 07/13/2023    Procedure: RECESSION, GASTROCNEMIUS, ENDOSCOPIC;  Surgeon: Agusto Edwards DPM;  Location: [facility];  Service: Podiatry;  Laterality: Right;  popliteal saphenous block,    EPIDURAL STEROID INJECTION INTO CERVICAL SPINE N/A 8/8/2024    Procedure: Injection-steroid-epidural-cervical c7-T1;  Surgeon: Jamaica Olvera MD;  Location: [facility] OR;  Service: Anesthesiology;  Laterality: N/A;    EPIDURAL STEROID INJECTION INTO LUMBAR SPINE N/A 3/18/2025    Procedure: Injection-steroid-epidural-lumbar L5/S1;  Surgeon: Walt Lawson MD;  Location: [facility];  Service: Pain Management;  Laterality: N/A;    ESOPHAGOGASTRODUODENOSCOPY N/A 05/14/2019    Procedure: EGD (ESOPHAGOGASTRODUODENOSCOPY);  Surgeon: David Guzman Jr., MD;  Location: [facility];  Service: Endoscopy;  Laterality: N/A;    ESOPHAGOGASTRODUODENOSCOPY N/A 10/09/2023    Procedure: EGD (ESOPHAGOGASTRODUODENOSCOPY);  Surgeon: David Guzman Jr., MD;  Location: [facility];  Service: Endoscopy;  Laterality: N/A;    EYE SURGERY Bilateral 1995    RK surgery    EYE SURGERY Bilateral 2015    cataract removal    FOOT ARTHRODESIS Right 07/13/2023    Procedure: FUSION, FOOT;  Surgeon: Agusto Edwards DPM;  Location: [facility] OR;  Service: Podiatry;  Laterality: Right;  popliteal saphenous block, fusion of the naviculocuneiform and tarsometatarsal joints 1-3.    GASTRECTOMY      HYSTERECTOMY      KNEE ARTHROSCOPY W/ DEBRIDEMENT      lt knee    OOPHORECTOMY      SHOULDER OPEN ROTATOR CUFF REPAIR      left    STOMACH SURGERY  12/7/2011  Dr. Cai    removal of GIST tumor from wall of the stomach, 2.3 cm in size.    TRANSFORAMINAL EPIDURAL INJECTION OF STEROID Bilateral 04/09/2019    Procedure: Injection,steroid,epidural,transforaminal approach L4/5;  Surgeon: Pa Pruett MD;  Location: [facility];   Service: Pain Management;  Laterality: Bilateral;    TRANSFORAMINAL EPIDURAL INJECTION OF STEROID Bilateral 06/05/2019    Procedure: Injection,steroid,epidural,transforaminal approach L4/5;  Surgeon: Pa Pruett MD;  Location: [facility];  Service: Pain Management;  Laterality: Bilateral;    UPPER GASTROINTESTINAL ENDOSCOPY  2/24/2012  [facility]    Non-erosive esophageal reflux (NERD) disease?.  Post-surgical deformity in the gastric body.   Gastric mucosal abnormality (erythema) in the greater  curve of antrum.  STEVIE Test performed on 02/24/12 was Negative.    UPPER GASTROINTESTINAL ENDOSCOPY  04/18/2018    Dr. Guzman, antritis, evidence of prior surgery with healthy mucosa     Past Medical History:   Diagnosis Date    Anticoagulant long-term use     Arthritis     osteoarthritis    Blood transfusion     Cancer 2011    stromal tumor, stomach    Depression     Disorder of kidney and ureter     CKD 2    GERD (gastroesophageal reflux disease)     GIST (gastrointestinal stromal tumor), malignant     H. pylori infection     Hypertension     KATHY (obstructive sleep apnea) 06/24/2013    Psoriasis 06/24/2013    Thyroid disease     Trouble in sleeping     arthritic pain wakes her up    Urinary incontinence     stress incontinence     Social Drivers of Health     Tobacco Use: Low Risk  (5/5/2025)    Patient History     Smoking Tobacco Use: Never     Smokeless Tobacco Use: Never     Passive Exposure: Not on file   Alcohol Use: Not At Risk (6/26/2024)    AUDIT-C     Frequency of Alcohol Consumption: Never     Average Number of Drinks: Patient does not drink     Frequency of Binge Drinking: Never   Financial Resource Strain: Low Risk  (6/26/2024)    Overall Financial Resource Strain (CARDIA)     Difficulty of Paying Living Expenses: Not hard at all   Food Insecurity: No Food Insecurity (6/26/2024)    Hunger Vital Sign     Worried About Running Out of Food in the Last Year: Never true     Ran Out of Food in the Last Year: Never  true   Transportation Needs: No Transportation Needs (6/26/2024)    PRAPARE - Transportation     Lack of Transportation (Medical): No     Lack of Transportation (Non-Medical): No   Physical Activity: Inactive (6/26/2024)    Exercise Vital Sign     Days of Exercise per Week: 0 days     Minutes of Exercise per Session: 0 min   Stress: Stress Concern Present (6/26/2024)    [employer] - Occupational Stress Questionnaire     Feeling of Stress : To some extent   Housing Stability: Unknown (6/26/2024)    Housing Stability Vital Sign     Unable to Pay for Housing in the Last Year: No     Number of Times Moved in the Last Year: Not on file     Homeless in the Last Year: No   Depression: Low Risk  (3/3/2025)    Depression     Last PHQ-4: Flowsheet Data: 0   Utilities: Not At Risk (6/26/2024)    [facility] Utilities     Threatened with loss of utilities: No   Health Literacy: Adequate Health Literacy (6/26/2024)     Health Literacy     Frequency of need for help with medical instructions: Never   Social Isolation: Not on file     Review of patient's allergies indicates:   Allergen Reactions    Codeine      Other reaction(s): Nausea     Current Outpatient Medications   Medication Instructions    albuterol (VENTOLIN HFA) 90 mcg/actuation inhaler 2 puffs, Inhalation, Every 6 hours PRN, Rescue    amLODIPine (NORVASC) 5 mg, Oral    aspirin (ECOTRIN) 81 mg, Daily    azelastine (ASTELIN) 137 mcg, Nasal, 2 times daily    cetirizine (ZYRTEC) 10 mg, Daily    chlorhexidine (PERIDEX) 0.12 % solution 15 mLs, Mouth/Throat, 2 times daily, gargle    cholecalciferol (vitamin D3) (VITAMIN D3) 1,000 Units, Daily    clobetasol (TEMOVATE) 0.05 % external solution As needed (PRN)    diclofenac sodium (VOLTAREN ARTHRITIS PAIN) 2 g, Daily    DULoxetine (CYMBALTA) 30 mg, Oral, Daily    estradioL (DIVIGEL) 0.5 mg/0.5 gram (0.1 %) GlPk 1 packet, Transdermal, Daily    famotidine (PEPCID) 40 mg, Oral, Nightly    famotidine  (PEPCID) 40 mg, Oral, Nightly    fish oil-omega-3 fatty acids 300-1,000 mg capsule Daily    FOLIC ACID/MULTIVIT-MIN/LUTEIN (CENTRUM SILVER ORAL) Take by mouth.    levothyroxine (SYNTHROID) 50 mcg, Oral, Before breakfast    losartan-hydrochlorothiazide 100-12.5 mg (HYZAAR) 100-12.5 mg Tab 1 tablet, Oral, Daily    mirabegron (MYRBETRIQ) 50 mg, Oral, Daily    naproxen sodium (ANAPROX) 220 mg, 2 times daily with meals    omeprazole (PRILOSEC) 40 MG capsule TAKE 1 CAPSULE BY MOUTH ONCE DAILY BEFORE BREAKFAST    solifenacin (VESICARE) 5 mg, Oral, Daily    vit C/E/Zn/coppr/lutein/zeaxan (PRESERVISION AREDS-2 ORAL) 2 times daily         ENT ROS negative except as stated above.     Patient answers are not available for this visit.            Objective:      There were no vitals filed for this visit.    General: NAD, well appearing  Eyes: Normal conjunctiva and lids  Face: symmetric, nerve intact  Nose: The nose is without any evidence of any deformity. The nasal mucosa is moist. The septum is mildly deviated left. There is no evidence of septal hematoma. The turbinates are mild/ moderately large.   Ears: The ears are with normal-appearing pinna. Examination of the canals is normal appearing bilaterally. There is no drainage or erythema noted. The tympanic membranes are normal appearing with pearly color, normal-appearing landmarks and normal light reflex. Hearing is grossly intact.  Mouth: No obvious abnormalities to the lips. The teeth are unremarkable. The gingivae are without any obvious evidence of infection or lesion. The oral mucosa is moist and pink. There are no obvious masses to the hard or soft palate.   Oropharynx: The uvula is midline.  The tongue is midline. The posterior pharynx is inflamed. The tonsils 1+, cryptic, inflamed,, couple of stones, crowded more at posterior pillar. Mirror with no lingual tonsils, good view of vocal folds.   Salivary glands: The salivary glands are symmetric and not enlarged, no  masses  Neck: No lymphadenopathy, trachea midline, thryoid not enlarged.  Psych: Normal mood and affect.   Neuro: Grossly intact  Speech: fluent    Test Review: I personally reviewed audiogram        EGD 2023  Findings:       The oropharynx was normal.        The larynx was normal.        The cricopharyngeus was normal.        The examined esophagus was normal.        The Z-line was regular and was found 36-37 cm from the incisors.        A patulous lower esophageal sphincter was found.        The gastric fundus was normal.        Localized minimal inflammation characterized by erythema was found        on the greater curvature of the gastric body. Biopsies were taken        with a cold forceps for histology.        Patchy mild inflammation characterized by erythema was found in the        prepyloric region of the stomach. Biopsies were taken with a cold        forceps for histology and to r/o H pylori.        The stomach was otherwise normal.        The pylorus was normal.        The examined duodenum was normal.     Thyroid US   FINDINGS:  The right lobe of thyroid gland measures 4.3 x 1.8 x 1.5 cm in size.  The left lobe the thyroid gland measures 4.2 x 1.6 x 1.5cm in size.  This gives an approximate volume of on the right of 6.3cc and an approximate volume on the left of 5.3 cc for a total approximate volume of 11 0.6 cc.     A 9 mm nodule is noted at the upper pole that appears solid and cystic hypoechoic well-circumscribed without obvious microcalcifications as wide as tall.     Inferior to this a 9 mm nodule is noted wider than tall without obvious microcalcifications hypoechoic solid and cystic as well.  This is well-circumscribed.     Two normal sized cervical nodes are noted.     Impression:     1. Two nodules likely TR 3 possibly TR 4.  Follow-up for size stability is suggested.  No nodules are noted meeting criteria for fine-needle aspiration or biopsy.        Electronically signed by:Alirio De La Paz  MD  Date:                                            09/30/2024     Assessment and Plan:       1. KATHY (obstructive sleep apnea)    2. Intolerance of continuous positive airway pressure (CPAP) ventilation    3. Daytime somnolence    4. Tonsillar hypertrophy    5. Tonsillith    6. Sore throat    7. Globus syndrome            Reviewed tinnitus and measures.     Reviewed her sleep studies. In lab PSG did not show same oxygenation concerns as HST.   She does not meet 4% desat criteria for inspire. She does have 1+ tonsils which do appear to narrow her airway to an extent. Has mild turbinate hypertrophy. DISE would clarify more.   Alternatives- sleep positioning, oral appliance, weight loss. Nasal sprays.     Suspect sore throat is PND and possible still reflux. On omeprazole and not feeling. Not related to thyroid. Reviewed with her.   Will start astelin for PND BID. Will do peridex 10 days now for tonsil inflammation and stones.     May  benefit from wedge for reflux and KATHY. If silent reflux, recommend alginates at night. Sample and info given.       RTC: she will reach out as needed    Plan of care was discussed in detail with the patient, who agreed with the plan as above. All questions were answered in detail.     Deloris River MD  Otolaryngology

## 2025-05-19 ENCOUNTER — TELEPHONE (OUTPATIENT)
Dept: PRIMARY CARE CLINIC | Facility: CLINIC | Age: 75
End: 2025-05-19
Payer: MEDICARE

## 2025-05-21 NOTE — H&P
History & Physical - Short Stay  Gastroenterology      SUBJECTIVE:     Procedure: Gastroscopy    Chief Complaint/Indication for Procedure: Dysphagia.  Epig pain.  Globus sensation.  Hx of Gastric GIST, 2011.    History of Present Illness:  See recent GI OV note:  Office Visit   3/18/2025  [place] - Gastroenterology       Tanya Booth NP  Gastroenterology Esophageal dysphagia +5 more  Dx Dysphagia  Reason for Visit     Progress Notes    Tanya Booth, NP at 3/18/2025  8:30 AM    Status: Signed   Expand All Collapse All  Subjective:      Subjective  Patient ID: Lyudmila Neri is a 75 y.o. female, Body mass index is 28.74 kg/m².     Chief Complaint: Dysphagia        Established patient of Jailene Roberson, ОЛЬГА & myself.     Gastroesophageal Reflux  She complains of abdominal pain (reports epigastric region tenderness), dysphagia (dysphagia to solids, especially rice and bread, and saliva; denies trouble swallowing liquids) and globus sensation (followed by ENT; recently diagnosed with thyroid nodules). She reports no belching, no chest pain, no choking, no coughing, no early satiety, no heartburn, no hoarse voice, no nausea, no sore throat, no stridor, no tooth decay, no water brash or no wheezing. This is a new problem. The current episode started more than 1 month ago (Started ~ three months ago). The problem occurs frequently. The problem has been unchanged. The symptoms are aggravated by certain foods. Pertinent negatives include no anemia, melena or weight loss. Risk factors include NSAIDs. She has tried a histamine-2 antagonist and a PPI (Currently: Prilosec 40 mg once daily and Pepcid 40 mg nightly- helps) for the symptoms. Past procedures include an EGD.      Review of Systems   Constitutional:  Negative for appetite change, fever, unexpected weight change and weight loss.   HENT:  Positive for trouble swallowing. Negative for hoarse voice and sore throat.    Respiratory:  Negative for  cough, choking, shortness of breath and wheezing.    Cardiovascular:  Negative for chest pain.   Gastrointestinal:  Positive for abdominal pain (reports epigastric region tenderness), constipation (chronic problem; taking OTC fiber gummies somewhat helps) and dysphagia (dysphagia to solids, especially rice and bread, and saliva; denies trouble swallowing liquids). Negative for abdominal distention, anal bleeding, blood in stool, diarrhea, heartburn, melena, nausea, rectal pain and vomiting.       Assessment:      Assessment  1. Esophageal dysphagia    2. Gastroesophageal reflux disease without esophagitis    3. Chronic constipation    4. S/P cholecystectomy    5. History of gastrointestinal stromal tumor (GIST)    6. Screening for colon cancer             Plan:   All diagnoses and orders for this visit:     Esophageal dysphagia              - Schedule EGD with possible esophageal dilation if indicated  - Educated patient to eat smaller more frequent meals and to eat slowly and advised to eat a soft diet.  - Possible UGI/esophagram/esophageal manometry if symptoms persist      Gastroesophageal reflux disease without esophagitis              - Continue Prilosec 40 mg once daily              - Continue Pepcid 40 mg nightly              - Take PPI in the morning 30 minutes before breakfast  - Recommend to avoid large meals, avoid eating within 3 hours of bedtime, elevate head of bed if nocturnal symptoms are present, smoking cessation (if current smoker), & weight loss (if overweight).   - Recommend minimize/avoid high-fat foods, chocolate, caffeine, citrus, alcohol, & tomato products.  - Advised to avoid/limit use of NSAID's, since they can cause GI upset, bleeding, and/or ulcers. If needed, take with food.       Chronic constipation              - Recommend daily exercise as tolerated, adequate water intake, and high fiber diet.   - Continue OTC fiber supplement  - Recommend OTC stool softener   - Recommend OTC  MiraLax once daily (17g PO) as directed     S/P cholecystectomy     History of gastrointestinal stromal tumor (GIST)              - Recommend follow-up with hematology for continued evaluation and management                    PTA Medications   Medication Sig    amLODIPine (NORVASC) 5 MG tablet TAKE 1 TABLET(5 MG) BY MOUTH EVERY DAY    azelastine (ASTELIN) 137 mcg (0.1 %) nasal spray 1 spray (137 mcg total) by Nasal route 2 (two) times daily.    DULoxetine (CYMBALTA) 30 MG capsule Take 1 capsule (30 mg total) by mouth once daily.    fish oil-omega-3 fatty acids 300-1,000 mg capsule Take by mouth once daily.    levothyroxine (SYNTHROID) 50 MCG tablet Take 1 tablet (50 mcg total) by mouth before breakfast.    losartan-hydrochlorothiazide 100-12.5 mg (HYZAAR) 100-12.5 mg Tab Take 1 tablet by mouth once daily.    mirabegron (MYRBETRIQ) 50 mg Tb24 Take 1 tablet (50 mg total) by mouth once daily.    naproxen sodium (ANAPROX) 220 MG tablet Take 220 mg by mouth 2 (two) times daily with meals.    omeprazole (PRILOSEC) 40 MG capsule TAKE 1 CAPSULE BY MOUTH ONCE DAILY BEFORE BREAKFAST    solifenacin (VESICARE) 5 MG tablet Take 1 tablet (5 mg total) by mouth once daily.    albuterol (VENTOLIN HFA) 90 mcg/actuation inhaler Inhale 2 puffs into the lungs every 6 (six) hours as needed for Wheezing. Rescue (Patient not taking: Reported on 5/7/2025)    aspirin (ECOTRIN) 81 MG EC tablet Take 81 mg by mouth once daily.    cetirizine (ZYRTEC) 10 MG tablet Take 10 mg by mouth once daily.    cholecalciferol, vitamin D3, (VITAMIN D3) 25 mcg (1,000 unit) capsule Take 1,000 Units by mouth once daily.    clobetasol (TEMOVATE) 0.05 % external solution Apply topically as needed. (Patient not taking: Reported on 5/7/2025)    diclofenac sodium (VOLTAREN ARTHRITIS PAIN) 1 % Gel Apply 2 g topically once daily.    estradioL (DIVIGEL) 0.5 mg/0.5 gram (0.1 %) GlPk Place 1 packet onto the skin once daily.    famotidine (PEPCID) 40 MG tablet TAKE 1  TABLET(40 MG) BY MOUTH EVERY EVENING    famotidine (PEPCID) 40 MG tablet TAKE 1 TABLET(40 MG) BY MOUTH EVERY EVENING    FOLIC ACID/MULTIVIT-MIN/LUTEIN (CENTRUM SILVER ORAL) Take by mouth.    vit C/E/Zn/coppr/lutein/zeaxan (PRESERVISION AREDS-2 ORAL) Take by mouth 2 (two) times a day.       Review of patient's allergies indicates:   Allergen Reactions    Codeine      Other reaction(s): Nausea        Past Medical History:   Diagnosis Date    Anticoagulant long-term use     Arthritis     osteoarthritis    Blood transfusion     Cancer 2011    stromal tumor, stomach    Depression     Disorder of kidney and ureter     CKD 2    GERD (gastroesophageal reflux disease)     GIST (gastrointestinal stromal tumor), malignant     H. pylori infection     Hypertension     KATHY (obstructive sleep apnea) 06/24/2013    Psoriasis 06/24/2013    Thyroid disease     Trouble in sleeping     arthritic pain wakes her up    Urinary incontinence     stress incontinence     Past Surgical History:   Procedure Laterality Date    ANKLE FRACTURE SURGERY      RT ankle    APPENDECTOMY      BLADDER SUSPENSION  1995    CARPAL TUNNEL RELEASE      left    CERVICAL FUSION      CHEILECTOMY Right 07/13/2023    Procedure: CHEILECTOMY;  Surgeon: Agusto Edwards DPM;  Location: [facility] OR;  Service: Podiatry;  Laterality: Right;    CHOLECYSTECTOMY  12/7/2011  Dr. Cai    COLONOSCOPY  7/26/2005  [facility]    Non-erosive esophageal reflux (NERD) disease?.  Post-surgical deformity in the gastric body.   Gastric mucosal abnormality (erythema) in the greater  curve of antrum.  STEVIE Test performed on 02/24/12 was Negative.    COLONOSCOPY  12/12/2008  [facility]    Small internal hemorrhoids.  Slightly redundant left colon.    COLONOSCOPY N/A 04/18/2018    Procedure: COLONOSCOPY;  Surgeon: David Guzman Jr., MD;  Location: [facility] ENDO;  Service: Endoscopy;  Laterality: N/A;    COLONOSCOPY  04/18/2018    Dr. Guzman; hemorrhoids, otherwise unremarkable, repeat 5 years for  surveillance due to family history of colon cancer    CYST REMOVAL Left 2016    left middle finger, Dr. Johnson    ENDOSCOPIC GASTROCNEMIUS RECESSION Right 07/13/2023    Procedure: RECESSION, GASTROCNEMIUS, ENDOSCOPIC;  Surgeon: Agusto Edwards DPM;  Location: [facility];  Service: Podiatry;  Laterality: Right;  popliteal saphenous block,    EPIDURAL STEROID INJECTION INTO CERVICAL SPINE N/A 8/8/2024    Procedure: Injection-steroid-epidural-cervical c7-T1;  Surgeon: Jamaica Olvera MD;  Location: [facility] OR;  Service: Anesthesiology;  Laterality: N/A;    EPIDURAL STEROID INJECTION INTO LUMBAR SPINE N/A 3/18/2025    Procedure: Injection-steroid-epidural-lumbar L5/S1;  Surgeon: Walt Lawson MD;  Location: [facility];  Service: Pain Management;  Laterality: N/A;    ESOPHAGOGASTRODUODENOSCOPY N/A 05/14/2019    Procedure: EGD (ESOPHAGOGASTRODUODENOSCOPY);  Surgeon: David Guzman Jr., MD;  Location: [facility];  Service: Endoscopy;  Laterality: N/A;    ESOPHAGOGASTRODUODENOSCOPY N/A 10/09/2023    Procedure: EGD (ESOPHAGOGASTRODUODENOSCOPY);  Surgeon: David Guzman Jr., MD;  Location: [facility];  Service: Endoscopy;  Laterality: N/A;    EYE SURGERY Bilateral 1995    RK surgery    EYE SURGERY Bilateral 2015    cataract removal    FOOT ARTHRODESIS Right 07/13/2023    Procedure: FUSION, FOOT;  Surgeon: Agusto Edwards DPM;  Location: [facility] OR;  Service: Podiatry;  Laterality: Right;  popliteal saphenous block, fusion of the naviculocuneiform and tarsometatarsal joints 1-3.    GASTRECTOMY      HYSTERECTOMY      KNEE ARTHROSCOPY W/ DEBRIDEMENT      lt knee    OOPHORECTOMY      SHOULDER OPEN ROTATOR CUFF REPAIR      left    STOMACH SURGERY  12/7/2011  Dr. Cai    removal of GIST tumor from wall of the stomach, 2.3 cm in size.    TRANSFORAMINAL EPIDURAL INJECTION OF STEROID Bilateral 04/09/2019    Procedure: Injection,steroid,epidural,transforaminal approach L4/5;  Surgeon: Pa Pruett MD;  Location: [facility];   "Service: Pain Management;  Laterality: Bilateral;    TRANSFORAMINAL EPIDURAL INJECTION OF STEROID Bilateral 06/05/2019    Procedure: Injection,steroid,epidural,transforaminal approach L4/5;  Surgeon: Pa Pruett MD;  Location: [facility];  Service: Pain Management;  Laterality: Bilateral;    UPPER GASTROINTESTINAL ENDOSCOPY  2/24/2012  [facility]    Non-erosive esophageal reflux (NERD) disease?.  Post-surgical deformity in the gastric body.   Gastric mucosal abnormality (erythema) in the greater  curve of antrum.  STEVIE Test performed on 02/24/12 was Negative.    UPPER GASTROINTESTINAL ENDOSCOPY  04/18/2018    Dr. Guzman, antritis, evidence of prior surgery with healthy mucosa     Family History   Problem Relation Name Age of Onset    Arthritis Paternal Grandmother          rheumatoid arthritis    Diabetes Paternal Grandmother      Heart disease Maternal Grandfather      Cancer Father          brain and lung    Arthritis Father      Heart disease Mother      Arthritis Mother          osteoarthritis    COPD Mother      Hyperlipidemia Mother      Hypertension Mother      Kidney disease Mother      Stroke Mother      Colon cancer Mother  75    Cancer Mother  70        colon cancer    COPD Sister      Depression Daughter      Arthritis Maternal Aunt          rheumatoid arthritis    Heart disease Maternal Uncle      Early death Maternal Uncle          in 50s due to heart disease    Arthritis Paternal Aunt          rheuamtoid arthritis    Diabetes Cousin      Heart disease Cousin      Crohn's disease Neg Hx      Stomach cancer Neg Hx      Ulcerative colitis Neg Hx      Esophageal cancer Neg Hx      Breast cancer Neg Hx       Social History[1]      OBJECTIVE:     Vital Signs (Most Recent)  Temp: 97.3 °F (36.3 °C) (05/22/25 0743)  Pulse: 63 (05/22/25 0743)  Resp: 14 (05/22/25 0743)  BP: 128/61 (05/22/25 0743)  SpO2: 98 % (05/22/25 0743)    Physical Exam:  : Ht: 5' 3" (160 cm)   Wt: 69.9 kg   BMI: 27.28 kg/m²  .                 "                                       GENERAL:  Comfortable, in no acute distress.                                 HEENT EXAM:  Nonicteric.  No adenopathy.  Oropharynx is clear.               NECK:  Supple.                                                               LUNGS:  Clear.                                                               CARDIAC:  Regular rate and rhythm.  S1, S2.  No murmur.                      ABDOMEN:  Soft, positive bowel sounds, nontender.  No hepatosplenomegaly or masses.  No rebound or guarding.                                             EXTREMITIES:  No edema.     MENTAL STATUS:  Alert and oriented.    ASSESSMENT/PLAN:     Assessment: Dysphagia.  Epig pain.  Globus sensation.  Hx of Gastric GIST, 2011.    Plan: Gastroscopy    Anesthesia Plan:   MAC / General Anaesthesia    ASA Grade: ASA 2 - Patient with mild systemic disease with no functional limitations    MALLAMPATI SCORE: I (soft palate, uvula, fauces, and tonsillar pillars visible)         [1]   Social History  Tobacco Use    Smoking status: Never    Smokeless tobacco: Never   Substance Use Topics    Alcohol use: No    Drug use: No

## 2025-05-22 ENCOUNTER — HOSPITAL ENCOUNTER (OUTPATIENT)
Facility: HOSPITAL | Age: 75
Discharge: HOME | End: 2025-05-22
Attending: INTERNAL MEDICINE | Admitting: INTERNAL MEDICINE
Payer: MEDICARE

## 2025-05-22 ENCOUNTER — ANESTHESIA (OUTPATIENT)
Dept: ENDOSCOPY | Facility: HOSPITAL | Age: 75
End: 2025-05-22
Payer: MEDICARE

## 2025-05-22 ENCOUNTER — TELEPHONE (OUTPATIENT)
Dept: GASTROENTEROLOGY | Facility: CLINIC | Age: 75
End: 2025-05-22
Payer: MEDICARE

## 2025-05-22 ENCOUNTER — ANESTHESIA EVENT (OUTPATIENT)
Dept: ENDOSCOPY | Facility: HOSPITAL | Age: 75
End: 2025-05-22
Payer: MEDICARE

## 2025-05-22 VITALS
HEIGHT: 63 IN | BODY MASS INDEX: 27.29 KG/M2 | WEIGHT: 154 LBS | TEMPERATURE: 97 F | HEART RATE: 79 BPM | OXYGEN SATURATION: 95 % | RESPIRATION RATE: 15 BRPM | DIASTOLIC BLOOD PRESSURE: 65 MMHG | SYSTOLIC BLOOD PRESSURE: 105 MMHG

## 2025-05-22 DIAGNOSIS — R13.10 DYSPHAGIA: ICD-10-CM

## 2025-05-22 DIAGNOSIS — R09.A2 GLOBUS SENSATION: ICD-10-CM

## 2025-05-22 DIAGNOSIS — R13.10 DYSPHAGIA, UNSPECIFIED TYPE: ICD-10-CM

## 2025-05-22 PROCEDURE — 25000003 PHARM REV CODE 250: Mod: PO | Performed by: NURSE ANESTHETIST, CERTIFIED REGISTERED

## 2025-05-22 PROCEDURE — 43239 EGD BIOPSY SINGLE/MULTIPLE: CPT | Mod: ,,, | Performed by: INTERNAL MEDICINE

## 2025-05-22 PROCEDURE — 63600175 PHARM REV CODE 636 W HCPCS: Mod: PO | Performed by: INTERNAL MEDICINE

## 2025-05-22 PROCEDURE — 88342 IMHCHEM/IMCYTCHM 1ST ANTB: CPT | Mod: TC,59 | Performed by: INTERNAL MEDICINE

## 2025-05-22 PROCEDURE — 27201012 HC FORCEPS, HOT/COLD, DISP: Mod: PO | Performed by: INTERNAL MEDICINE

## 2025-05-22 PROCEDURE — 43239 EGD BIOPSY SINGLE/MULTIPLE: CPT | Mod: PO | Performed by: INTERNAL MEDICINE

## 2025-05-22 PROCEDURE — 63600175 PHARM REV CODE 636 W HCPCS: Mod: PO | Performed by: NURSE ANESTHETIST, CERTIFIED REGISTERED

## 2025-05-22 PROCEDURE — 37000009 HC ANESTHESIA EA ADD 15 MINS: Mod: PO | Performed by: INTERNAL MEDICINE

## 2025-05-22 PROCEDURE — 37000008 HC ANESTHESIA 1ST 15 MINUTES: Mod: PO | Performed by: INTERNAL MEDICINE

## 2025-05-22 RX ORDER — LIDOCAINE HYDROCHLORIDE 20 MG/ML
JELLY TOPICAL
Status: DISCONTINUED | OUTPATIENT
Start: 2025-05-22 | End: 2025-05-22

## 2025-05-22 RX ORDER — PROPOFOL 10 MG/ML
VIAL (ML) INTRAVENOUS
Status: DISCONTINUED | OUTPATIENT
Start: 2025-05-22 | End: 2025-05-22

## 2025-05-22 RX ORDER — SODIUM CHLORIDE, SODIUM LACTATE, POTASSIUM CHLORIDE, CALCIUM CHLORIDE 600; 310; 30; 20 MG/100ML; MG/100ML; MG/100ML; MG/100ML
INJECTION, SOLUTION INTRAVENOUS CONTINUOUS
Status: DISCONTINUED | OUTPATIENT
Start: 2025-05-22 | End: 2025-05-22 | Stop reason: HOSPADM

## 2025-05-22 RX ORDER — SODIUM CHLORIDE 0.9 % (FLUSH) 0.9 %
10 SYRINGE (ML) INJECTION
Status: DISCONTINUED | OUTPATIENT
Start: 2025-05-22 | End: 2025-05-22 | Stop reason: HOSPADM

## 2025-05-22 RX ADMIN — PROPOFOL 50 MG: 10 INJECTION, EMULSION INTRAVENOUS at 08:05

## 2025-05-22 RX ADMIN — SODIUM CHLORIDE, POTASSIUM CHLORIDE, SODIUM LACTATE AND CALCIUM CHLORIDE: 600; 310; 30; 20 INJECTION, SOLUTION INTRAVENOUS at 08:05

## 2025-05-22 RX ADMIN — LIDOCAINE HYDROCHLORIDE 50 ML: 20 JELLY TOPICAL at 08:05

## 2025-05-22 RX ADMIN — PROPOFOL 100 MG: 10 INJECTION, EMULSION INTRAVENOUS at 08:05

## 2025-05-22 NOTE — ANESTHESIA POSTPROCEDURE EVALUATION
Anesthesia Post Evaluation    Patient: Lyudmila Neri    Procedure(s) Performed: Procedure(s) (LRB):  EGD (ESOPHAGOGASTRODUODENOSCOPY) (N/A)    Final Anesthesia Type: general      Patient location during evaluation: PACU  Patient participation: Yes- Able to Participate  Level of consciousness: sedated and awake  Post-procedure vital signs: reviewed and stable  Pain management: adequate  Airway patency: patent    PONV status at discharge: No PONV  Anesthetic complications: no      Cardiovascular status: blood pressure returned to baseline and hemodynamically stable  Respiratory status: spontaneous ventilation  Hydration status: euvolemic  Follow-up not needed.              Vitals Value Taken Time   /65 05/22/25 09:08   Temp 36.3 °C (97.3 °F) 05/22/25 09:08   Pulse 79 05/22/25 09:08   Resp 15 05/22/25 09:08   SpO2 95 % 05/22/25 09:08         No case tracking events are documented in the log.      Pain/Jacquelin Score: Jacquelin Score: 8 (5/22/2025  8:56 AM)

## 2025-05-22 NOTE — DISCHARGE INSTRUCTIONS
Recovery After Procedural Sedation (Adult)   You have been given medicine by vein to make you sleep during your surgery. This may have included both a pain medicine and sleeping medicine. Most of the effects have worn off. But you may still have some drowsiness for the next 6 to 8 hours.  Home care  Follow these guidelines when you get home:  For the next 8 hours, you should be watched by a responsible adult. This person should make sure your condition is not getting worse.  Don't drink any alcohol for the next 24 hours.  Don't drive, operate dangerous machinery, or make important business or personal decisions during the next 24 hours.  To prevent injury or falls, use caution when standing and walking for at least 24 hours after your procedure.  Note: Your healthcare provider may tell you not to take any medicine by mouth for pain or sleep in the next 4 hours. These medicines may react with the medicines you were given in the hospital. This could cause a much stronger response than usual.  Follow-up care  Follow up with your healthcare provider if you are not alert and back to your usual level of activity within 12 hours.  When to seek medical advice  Call your healthcare provider right away if any of these occur:  Drowsiness gets worse  Weakness or dizziness gets worse  Repeated vomiting  You can't be awakened  Fever  New rash  [facility] last reviewed this educational content on 9/1/2019  © 2146-1784 [employer]. [address] [place] [address], [place] [address]. All rights reserved. This information is not intended as a substitute for professional medical care. Always follow your healthcare professional's instructions         Tips to Control Acid Reflux    To control acid reflux, youll need to make some basic diet and lifestyle changes. The simple steps outlined below may be all youll need to ease discomfort.  Watch what you eat  Avoid fatty foods and spicy foods.  Eat fewer acidic foods, such as citrus  "and tomato-based foods. These can increase symptoms.  Limit drinking alcohol, caffeine, and fizzy beverages. All increase acid reflux.  Try limiting chocolate, peppermint, and spearmint. These can worsen acid reflux in some people.  Watch when you eat  Avoid lying down for 3 hours after eating.  Do not snack before going to bed.  Raise your head  Raising your head and upper body by 4 to 6 inches helps limit reflux when youre lying down. Put blocks under the head of your bed frame to raise it.  Other changes  Lose weight, if you need to  Dont exercise near bedtime  Avoid tight-fitting clothes  Limit aspirin and ibuprofen  Stop smoking   Date Last Reviewed: 7/1/2016  © 7768-0983 [employer]. [address] [place] [address], [place] [address]. All rights reserved. This information is not intended as a substitute for professional medical care. Always follow your healthcare professional's instructions.         Patient Education     Gastroparesis (delayed gastric emptying)   The Basics   Written by the doctors and editors at [facility]   What is gastroparesis? -- This is a condition that causes nausea and vomiting. It can also make you feel full too soon after you start eating. It happens because the stomach takes too long to empty and does not move food along through your body fast enough (figure 1). Gastroparesis is also called "delayed gastric emptying." ("Gastric" means related to the stomach.)  Gastroparesis can happen in people who:   Have diabetes   Had food poisoning (gastroenteritis)   Had surgery   Take certain medicines  But it can also happen for no clear reason.  When gastroparesis starts after someone had food poisoning, it often gets better in a few days to weeks. Sometimes, it lasts longer or never goes away. In people with diabetes, it usually does not go away, but there are things that can make it better.  What are the symptoms of gastroparesis? -- The symptoms can include:   Nausea with or without " "vomiting   Belly pain   Feeling full too soon after you start eating   Bloating (feeling as though your stomach is full of air)   Weight loss  Is there a test for gastroparesis? -- Yes. If your doctor or nurse suspects that you have gastroparesis, they might do 1 or more of these tests:   Upper endoscopy - The doctor or nurse puts a thin tube down your throat and into your stomach. The tube has a light and a tiny camera on the end, so the doctor or nurse can see inside your stomach. If they still find food in your stomach even though it has been more than 8 hours since you ate, it's a sign that you might have gastroparesis.   Barium follow-through, CT scan, or MRI - You eat a special substance that shows up on imaging (imaging tests create pictures of the inside of your body). This can show if there is something blocking the flow of food, keeping it from leaving your stomach.   Gastric emptying scintigraphy - You eat food that has a small amount of radioactive material in it. Then, you have images taken of the inside of your body for up to 4 hours. That way, doctors can follow where the food goes and how fast it gets there. If you still have a certain amount of food in your stomach after 4 hours, you have gastroparesis.   Breath test - This measures substances in your breath after you eat a special meal. It can sometimes show if you have gastroparesis.  Should I change my diet to feel better? -- Yes. You might feel better if you:   Eat 4 to 5 small meals during the day, instead of 2 or 3 big ones.   Put food in the  before eating it.   Cut down on foods that have a lot of fat, such as cheese and fried foods.   Cut down on foods that have a lot of "insoluble" fiber, such as some fruits, vegetables, and beans.   Avoid fizzy drinks, like soda. They can cause more bloating and gas.   Avoid alcohol and smoking.  If you have diabetes, it's also very important to keep your blood sugar as close to normal as " "possible.  How is gastroparesis treated? -- Treatments can include:   Treatments to help you get the food and fluids you need - This might include taking liquid food supplements or (in rare cases) being fed through a tube.   Medicines that make the stomach empty faster   Medicines that help prevent nausea   Emptying the stomach with a tube - Your doctor might recommend this if your symptoms are severe. They will put a tube down your throat or directly into your stomach through your skin.   Electrical stimulation of the stomach - In very rare cases and for certain causes of gastroparesis, doctors use electrical stimulation to make the stomach empty.  When should I call the doctor? -- Call your doctor or nurse if you have:   Nausea, vomiting, or belly pain that does not get better   Trouble eating   Weight loss  All topics are updated as new evidence becomes available and our peer review process is complete.  This topic retrieved from [employer] on: Jun 23, 2024.  Topic 91280 Version 11.0  Release: 32.5.3 - C32.173  © 2024 UpToDate, Inc. and/or its affiliates. All rights reserved.  figure 1: Digestive system     This drawing shows the organs in the body that process food. Together, these organs are called the "digestive system" or "digestive tract." As food travels through this system, the body absorbs nutrients and water.  Graphic 29088 Version 5.0  Consumer Information Use and Disclaimer   Disclaimer: This generalized information is a limited summary of diagnosis, treatment, and/or medication information. It is not meant to be comprehensive and should be used as a tool to help the user understand and/or assess potential diagnostic and treatment options. It does NOT include all information about conditions, treatments, medications, side effects, or risks that may apply to a specific patient. It is not intended to be medical advice or a substitute for the medical advice, diagnosis, or treatment of a health care provider " based on the health care provider's examination and assessment of a patient's specific and unique circumstances. Patients must speak with a health care provider for complete information about their health, medical questions, and treatment options, including any risks or benefits regarding use of medications. This information does not endorse any treatments or medications as safe, effective, or approved for treating a specific patient. UpToDate, Inc. and its affiliates disclaim any warranty or liability relating to this information or the use thereof.The use of this information is governed by the Terms of Use, available at https://www.[employer].com/en/know/clinical-effectiveness-terms. 2024© UpToDate, Inc. and its affiliates and/or licensors. All rights reserved.  Copyright   © 2024 UpToDate, Inc. and/or its affiliates. All rights reserved.

## 2025-05-22 NOTE — PROVATION PATIENT INSTRUCTIONS
Discharge Summary/Instructions after an Endoscopic Procedure  Patient Name: Lyudmila Neri  Patient MRN: [id number]  Patient YOB: 1950  Thursday, May 22, 2025  David Guzman MD  Dear patient,  As a result of recent federal legislation (The Federal Cures Act), you may   receive lab or pathology results from your procedure in your [facility]   account before your physician is able to contact you. Your physician or   their representative will relay the results to you with their   recommendations at their soonest availability.  Thank you,  RESTRICTIONS:  During your procedure today, you received medications for sedation.  These   medications may affect your judgment, balance and coordination.  Therefore,   for 24 hours, you have the following restrictions:   - DO NOT drive a car, operate machinery, make legal/financial decisions,   sign important papers or drink alcohol.    ACTIVITY:  Today: no heavy lifting, straining or running due to procedural   sedation/anesthesia.  The following day: return to full activity including work.  DIET:  Eat and drink normally unless instructed otherwise.     TREATMENT FOR COMMON SIDE EFFECTS:  - Mild abdominal pain, nausea, belching, bloating or excessive gas:  rest,   eat lightly and use a heating pad.  - Sore Throat: treat with throat lozenges and/or gargle with warm salt   water.  - Because air was used during the procedure, expelling large amounts of air   from your rectum or belching is normal.  - If a bowel prep was taken, you may not have a bowel movement for 1-3 days.    This is normal.  SYMPTOMS TO WATCH FOR AND REPORT TO YOUR PHYSICIAN:  1. Abdominal pain or bloating, other than gas cramps.  2. Chest pain.  3. Back pain.  4. Signs of infection such as: chills or fever occurring within 24 hours   after the procedure.  5. Rectal bleeding, which would show as bright red, maroon, or black stools.   (A tablespoon of blood from the rectum is not serious, especially  if   hemorrhoids are present.)  6. Vomiting.  7. Weakness or dizziness.  GO DIRECTLY TO THE NEAREST EMERGENCY ROOM IF YOU HAVE ANY OF THE FOLLOWING:      Difficulty breathing              Chills and/or fever over 101 F   Persistent vomiting and/or vomiting blood   Severe abdominal pain   Severe chest pain   Black, tarry stools   Bleeding- more than one tablespoon   Any other symptom or condition that you feel may need urgent attention  Your doctor recommends these additional instructions:  If any biopsies were taken, your doctors clinic will contact you in 1 to 2   weeks with any results.  Continue your present medications.   Take Prilosec (omeprazole) 40 mg by mouth once a day.   Take Pepcid (famotidine) 40 mg by mouth every night.   Your physician has recommended a gastric emptying study at the next   available appointment (to evaluate gastric function).   Return to your GI clinic in 4-6 weeks.  For questions, problems or results please call your physician - David Guzman MD at Work:  [phone].  EMERGENCY PHONE NUMBER: [phone], LAB RESULTS: [phone]  IF A COMPLICATION OR EMERGENCY SITUATION ARISES AND YOU ARE UNABLE TO REACH   YOUR PHYSICIAN - GO DIRECTLY TO THE EMERGENCY ROOM.  ___________________________________________  Nurse Signature  ___________________________________________  Patient/Designated Responsible Party Signature  David Guzman MD  5/22/2025 9:15:22 AM  This report has been verified and signed electronically.  Dear patient,  As a result of recent federal legislation (The Federal Cures Act), you may   receive lab or pathology results from your procedure in your [facility]   account before your physician is able to contact you. Your physician or   their representative will relay the results to you with their   recommendations at their soonest availability.  Thank you.  [employer]OVATION

## 2025-05-22 NOTE — TRANSFER OF CARE
"Anesthesia Transfer of Care Note    Patient: Lyudmila Neri    Procedure(s) Performed: Procedure(s) (LRB):  EGD (ESOPHAGOGASTRODUODENOSCOPY) (N/A)    Patient location: PACU    Anesthesia Type: general    Transport from OR: Transported from OR on room air with adequate spontaneous ventilation    Post pain: adequate analgesia    Post assessment: no apparent anesthetic complications and tolerated procedure well    Post vital signs: stable    Level of consciousness: responds to stimulation and sedated    Nausea/Vomiting: no nausea/vomiting    Complications: none    Transfer of care protocol was followed      Last vitals: Visit Vitals  /61 (BP Location: Right arm, Patient Position: Lying)   Pulse 63   Temp 36.3 °C (97.3 °F) (Skin)   Resp 14   Ht 5' 3" (1.6 m)   Wt 69.9 kg (154 lb)   SpO2 98%   Breastfeeding No   BMI 27.28 kg/m²     "

## 2025-05-22 NOTE — BRIEF OP NOTE
Discharge Note  Short Stay      SUMMARY     Admit Date: 5/22/2025    Attending Physician: David Guzman Jr., MD     Discharge Physician: David Guzman Jr., MD    Discharge Date: 5/22/2025 9:33 AM    Final Diagnosis: Globus sensation [R09.A2]  Dysphagia, unspecified type [R13.10]      Impression:            - Prominent tonsils, ad the right one with stones.                          Otherwise, normal oropharynx.                          - Normal cricopharyngeus.                          - Normal upper third of esophagus and middle third                          of esophagus.                          - Reflux esophagitis, minimal. Biopsied, reflux                          vs. EoE.                          - Z-line regular, 37 cm from the incisors.                          - Patulous lower esophageal sphincter.                          - A large amount of food (residue) in the stomach                          (fundus, body, antrum).                          - Though view was limited, grossly normal gastric                          fundus, gastric body and antrum. Biopsied, and to                          r/o H pylori.                          - Normal pylorus.                          - Normal examined duodenum.                          - Gastroparesis, idiopathic etiology.   Recommendation:        - Discharge patient to home.                          - Await pathology results.                          - Continue present medications.                          - Use Prilosec (omeprazole) 40 mg PO daily.                          - Use Pepcid (famotidine) 40 mg PO nightly.                          - Do a gastric emptying study at the next                          available appointment.                          - Return to GI clinic in 4-6 weeks.   David Guzman MD   5/22/2025       Disposition: HOME OR SELF CARE    Patient Instructions:   Current Discharge Medication List        CONTINUE these medications  which have NOT CHANGED    Details   amLODIPine (NORVASC) 5 MG tablet TAKE 1 TABLET(5 MG) BY MOUTH EVERY DAY  Qty: 90 tablet, Refills: 3    Comments: .      azelastine (ASTELIN) 137 mcg (0.1 %) nasal spray 1 spray (137 mcg total) by Nasal route 2 (two) times daily.  Qty: 30 mL, Refills: 11      DULoxetine (CYMBALTA) 30 MG capsule Take 1 capsule (30 mg total) by mouth once daily.  Qty: 30 capsule, Refills: 6      fish oil-omega-3 fatty acids 300-1,000 mg capsule Take by mouth once daily.      levothyroxine (SYNTHROID) 50 MCG tablet Take 1 tablet (50 mcg total) by mouth before breakfast.  Qty: 90 tablet, Refills: 3      losartan-hydrochlorothiazide 100-12.5 mg (HYZAAR) 100-12.5 mg Tab Take 1 tablet by mouth once daily.  Qty: 90 tablet, Refills: 2    Comments: .      mirabegron (MYRBETRIQ) 50 mg Tb24 Take 1 tablet (50 mg total) by mouth once daily.  Qty: 90 tablet, Refills: 3      naproxen sodium (ANAPROX) 220 MG tablet Take 220 mg by mouth 2 (two) times daily with meals.      omeprazole (PRILOSEC) 40 MG capsule TAKE 1 CAPSULE BY MOUTH ONCE DAILY BEFORE BREAKFAST  Qty: 90 capsule, Refills: 3    Associated Diagnoses: Gastroesophageal reflux disease without esophagitis      solifenacin (VESICARE) 5 MG tablet Take 1 tablet (5 mg total) by mouth once daily.  Qty: 30 tablet, Refills: 11      albuterol (VENTOLIN HFA) 90 mcg/actuation inhaler Inhale 2 puffs into the lungs every 6 (six) hours as needed for Wheezing. Rescue  Qty: 18 g, Refills: 0    Associated Diagnoses: Cough, unspecified type      aspirin (ECOTRIN) 81 MG EC tablet Take 81 mg by mouth once daily.      cetirizine (ZYRTEC) 10 MG tablet Take 10 mg by mouth once daily.      cholecalciferol, vitamin D3, (VITAMIN D3) 25 mcg (1,000 unit) capsule Take 1,000 Units by mouth once daily.      clobetasol (TEMOVATE) 0.05 % external solution Apply topically as needed.    Associated Diagnoses: GIST (gastrointestinal stromal tumor), malignant      diclofenac sodium (VOLTAREN  ARTHRITIS PAIN) 1 % Gel Apply 2 g topically once daily.      estradioL (DIVIGEL) 0.5 mg/0.5 gram (0.1 %) GlPk Place 1 packet onto the skin once daily.  Qty: 30 packet, Refills: 3      famotidine (PEPCID) 40 MG tablet TAKE 1 TABLET(40 MG) BY MOUTH EVERY EVENING  Qty: 90 tablet, Refills: 4    Associated Diagnoses: Gastroesophageal reflux disease without esophagitis      FOLIC ACID/MULTIVIT-MIN/LUTEIN (CENTRUM SILVER ORAL) Take by mouth.      vit C/E/Zn/coppr/lutein/zeaxan (PRESERVISION AREDS-2 ORAL) Take by mouth 2 (two) times a day.             Discharge Procedure Orders (must include Diet, Follow-up, Activity)    Follow Up:  Follow up with PCP as per your routine.  Please follow a gastroparesis diet.  Activity as tolerated.    No driving day of procedure.

## 2025-05-22 NOTE — ANESTHESIA PREPROCEDURE EVALUATION
05/22/2025  Lyudmila Neri is a 75 y.o., female.    Anesthesia Evaluation    I have reviewed the Patient Summary Reports.     I have reviewed the Nursing Notes. I have reviewed the NPO Status.   I have reviewed the Medications.     Review of Systems  Anesthesia Hx:  No problems with previous Anesthesia                Hematology/Oncology:                        --  Cancer in past history:       Other (see Oncology comments)       surgery       Cardiovascular:     Hypertension, well controlled          PVD                                Pulmonary:        Sleep Apnea                Renal/:  Chronic Renal Disease, CKD   Recent UTI on ab's              Hepatic/GI:     GERD   H/o GIST; dysphagia               Musculoskeletal:  Arthritis          Spine Disorders: lumbar Degenerative disease and Disc disease           Neurological:  Neurology Normal                                      Endocrine:  Endocrine Normal            Psych:  Psychiatric History  depression                Physical Exam  General:  Well nourished       Airway/Jaw/Neck:  Airway Findings: Mouth Opening: Normal     Tongue: Normal      General Airway Assessment: Adult      Mallampati: I   TM Distance: Normal, at least 6 cm                Eyes/Ears/Nose:  EYES/EARS/NOSE FINDINGS: Normal    Dental:  Dental Findings:  Upper veneers    Chest/Lungs:  Chest/Lungs Findings:  Clear to auscultation, Normal Respiratory Rate       Heart/Vascular:  Heart Findings: Rate: Normal  Rhythm: Regular Rhythm  Sounds: Normal                       Mental Status:  Mental Status Findings:  Cooperative, Alert and Oriented         Anesthesia Plan  Type of Anesthesia, risks & benefits discussed:  Anesthesia Type:  general    Patient's Preference:   Plan Factors:          Intra-op Monitoring Plan: standard ASA monitors  Intra-op Monitoring Plan Comments:   Post Op Pain  Control Plan:   Post Op Pain Control Plan Comments:     Induction:   IV  Beta Blocker:  Patient is not currently on a Beta-Blocker (No further documentation required).       Informed Consent: Informed consent signed with the Patient and all parties understand the risks and agree with anesthesia plan.  All questions answered.  Anesthesia consent signed with patient.  ASA Score: 3     Day of Surgery Review of History & Physical: I have interviewed and examined the patient. I have reviewed the patient's H&P dated:  There are no significant changes.            Ready For Surgery From Anesthesia Perspective.             Physical Exam  General: Well nourished    Airway:  Mallampati: I   Mouth Opening: Normal  TM Distance: Normal, at least 6 cm  Tongue: Normal    Chest/Lungs:  Clear to auscultation, Normal Respiratory Rate    Heart:  Rate: Normal  Rhythm: Regular Rhythm  Sounds: Normal          Anesthesia Plan  Type of Anesthesia, risks & benefits discussed:    Anesthesia Type: general  Intra-op Monitoring Plan: standard ASA monitors  Induction:  IV  Informed Consent: Informed consent signed with the Patient and all parties understand the risks and agree with anesthesia plan.  All questions answered.   ASA Score: 3  Day of Surgery Review of History & Physical: I have interviewed and examined the patient. I have reviewed the patient's H&P dated:     Ready For Surgery From Anesthesia Perspective.       .

## 2025-05-23 ENCOUNTER — TELEPHONE (OUTPATIENT)
Dept: GASTROENTEROLOGY | Facility: CLINIC | Age: 75
End: 2025-05-23
Payer: MEDICARE

## 2025-05-23 NOTE — TELEPHONE ENCOUNTER
Called and spoke to pt, pt stated she is wanting to schedule a f/u appt recommended by Dr. Guzman.   Scheduled f/u with NP. Pt verbalized understanding. Pt stated she was told by Dr. Guzman that she needed to see nuclear medicine.  Advised pt I will send a message to Dr. Guzman regarding GES order.   Told pt I will give her a call back once order is placed, gave pt # to nuclear med.

## 2025-05-26 LAB
DHEA SERPL-MCNC: NORMAL
ESTROGEN SERPL-MCNC: NORMAL PG/ML
INSULIN SERPL-ACNC: NORMAL U[IU]/ML
LAB AP CLINICAL INFORMATION: NORMAL
LAB AP GROSS DESCRIPTION: NORMAL
LAB AP PERFORMING LOCATION(S): NORMAL
LAB AP REPORT FOOTNOTES: NORMAL

## 2025-05-29 DIAGNOSIS — K31.84 GASTROPARESIS: Primary | ICD-10-CM

## 2025-05-30 ENCOUNTER — RESULTS FOLLOW-UP (OUTPATIENT)
Dept: GASTROENTEROLOGY | Facility: HOSPITAL | Age: 75
End: 2025-05-30

## 2025-05-30 NOTE — PROGRESS NOTES
Please let patient know.  The esophagus and stomach biopsies came out fine.  And no evidence of the bacteria.  Rec remains the same.  Take your medicines as directed.      (And let's get her scheduled for the Gastric Empty Study).

## 2025-06-09 ENCOUNTER — OFFICE VISIT (OUTPATIENT)
Dept: OBSTETRICS AND GYNECOLOGY | Facility: CLINIC | Age: 75
End: 2025-06-09
Payer: MEDICARE

## 2025-06-09 VITALS
SYSTOLIC BLOOD PRESSURE: 120 MMHG | BODY MASS INDEX: 27.84 KG/M2 | DIASTOLIC BLOOD PRESSURE: 72 MMHG | WEIGHT: 157.19 LBS

## 2025-06-09 DIAGNOSIS — Z79.890 HORMONE REPLACEMENT THERAPY (HRT): ICD-10-CM

## 2025-06-09 DIAGNOSIS — Z12.72 SMEAR, VAGINAL, AS PART OF ROUTINE GYNECOLOGICAL EXAMINATION: Primary | ICD-10-CM

## 2025-06-09 DIAGNOSIS — Z01.419 ENCOUNTER FOR ANNUAL ROUTINE GYNECOLOGICAL EXAMINATION: ICD-10-CM

## 2025-06-09 DIAGNOSIS — Z01.419 SMEAR, VAGINAL, AS PART OF ROUTINE GYNECOLOGICAL EXAMINATION: Primary | ICD-10-CM

## 2025-06-09 PROCEDURE — 99999 PR PBB SHADOW E&M-EST. PATIENT-LVL IV: CPT | Mod: PBBFAC,,, | Performed by: OBSTETRICS & GYNECOLOGY

## 2025-06-09 PROCEDURE — 88175 CYTOPATH C/V AUTO FLUID REDO: CPT | Mod: TC | Performed by: OBSTETRICS & GYNECOLOGY

## 2025-06-09 RX ORDER — ESTRADIOL 0.05 MG/D
1 FILM, EXTENDED RELEASE TRANSDERMAL
Qty: 8 PATCH | Refills: 11 | Status: SHIPPED | OUTPATIENT
Start: 2025-06-09 | End: 2026-06-09

## 2025-06-09 RX ORDER — AMOXICILLIN AND CLAVULANATE POTASSIUM 500; 125 MG/1; MG/1
500 TABLET, FILM COATED ORAL 2 TIMES DAILY
COMMUNITY
Start: 2025-06-02 | End: 2025-06-12

## 2025-06-09 RX ORDER — IBUPROFEN 800 MG/1
800 TABLET, FILM COATED ORAL EVERY 6 HOURS PRN
COMMUNITY
Start: 2025-05-19

## 2025-06-09 NOTE — PROGRESS NOTES
Chief Complaint   Patient presents with    Annual Exam     C/o wants to change back to hrt patch the gel is too expensive        History and Physical:  No LMP recorded. Patient has had a hysterectomy.       Lyudmila Neri is a 75 y.o. WF who presents today for her routine annual GYN exam. The patient has no Gynecology complaints today. Wants to change gel to patch for HRT      Allergies:   Review of patient's allergies indicates:   Allergen Reactions    Codeine      Other reaction(s): Nausea       Past Medical History:   Diagnosis Date    Anticoagulant long-term use     Arthritis     osteoarthritis    Blood transfusion     Cancer 2011    stromal tumor, stomach    Depression     Disorder of kidney and ureter     CKD 2    GERD (gastroesophageal reflux disease)     GIST (gastrointestinal stromal tumor), malignant     H. pylori infection     Hypertension     KATHY (obstructive sleep apnea) 06/24/2013    Psoriasis 06/24/2013    Thyroid disease     Trouble in sleeping     arthritic pain wakes her up    Urinary incontinence     stress incontinence       Past Surgical History:   Procedure Laterality Date    ANKLE FRACTURE SURGERY      RT ankle    APPENDECTOMY      BLADDER SUSPENSION  1995    CARPAL TUNNEL RELEASE      left    CERVICAL FUSION      CHEILECTOMY Right 07/13/2023    Procedure: CHEILECTOMY;  Surgeon: Agusto Edwards DPM;  Location: [facility] OR;  Service: Podiatry;  Laterality: Right;    CHOLECYSTECTOMY  12/7/2011  Dr. Cai    COLONOSCOPY  7/26/2005  Thomas    Non-erosive esophageal reflux (NERD) disease?.  Post-surgical deformity in the gastric body.   Gastric mucosal abnormality (erythema) in the greater  curve of antrum.  STEVIE Test performed on 02/24/12 was Negative.    COLONOSCOPY  12/12/2008  Thomas    Small internal hemorrhoids.  Slightly redundant left colon.    COLONOSCOPY N/A 04/18/2018    Procedure: COLONOSCOPY;  Surgeon: David Guzman Jr., MD;  Location: [facility] ENDO;  Service: Endoscopy;  Laterality:  N/A;    COLONOSCOPY  04/18/2018    Dr. Guzman; hemorrhoids, otherwise unremarkable, repeat 5 years for surveillance due to family history of colon cancer    CYST REMOVAL Left 2016    left middle finger, Dr. Johnson    ENDOSCOPIC GASTROCNEMIUS RECESSION Right 07/13/2023    Procedure: RECESSION, GASTROCNEMIUS, ENDOSCOPIC;  Surgeon: Agusto Edwards DPM;  Location: [facility];  Service: Podiatry;  Laterality: Right;  popliteal saphenous block,    EPIDURAL STEROID INJECTION INTO CERVICAL SPINE N/A 8/8/2024    Procedure: Injection-steroid-epidural-cervical c7-T1;  Surgeon: Jamaica Olvera MD;  Location: [facility];  Service: Anesthesiology;  Laterality: N/A;    EPIDURAL STEROID INJECTION INTO LUMBAR SPINE N/A 3/18/2025    Procedure: Injection-steroid-epidural-lumbar L5/S1;  Surgeon: Walt Lawson MD;  Location: [facility];  Service: Pain Management;  Laterality: N/A;    ESOPHAGOGASTRODUODENOSCOPY N/A 05/14/2019    Procedure: EGD (ESOPHAGOGASTRODUODENOSCOPY);  Surgeon: David Guzman Jr., MD;  [facility];  Service: Endoscopy;  Laterality: N/A;    ESOPHAGOGASTRODUODENOSCOPY N/A 10/09/2023    Procedure: EGD (ESOPHAGOGASTRODUODENOSCOPY);  Surgeon: David Guzman Jr., MD;  [facility];  Service: Endoscopy;  Laterality: N/A;    ESOPHAGOGASTRODUODENOSCOPY N/A 5/22/2025    Procedure: EGD (ESOPHAGOGASTRODUODENOSCOPY);  Surgeon: David Guzman Jr., MD;  Location: [facility];  Service: Endoscopy;  Laterality: N/A;    EYE SURGERY Bilateral 1995    RK surgery    EYE SURGERY Bilateral 2015    cataract removal    FOOT ARTHRODESIS Right 07/13/2023    Procedure: FUSION, FOOT;  Surgeon: Agusto Edwards DPM;  Location: [facility];  Service: Podiatry;  Laterality: Right;  popliteal saphenous block, fusion of the naviculocuneiform and tarsometatarsal joints 1-3.    GASTRECTOMY      HYSTERECTOMY      KNEE ARTHROSCOPY W/ DEBRIDEMENT      lt knee    OOPHORECTOMY      SHOULDER OPEN ROTATOR CUFF REPAIR      left    STOMACH  SURGERY  2011  Dr. Cai    removal of GIST tumor from wall of the stomach, 2.3 cm in size.    TRANSFORAMINAL EPIDURAL INJECTION OF STEROID Bilateral 2019    Procedure: Injection,steroid,epidural,transforaminal approach L4/5;  Surgeon: Pa Pruett MD;  Location: [facility] OR;  Service: Pain Management;  Laterality: Bilateral;    TRANSFORAMINAL EPIDURAL INJECTION OF STEROID Bilateral 2019    Procedure: Injection,steroid,epidural,transforaminal approach L4/5;  Surgeon: Pa Pruett MD;  Location: [facility] OR;  Service: Pain Management;  Laterality: Bilateral;    UPPER GASTROINTESTINAL ENDOSCOPY  2012  Thomas    Non-erosive esophageal reflux (NERD) disease?.  Post-surgical deformity in the gastric body.   Gastric mucosal abnormality (erythema) in the greater  curve of antrum.  STEVIE Test performed on 12 was Negative.    UPPER GASTROINTESTINAL ENDOSCOPY  2018    Dr. Guzman, antritis, evidence of prior surgery with healthy mucosa       MEDS: Medications Ordered Prior to Encounter[1]    OB History          1    Para   1    Term   1            AB        Living   1         SAB        IAB        Ectopic        Multiple        Live Births               Obstetric Comments   Has total of 3 children.                Social History[2]    Family History   Problem Relation Name Age of Onset    Arthritis Paternal Grandmother          rheumatoid arthritis    Diabetes Paternal Grandmother      Heart disease Maternal Grandfather      Cancer Father          brain and lung    Arthritis Father      Heart disease Mother      Arthritis Mother          osteoarthritis    COPD Mother      Hyperlipidemia Mother      Hypertension Mother      Kidney disease Mother      Stroke Mother      Colon cancer Mother  75    Cancer Mother  70        colon cancer    COPD Sister      Depression Daughter      Arthritis Maternal Aunt          rheumatoid arthritis    Heart disease Maternal Uncle      Early death  Maternal Uncle          in 50s due to heart disease    Arthritis Paternal Aunt          rheuamtoid arthritis    Diabetes Cousin      Heart disease Cousin      Crohn's disease Neg Hx      Stomach cancer Neg Hx      Ulcerative colitis Neg Hx      Esophageal cancer Neg Hx      Breast cancer Neg Hx           Past medical and surgical history reviewed.   I have reviewed the patient's medical history in detail and updated the computerized patient record.        Review of System:   General: no chills, fever, night sweats, weight gain or weight loss  Psychological: no depression or suicidal ideation  Breasts: no new or changing breast lumps, nipple discharge or masses.  Respiratory: no cough, shortness of breath, or wheezing  Cardiovascular: no chest pain or dyspnea on exertion  Gastrointestinal: no abdominal pain, change in bowel habits, or black or bloody stools  Genito-Urinary: no incontinence, urinary frequency/urgency or vulvar/vaginal symptoms, pelvic pain or abnormal vaginal bleeding.  Musculoskeletal: no gait disturbance or muscular weakness      Physical Exam:   /72 (BP Location: Right arm, Patient Position: Sitting)   Wt 71.3 kg (157 lb 3 oz)   BMI 27.84 kg/m²   Constitutional: She is oriented to person, place, and time. She appears well-developed and well-nourished. No distress.  HENT:   Head: Normocephalic and atraumatic.   Eyes: Conjunctivae and EOM are normal. No scleral icterus.   Neck: Normal range of motion. Neck supple. No tracheal deviation present.   Cardiovascular: Normal rate.    Pulmonary/Chest: Effort normal. No respiratory distress. She exhibits no tenderness.  Breasts: are symmetrical.no masses    Right breast exhibits no inverted nipple, no mass, no nipple discharge, no skin change and no tenderness.   Left breast exhibits no inverted nipple, no mass, no nipple discharge, no skin change and no tenderness.  Abdominal: Soft. She exhibits no distension and no mass. There is no tenderness.  There is no rebound and no guarding.   Genitourinary:    External rectal exam shows no thrombosed external hemorrhoids.    Pelvic exam was performed with patient supine.   No labial fusion.   There is no rash, lesion or injury on the right labia.   There is no rash, lesion or injury on the left labia.   No bleeding and no signs of injury around the vaginal introitus, urethra is without lesions and well supported.    No vaginal discharge found.    No significant Cystocele, Enterocele or rectocele, and cuff well supported.   Bimanual exam:   The urethra and vagina are without palpable masses or tenderness.   Uterus and cervix are surgically absents, vaginal cuff is intact and well supported.   Right adnexum displays no mass and no tenderness.   Left adnexum displays no mass and no tenderness.  Musculoskeletal: Normal range of motion.   Lymphadenopathy: No inguinal adenopathy present.   Neurological: She is alert and oriented to person, place, and time. Coordination normal.   Skin: Skin is warm and dry. She is not diaphoretic.   Psychiatric: She has a normal mood and affect.        Assessment:   Normal annual GYN exam  1. Smear, vaginal, as part of routine gynecological examination  Liquid-Based Pap Smear, Screening      2. Encounter for annual routine gynecological examination        3. Hormone replacement therapy (HRT)          Hx thyroid nodules- followed by PCP  GI workup in progress for gastric emptying    Plan:   PAP   Mammogram Nov  Change divigel to vivelle 0.05mg/d  Follow up in 1 year.           [1]   Current Outpatient Medications on File Prior to Visit   Medication Sig Dispense Refill    amLODIPine (NORVASC) 5 MG tablet TAKE 1 TABLET(5 MG) BY MOUTH EVERY DAY 90 tablet 3    amoxicillin-clavulanate 500-125mg (AUGMENTIN) 500-125 mg Tab Take 500 mg by mouth 2 (two) times daily.      aspirin (ECOTRIN) 81 MG EC tablet Take 81 mg by mouth once daily.      azelastine (ASTELIN) 137 mcg (0.1 %) nasal spray 1 spray  (137 mcg total) by Nasal route 2 (two) times daily. 30 mL 11    cetirizine (ZYRTEC) 10 MG tablet Take 10 mg by mouth once daily.      cholecalciferol, vitamin D3, (VITAMIN D3) 25 mcg (1,000 unit) capsule Take 1,000 Units by mouth once daily.      diclofenac sodium (VOLTAREN ARTHRITIS PAIN) 1 % Gel Apply 2 g topically once daily.      DULoxetine (CYMBALTA) 30 MG capsule Take 1 capsule (30 mg total) by mouth once daily. 30 capsule 6    famotidine (PEPCID) 40 MG tablet TAKE 1 TABLET(40 MG) BY MOUTH EVERY EVENING 90 tablet 4    fish oil-omega-3 fatty acids 300-1,000 mg capsule Take by mouth once daily.      FOLIC ACID/MULTIVIT-MIN/LUTEIN (CENTRUM SILVER ORAL) Take by mouth.      ibuprofen (ADVIL,MOTRIN) 800 MG tablet Take 800 mg by mouth every 6 (six) hours as needed.      levothyroxine (SYNTHROID) 50 MCG tablet Take 1 tablet (50 mcg total) by mouth before breakfast. 90 tablet 3    losartan-hydrochlorothiazide 100-12.5 mg (HYZAAR) 100-12.5 mg Tab Take 1 tablet by mouth once daily. 90 tablet 2    mirabegron (MYRBETRIQ) 50 mg Tb24 Take 1 tablet (50 mg total) by mouth once daily. 90 tablet 3    naproxen sodium (ANAPROX) 220 MG tablet Take 220 mg by mouth 2 (two) times daily with meals.      omeprazole (PRILOSEC) 40 MG capsule TAKE 1 CAPSULE BY MOUTH ONCE DAILY BEFORE BREAKFAST 90 capsule 3    solifenacin (VESICARE) 5 MG tablet Take 1 tablet (5 mg total) by mouth once daily. 30 tablet 11    vit C/E/Zn/coppr/lutein/zeaxan (PRESERVISION AREDS-2 ORAL) Take by mouth 2 (two) times a day.      [DISCONTINUED] estradioL (DIVIGEL) 0.5 mg/0.5 gram (0.1 %) GlPk Place 1 packet onto the skin once daily. 30 packet 3    albuterol (VENTOLIN HFA) 90 mcg/actuation inhaler Inhale 2 puffs into the lungs every 6 (six) hours as needed for Wheezing. Rescue (Patient not taking: Reported on 4/28/2025) 18 g 0    [DISCONTINUED] clobetasol (TEMOVATE) 0.05 % external solution Apply topically as needed. (Patient not taking: Reported on 4/28/2025)        No current facility-administered medications on file prior to visit.   [2]   Social History  Socioeconomic History    Marital status:    Tobacco Use    Smoking status: Never    Smokeless tobacco: Never   Substance and Sexual Activity    Alcohol use: No    Drug use: No    Sexual activity: Yes     Partners: Male     Social Drivers of Health     Financial Resource Strain: Low Risk  (6/26/2024)    Overall Financial Resource Strain (CARDIA)     Difficulty of Paying Living Expenses: Not hard at all   Food Insecurity: No Food Insecurity (6/26/2024)    Hunger Vital Sign     Worried About Running Out of Food in the Last Year: Never true     Ran Out of Food in the Last Year: Never true   Transportation Needs: No Transportation Needs (6/26/2024)    PRAPARE - Transportation     Lack of Transportation (Medical): No     Lack of Transportation (Non-Medical): No   Physical Activity: Inactive (6/26/2024)    Exercise Vital Sign     Days of Exercise per Week: 0 days     Minutes of Exercise per Session: 0 min   Stress: Stress Concern Present (6/26/2024)    [employer] - Occupational Stress Questionnaire     Feeling of Stress : To some extent   Housing Stability: Unknown (6/26/2024)    Housing Stability Vital Sign     Unable to Pay for Housing in the Last Year: No     Homeless in the Last Year: No

## 2025-06-11 ENCOUNTER — TELEPHONE (OUTPATIENT)
Dept: OBSTETRICS AND GYNECOLOGY | Facility: CLINIC | Age: 75
End: 2025-06-11
Payer: MEDICARE

## 2025-06-11 ENCOUNTER — RESULTS FOLLOW-UP (OUTPATIENT)
Dept: OBSTETRICS AND GYNECOLOGY | Facility: CLINIC | Age: 75
End: 2025-06-11

## 2025-06-11 LAB
INSULIN SERPL-ACNC: NORMAL U[IU]/ML
LAB AP BETHESDA CATEGORY: NORMAL
LAB AP CLINICAL FINDINGS: NORMAL
LAB AP CONTRACEPTIVES: NORMAL
LAB AP OCHS PAP SPECIMEN ADEQUACY: NORMAL
LAB AP OHS PAP INTERPRETATION: NORMAL
LAB AP PAP DISCLAIMER COMMENTS: NORMAL
LAB AP PAP ESTROGEN REPLACEMENT THERAPY: NORMAL
LAB AP PAP PMP: NORMAL
LAB AP PAP PREVIOUS BX: NORMAL
LAB AP PAP PRIOR TREATMENT: NORMAL
LAB AP PERFORMING LOCATION(S): NORMAL

## 2025-06-11 NOTE — TELEPHONE ENCOUNTER
Copied from CRM #[id number]. Topic: General Inquiry - Return Call  >> Jun 11, 2025 10:43 AM Emmie wrote:  Type:  Patient Returning Call    Who Called:  Pt  Who Left Message for Patient:  Jannette  Does the patient know what this is regarding?:  Yes  Best Call Back Number:  [phone]    Additional Information:

## 2025-06-11 NOTE — TELEPHONE ENCOUNTER
Pt advised patch is not covered under her plan would need covered med or cash pay. Pt stated she rec'd letter with list of covered meds. Pt advised to upload to portal or drop off to see medications covered for new script if needed. Pt verbalized understanding.

## 2025-06-11 NOTE — TELEPHONE ENCOUNTER
MIRZA-rec'd message from [facility] pharmacy: estradiol 0.05mg patch (twice wkly) is not covered under her plan.

## 2025-06-24 ENCOUNTER — HOSPITAL ENCOUNTER (OUTPATIENT)
Dept: RADIOLOGY | Facility: HOSPITAL | Age: 75
Discharge: HOME | End: 2025-06-24
Attending: INTERNAL MEDICINE
Payer: MEDICARE

## 2025-06-24 DIAGNOSIS — K31.84 GASTROPARESIS: ICD-10-CM

## 2025-06-24 PROCEDURE — 78264 GASTRIC EMPTYING IMG STUDY: CPT | Mod: 26,,, | Performed by: RADIOLOGY

## 2025-06-24 PROCEDURE — A9541 TC99M SULFUR COLLOID: HCPCS | Mod: PO | Performed by: INTERNAL MEDICINE

## 2025-06-24 PROCEDURE — 78264 GASTRIC EMPTYING IMG STUDY: CPT | Mod: TC,PO

## 2025-06-24 RX ORDER — TECHNETIUM TC 99M SULFUR COLLOID 2 MG
1.05 KIT MISCELLANEOUS
Status: COMPLETED | OUTPATIENT
Start: 2025-06-24 | End: 2025-06-24

## 2025-06-24 RX ADMIN — TECHNETIUM TC 99M SULFUR COLLOID KIT 1.05 MILLICURIE: KIT at 11:06

## 2025-07-03 ENCOUNTER — OFFICE VISIT (OUTPATIENT)
Dept: FAMILY MEDICINE | Facility: CLINIC | Age: 75
End: 2025-07-03
Payer: MEDICARE

## 2025-07-03 VITALS
HEART RATE: 62 BPM | WEIGHT: 154.19 LBS | DIASTOLIC BLOOD PRESSURE: 74 MMHG | BODY MASS INDEX: 27.32 KG/M2 | SYSTOLIC BLOOD PRESSURE: 102 MMHG | HEIGHT: 63 IN | OXYGEN SATURATION: 96 %

## 2025-07-03 DIAGNOSIS — I77.9 BILATERAL CAROTID ARTERY DISEASE, UNSPECIFIED TYPE: ICD-10-CM

## 2025-07-03 DIAGNOSIS — G47.33 OSA (OBSTRUCTIVE SLEEP APNEA): ICD-10-CM

## 2025-07-03 DIAGNOSIS — M51.369 DEGENERATION OF INTERVERTEBRAL DISC OF LUMBAR REGION WITHOUT DISCOGENIC BACK PAIN OR LOWER EXTREMITY PAIN: ICD-10-CM

## 2025-07-03 DIAGNOSIS — M54.16 LUMBAR RADICULOPATHY: ICD-10-CM

## 2025-07-03 DIAGNOSIS — L40.9 PSORIASIS: ICD-10-CM

## 2025-07-03 DIAGNOSIS — N18.2 CHRONIC KIDNEY DISEASE, STAGE II (MILD): ICD-10-CM

## 2025-07-03 DIAGNOSIS — H35.3131 EARLY DRY STAGE NONEXUDATIVE AGE-RELATED MACULAR DEGENERATION OF BOTH EYES: ICD-10-CM

## 2025-07-03 DIAGNOSIS — I10 ESSENTIAL HYPERTENSION: ICD-10-CM

## 2025-07-03 DIAGNOSIS — Z00.00 ENCOUNTER FOR MEDICARE ANNUAL WELLNESS EXAM: Primary | ICD-10-CM

## 2025-07-03 PROCEDURE — 1160F RVW MEDS BY RX/DR IN RCRD: CPT | Mod: CPTII,S$GLB,, | Performed by: NURSE PRACTITIONER

## 2025-07-03 PROCEDURE — 99999 PR PBB SHADOW E&M-EST. PATIENT-LVL IV: CPT | Mod: PBBFAC,,, | Performed by: NURSE PRACTITIONER

## 2025-07-03 PROCEDURE — 1125F AMNT PAIN NOTED PAIN PRSNT: CPT | Mod: CPTII,S$GLB,, | Performed by: NURSE PRACTITIONER

## 2025-07-03 PROCEDURE — 3074F SYST BP LT 130 MM HG: CPT | Mod: CPTII,S$GLB,, | Performed by: NURSE PRACTITIONER

## 2025-07-03 PROCEDURE — 1101F PT FALLS ASSESS-DOCD LE1/YR: CPT | Mod: CPTII,S$GLB,, | Performed by: NURSE PRACTITIONER

## 2025-07-03 PROCEDURE — 3288F FALL RISK ASSESSMENT DOCD: CPT | Mod: CPTII,S$GLB,, | Performed by: NURSE PRACTITIONER

## 2025-07-03 PROCEDURE — G0439 PPPS, SUBSEQ VISIT: HCPCS | Mod: S$GLB,,, | Performed by: NURSE PRACTITIONER

## 2025-07-03 PROCEDURE — 1158F ADVNC CARE PLAN TLK DOCD: CPT | Mod: CPTII,S$GLB,, | Performed by: NURSE PRACTITIONER

## 2025-07-03 PROCEDURE — 1159F MED LIST DOCD IN RCRD: CPT | Mod: CPTII,S$GLB,, | Performed by: NURSE PRACTITIONER

## 2025-07-03 PROCEDURE — 3078F DIAST BP <80 MM HG: CPT | Mod: CPTII,S$GLB,, | Performed by: NURSE PRACTITIONER

## 2025-07-03 NOTE — PATIENT INSTRUCTIONS
Counseling and Referral of Other Preventative  (Italic type indicates deductible and co-insurance are waived)    Patient Name: Lyudmila Neri  Today's Date: 7/3/2025    Health Maintenance       Date Due Completion Date    Influenza Vaccine (1) 09/01/2025 11/5/2024    Mammogram 11/05/2025 11/5/2024    DEXA Scan 11/02/2028 11/2/2023    TETANUS VACCINE 04/16/2029 4/16/2019    Override on 9/23/2016: Declined    Lipid Panel 10/03/2029 10/3/2024        No orders of the defined types were placed in this encounter.    The following information is provided to all patients.  This information is to help you find resources for any of the problems found today that may be affecting your health:                  Living healthy guide: www.[facility].[place].gov      Understanding Diabetes: www.diabetes.org      Eating healthy: www.cdc.gov/healthyweight      CDC home safety checklist: www.cdc.gov/steadi/patient.html      Agency on Aging: www.goea.[place].[facility]      Alcoholics anonymous (AA): www.aa.org      Physical Activity: www.eliud.nih.gov/fh4frtt      Tobacco use: www.quitwithusla.org

## 2025-07-07 NOTE — PROGRESS NOTES
"  Lyudmila Neri presented for a follow-up Medicare AWV today. The following components were reviewed and updated:    Medical history  Family History  Social history  Allergies and Current Medications  Health Risk Assessment  Health Maintenance  Care Team    **See Completed Assessments for Annual Wellness visit with in the encounter summary    The following assessments were completed:  Depression Screening  Cognitive function Screening    Timed Get Up Test  Whisper Test      Opioid documentation:      Patient does not have a current opioid prescription.          Vitals:    07/03/25 0909   BP: 102/74   BP Location: Right forearm   Patient Position: Sitting   Pulse: 62   SpO2: 96%   Weight: 70 kg (154 lb 3.4 oz)   Height: 5' 3" (1.6 m)     Body mass index is 27.32 kg/m².       Physical Exam  Vitals reviewed.   Constitutional:       General: She is not in acute distress.  HENT:      Head: Normocephalic.   Cardiovascular:      Rate and Rhythm: Normal rate.   Pulmonary:      Effort: Pulmonary effort is normal. No respiratory distress.   Skin:     General: Skin is warm.   Neurological:      General: No focal deficit present.      Mental Status: She is alert.   Psychiatric:         Mood and Affect: Mood normal.           Diagnoses and health risks identified today and associated recommendations/orders:  1. Encounter for Medicare annual wellness exam  Reviewed health maintenance and provided recommendations    Health maintenance is UTD    2. KATHY (obstructive sleep apnea)  Continue to monitor  Followed by [place] DO .      3. Degeneration of intervertebral disc of lumbar region without discogenic back pain or lower extremity pain  Continue to monitor  Followed by Dr Lawson.      4. Lumbar radiculopathy  Continue to monitor  Followed by Dr Lawson.      5. Early dry stage nonexudative age-related macular degeneration of both eyes  Continue to monitor  Followed by Dr Andres.      6. Bilateral carotid artery disease, " unspecified type  Continue to monitor  Followed by [place] DO .      7. Essential hypertension  Continue to monitor  Followed by [place] DO .      8. Chronic kidney disease, stage II (mild)  Continue to monitor  Followed by [place] DO   Encourage adequate water intake.      9. Psoriasis  Continue to monitor  Followed by [place] DO .        Provided Lyudmila with a 5-10 year written screening schedule and personal prevention plan. Recommendations were developed using the USPSTF age appropriate recommendations. Education, counseling, and referrals were provided as needed.  After Visit Summary printed and given to patient which includes a list of additional screenings\tests needed.    No follow-ups on file.      Tamie Torres NP

## 2025-07-08 ENCOUNTER — OFFICE VISIT (OUTPATIENT)
Dept: OTOLARYNGOLOGY | Facility: CLINIC | Age: 75
End: 2025-07-08
Payer: MEDICARE

## 2025-07-08 VITALS — WEIGHT: 155 LBS | BODY MASS INDEX: 27.46 KG/M2 | HEIGHT: 63 IN

## 2025-07-08 DIAGNOSIS — G47.33 OSA (OBSTRUCTIVE SLEEP APNEA): ICD-10-CM

## 2025-07-08 PROCEDURE — 1126F AMNT PAIN NOTED NONE PRSNT: CPT | Mod: CPTII,S$GLB,, | Performed by: OTOLARYNGOLOGY

## 2025-07-08 PROCEDURE — 1101F PT FALLS ASSESS-DOCD LE1/YR: CPT | Mod: CPTII,S$GLB,, | Performed by: OTOLARYNGOLOGY

## 2025-07-08 PROCEDURE — 99999 PR PBB SHADOW E&M-EST. PATIENT-LVL IV: CPT | Mod: PBBFAC,,, | Performed by: OTOLARYNGOLOGY

## 2025-07-08 PROCEDURE — 3288F FALL RISK ASSESSMENT DOCD: CPT | Mod: CPTII,S$GLB,, | Performed by: OTOLARYNGOLOGY

## 2025-07-08 PROCEDURE — 1159F MED LIST DOCD IN RCRD: CPT | Mod: CPTII,S$GLB,, | Performed by: OTOLARYNGOLOGY

## 2025-07-08 PROCEDURE — 1160F RVW MEDS BY RX/DR IN RCRD: CPT | Mod: CPTII,S$GLB,, | Performed by: OTOLARYNGOLOGY

## 2025-07-08 PROCEDURE — 99213 OFFICE O/P EST LOW 20 MIN: CPT | Mod: S$GLB,,, | Performed by: OTOLARYNGOLOGY

## 2025-07-08 NOTE — PATIENT INSTRUCTIONS
Dental appliance for snoring / sleep apnea    Dr. Regino Geller  [address]  [place] [address]  Phone: [phone]  Fax: [phone]  [email or web address]-[email or web address]

## 2025-07-08 NOTE — PROGRESS NOTES
Subjective:       Patient ID: Lyudmila Neri is a 75 y.o. female.    Chief Complaint: Sleep Apnea (2nd opinion for inspire )      Lyudmila is here for follow-up.   Here today for KATHY.   She saw Perico > 1 month ago with the same issue.  She is here for a 2nd opinion as to whether inspire would be a possibility    Patient validated questionnaires (if applicable):      %            No data to display                   No data to display                   No data to display                     Review of Systems   Constitutional: Negative for activity change and appetite change.   Respiratory: Negative for difficulty breathing and wheezing   Cardiovascular: Negative for chest pain.      Objective:        Constitutional:   Vital signs are normal. She appears well-developed and well-nourished.     Head:  Normocephalic and atraumatic.     Ears:  Hearing normal to normal and whispered voice; external ear normal without scars, lesions, or masses; ear canal, tympanic membrane, and middle ear normal..     Nose:  Nose normal including turbinates, nasal mucosa, sinuses and nasal septum.     Mouth/Throat  Oropharynx clear and moist without lesions or asymmetry.     Neck:  Neck normal without thyromegaly masses, asymmetry, normal tracheal structure, crepitus, and tenderness.         Tests / Results:  Personally reviewed the sleep study    In-lab sleep study from April 25 was a split night study with an AHI 4% of 9.8 oxygen jacque of 81%.  Home sleep study 4% was 14.4 GHADA    Assessment:       1. KATHY (obstructive sleep apnea)          Plan:         I confirmed with the patient that unfortunately she is not candidate for inspire because Medicare guidelines utilize the 4% hypopnea rule.  Based on this, she has mild sleep apnea the we discussed that most medical professionals use the 3% (which has her in moderate.)    We could consider a DISE to evaluate whether any other static procedures would benefit her; however, she is not  interested at this time.  I did give her information on oral appliance and she will contact me if she would like a referral

## 2025-07-14 ENCOUNTER — OFFICE VISIT (OUTPATIENT)
Dept: GASTROENTEROLOGY | Facility: CLINIC | Age: 75
End: 2025-07-14
Payer: MEDICARE

## 2025-07-14 ENCOUNTER — OFFICE VISIT (OUTPATIENT)
Dept: OPTOMETRY | Facility: CLINIC | Age: 75
End: 2025-07-14
Payer: MEDICARE

## 2025-07-14 VITALS — WEIGHT: 156.5 LBS | HEIGHT: 63 IN | BODY MASS INDEX: 27.73 KG/M2

## 2025-07-14 DIAGNOSIS — Z98.890 HISTORY OF ESOPHAGOGASTRODUODENOSCOPY (EGD): Primary | ICD-10-CM

## 2025-07-14 DIAGNOSIS — K59.09 CHRONIC CONSTIPATION: ICD-10-CM

## 2025-07-14 DIAGNOSIS — K21.00 GASTROESOPHAGEAL REFLUX DISEASE WITH ESOPHAGITIS WITHOUT HEMORRHAGE: ICD-10-CM

## 2025-07-14 DIAGNOSIS — Z90.49 S/P CHOLECYSTECTOMY: ICD-10-CM

## 2025-07-14 DIAGNOSIS — R13.19 ESOPHAGEAL DYSPHAGIA: ICD-10-CM

## 2025-07-14 DIAGNOSIS — H18.513 FUCHS' CORNEAL DYSTROPHY OF BOTH EYES: ICD-10-CM

## 2025-07-14 DIAGNOSIS — H35.3131 EARLY DRY STAGE NONEXUDATIVE AGE-RELATED MACULAR DEGENERATION OF BOTH EYES: Primary | ICD-10-CM

## 2025-07-14 DIAGNOSIS — H52.7 REFRACTIVE ERROR: ICD-10-CM

## 2025-07-14 DIAGNOSIS — Z96.1 PSEUDOPHAKIA OF BOTH EYES: ICD-10-CM

## 2025-07-14 DIAGNOSIS — Z12.11 SCREENING FOR COLON CANCER: ICD-10-CM

## 2025-07-14 PROCEDURE — 1159F MED LIST DOCD IN RCRD: CPT | Mod: CPTII,S$GLB,, | Performed by: OPTOMETRIST

## 2025-07-14 PROCEDURE — 99999 PR PBB SHADOW E&M-EST. PATIENT-LVL IV: CPT | Mod: PBBFAC,,, | Performed by: NURSE PRACTITIONER

## 2025-07-14 PROCEDURE — 99999 PR PBB SHADOW E&M-EST. PATIENT-LVL III: CPT | Mod: PBBFAC,,, | Performed by: OPTOMETRIST

## 2025-07-14 PROCEDURE — 92004 COMPRE OPH EXAM NEW PT 1/>: CPT | Mod: S$GLB,,, | Performed by: OPTOMETRIST

## 2025-07-14 PROCEDURE — 1160F RVW MEDS BY RX/DR IN RCRD: CPT | Mod: CPTII,S$GLB,, | Performed by: OPTOMETRIST

## 2025-07-14 PROCEDURE — 92015 DETERMINE REFRACTIVE STATE: CPT | Mod: S$GLB,,, | Performed by: OPTOMETRIST

## 2025-07-14 PROCEDURE — 1126F AMNT PAIN NOTED NONE PRSNT: CPT | Mod: CPTII,S$GLB,, | Performed by: OPTOMETRIST

## 2025-07-14 NOTE — PROGRESS NOTES
Subjective:       Patient ID: Lyudmila Neri is a 75 y.o. female, Body mass index is 27.73 kg/m².    Chief Complaint: Follow-up      Established patient of Jailene Roberson, NP & myself.    Gastroesophageal Reflux  She complains of dysphagia (dysphagia to solids, especially rice; denies trouble swallowing liquids) and nausea. She reports no abdominal pain, no belching, no chest pain, no choking, no coughing, no early satiety, no globus sensation, no heartburn (Denies heartburn/dyspepsia taking PPI), no hoarse voice, no sore throat, no stridor, no tooth decay, no water brash or no wheezing. This is a chronic problem. The current episode started more than 1 month ago. The problem occurs occasionally. The problem has been unchanged. The symptoms are aggravated by certain foods. Pertinent negatives include no anemia, melena or weight loss. Risk factors include NSAIDs. She has tried a PPI, a histamine-2 antagonist and a diet change (Currently: Prilosec 40 mg once daily and Pepcid 40 mg nightly- helps; has been following a gastroparesis diet since EGD- helps constipation and nausea) for the symptoms. Past procedures include an EGD.     Review of Systems   Constitutional:  Negative for appetite change, fever, unexpected weight change and weight loss.   HENT:  Positive for trouble swallowing. Negative for hoarse voice and sore throat.    Respiratory:  Negative for cough, choking, shortness of breath and wheezing.    Cardiovascular:  Negative for chest pain.   Gastrointestinal:  Positive for constipation (chronic problem; gastroparesis diet has significantly helped), dysphagia (dysphagia to solids, especially rice; denies trouble swallowing liquids) and nausea. Negative for abdominal distention, abdominal pain, anal bleeding, blood in stool, diarrhea, heartburn (Denies heartburn/dyspepsia taking PPI), melena, rectal pain and vomiting.   Genitourinary:  Negative for difficulty urinating and dysuria.    Musculoskeletal:  Negative for gait problem.   Skin:  Negative for rash.   Neurological:  Negative for speech difficulty.   Psychiatric/Behavioral:  Negative for confusion.        Past Medical History:   Diagnosis Date    Anticoagulant long-term use     Arthritis     osteoarthritis    Blood transfusion     Cancer 2011    stromal tumor, stomach    Depression     Disorder of kidney and ureter     CKD 2    GERD (gastroesophageal reflux disease)     GIST (gastrointestinal stromal tumor), malignant     H. pylori infection     Hypertension     KATHY (obstructive sleep apnea) 06/24/2013    Psoriasis 06/24/2013    Thyroid disease     Trouble in sleeping     arthritic pain wakes her up    Urinary incontinence     stress incontinence      Past Surgical History:   Procedure Laterality Date    ANKLE FRACTURE SURGERY      RT ankle    APPENDECTOMY      BLADDER SUSPENSION  1995    CARPAL TUNNEL RELEASE      left    CATARACT EXTRACTION      CERVICAL FUSION      CHEILECTOMY Right 07/13/2023    Procedure: CHEILECTOMY;  Surgeon: Agusto Edwards DPM;  Location: [facility] OR;  Service: Podiatry;  Laterality: Right;    CHOLECYSTECTOMY  12/7/2011  Dr. Cai    COLONOSCOPY  7/26/2005  Thomas    Non-erosive esophageal reflux (NERD) disease?.  Post-surgical deformity in the gastric body.   Gastric mucosal abnormality (erythema) in the greater  curve of antrum.  STEVIE Test performed on 02/24/12 was Negative.    COLONOSCOPY  12/12/2008  Thomas    Small internal hemorrhoids.  Slightly redundant left colon.    COLONOSCOPY N/A 04/18/2018    Procedure: COLONOSCOPY;  Surgeon: David Guzman Jr., MD;  Location: [facility];  Service: Endoscopy;  Laterality: N/A;    COLONOSCOPY  04/18/2018    Dr. Guzman; hemorrhoids, otherwise unremarkable, repeat 5 years for surveillance due to family history of colon cancer    CYST REMOVAL Left 2016    left middle finger, Dr. Johnson    ENDOSCOPIC GASTROCNEMIUS RECESSION Right 07/13/2023    Procedure: RECESSION,  GASTROCNEMIUS, ENDOSCOPIC;  Surgeon: Agusto Edwards DPM;  Location: [facility] OR;  Service: Podiatry;  Laterality: Right;  popliteal saphenous block,    EPIDURAL STEROID INJECTION INTO CERVICAL SPINE N/A 08/08/2024    Procedure: Injection-steroid-epidural-cervical c7-T1;  Surgeon: Jamaica Olvera MD;  Location: [facility] OR;  Service: Anesthesiology;  Laterality: N/A;    EPIDURAL STEROID INJECTION INTO LUMBAR SPINE N/A 03/18/2025    Procedure: Injection-steroid-epidural-lumbar L5/S1;  Surgeon: Walt Lawson MD;  Location: [facility] OR;  Service: Pain Management;  Laterality: N/A;    ESOPHAGOGASTRODUODENOSCOPY N/A 05/14/2019    Procedure: EGD (ESOPHAGOGASTRODUODENOSCOPY);  Surgeon: David Guzman Jr., MD;  Location: [facility];  Service: Endoscopy;  Laterality: N/A;    ESOPHAGOGASTRODUODENOSCOPY N/A 10/09/2023    Procedure: EGD (ESOPHAGOGASTRODUODENOSCOPY);  Surgeon: David Guzman Jr., MD;  Location: [facility];  Service: Endoscopy;  Laterality: N/A;    ESOPHAGOGASTRODUODENOSCOPY N/A 05/22/2025    Procedure: EGD (ESOPHAGOGASTRODUODENOSCOPY);  Surgeon: David Guzman Jr., MD;  Location: [facility];  Service: Endoscopy;  Laterality: N/A;    EYE SURGERY Bilateral 1995    RK surgery    EYE SURGERY Bilateral 2015    cataract removal    FOOT ARTHRODESIS Right 07/13/2023    Procedure: FUSION, FOOT;  Surgeon: Agusto Edwards DPM;  Location: [facility] OR;  Service: Podiatry;  Laterality: Right;  popliteal saphenous block, fusion of the naviculocuneiform and tarsometatarsal joints 1-3.    GASTRECTOMY      HYSTERECTOMY      KNEE ARTHROSCOPY W/ DEBRIDEMENT      lt knee    OOPHORECTOMY      SHOULDER OPEN ROTATOR CUFF REPAIR      left    STOMACH SURGERY  12/7/2011  Dr. Cai    removal of GIST tumor from wall of the stomach, 2.3 cm in size.    TRANSFORAMINAL EPIDURAL INJECTION OF STEROID Bilateral 04/09/2019    Procedure: Injection,steroid,epidural,transforaminal approach L4/5;  Surgeon: Pa Pruett MD;  Location: [facility];   Service: Pain Management;  Laterality: Bilateral;    TRANSFORAMINAL EPIDURAL INJECTION OF STEROID Bilateral 06/05/2019    Procedure: Injection,steroid,epidural,transforaminal approach L4/5;  Surgeon: Pa Pruett MD;  Location: [facility] OR;  Service: Pain Management;  Laterality: Bilateral;    UPPER GASTROINTESTINAL ENDOSCOPY  2/24/2012  Thomas    Non-erosive esophageal reflux (NERD) disease?.  Post-surgical deformity in the gastric body.   Gastric mucosal abnormality (erythema) in the greater  curve of antrum.  STEVIE Test performed on 02/24/12 was Negative.    UPPER GASTROINTESTINAL ENDOSCOPY  04/18/2018    Dr. Guzman, antritis, evidence of prior surgery with healthy mucosa      Family History   Problem Relation Name Age of Onset    Arthritis Paternal Grandmother          rheumatoid arthritis    Diabetes Paternal Grandmother      Heart disease Maternal Grandfather      Cancer Father          brain and lung    Arthritis Father      Heart disease Mother      Arthritis Mother          osteoarthritis    COPD Mother      Hyperlipidemia Mother      Hypertension Mother      Kidney disease Mother      Stroke Mother      Colon cancer Mother  75    Cancer Mother  70        colon cancer    COPD Sister      Depression Daughter      Arthritis Maternal Aunt          rheumatoid arthritis    Heart disease Maternal Uncle      Early death Maternal Uncle          in 50s due to heart disease    Arthritis Paternal Aunt          rheuamtoid arthritis    Diabetes Cousin      Heart disease Cousin      Crohn's disease Neg Hx      Stomach cancer Neg Hx      Ulcerative colitis Neg Hx      Esophageal cancer Neg Hx      Breast cancer Neg Hx        Wt Readings from Last 10 Encounters:   07/14/25 71 kg (156 lb 8.4 oz)   07/08/25 70.3 kg (154 lb 15.7 oz)   07/03/25 70 kg (154 lb 3.4 oz)   06/10/25 70.8 kg (156 lb)   06/09/25 71.3 kg (157 lb 3 oz)   05/16/25 69.9 kg (154 lb)   05/07/25 70.2 kg (154 lb 12.2 oz)   04/28/25 70.9 kg (156 lb 3.2 oz)    04/10/25 71.5 kg (157 lb 10.1 oz)   03/14/25 73 kg (161 lb)     Lab Results   Component Value Date    WBC 8.71 10/03/2024    HGB 14.4 10/03/2024    HCT 44.3 10/03/2024    MCV 92 10/03/2024     10/03/2024     CMP  Sodium   Date Value Ref Range Status   10/03/2024 138 136 - 145 mmol/L Final     Potassium   Date Value Ref Range Status   10/03/2024 4.2 3.5 - 5.1 mmol/L Final     Chloride   Date Value Ref Range Status   10/03/2024 102 95 - 110 mmol/L Final     CO2   Date Value Ref Range Status   10/03/2024 28 23 - 29 mmol/L Final     Glucose   Date Value Ref Range Status   10/03/2024 121 (H) 70 - 110 mg/dL Final     BUN   Date Value Ref Range Status   10/03/2024 14 8 - 23 mg/dL Final     Creatinine   Date Value Ref Range Status   10/03/2024 0.9 0.5 - 1.4 mg/dL Final   10/03/2024 0.9 0.5 - 1.4 mg/dL Final     Calcium   Date Value Ref Range Status   10/03/2024 9.0 8.7 - 10.5 mg/dL Final     Total Protein   Date Value Ref Range Status   10/03/2024 7.2 6.0 - 8.4 g/dL Final     Albumin   Date Value Ref Range Status   10/03/2024 3.5 3.5 - 5.2 g/dL Final     Total Bilirubin   Date Value Ref Range Status   10/03/2024 0.3 0.1 - 1.0 mg/dL Final     Comment:     For infants and newborns, interpretation of results should be based  on gestational age, weight and in agreement with clinical  observations.    Premature Infant recommended reference ranges:  Up to 24 hours.............<8.0 mg/dL  Up to 48 hours............<12.0 mg/dL  3-5 days..................<15.0 mg/dL  6-29 days.................<15.0 mg/dL       Alkaline Phosphatase   Date Value Ref Range Status   10/03/2024 56 55 - 135 U/L Final     AST   Date Value Ref Range Status   10/03/2024 21 10 - 40 U/L Final     ALT   Date Value Ref Range Status   10/03/2024 21 10 - 44 U/L Final     Anion Gap   Date Value Ref Range Status   10/03/2024 8 8 - 16 mmol/L Final     eGFR if    Date Value Ref Range Status   10/05/2021 >60 >60 mL/min/1.73 m^2 Final     eGFR  if non    Date Value Ref Range Status   10/05/2021 56 (A) >60 mL/min/1.73 m^2 Final     Comment:     Calculation used to obtain the estimated glomerular filtration  rate (eGFR) is the CKD-EPI equation.                 Reviewed prior medical records including radiology report of GES 6/24/25 and CT of abdomen and pelvis 10/3/24 & endoscopy history (see surgical history).     Objective:      Physical Exam  Constitutional:       General: She is not in acute distress.     Appearance: She is well-developed.   HENT:      Head: Normocephalic.      Right Ear: Hearing normal.      Left Ear: Hearing normal.      Nose: Nose normal.      Mouth/Throat:      Mouth: No oral lesions.      Pharynx: Uvula midline.   Eyes:      General: Lids are normal.      Conjunctiva/sclera: Conjunctivae normal.      Pupils: Pupils are equal, round, and reactive to light.   Neck:      Trachea: Trachea normal.   Cardiovascular:      Rate and Rhythm: Normal rate and regular rhythm.      Heart sounds: Normal heart sounds. No murmur heard.  Pulmonary:      Effort: Pulmonary effort is normal. No respiratory distress.      Breath sounds: Normal breath sounds. No stridor. No wheezing.   Abdominal:      General: Bowel sounds are normal. There is no distension.      Palpations: Abdomen is soft. There is no mass.      Tenderness: There is abdominal tenderness (mild) in the right lower quadrant and left upper quadrant. There is no guarding or rebound.   Musculoskeletal:         General: Normal range of motion.      Cervical back: Normal range of motion.   Skin:     General: Skin is warm and dry.      Findings: No rash.      Comments: Non jaundiced   Neurological:      Mental Status: She is alert and oriented to person, place, and time.   Psychiatric:         Speech: Speech normal.         Behavior: Behavior normal. Behavior is cooperative.           Assessment:       1. History of esophagogastroduodenoscopy (EGD)    2. Esophageal dysphagia     3. Gastroesophageal reflux disease with esophagitis without hemorrhage    4. Chronic constipation    5. Screening for colon cancer    6. S/P cholecystectomy           Plan:   All diagnoses and orders for this visit:    History of esophagogastroduodenoscopy (EGD)   - Discussed results of EGD, patient verbalized understanding    Esophageal dysphagia  - FL Esophagram Complete; Future; Expected date: 07/14/2025        - Educated patient to eat smaller more frequent meals and to eat slowly and advised to eat a soft diet.    Gastroesophageal reflux disease with esophagitis without hemorrhage   - Continue Prilosec 40 mg once daily   - Continue Pepcid 40 mg nightly   - Take PPI in the morning 30 minutes before breakfast  - Recommend to avoid large meals, avoid eating within 3 hours of bedtime, elevate head of bed if nocturnal symptoms are present, smoking cessation (if current smoker), & weight loss (if overweight).   - Recommend minimize/avoid high-fat foods, chocolate, caffeine, citrus, alcohol, & tomato products.  - Advised to avoid/limit use of NSAID's, since they can cause GI upset, bleeding, and/or ulcers. If needed, take with food.      Chronic constipation   - Recommend daily exercise as tolerated, adequate water intake, and high fiber diet.   - Recommend OTC fiber supplement (Benefiber)  - Recommend OTC stool softener (Colace)  - Recommend OTC MiraLax once daily (17g PO) as directed  - Continue gastroparesis diet since helpful     Screening for colon cancer   - Next surveillance colonoscopy due 4/2026    S/P cholecystectomy    If no improvement in symptoms or symptoms worsen, call/follow-up at clinic or go to ER

## 2025-07-14 NOTE — PROGRESS NOTES
HPI     Annual Exam            Comments: LDLE with Dr Martini  Hsty : Of RK   Nonexudative age-related macular degeneration, bilateral, intermediate dry   stage  Puckering of macula, bilateral  Vitreous degeneration, bilateral  Dry eye syndrome of bilateral lacrimal glands  PCIOL OU   Seen Dmitry for FUCHS           Comments    Pt states has been seeing Dr Andres for FUCKADEEM & Vini for ARMD - last   exam 6-8 mon ago   Pt states she can no longer see them due to insurance so she's here for a   referral for both   Has specs may need an update unsure vision fluctuates  so much due to   fuchs & RK   Uses [facility] prn  Pt has ocass floaters & flashes per pt   HTN is controlled w meds             Last edited by Mine Higgins on 7/14/2025 11:12 AM.            Assessment /Plan     For exam results, see Encounter Report.    Early dry stage nonexudative age-related macular degeneration of both eyes    Fuchs' corneal dystrophy of both eyes    Pseudophakia of both eyes    Refractive error      Pt of [place], switching care to [facility], RTC with Jareth for eval  Acuity stable, Recommend artificial tears. 1 drop 2x per day. Chronicity of disease and treatment discussed.Monitor condition. Patient to report any changes. RTC 1 year recheck.  3. Monitor condition. Patient to report any changes. RTC 1 year recheck.  4. New Spectacle Rx given, discussed different options for glasses. RTC 1 year routine eye exam.

## 2025-07-15 ENCOUNTER — TELEPHONE (OUTPATIENT)
Dept: OPHTHALMOLOGY | Facility: CLINIC | Age: 75
End: 2025-07-15
Payer: MEDICARE

## 2025-07-16 ENCOUNTER — TELEPHONE (OUTPATIENT)
Dept: FAMILY MEDICINE | Facility: CLINIC | Age: 75
End: 2025-07-16
Payer: MEDICARE

## 2025-07-16 DIAGNOSIS — L40.9 PSORIASIS: Primary | ICD-10-CM

## 2025-07-16 NOTE — TELEPHONE ENCOUNTER
Copied from CRM #[id number]. Topic: General Inquiry - Patient Advice  >> Jul 16, 2025 10:27 AM Klaudia wrote:  Type:  Needs Medical Advice    Who Called: pt  Symptoms (please be specific): phsorsis break   How long has patient had these symptoms:  week and half  Would the patient rather a call back or a response via [employer]? Call back   Best Call Back Number: [phone]   Additional Information: referral for dermatology, please call or mess to advise  
Derm referral requested  Order pended for review  
8

## 2025-07-22 ENCOUNTER — TELEPHONE (OUTPATIENT)
Dept: GASTROENTEROLOGY | Facility: CLINIC | Age: 75
End: 2025-07-22
Payer: MEDICARE

## 2025-07-22 ENCOUNTER — HOSPITAL ENCOUNTER (OUTPATIENT)
Dept: RADIOLOGY | Facility: HOSPITAL | Age: 75
Discharge: HOME | End: 2025-07-22
Attending: NURSE PRACTITIONER
Payer: MEDICARE

## 2025-07-22 DIAGNOSIS — R13.19 ESOPHAGEAL DYSPHAGIA: Primary | ICD-10-CM

## 2025-07-22 DIAGNOSIS — K22.4 ESOPHAGEAL DYSMOTILITY: ICD-10-CM

## 2025-07-22 DIAGNOSIS — R13.19 ESOPHAGEAL DYSPHAGIA: ICD-10-CM

## 2025-07-22 PROCEDURE — 74220 X-RAY XM ESOPHAGUS 1CNTRST: CPT | Mod: 26,,, | Performed by: STUDENT IN AN ORGANIZED HEALTH CARE EDUCATION/TRAINING PROGRAM

## 2025-07-22 PROCEDURE — 74220 X-RAY XM ESOPHAGUS 1CNTRST: CPT | Mod: TC,PO

## 2025-07-22 PROCEDURE — A9698 NON-RAD CONTRAST MATERIALNOC: HCPCS | Mod: PO | Performed by: NURSE PRACTITIONER

## 2025-07-22 PROCEDURE — 25500020 PHARM REV CODE 255: Mod: PO | Performed by: NURSE PRACTITIONER

## 2025-07-22 RX ADMIN — BARIUM SULFATE 355 ML: 0.6 SUSPENSION ORAL at 08:07

## 2025-07-22 RX ADMIN — BARIUM SULFATE 135 ML: 980 POWDER, FOR SUSPENSION ORAL at 08:07

## 2025-07-22 NOTE — TELEPHONE ENCOUNTER
Let patient know her Esophagram showed mild esophageal dysmotility; otherwise, unremarkable. Recommend a referral to GI motility specialist (Dr. Pruitt) for further evaluation of esophageal dysmotility.

## 2025-07-24 ENCOUNTER — TELEPHONE (OUTPATIENT)
Dept: GASTROENTEROLOGY | Facility: CLINIC | Age: 75
End: 2025-07-24
Payer: MEDICARE

## 2025-07-24 NOTE — TELEPHONE ENCOUNTER
Copied from CRM #[id number]. Topic: General Inquiry - Return Call  >> Jul 23, 2025  4:39 PM Emmie wrote:  Type:  Patient Returning Call    Who Called:  Pt  Who Left Message for Patient:  Angi  Does the patient know what this is regarding?:  No  Best Call Back Number:  [phone]    Additional Information:

## 2025-07-24 NOTE — TELEPHONE ENCOUNTER
Pt has referral placed for GI motility specialist.   Please reach out to pt to schedule/add on wait list.

## 2025-07-24 NOTE — TELEPHONE ENCOUNTER
Spoke with pt. Informed of results & recommendations per Tanya Booth NP. Pt verbalized understanding.

## 2025-07-28 ENCOUNTER — OFFICE VISIT (OUTPATIENT)
Dept: OPHTHALMOLOGY | Facility: CLINIC | Age: 75
End: 2025-07-28
Payer: MEDICARE

## 2025-07-28 DIAGNOSIS — H35.3131 EARLY DRY STAGE NONEXUDATIVE AGE-RELATED MACULAR DEGENERATION OF BOTH EYES: Primary | ICD-10-CM

## 2025-07-28 DIAGNOSIS — H43.813 POSTERIOR VITREOUS DETACHMENT, BILATERAL: ICD-10-CM

## 2025-07-28 PROCEDURE — 92201 OPSCPY EXTND RTA DRAW UNI/BI: CPT | Mod: S$GLB,,, | Performed by: OPHTHALMOLOGY

## 2025-07-28 PROCEDURE — 99999 PR PBB SHADOW E&M-EST. PATIENT-LVL III: CPT | Mod: PBBFAC,,, | Performed by: OPHTHALMOLOGY

## 2025-07-28 PROCEDURE — 1101F PT FALLS ASSESS-DOCD LE1/YR: CPT | Mod: CPTII,S$GLB,, | Performed by: OPHTHALMOLOGY

## 2025-07-28 PROCEDURE — 1160F RVW MEDS BY RX/DR IN RCRD: CPT | Mod: CPTII,S$GLB,, | Performed by: OPHTHALMOLOGY

## 2025-07-28 PROCEDURE — 1126F AMNT PAIN NOTED NONE PRSNT: CPT | Mod: CPTII,S$GLB,, | Performed by: OPHTHALMOLOGY

## 2025-07-28 PROCEDURE — 3288F FALL RISK ASSESSMENT DOCD: CPT | Mod: CPTII,S$GLB,, | Performed by: OPHTHALMOLOGY

## 2025-07-28 PROCEDURE — 92014 COMPRE OPH EXAM EST PT 1/>: CPT | Mod: S$GLB,,, | Performed by: OPHTHALMOLOGY

## 2025-07-28 PROCEDURE — 92134 CPTRZ OPH DX IMG PST SGM RTA: CPT | Mod: S$GLB,,, | Performed by: OPHTHALMOLOGY

## 2025-07-28 PROCEDURE — 1159F MED LIST DOCD IN RCRD: CPT | Mod: CPTII,S$GLB,, | Performed by: OPHTHALMOLOGY

## 2025-07-28 NOTE — PROGRESS NOTES
HPI    DLS:7/14/2025    Pt here for 2wk f.u. Pt has no new changes. Pt deneis anyredness orpain.   Pt being seen for early AMD OU.     EYE MEDS:    Art tears PRN OU   Last edited by India Moscoso on 7/28/2025  2:27 PM.          OCT - few small drusen      A/P    Early AMD OU   Low risk for vision loss  No clear role for AREDS, can stop if she wishes    2. PVD OU  Long standing    3. PCIOL OU  Prior high myope  S/p RK OU  Pt with aberrations - can see k specialist if desires, but I would advocate for conservative approach at this point.      Me PRN

## (undated) DEVICE — GLOVE BIOGEL ECLIPSE SZ 7.5

## (undated) DEVICE — KIT SAHARA DRAPE DRAW/LIFT

## (undated) DEVICE — SYR PLASTIC 8ML PERIFIX LL

## (undated) DEVICE — SEE MEDLINE ITEM 152622

## (undated) DEVICE — NDL HYPO REG 25G X 1 1/2

## (undated) DEVICE — MARKER SKIN STND TIP BLUE BARR

## (undated) DEVICE — DRAPE T EXTRM SURG 121X128X90

## (undated) DEVICE — DRAPE HALF SURGICAL 40X58IN

## (undated) DEVICE — BLADE MEDIUM 9MM X 25MM

## (undated) DEVICE — TRAY NERVE BLOCK

## (undated) DEVICE — DRAPE THREE-QTR REINF 53X77IN

## (undated) DEVICE — GAUZE SPONGE 4X4 12PLY

## (undated) DEVICE — APPLICATOR CHLORAPREP CLR 10.5

## (undated) DEVICE — BIT DRILL SCALED 2X105MM

## (undated) DEVICE — APPLICATOR CHLORAPREP ORN 26ML

## (undated) DEVICE — NDL SPINAL SPINOCAN 22GX3.5

## (undated) DEVICE — SPONGE DERMACEA GAUZE 4X4

## (undated) DEVICE — BANDAGE ESMARK LATEX FREE 4INX

## (undated) DEVICE — SCREW BONE NON LOCK 3.5X30MM
Type: IMPLANTABLE DEVICE | Site: FOOT | Status: NON-FUNCTIONAL
Removed: 2023-07-13

## (undated) DEVICE — Device

## (undated) DEVICE — SUT ETHILON 4-0 PS2 18 BLK

## (undated) DEVICE — BANDAGE MATRIX HK LOOP 4IN 5YD

## (undated) DEVICE — GLOVE SENSICARE PI MICRO 6

## (undated) DEVICE — DRESSING XEROFORM FOIL PK 1X8

## (undated) DEVICE — GLOVE PROTEXIS HYDROGEL SZ8

## (undated) DEVICE — NDL TUOHY EPIDURAL 20G X 3.5

## (undated) DEVICE — DRAPE EXTREMITY W/ABC NON-SLIP

## (undated) DEVICE — SOL SOD CHLORIDE 0.9% 10ML

## (undated) DEVICE — GLOVE SURGICAL LATEX SZ 7

## (undated) DEVICE — SUT 3-0 VICRYL / SH (J416)

## (undated) DEVICE — BIT DRILL AO 2.6X135MM SCALED

## (undated) DEVICE — BLADE SURG #15 CARBON STEEL

## (undated) DEVICE — KWIRE OLIVE LG THD 1.6X65MM
Type: IMPLANTABLE DEVICE | Site: FOOT | Status: NON-FUNCTIONAL
Removed: 2023-07-13

## (undated) DEVICE — STOCKINET 4INX48

## (undated) DEVICE — GLOVE PROTEXIS BLUE LATEX 7.5

## (undated) DEVICE — ELECTRODE REM PLYHSV RETURN 9

## (undated) DEVICE — SUT ETHILON 3-0 FS-1 30

## (undated) DEVICE — SEE L#120831

## (undated) DEVICE — DRAPE STERI-DRAPE 1000 17X11IN

## (undated) DEVICE — GLOVE 7.5 PROTEXIS PI MICRO

## (undated) DEVICE — PENCIL ROCKER SWITCH 10FT CORD

## (undated) DEVICE — STRAP OR TABLE 5IN X 72IN

## (undated) DEVICE — SYR 10CC LUER LOCK

## (undated) DEVICE — KWIRE ORTHOLOC 3DI 2.5X150MM
Type: IMPLANTABLE DEVICE | Site: FOOT | Status: NON-FUNCTIONAL
Removed: 2023-07-13

## (undated) DEVICE — MARKER SKIN RULER STERILE

## (undated) DEVICE — GUIDEWIRE ORTHO 1.6X150MM
Type: IMPLANTABLE DEVICE | Site: FOOT | Status: NON-FUNCTIONAL
Removed: 2023-07-13